# Patient Record
Sex: MALE | Race: WHITE | NOT HISPANIC OR LATINO | URBAN - METROPOLITAN AREA
[De-identification: names, ages, dates, MRNs, and addresses within clinical notes are randomized per-mention and may not be internally consistent; named-entity substitution may affect disease eponyms.]

---

## 2017-07-20 ENCOUNTER — OUTPATIENT (OUTPATIENT)
Dept: OUTPATIENT SERVICES | Facility: HOSPITAL | Age: 77
LOS: 1 days | End: 2017-07-20
Payer: MEDICARE

## 2017-07-20 ENCOUNTER — RESULT REVIEW (OUTPATIENT)
Age: 77
End: 2017-07-20

## 2017-07-20 VITALS
HEIGHT: 66 IN | SYSTOLIC BLOOD PRESSURE: 124 MMHG | RESPIRATION RATE: 14 BRPM | DIASTOLIC BLOOD PRESSURE: 77 MMHG | WEIGHT: 165.79 LBS | HEART RATE: 65 BPM | OXYGEN SATURATION: 96 % | TEMPERATURE: 99 F

## 2017-07-20 VITALS
RESPIRATION RATE: 18 BRPM | HEART RATE: 76 BPM | DIASTOLIC BLOOD PRESSURE: 76 MMHG | OXYGEN SATURATION: 97 % | SYSTOLIC BLOOD PRESSURE: 129 MMHG

## 2017-07-20 DIAGNOSIS — H27.01 APHAKIA, RIGHT EYE: ICD-10-CM

## 2017-07-20 DIAGNOSIS — T85.398D OTHER MECHANICAL COMPLICATION OF OTHER OCULAR PROSTHETIC DEVICES, IMPLANTS AND GRAFTS, SUBSEQUENT ENCOUNTER: ICD-10-CM

## 2017-07-20 DIAGNOSIS — Z90.49 ACQUIRED ABSENCE OF OTHER SPECIFIED PARTS OF DIGESTIVE TRACT: Chronic | ICD-10-CM

## 2017-07-20 DIAGNOSIS — Z98.49 CATARACT EXTRACTION STATUS, UNSPECIFIED EYE: Chronic | ICD-10-CM

## 2017-07-20 DIAGNOSIS — Z98.890 OTHER SPECIFIED POSTPROCEDURAL STATES: Chronic | ICD-10-CM

## 2017-07-20 PROCEDURE — 66986 EXCHANGE LENS PROSTHESIS: CPT | Mod: RT

## 2017-07-20 PROCEDURE — 88300 SURGICAL PATH GROSS: CPT

## 2017-07-20 PROCEDURE — 67036 REMOVAL OF INNER EYE FLUID: CPT | Mod: RT

## 2017-07-20 PROCEDURE — 88300 SURGICAL PATH GROSS: CPT | Mod: 26

## 2017-07-20 NOTE — ASU PATIENT PROFILE, ADULT - HEALTHCARE QUESTIONS, PROFILE
spoke with Dr. Fang and Dr. Rivas spoke with Dr. Fang and Dr. Chaidez from anesthesia prior to procedure

## 2017-07-20 NOTE — ASU PATIENT PROFILE, ADULT - PSH
Bunion    Carpal Tunnel Syndrome  right hand  Cataract extraction status  right  History of eye surgery  PPV right  Inguinal Hernia Bilateral    S/P eye surgery  "removed fluid" from rigth eye  Shoulder Problem  right rotator cuff  Sinus Disorder  surgery x 2  Status post lung surgery  ? wedge resection Bunion    Carpal Tunnel Syndrome  right hand  Cataract extraction status  right  History of colon resection    History of eye surgery  PPV right  Inguinal Hernia Bilateral    S/P eye surgery  "removed fluid" from rigth eye  Shoulder Problem  right rotator cuff  Sinus Disorder  surgery x 2  Status post lung surgery  ? wedge resection

## 2017-07-20 NOTE — ASU PATIENT PROFILE, ADULT - PMH
Anxiety    Asthma  Controlled  Depression    Diverticulitis    Nocturia    Panic attacks    Sleep Apnea  (+) CPAP use

## 2017-07-20 NOTE — ASU DISCHARGE PLAN (ADULT/PEDIATRIC). - SPECIAL INSTRUCTIONS
LEAVE PATCH AND SHIELD ON  FOLLOW UP TOMORROW IN OFFICE  CALL OFFICE [phone] IF NEEDED FOR EMERGENCY

## 2017-07-23 ENCOUNTER — TRANSCRIPTION ENCOUNTER (OUTPATIENT)
Age: 77
End: 2017-07-23

## 2018-05-07 ENCOUNTER — APPOINTMENT (OUTPATIENT)
Dept: FAMILY MEDICINE | Facility: CLINIC | Age: 78
End: 2018-05-07

## 2018-05-07 ENCOUNTER — APPOINTMENT (OUTPATIENT)
Dept: FAMILY MEDICINE | Facility: CLINIC | Age: 78
End: 2018-05-07
Payer: MEDICARE

## 2018-05-07 VITALS
OXYGEN SATURATION: 98 % | BODY MASS INDEX: 25.77 KG/M2 | SYSTOLIC BLOOD PRESSURE: 100 MMHG | HEIGHT: 68.5 IN | HEART RATE: 88 BPM | WEIGHT: 172 LBS | DIASTOLIC BLOOD PRESSURE: 60 MMHG

## 2018-05-07 DIAGNOSIS — K21.9 GASTRO-ESOPHAGEAL REFLUX DISEASE W/OUT ESOPHAGITIS: ICD-10-CM

## 2018-05-07 DIAGNOSIS — N32.81 OVERACTIVE BLADDER: ICD-10-CM

## 2018-05-07 DIAGNOSIS — E78.00 PURE HYPERCHOLESTEROLEMIA, UNSPECIFIED: ICD-10-CM

## 2018-05-07 PROCEDURE — 99204 OFFICE O/P NEW MOD 45 MIN: CPT

## 2018-05-07 PROCEDURE — 99214 OFFICE O/P EST MOD 30 MIN: CPT

## 2018-05-07 RX ORDER — ATORVASTATIN CALCIUM 40 MG/1
40 TABLET, FILM COATED ORAL
Refills: 0 | Status: ACTIVE | COMMUNITY

## 2018-05-07 RX ORDER — ESOMEPRAZOLE MAGNESIUM 40 MG/1
40 GRANULE, DELAYED RELEASE ORAL
Refills: 0 | Status: ACTIVE | COMMUNITY

## 2018-05-07 RX ORDER — OXYBUTYNIN CHLORIDE 5 MG/1
5 TABLET, EXTENDED RELEASE ORAL
Refills: 0 | Status: ACTIVE | COMMUNITY

## 2018-08-06 ENCOUNTER — APPOINTMENT (OUTPATIENT)
Dept: FAMILY MEDICINE | Facility: CLINIC | Age: 78
End: 2018-08-06
Payer: MEDICARE

## 2018-08-06 VITALS
SYSTOLIC BLOOD PRESSURE: 120 MMHG | DIASTOLIC BLOOD PRESSURE: 68 MMHG | HEART RATE: 70 BPM | WEIGHT: 171 LBS | BODY MASS INDEX: 25.62 KG/M2 | RESPIRATION RATE: 16 BRPM | OXYGEN SATURATION: 97 % | HEIGHT: 68.5 IN

## 2018-08-06 DIAGNOSIS — R53.83 OTHER MALAISE: ICD-10-CM

## 2018-08-06 DIAGNOSIS — Z00.00 ENCOUNTER FOR GENERAL ADULT MEDICAL EXAMINATION W/OUT ABNORMAL FINDINGS: ICD-10-CM

## 2018-08-06 DIAGNOSIS — F32.9 ANXIETY DISORDER, UNSPECIFIED: ICD-10-CM

## 2018-08-06 DIAGNOSIS — G89.29 DORSALGIA, UNSPECIFIED: ICD-10-CM

## 2018-08-06 DIAGNOSIS — R53.81 OTHER MALAISE: ICD-10-CM

## 2018-08-06 DIAGNOSIS — M54.9 DORSALGIA, UNSPECIFIED: ICD-10-CM

## 2018-08-06 DIAGNOSIS — F41.9 ANXIETY DISORDER, UNSPECIFIED: ICD-10-CM

## 2018-08-06 PROCEDURE — 93000 ELECTROCARDIOGRAM COMPLETE: CPT

## 2018-08-06 PROCEDURE — 36415 COLL VENOUS BLD VENIPUNCTURE: CPT

## 2018-08-06 PROCEDURE — G0438: CPT

## 2018-08-06 RX ORDER — ASPIRIN 81 MG
81 TABLET, DELAYED RELEASE (ENTERIC COATED) ORAL
Refills: 0 | Status: ACTIVE | COMMUNITY

## 2018-08-06 RX ORDER — SERTRALINE HYDROCHLORIDE 100 MG/1
100 TABLET, FILM COATED ORAL DAILY
Qty: 180 | Refills: 0 | Status: ACTIVE | COMMUNITY

## 2018-08-06 RX ORDER — ARIPIPRAZOLE 5 MG/1
5 TABLET ORAL DAILY
Qty: 90 | Refills: 0 | Status: ACTIVE | COMMUNITY

## 2018-08-06 NOTE — HEALTH RISK ASSESSMENT
[Good] : ~his/her~  mood as  good [] : No [No falls in past year] : Patient reported no falls in the past year [3] : 2) Feeling down, depressed, or hopeless for nearly every day (3) [[id number]] : 6 [Patient reported colonoscopy was normal] : Patient reported colonoscopy was normal [HIV Test offered] : HIV Test offered [Hepatitis C test offered] : Hepatitis C test offered [None] : None [With Significant Other] : lives with significant other [Retired] : retired [] :  [# Of Children ___] : has [unfilled] children [Fully functional (bathing, dressing, toileting, transferring, walking, feeding)] : Fully functional (bathing, dressing, toileting, transferring, walking, feeding) [Fully functional (using the telephone, shopping, preparing meals, housekeeping, doing laundry, using] : Fully functional and needs no help or supervision to perform IADLs (using the telephone, shopping, preparing meals, housekeeping, doing laundry, using transportation, managing medications and managing finances) [Reports changes in hearing] : Reports no changes in hearing [Reports changes in vision] : Reports no changes in vision [Reports changes in dental health] : Reports no changes in dental health [Smoke Detector] : smoke detector [Seat Belt] :  uses seat belt [Sunscreen] : does not use sunscreen [TB Exposure] : is not being exposed to tuberculosis [ColonoscopyDate] : 2015 [Discussed at today's visit] : Advance Directives Discussed at today's visit

## 2018-08-06 NOTE — COUNSELING
[Behavioral health counseling provided] : behavioral health  [Engage in a relaxing activity] : Engage in a relaxing activity [None] : None [Good understanding] : Patient has a good understanding of lifestyle changes and the steps needed to achieve self management goals

## 2018-08-06 NOTE — ASSESSMENT
[FreeTextEntry1] : Discussed diagnosis of anxiety and depression with the patient and potential outcomes/side affects of treatment versus non treatment. Medications were assessed and described at length. Side affects and black box warning were discussed.  Patient was advised to continue will all medications prescribed and the need for compliance was discussed and emphasized. Patient was advised to not stop medications without discussing with a health care provider first. Patient was advised to continue psychotherapy or seek therapy if not currently attending.  Patient verbalized understanding of all the above.\par will restart zoloft, 1 pill daily for two weeks and then two pills\par was stable and happy on previous regimine of zoloft 200 and abilify daily\par \par reflux - only uses meds as needed\par \par overactive bladder - controlled with oxybutynin\par \par chronic pain - will give percocet for only as needed\par \par

## 2018-08-06 NOTE — HISTORY OF PRESENT ILLNESS
[de-identified] : 78 year old male  here for annual well visit. Patient's blood work was drawn and medications reviewed. Patient's past medical history was reviewed, allergies verified and problems were identified and assessed. Patients medications were reviewed. \par \par Patient is not happy - very depressed and tired\par patient has a lot of family issues going on\par

## 2018-08-06 NOTE — PHYSICAL EXAM
[Well Nourished] : well nourished [Normal Oropharynx] : the oropharynx was normal [Clear to Auscultation] : lungs were clear to auscultation bilaterally [Regular Rhythm] : with a regular rhythm [Normal S1, S2] : normal S1 and S2 [Non Tender] : non-tender [No CVA Tenderness] : no CVA  tenderness [No Joint Swelling] : no joint swelling [No Rash] : no rash [Normal Gait] : normal gait [Normal Affect] : the affect was normal [Normal Insight/Judgement] : insight and judgment were intact

## 2018-08-07 LAB
ALBUMIN SERPL ELPH-MCNC: 4 G/DL
ALP BLD-CCNC: 78 U/L
ALT SERPL-CCNC: 15 U/L
ANION GAP SERPL CALC-SCNC: 15 MMOL/L
AST SERPL-CCNC: 21 U/L
BASOPHILS # BLD AUTO: 0.05 K/UL
BASOPHILS NFR BLD AUTO: 0.7 %
BILIRUB SERPL-MCNC: 0.6 MG/DL
BUN SERPL-MCNC: 19 MG/DL
CALCIUM SERPL-MCNC: 9.5 MG/DL
CHLORIDE SERPL-SCNC: 98 MMOL/L
CHOLEST SERPL-MCNC: 173 MG/DL
CHOLEST/HDLC SERPL: 3.7 RATIO
CO2 SERPL-SCNC: 28 MMOL/L
CREAT SERPL-MCNC: 1.17 MG/DL
EOSINOPHIL # BLD AUTO: 0.26 K/UL
EOSINOPHIL NFR BLD AUTO: 3.4 %
GLUCOSE SERPL-MCNC: 56 MG/DL
HBA1C MFR BLD HPLC: 5.6 %
HCT VFR BLD CALC: 44.4 %
HDLC SERPL-MCNC: 47 MG/DL
HGB BLD-MCNC: 13.6 G/DL
IMM GRANULOCYTES NFR BLD AUTO: 0.9 %
LDLC SERPL CALC-MCNC: 96 MG/DL
LYMPHOCYTES # BLD AUTO: 1.33 K/UL
LYMPHOCYTES NFR BLD AUTO: 17.6 %
MAN DIFF?: NORMAL
MCHC RBC-ENTMCNC: 27.1 PG
MCHC RBC-ENTMCNC: 30.6 GM/DL
MCV RBC AUTO: 88.6 FL
MONOCYTES # BLD AUTO: 0.83 K/UL
MONOCYTES NFR BLD AUTO: 11 %
NEUTROPHILS # BLD AUTO: 5.01 K/UL
NEUTROPHILS NFR BLD AUTO: 66.4 %
PLATELET # BLD AUTO: 182 K/UL
POTASSIUM SERPL-SCNC: 4.5 MMOL/L
PROT SERPL-MCNC: 7.5 G/DL
PSA SERPL-MCNC: 0.47 NG/ML
RBC # BLD: 5.01 M/UL
RBC # FLD: 14.7 %
SODIUM SERPL-SCNC: 141 MMOL/L
TRIGL SERPL-MCNC: 149 MG/DL
TSH SERPL-ACNC: 1.66 UIU/ML
WBC # FLD AUTO: 7.55 K/UL

## 2018-09-04 ENCOUNTER — MEDICATION RENEWAL (OUTPATIENT)
Age: 78
End: 2018-09-04

## 2018-09-04 RX ORDER — OXYCODONE AND ACETAMINOPHEN 5; 325 MG/1; MG/1
5-325 TABLET ORAL
Qty: 90 | Refills: 0 | Status: ACTIVE | COMMUNITY
Start: 2018-08-06 | End: 1900-01-01

## 2019-04-25 ENCOUNTER — INPATIENT (INPATIENT)
Facility: HOSPITAL | Age: 79
LOS: 30 days | DRG: 3 | End: 2019-05-26
Attending: STUDENT IN AN ORGANIZED HEALTH CARE EDUCATION/TRAINING PROGRAM | Admitting: INTERNAL MEDICINE
Payer: MEDICARE

## 2019-04-25 VITALS
RESPIRATION RATE: 16 BRPM | DIASTOLIC BLOOD PRESSURE: 78 MMHG | TEMPERATURE: 98 F | WEIGHT: 175.27 LBS | OXYGEN SATURATION: 99 % | SYSTOLIC BLOOD PRESSURE: 155 MMHG | HEIGHT: 68 IN | HEART RATE: 71 BPM

## 2019-04-25 DIAGNOSIS — Z90.49 ACQUIRED ABSENCE OF OTHER SPECIFIED PARTS OF DIGESTIVE TRACT: Chronic | ICD-10-CM

## 2019-04-25 DIAGNOSIS — Z98.49 CATARACT EXTRACTION STATUS, UNSPECIFIED EYE: Chronic | ICD-10-CM

## 2019-04-25 DIAGNOSIS — I25.10 ATHEROSCLEROTIC HEART DISEASE OF NATIVE CORONARY ARTERY WITHOUT ANGINA PECTORIS: ICD-10-CM

## 2019-04-25 DIAGNOSIS — Z98.890 OTHER SPECIFIED POSTPROCEDURAL STATES: Chronic | ICD-10-CM

## 2019-04-25 DIAGNOSIS — I20.9 ANGINA PECTORIS, UNSPECIFIED: ICD-10-CM

## 2019-04-25 LAB
ALBUMIN SERPL ELPH-MCNC: 4.1 G/DL — SIGNIFICANT CHANGE UP (ref 3.3–5)
ALP SERPL-CCNC: 61 U/L — SIGNIFICANT CHANGE UP (ref 40–120)
ALT FLD-CCNC: 18 U/L — SIGNIFICANT CHANGE UP (ref 10–45)
ANION GAP SERPL CALC-SCNC: 13 MMOL/L — SIGNIFICANT CHANGE UP (ref 5–17)
APPEARANCE UR: CLEAR — SIGNIFICANT CHANGE UP
APTT BLD: 29 SEC — SIGNIFICANT CHANGE UP (ref 27.5–36.3)
AST SERPL-CCNC: 24 U/L — SIGNIFICANT CHANGE UP (ref 10–40)
BACTERIA # UR AUTO: NEGATIVE — SIGNIFICANT CHANGE UP
BILIRUB SERPL-MCNC: 0.8 MG/DL — SIGNIFICANT CHANGE UP (ref 0.2–1.2)
BILIRUB UR-MCNC: NEGATIVE — SIGNIFICANT CHANGE UP
BLD GP AB SCN SERPL QL: NEGATIVE — SIGNIFICANT CHANGE UP
BUN SERPL-MCNC: 18 MG/DL — SIGNIFICANT CHANGE UP (ref 7–23)
CALCIUM SERPL-MCNC: 10.1 MG/DL — SIGNIFICANT CHANGE UP (ref 8.4–10.5)
CHLORIDE SERPL-SCNC: 103 MMOL/L — SIGNIFICANT CHANGE UP (ref 96–108)
CHOLEST SERPL-MCNC: 213 MG/DL — HIGH (ref 10–199)
CO2 SERPL-SCNC: 28 MMOL/L — SIGNIFICANT CHANGE UP (ref 22–31)
COLOR SPEC: YELLOW — SIGNIFICANT CHANGE UP
CREAT SERPL-MCNC: 1.06 MG/DL — SIGNIFICANT CHANGE UP (ref 0.5–1.3)
DIFF PNL FLD: NEGATIVE — SIGNIFICANT CHANGE UP
EPI CELLS # UR: 1 /HPF — SIGNIFICANT CHANGE UP
GLUCOSE SERPL-MCNC: 88 MG/DL — SIGNIFICANT CHANGE UP (ref 70–99)
GLUCOSE UR QL: NEGATIVE — SIGNIFICANT CHANGE UP
HBA1C BLD-MCNC: 5.4 % — SIGNIFICANT CHANGE UP (ref 4–5.6)
HCT VFR BLD CALC: 46.4 % — SIGNIFICANT CHANGE UP (ref 39–50)
HDLC SERPL-MCNC: 38 MG/DL — LOW
HGB BLD-MCNC: 15.3 G/DL — SIGNIFICANT CHANGE UP (ref 13–17)
HYALINE CASTS # UR AUTO: 0 /LPF — SIGNIFICANT CHANGE UP (ref 0–2)
INR BLD: 1.03 RATIO — SIGNIFICANT CHANGE UP (ref 0.88–1.16)
KETONES UR-MCNC: NEGATIVE — SIGNIFICANT CHANGE UP
LEUKOCYTE ESTERASE UR-ACNC: NEGATIVE — SIGNIFICANT CHANGE UP
LIPID PNL WITH DIRECT LDL SERPL: 146 MG/DL — HIGH
MCHC RBC-ENTMCNC: 28.9 PG — SIGNIFICANT CHANGE UP (ref 27–34)
MCHC RBC-ENTMCNC: 32.9 GM/DL — SIGNIFICANT CHANGE UP (ref 32–36)
MCV RBC AUTO: 87.7 FL — SIGNIFICANT CHANGE UP (ref 80–100)
NITRITE UR-MCNC: NEGATIVE — SIGNIFICANT CHANGE UP
NT-PROBNP SERPL-SCNC: 1061 PG/ML — HIGH (ref 0–300)
PH UR: 7 — SIGNIFICANT CHANGE UP (ref 5–8)
PLATELET # BLD AUTO: 166 K/UL — SIGNIFICANT CHANGE UP (ref 150–400)
POTASSIUM SERPL-MCNC: 4.7 MMOL/L — SIGNIFICANT CHANGE UP (ref 3.5–5.3)
POTASSIUM SERPL-SCNC: 4.7 MMOL/L — SIGNIFICANT CHANGE UP (ref 3.5–5.3)
PROT SERPL-MCNC: 7.6 G/DL — SIGNIFICANT CHANGE UP (ref 6–8.3)
PROT UR-MCNC: ABNORMAL
PROTHROM AB SERPL-ACNC: 11.7 SEC — SIGNIFICANT CHANGE UP (ref 10–12.9)
RBC # BLD: 5.29 M/UL — SIGNIFICANT CHANGE UP (ref 4.2–5.8)
RBC # FLD: 12.3 % — SIGNIFICANT CHANGE UP (ref 10.3–14.5)
RBC CASTS # UR COMP ASSIST: 1 /HPF — SIGNIFICANT CHANGE UP (ref 0–4)
RH IG SCN BLD-IMP: POSITIVE — SIGNIFICANT CHANGE UP
SODIUM SERPL-SCNC: 144 MMOL/L — SIGNIFICANT CHANGE UP (ref 135–145)
SP GR SPEC: >1.05 (ref 1.01–1.02)
T3 SERPL-MCNC: 91 NG/DL — SIGNIFICANT CHANGE UP (ref 80–200)
T4 AB SER-ACNC: 7.2 UG/DL — SIGNIFICANT CHANGE UP (ref 4.6–12)
TOTAL CHOLESTEROL/HDL RATIO MEASUREMENT: 5.6 RATIO — SIGNIFICANT CHANGE UP (ref 3.4–9.6)
TRIGL SERPL-MCNC: 147 MG/DL — SIGNIFICANT CHANGE UP (ref 10–149)
TSH SERPL-MCNC: 1.48 UIU/ML — SIGNIFICANT CHANGE UP (ref 0.27–4.2)
UROBILINOGEN FLD QL: NEGATIVE — SIGNIFICANT CHANGE UP
WBC # BLD: 6.9 K/UL — SIGNIFICANT CHANGE UP (ref 3.8–10.5)
WBC # FLD AUTO: 6.9 K/UL — SIGNIFICANT CHANGE UP (ref 3.8–10.5)
WBC UR QL: 3 /HPF — SIGNIFICANT CHANGE UP (ref 0–5)

## 2019-04-25 PROCEDURE — 93306 TTE W/DOPPLER COMPLETE: CPT | Mod: 26

## 2019-04-25 PROCEDURE — 93010 ELECTROCARDIOGRAM REPORT: CPT

## 2019-04-25 PROCEDURE — 99152 MOD SED SAME PHYS/QHP 5/>YRS: CPT | Mod: GC

## 2019-04-25 PROCEDURE — 93456 R HRT CORONARY ARTERY ANGIO: CPT | Mod: 26,GC

## 2019-04-25 PROCEDURE — 76937 US GUIDE VASCULAR ACCESS: CPT | Mod: 26,GC

## 2019-04-25 PROCEDURE — 33518 CABG ARTERY-VEIN TWO: CPT | Mod: AS

## 2019-04-25 PROCEDURE — 71045 X-RAY EXAM CHEST 1 VIEW: CPT | Mod: 26

## 2019-04-25 PROCEDURE — 33533 CABG ARTERIAL SINGLE: CPT | Mod: AS

## 2019-04-25 PROCEDURE — 93880 EXTRACRANIAL BILAT STUDY: CPT | Mod: 26

## 2019-04-25 PROCEDURE — 99221 1ST HOSP IP/OBS SF/LOW 40: CPT | Mod: 24

## 2019-04-25 RX ORDER — CEFUROXIME AXETIL 250 MG
1500 TABLET ORAL ONCE
Qty: 0 | Refills: 0 | Status: DISCONTINUED | OUTPATIENT
Start: 2019-04-25 | End: 2019-04-26

## 2019-04-25 RX ORDER — CHLORHEXIDINE GLUCONATE 213 G/1000ML
1 SOLUTION TOPICAL ONCE
Qty: 0 | Refills: 0 | Status: COMPLETED | OUTPATIENT
Start: 2019-04-26 | End: 2019-04-26

## 2019-04-25 RX ORDER — METOPROLOL TARTRATE 50 MG
1 TABLET ORAL
Qty: 0 | Refills: 0 | COMMUNITY

## 2019-04-25 RX ORDER — METOPROLOL TARTRATE 50 MG
25 TABLET ORAL DAILY
Qty: 0 | Refills: 0 | Status: DISCONTINUED | OUTPATIENT
Start: 2019-04-25 | End: 2019-04-26

## 2019-04-25 RX ORDER — ASPIRIN/CALCIUM CARB/MAGNESIUM 324 MG
1 TABLET ORAL
Qty: 0 | Refills: 0 | COMMUNITY

## 2019-04-25 RX ORDER — ESCITALOPRAM OXALATE 10 MG/1
1 TABLET, FILM COATED ORAL
Qty: 0 | Refills: 0 | COMMUNITY

## 2019-04-25 RX ORDER — ACETAMINOPHEN 500 MG
650 TABLET ORAL ONCE
Qty: 0 | Refills: 0 | Status: COMPLETED | OUTPATIENT
Start: 2019-04-25 | End: 2019-04-25

## 2019-04-25 RX ORDER — CHLORHEXIDINE GLUCONATE 213 G/1000ML
1 SOLUTION TOPICAL ONCE
Qty: 0 | Refills: 0 | Status: COMPLETED | OUTPATIENT
Start: 2019-04-25 | End: 2019-04-25

## 2019-04-25 RX ORDER — CHLORHEXIDINE GLUCONATE 213 G/1000ML
30 SOLUTION TOPICAL ONCE
Qty: 0 | Refills: 0 | Status: COMPLETED | OUTPATIENT
Start: 2019-04-25 | End: 2019-04-26

## 2019-04-25 RX ORDER — ESCITALOPRAM OXALATE 10 MG/1
10 TABLET, FILM COATED ORAL DAILY
Qty: 0 | Refills: 0 | Status: DISCONTINUED | OUTPATIENT
Start: 2019-04-25 | End: 2019-04-26

## 2019-04-25 RX ORDER — ASPIRIN/CALCIUM CARB/MAGNESIUM 324 MG
81 TABLET ORAL DAILY
Qty: 0 | Refills: 0 | Status: DISCONTINUED | OUTPATIENT
Start: 2019-04-25 | End: 2019-04-26

## 2019-04-25 RX ORDER — ATORVASTATIN CALCIUM 80 MG/1
40 TABLET, FILM COATED ORAL AT BEDTIME
Qty: 0 | Refills: 0 | Status: DISCONTINUED | OUTPATIENT
Start: 2019-04-25 | End: 2019-04-26

## 2019-04-25 RX ADMIN — Medication 650 MILLIGRAM(S): at 11:30

## 2019-04-25 RX ADMIN — Medication 81 MILLIGRAM(S): at 17:55

## 2019-04-25 RX ADMIN — Medication 650 MILLIGRAM(S): at 10:42

## 2019-04-25 RX ADMIN — Medication 25 MILLIGRAM(S): at 17:55

## 2019-04-25 RX ADMIN — ATORVASTATIN CALCIUM 40 MILLIGRAM(S): 80 TABLET, FILM COATED ORAL at 21:10

## 2019-04-25 RX ADMIN — ESCITALOPRAM OXALATE 10 MILLIGRAM(S): 10 TABLET, FILM COATED ORAL at 18:02

## 2019-04-25 RX ADMIN — CHLORHEXIDINE GLUCONATE 1 APPLICATION(S): 213 SOLUTION TOPICAL at 22:00

## 2019-04-25 NOTE — H&P CARDIOLOGY - PMH
Anxiety    Asthma  Controlled  Depression    Diverticulitis    Nocturia    Panic attacks    Sleep Apnea

## 2019-04-25 NOTE — H&P CARDIOLOGY - HISTORY OF PRESENT ILLNESS
This is a 79 yo   male with PMH of HLD, Sleep apnea ( non compliant with CPAP), GERD, diverticulitis, depression, anxiety, and panic attack. Denies significant PMH of heart disease but reports early CAD in family; mother  of MI at age 54 and uncle ( mothers brother) at age 37.  Presents to Dr Feng with c/c of chest discomfort associated with dyspnea ( on exertion after walking 100 feet)  and palpitations for the past 1 year.  CP is described as 'pinch like", intermittent, sporadic, lasting 1-2 min, localized in the left anterior chest non radiating; CP triggered by exercise and stress. Abnormal NST ( last week) : mild to moderate abnormal study with medium sized inferior and post- lateral ischemia without infarct, EF 50%.  Holter monitor revealed: frequent PVCs, ECHO revealed AR, concentric LVH, diastolic dysfunction, and MR. Referred here today for R and LHC. Presently asymptomatic, denies chest pain, dyspnea, dizziness, palpitations, N&V, HA, LE edema.       PCP: Marleny Ceja This is a 77 yo   male with PMH of HLD, Sleep apnea ( non compliant with CPAP), GERD, diverticulitis, depression, anxiety, and panic attack. Denies significant PMH of heart disease but reports early CAD in family; mother  of MI at age 54 and uncle ( mothers brother) at age 37.  Presents to Dr Feng with c/c of chest discomfort associated with dyspnea ( on exertion after walking 100 feet)  and palpitations for the past 1 year.  CP is described as 'pinch like", intermittent, sporadic, lasting 1-2 min, localized in the left anterior chest non radiating; CP triggered by exercise and stress. Abnormal NST ( last week) : mild to moderate abnormal study with medium sized inferior and post- lateral ischemia without infarct, EF 50%.  Holter monitor revealed: frequent PVCs, ECHO revealed AR, concentric LVH, diastolic dysfunction, and MRGuille Referred here today for R and LHC. Presently asymptomatic, denies chest pain, dyspnea, dizziness, palpitations, N&V, HA, LE edema.    OF NOTE: patient was started on Toprol 25mg PO daily however he never picked it up from pharmacy because he states" he did not know about it"    PCP: Marleny Ceja

## 2019-04-25 NOTE — CONSULT NOTE ADULT - ASSESSMENT
78 yr old male with complaints of 78 yr old male with complaints of intermittent chest pain and SOB for CABG in am

## 2019-04-25 NOTE — CONSULT NOTE ADULT - SUBJECTIVE AND OBJECTIVE BOX
History of Present Illness:  78y Male    Past Medical History  Diverticulitis  Nocturia  Panic attacks  Anxiety  Depression  Asthma: Controlled  Sleep Apnea      Past Surgical History  History of partial colectomy  History of colon resection  History of eye surgery: PPV right  Cataract extraction status: right  Status post lung surgery: ? wedge resection  S/P eye surgery: &quot;removed fluid&quot; from rigth eye  Bunion  Sinus Disorder: surgery x 2  Inguinal Hernia Bilateral  Shoulder Problem: right rotator cuff  Carpal Tunnel Syndrome: right hand      MEDICATIONS  (STANDING):  aspirin enteric coated 81 milliGRAM(s) Oral daily  atorvastatin 40 milliGRAM(s) Oral at bedtime  cefuroxime  IVPB 1500 milliGRAM(s) IV Intermittent once  chlorhexidine 0.12% Liquid 30 milliLiter(s) Swish and Spit once  chlorhexidine 4% Liquid 1 Application(s) Topical once  escitalopram 10 milliGRAM(s) Oral daily  metoprolol succinate ER 25 milliGRAM(s) Oral daily    MEDICATIONS  (PRN):    Antiplatelet therapy:                           Last dose/amt:    Allergies: No Known Allergies      SOCIAL HISTORY:  Smoker: [ ] Yes  [ ] No        PACK YEARS:                         WHEN QUIT?  ETOH use: [ ] Yes  [ ] No              FREQUENCY / QUANTITY:  Ilicit Drug use:  [ ] Yes  [ ] No  Occupation:  Live with:  Assist device use:    Relevant Family History  FAMILY HISTORY:  Family history of acute myocardial infarction (Sibling): mom at 54   mothers brother at 37      Review of Systems  GENERAL:  Fevers[] chills[] sweats[] fatigue[] weight loss[] weight gain []                                        NEURO:  parathesias[] seizures []  syncope []  confusion []                                                                                  EYES: glasses[]  blurry vision[]  discharge[] pain[] glaucoma []                                                                            ENMT:  difficulty hearing []  vertigo[]  dysphagia[] epistaxis[] recent dental work []                                      CV:  chest pain[] palpitations[] COLON [] diaphoresis [] edema[]                                                                                             RESPIRATORY:  wheezing[] SOB[] cough [] sputum[] hemoptysis[]                                                                    GI:  nausea[]  vomiting []  diarrhea[] constipation [] melena []                                                                        : hematuria[ ]  dysuria[ ] urgency[] incontinence[]                                                                                              MUSKULOSKELETAL:  arthritis[ ]  joint swelling [ ] muscle weakness [ ]                                                                  SKIN/BREAST:  rash[ ] itching [ ]  hair loss[ ] masses[ ]                                                                                                PSYCH:  dementia [ ] depresion [ ] anxiety[ ]                                                                                                                  HEME/LYMPH:  bruises easily[ ] enlarged lymph nodes[ ] tender lymph nodes[ ]                                                 ENDOCRINE:  cold intolerance[ ] heat intolerance[ ] polydipsia[ ]                                                                              PHYSICAL EXAM  Vital Signs Last 24 Hrs  T(C): 36.8 (25 Apr 2019 08:32), Max: 36.8 (25 Apr 2019 08:32)  T(F): 98.3 (25 Apr 2019 08:32), Max: 98.3 (25 Apr 2019 08:32)  HR: 71 (25 Apr 2019 08:32) (71 - 71)  BP: 155/78 (25 Apr 2019 08:32) (155/78 - 155/78)  BP(mean): 103 (25 Apr 2019 08:32) (103 - 103)  RR: 16 (25 Apr 2019 08:32) (16 - 16)  SpO2: 99% (25 Apr 2019 08:32) (99% - 99%)    General: Well nourished, well developed, no acute distress.                                                         Neuro: Normal exam oriented to person/place & time with no focal motor or sensory  deficits.                    Eyes: Normal exam of conjunctiva & lids, pupils equally reactive.   ENT: Normal exam of nasal/oral mucosa with absence of cyanosis.   Neck: Normal exam of jugular veins, trachea & thyroid.   Chest: Normal lung exam with good air movement absence of wheezes, rales, or rhonchi:                                                                          CV:  Auscultation: normal [ ] S3[ ] S4[ ] Irregular [ ] Rub[ ] Clicks[ ]  Murmurs none:[ ]systolic [ ]  diastolic [ ] holosystolic [ ]  Carotids: No Bruits[ ] Other____________ Abdominal Aorta: normal [ ] nonpalpable[ ]                                                                         GI: Normal exam of abdomen, liver & spleen with no noted masses or tenderness.                                                                                              Extremities: Normal no evidence of cyanosis or deformity Edema: none[ ]trace[ ]1+[ ]2+[ ]3+[ ]4+[ ]  Lower Extremity Pulses: Right[ ] Left[ ]Varicosities[ ]  SKIN : Normal exam to inspection & palation.                                                           LABS:                        15.3   6.9   )-----------( 166      ( 25 Apr 2019 09:18 )             46.4     04-25    144  |  103  |  18  ----------------------------<  88  4.7   |  28  |  1.06    Ca    10.1      25 Apr 2019 09:18    TPro  7.6  /  Alb  4.1  /  TBili  0.8  /  DBili  x   /  AST  24  /  ALT  18  /  AlkPhos  61  04-25    PT/INR - ( 25 Apr 2019 13:10 )   PT: 11.7 sec;   INR: 1.03 ratio         PTT - ( 25 Apr 2019 13:10 )  PTT:29.0 sec            Cardiac Cath:    TTE / DANIELA:  results to follow    Assessment:  78y Male presents with History of Present Illness:  78y Male   with complaints of intermittent chest pain and shortness of breath over a year now recent positive stress test now  s/p Cardiac cath revealing TVD.  H:  includes Rt vats procedure according to patient fot hemoptysis 10 yrs ago   colon rx for diverticulitis, rt rotator cuff repair  admit for CABG in am    Past Medical History  Diverticulitis  Nocturia  Panic attacks  Anxiety  Depression  Asthma: Controlled  Sleep Apnea      Past Surgical History  History of partial colectomy  History of colon resection  History of eye surgery: PPV right  Cataract extraction status: right  Status post lung surgery: ? wedge resection  S/P eye surgery: &quot;removed fluid&quot; from rigth eye  Bunion  Sinus Disorder: surgery x 2  Inguinal Hernia Bilateral  Shoulder Problem: right rotator cuff  Carpal Tunnel Syndrome: right hand      MEDICATIONS  (STANDING):  aspirin enteric coated 81 milliGRAM(s) Oral daily  atorvastatin 40 milliGRAM(s) Oral at bedtime  cefuroxime  IVPB 1500 milliGRAM(s) IV Intermittent once  chlorhexidine 0.12% Liquid 30 milliLiter(s) Swish and Spit once  chlorhexidine 4% Liquid 1 Application(s) Topical once  escitalopram 10 milliGRAM(s) Oral daily  metoprolol succinate ER 25 milliGRAM(s) Oral daily    MEDICATIONS  (PRN):    Antiplatelet therapy:                           Last dose/amt:    Allergies: No Known Allergies      SOCIAL HISTORY:  Smoker: No     ETOH use:  No        Ilicit Drug use: No    Live with:  wife      Relevant Family History  FAMILY HISTORY:  Family history of acute myocardial infarction (Sibling): mom at 54   mothers brother at 37      Review of Systems  GENERAL:    deniesFever sweats+  fatigue    weight loss                                     NEURO:    denies  parathesias,seizures ,  syncope ,                                                                                     EYES:   denies  glasses   denies   blurry vision discharge                                                                           ENMT:     denies difficulty hearing    denies  vertigo   denies  dysphagia                                     CV:   intermittent chest pain    denies  palpitations+ COLON                                                                                           RESPIRATORY:  + SOB[   denies cough                                                                    GI:    H colon rx  for diverticulitis   denies nausea/   vomiting                                                                      :   denies hematuria/   dysuria   denies urgency[] incontinence[]                                                                                              MUSKULOSKELETAL:    rt shoulder pain   denies/ arthriti  joint swelling                                                                   SKIN/BREAST:  rash[ ] itching [ ]  hair loss[ ] masses[ ]                                                                                                PSYCH:     denies dementia                                                                                                                 HEME/LYMPH:   denies  bruises easil    denies  enlarged lymph node                                                 ENDOCRINE:  cold intolerance[ ] heat intolerance[ ] polydipsia[ ]                                                                              PHYSICAL EXAM  Vital Signs Last 24 Hrs  T(C): 36.8 (25 Apr 2019 08:32), Max: 36.8 (25 Apr 2019 08:32)  T(F): 98.3 (25 Apr 2019 08:32), Max: 98.3 (25 Apr 2019 08:32)  HR: 71 (25 Apr 2019 08:32) (71 - 71)  BP: 155/78 (25 Apr 2019 08:32) (155/78 - 155/78)  BP(mean): 103 (25 Apr 2019 08:32) (103 - 103)  RR: 16 (25 Apr 2019 08:32) (16 - 16)  SpO2: 99% (25 Apr 2019 08:32) (99% - 99%)    General: Well nourished, well developed, no acute distress.                                                         Neuro: Normal exam oriented to person/place & time with no focal motor or sensory  deficits.                    Eyes: Normal exam of conjunctiva & lids, pupils equally reactive.   ENT: Normal exam of nasal/oral mucosa with absence of cyanosis.   Neck: Normal exam of jugular veins, trachea & thyroid.   Chest: Normal lung exam with good air movement absence of wheezes, rales, or rhonchi:            rt vats incisions   CDI                                                           CV:  Auscultation: normal [x ] S3[ ] S4[ ] Irregular [ ] Rub[ ] Clicks[ ]  Murmurs none:[x  Carotids: No Bruits[x ] Other____________ Abdominal Aorta: normanonpalpable[x ]                                                                         GI: Normal exam of abdomen, liver & spleen with no noted masses or tenderness.                                                                                              Extremities: Normal no evidence of cyanosis or deformity Edema: none[x ]trace  Lower Extremity Pulses: Right[ ] Left[ ]Varicosities[ ]  SKIN : Normal exam to inspection & palation.                                                           LABS:                        15.3   6.9   )-----------( 166      ( 25 Apr 2019 09:18 )             46.4     04-25    144  |  103  |  18  ----------------------------<  88  4.7   |  28  |  1.06    Ca    10.1      25 Apr 2019 09:18    TPro  7.6  /  Alb  4.1  /  TBili  0.8  /  DBili  x   /  AST  24  /  ALT  18  /  AlkPhos  61  04-25    PT/INR - ( 25 Apr 2019 13:10 )   PT: 11.7 sec;   INR: 1.03 ratio         PTT - ( 25 Apr 2019 13:10 )  PTT:29.0 sec            Cardiac Cath:    TTE / DANIELA:  results to follow    Assessment:  78y Male presents with History of Present Illness:  78y Male   with complaints of intermittent chest pain and shortness of breath over a year now recent positive stress test now  s/p Cardiac cath revealing TVD.  H:  includes Rt vats procedure according to patient fot hemoptysis 10 yrs ago   colon rx for diverticulitis, rt rotator cuff repair  admit for CABG in am    Past Medical History  Diverticulitis  Nocturia  Panic attacks  Anxiety  Depression  Asthma: Controlled  Sleep Apnea      Past Surgical History  History of partial colectomy  History of colon resection  History of eye surgery: PPV right  Cataract extraction status: right  Status post lung surgery: ? wedge resection  S/P eye surgery: &quot;removed fluid&quot; from rigth eye  Bunion  Sinus Disorder: surgery x 2  Inguinal Hernia Bilateral  Shoulder Problem: right rotator cuff  Carpal Tunnel Syndrome: right hand      MEDICATIONS  (STANDING):  aspirin enteric coated 81 milliGRAM(s) Oral daily  atorvastatin 40 milliGRAM(s) Oral at bedtime  cefuroxime  IVPB 1500 milliGRAM(s) IV Intermittent once  chlorhexidine 0.12% Liquid 30 milliLiter(s) Swish and Spit once  chlorhexidine 4% Liquid 1 Application(s) Topical once  escitalopram 10 milliGRAM(s) Oral daily  metoprolol succinate ER 25 milliGRAM(s) Oral daily    MEDICATIONS  (PRN):      Allergies: No Known Allergies      SOCIAL HISTORY:  Smoker: No     ETOH use:  No        Ilicit Drug use: No    Live with:  wife      Relevant Family History  FAMILY HISTORY:  Family history of acute myocardial infarction (Sibling): mom at 54   mothers brother at 37      Review of Systems  GENERAL:    deniesFever sweats+  fatigue    weight loss                                     NEURO:    denies  parathesias,seizures ,  syncope ,                                                                                     EYES:   denies  glasses   denies   blurry vision discharge                                                                           ENMT:     denies difficulty hearing    denies  vertigo   denies  dysphagia                                     CV:   intermittent chest pain    denies  palpitations+ COLON                                                                                           RESPIRATORY:  + SOB[   denies cough                                                                    GI:    H colon rx  for diverticulitis   denies nausea/   vomiting                                                                      :   denies hematuria/   dysuria   denies urgency[] incontinence[]                                                                                              MUSKULOSKELETAL:    rt shoulder pain   denies/ arthriti  joint swelling                                                                   SKIN/BREAST:  rash[ ] itching [ ]  hair loss[ ] masses[ ]                                                                                                PSYCH:     denies dementia                                                                                                                 HEME/LYMPH:   denies  bruises easil    denies  enlarged lymph node                                                 ENDOCRINE:  cold intolerance[ ] heat intolerance[ ] polydipsia[ ]                                                                              PHYSICAL EXAM  Vital Signs Last 24 Hrs  T(C): 36.8 (25 Apr 2019 08:32), Max: 36.8 (25 Apr 2019 08:32)  T(F): 98.3 (25 Apr 2019 08:32), Max: 98.3 (25 Apr 2019 08:32)  HR: 71 (25 Apr 2019 08:32) (71 - 71)  BP: 155/78 (25 Apr 2019 08:32) (155/78 - 155/78)  BP(mean): 103 (25 Apr 2019 08:32) (103 - 103)  RR: 16 (25 Apr 2019 08:32) (16 - 16)  SpO2: 99% (25 Apr 2019 08:32) (99% - 99%)    General: Well nourished, well developed, no acute distress.                                                         Neuro: Normal exam oriented to person/place & time with no focal motor or sensory  deficits.                    Eyes: Normal exam of conjunctiva & lids, pupils equally reactive.   ENT: Normal exam of nasal/oral mucosa with absence of cyanosis.   Neck: Normal exam of jugular veins, trachea & thyroid.   Chest: Normal lung exam with good air movement absence of wheezes, rales, or rhonchi:            rt vats incisions   CDI                                                           CV:  Auscultation: normal [x ] S3[ ] S4[ ] Irregular [ ] Rub[ ] Clicks[ ]  Murmurs none:[x  Carotids: No Bruits[x ] Other____________ Abdominal Aorta: normanonpalpable[x ]                                                                         GI: Normal exam of abdomen, liver & spleen with no noted masses or tenderness.                                                                                              Extremities: Normal no evidence of cyanosis or deformity Edema: none[x ]trace  Lower Extremity Pulses: Right[ ] Left[ ]Varicosities[ ]  SKIN : Normal exam to inspection & palation.                                                           LABS:                        15.3   6.9   )-----------( 166      ( 25 Apr 2019 09:18 )             46.4     04-25    144  |  103  |  18  ----------------------------<  88  4.7   |  28  |  1.06    Ca    10.1      25 Apr 2019 09:18    TPro  7.6  /  Alb  4.1  /  TBili  0.8  /  DBili  x   /  AST  24  /  ALT  18  /  AlkPhos  61  04-25    PT/INR - ( 25 Apr 2019 13:10 )   PT: 11.7 sec;   INR: 1.03 ratio         PTT - ( 25 Apr 2019 13:10 )  PTT:29.0 sec            Cardiac Cath:MEDICATIONS GIVEN: Fentanyl, 25 mcg, IV. Midazolam, 1 mg, IV.  CORONARY VESSELS: The coronary circulation is right dominant.  LM:   --  Proximal left main: There was a 80 % stenosis.  LAD:   --  Mid LAD: There was a 85 % stenosis.  CX:   --  Proximal circumflex: There was a 100 % stenosis.  RCA:   --  Mid RCA: There was a 90 % stenosis.    TTE / DANIELA:  results to follow

## 2019-04-25 NOTE — H&P CARDIOLOGY - PSH
Bunion    Carpal Tunnel Syndrome  right hand  Cataract extraction status  right  History of colon resection    History of eye surgery  PPV right  History of partial colectomy    Inguinal Hernia Bilateral    S/P eye surgery  "removed fluid" from rigth eye  Shoulder Problem  right rotator cuff  Sinus Disorder  surgery x 2  Status post lung surgery  ? wedge resection

## 2019-04-25 NOTE — H&P CARDIOLOGY - FAMILY HISTORY
Mother  Still living? Unknown  Family history of acute myocardial infarction, Age at diagnosis: Age Unknown     Sibling  Still living? Unknown  Family history of acute myocardial infarction, Age at diagnosis: Age Unknown

## 2019-04-26 ENCOUNTER — APPOINTMENT (OUTPATIENT)
Dept: CARDIOTHORACIC SURGERY | Facility: HOSPITAL | Age: 79
End: 2019-04-26

## 2019-04-26 LAB
ALBUMIN SERPL ELPH-MCNC: 3 G/DL — LOW (ref 3.3–5)
ALBUMIN SERPL ELPH-MCNC: 3.8 G/DL — SIGNIFICANT CHANGE UP (ref 3.3–5)
ALP SERPL-CCNC: 27 U/L — LOW (ref 40–120)
ALP SERPL-CCNC: 58 U/L — SIGNIFICANT CHANGE UP (ref 40–120)
ALT FLD-CCNC: 13 U/L — SIGNIFICANT CHANGE UP (ref 10–45)
ALT FLD-CCNC: 17 U/L — SIGNIFICANT CHANGE UP (ref 10–45)
ANION GAP SERPL CALC-SCNC: 14 MMOL/L — SIGNIFICANT CHANGE UP (ref 5–17)
APTT BLD: 32.2 SEC — SIGNIFICANT CHANGE UP (ref 27.5–36.3)
APTT BLD: 34.6 SEC — SIGNIFICANT CHANGE UP (ref 27.5–36.3)
AST SERPL-CCNC: 22 U/L — SIGNIFICANT CHANGE UP (ref 10–40)
AST SERPL-CCNC: 32 U/L — SIGNIFICANT CHANGE UP (ref 10–40)
BASE EXCESS BLDV CALC-SCNC: -0.2 MMOL/L — SIGNIFICANT CHANGE UP (ref -2–2)
BASE EXCESS BLDV CALC-SCNC: -0.4 MMOL/L — SIGNIFICANT CHANGE UP (ref -2–2)
BASE EXCESS BLDV CALC-SCNC: -0.5 MMOL/L — SIGNIFICANT CHANGE UP (ref -2–2)
BASE EXCESS BLDV CALC-SCNC: -1.6 MMOL/L — SIGNIFICANT CHANGE UP (ref -2–2)
BASE EXCESS BLDV CALC-SCNC: -2.8 MMOL/L — LOW (ref -2–2)
BASE EXCESS BLDV CALC-SCNC: 0 MMOL/L — SIGNIFICANT CHANGE UP (ref -2–2)
BASE EXCESS BLDV CALC-SCNC: 0.2 MMOL/L — SIGNIFICANT CHANGE UP (ref -2–2)
BASE EXCESS BLDV CALC-SCNC: 2.2 MMOL/L — HIGH (ref -2–2)
BASOPHILS # BLD AUTO: 0 K/UL — SIGNIFICANT CHANGE UP (ref 0–0.2)
BILIRUB DIRECT SERPL-MCNC: 0.1 MG/DL — SIGNIFICANT CHANGE UP (ref 0–0.2)
BILIRUB INDIRECT FLD-MCNC: 0.4 MG/DL — SIGNIFICANT CHANGE UP (ref 0.2–1)
BILIRUB SERPL-MCNC: 0.5 MG/DL — SIGNIFICANT CHANGE UP (ref 0.2–1.2)
BILIRUB SERPL-MCNC: 1.2 MG/DL — SIGNIFICANT CHANGE UP (ref 0.2–1.2)
BLD GP AB SCN SERPL QL: NEGATIVE — SIGNIFICANT CHANGE UP
BLOOD GAS VENOUS - CREATININE: SIGNIFICANT CHANGE UP MG/DL (ref 0.5–1.3)
BUN SERPL-MCNC: 16 MG/DL — SIGNIFICANT CHANGE UP (ref 7–23)
CA-I SERPL-SCNC: 0.96 MMOL/L — LOW (ref 1.12–1.3)
CA-I SERPL-SCNC: 1.03 MMOL/L — LOW (ref 1.12–1.3)
CA-I SERPL-SCNC: 1.04 MMOL/L — LOW (ref 1.12–1.3)
CA-I SERPL-SCNC: 1.08 MMOL/L — LOW (ref 1.12–1.3)
CALCIUM SERPL-MCNC: 8 MG/DL — LOW (ref 8.4–10.5)
CHLORIDE BLDV-SCNC: SIGNIFICANT CHANGE UP MMOL/L (ref 96–108)
CHLORIDE SERPL-SCNC: 104 MMOL/L — SIGNIFICANT CHANGE UP (ref 96–108)
CK MB BLD-MCNC: 16 % — HIGH (ref 0–3.5)
CK MB CFR SERPL CALC: 32.5 NG/ML — HIGH (ref 0–6.7)
CK SERPL-CCNC: 203 U/L — HIGH (ref 30–200)
CO2 BLDV-SCNC: 26 MMOL/L — SIGNIFICANT CHANGE UP (ref 22–30)
CO2 BLDV-SCNC: 26 MMOL/L — SIGNIFICANT CHANGE UP (ref 22–30)
CO2 BLDV-SCNC: 28 MMOL/L — SIGNIFICANT CHANGE UP (ref 22–30)
CO2 BLDV-SCNC: 29 MMOL/L — SIGNIFICANT CHANGE UP (ref 22–30)
CO2 SERPL-SCNC: 22 MMOL/L — SIGNIFICANT CHANGE UP (ref 22–31)
CREAT SERPL-MCNC: 0.78 MG/DL — SIGNIFICANT CHANGE UP (ref 0.5–1.3)
EOSINOPHIL # BLD AUTO: 0.2 K/UL — SIGNIFICANT CHANGE UP (ref 0–0.5)
EOSINOPHIL NFR BLD AUTO: 2 % — SIGNIFICANT CHANGE UP (ref 0–6)
FIBRINOGEN PPP-MCNC: 224 MG/DL — LOW (ref 350–510)
FIBRINOGEN PPP-MCNC: 417 MG/DL — SIGNIFICANT CHANGE UP (ref 350–510)
FIBRINOGEN PPP-MCNC: 437 MG/DL — SIGNIFICANT CHANGE UP (ref 350–510)
GAS PNL BLDA: SIGNIFICANT CHANGE UP
GAS PNL BLDV: 137 MMOL/L — SIGNIFICANT CHANGE UP (ref 136–145)
GAS PNL BLDV: 137 MMOL/L — SIGNIFICANT CHANGE UP (ref 136–145)
GAS PNL BLDV: 138 MMOL/L — SIGNIFICANT CHANGE UP (ref 136–145)
GAS PNL BLDV: 138 MMOL/L — SIGNIFICANT CHANGE UP (ref 136–145)
GAS PNL BLDV: SIGNIFICANT CHANGE UP
GLUCOSE BLDC GLUCOMTR-MCNC: 114 MG/DL — HIGH (ref 70–99)
GLUCOSE BLDV-MCNC: 109 MG/DL — HIGH (ref 70–99)
GLUCOSE BLDV-MCNC: 113 MG/DL — HIGH (ref 70–99)
GLUCOSE BLDV-MCNC: 125 MG/DL — HIGH (ref 70–99)
GLUCOSE BLDV-MCNC: 127 MG/DL — HIGH (ref 70–99)
GLUCOSE SERPL-MCNC: 116 MG/DL — HIGH (ref 70–99)
HCO3 BLDV-SCNC: 24 MMOL/L — SIGNIFICANT CHANGE UP (ref 21–29)
HCO3 BLDV-SCNC: 25 MMOL/L — SIGNIFICANT CHANGE UP (ref 21–29)
HCO3 BLDV-SCNC: 26 MMOL/L — SIGNIFICANT CHANGE UP (ref 21–29)
HCO3 BLDV-SCNC: 28 MMOL/L — SIGNIFICANT CHANGE UP (ref 21–29)
HCT VFR BLD CALC: 23.5 % — LOW (ref 39–50)
HCT VFR BLD CALC: 29 % — LOW (ref 39–50)
HCT VFR BLDA CALC: 26 % — LOW (ref 39–50)
HCT VFR BLDA CALC: 30 % — LOW (ref 39–50)
HCT VFR BLDA CALC: 31 % — LOW (ref 39–50)
HCT VFR BLDA CALC: 32 % — LOW (ref 39–50)
HGB BLD CALC-MCNC: 10.2 G/DL — LOW (ref 13–17)
HGB BLD CALC-MCNC: 8.4 G/DL — LOW (ref 13–17)
HGB BLD CALC-MCNC: 9.6 G/DL — LOW (ref 13–17)
HGB BLD CALC-MCNC: 9.9 G/DL — LOW (ref 13–17)
HGB BLD-MCNC: 10 G/DL — LOW (ref 13–17)
HGB BLD-MCNC: 8.4 G/DL — LOW (ref 13–17)
HOROWITZ INDEX BLDV+IHG-RTO: 0 — SIGNIFICANT CHANGE UP
HOROWITZ INDEX BLDV+IHG-RTO: 100 — SIGNIFICANT CHANGE UP
HOROWITZ INDEX BLDV+IHG-RTO: 60 — SIGNIFICANT CHANGE UP
HOROWITZ INDEX BLDV+IHG-RTO: 60 — SIGNIFICANT CHANGE UP
HOROWITZ INDEX BLDV+IHG-RTO: 70 — SIGNIFICANT CHANGE UP
INR BLD: 1.28 RATIO — HIGH (ref 0.88–1.16)
INR BLD: 1.56 RATIO — HIGH (ref 0.88–1.16)
LACTATE BLDV-MCNC: 1 MMOL/L — SIGNIFICANT CHANGE UP (ref 0.7–2)
LACTATE BLDV-MCNC: 1.1 MMOL/L — SIGNIFICANT CHANGE UP (ref 0.7–2)
LACTATE BLDV-MCNC: 1.1 MMOL/L — SIGNIFICANT CHANGE UP (ref 0.7–2)
LACTATE BLDV-MCNC: 1.3 MMOL/L — SIGNIFICANT CHANGE UP (ref 0.7–2)
LYMPHOCYTES # BLD AUTO: 1.3 K/UL — SIGNIFICANT CHANGE UP (ref 1–3.3)
LYMPHOCYTES # BLD AUTO: 5 % — LOW (ref 13–44)
MAGNESIUM SERPL-MCNC: 2.5 MG/DL — SIGNIFICANT CHANGE UP (ref 1.6–2.6)
MCHC RBC-ENTMCNC: 30 PG — SIGNIFICANT CHANGE UP (ref 27–34)
MCHC RBC-ENTMCNC: 31.2 PG — SIGNIFICANT CHANGE UP (ref 27–34)
MCHC RBC-ENTMCNC: 34.5 GM/DL — SIGNIFICANT CHANGE UP (ref 32–36)
MCHC RBC-ENTMCNC: 35.5 GM/DL — SIGNIFICANT CHANGE UP (ref 32–36)
MCV RBC AUTO: 87 FL — SIGNIFICANT CHANGE UP (ref 80–100)
MCV RBC AUTO: 87.9 FL — SIGNIFICANT CHANGE UP (ref 80–100)
MONOCYTES # BLD AUTO: 1.4 K/UL — HIGH (ref 0–0.9)
MONOCYTES NFR BLD AUTO: 7 % — SIGNIFICANT CHANGE UP (ref 2–14)
MRSA PCR RESULT.: SIGNIFICANT CHANGE UP
NEUTROPHILS # BLD AUTO: 15.7 K/UL — HIGH (ref 1.8–7.4)
NEUTROPHILS NFR BLD AUTO: 82 % — HIGH (ref 43–77)
PA ADP PRP-ACNC: 236 PRU — SIGNIFICANT CHANGE UP (ref 194–417)
PCO2 BLDV: 40 MMHG — SIGNIFICANT CHANGE UP (ref 35–50)
PCO2 BLDV: 41 MMHG — SIGNIFICANT CHANGE UP (ref 35–50)
PCO2 BLDV: 44 MMHG — SIGNIFICANT CHANGE UP (ref 35–50)
PCO2 BLDV: 48 MMHG — SIGNIFICANT CHANGE UP (ref 35–50)
PCO2 BLDV: 50 MMHG — SIGNIFICANT CHANGE UP (ref 35–50)
PCO2 BLDV: 51 MMHG — HIGH (ref 35–50)
PCO2 BLDV: 54 MMHG — HIGH (ref 35–50)
PCO2 BLDV: 57 MMHG — HIGH (ref 35–50)
PH BLDV: 7.25 — LOW (ref 7.35–7.45)
PH BLDV: 7.3 — LOW (ref 7.35–7.45)
PH BLDV: 7.31 — LOW (ref 7.35–7.45)
PH BLDV: 7.34 — LOW (ref 7.35–7.45)
PH BLDV: 7.36 — SIGNIFICANT CHANGE UP (ref 7.35–7.45)
PH BLDV: 7.36 — SIGNIFICANT CHANGE UP (ref 7.35–7.45)
PH BLDV: 7.39 — SIGNIFICANT CHANGE UP (ref 7.35–7.45)
PH BLDV: 7.4 — SIGNIFICANT CHANGE UP (ref 7.35–7.45)
PHOSPHATE SERPL-MCNC: 2.5 MG/DL — SIGNIFICANT CHANGE UP (ref 2.5–4.5)
PLATELET # BLD AUTO: 102 K/UL — LOW (ref 150–400)
PLATELET # BLD AUTO: 74 K/UL — LOW (ref 150–400)
PO2 BLDV: 29 MMHG — SIGNIFICANT CHANGE UP (ref 25–45)
PO2 BLDV: 31 MMHG — SIGNIFICANT CHANGE UP (ref 25–45)
PO2 BLDV: 33 MMHG — SIGNIFICANT CHANGE UP (ref 25–45)
PO2 BLDV: 38 MMHG — SIGNIFICANT CHANGE UP (ref 25–45)
PO2 BLDV: 52 MMHG — HIGH (ref 25–45)
PO2 BLDV: 74 MMHG — HIGH (ref 25–45)
PO2 BLDV: 87 MMHG — HIGH (ref 25–45)
PO2 BLDV: 87 MMHG — HIGH (ref 25–45)
POTASSIUM BLDV-SCNC: 4.5 MMOL/L — SIGNIFICANT CHANGE UP (ref 3.5–5)
POTASSIUM BLDV-SCNC: 4.5 MMOL/L — SIGNIFICANT CHANGE UP (ref 3.5–5)
POTASSIUM BLDV-SCNC: 4.6 MMOL/L — SIGNIFICANT CHANGE UP (ref 3.5–5)
POTASSIUM BLDV-SCNC: 4.9 MMOL/L — SIGNIFICANT CHANGE UP (ref 3.5–5)
POTASSIUM SERPL-MCNC: 4.4 MMOL/L — SIGNIFICANT CHANGE UP (ref 3.5–5.3)
POTASSIUM SERPL-SCNC: 4.4 MMOL/L — SIGNIFICANT CHANGE UP (ref 3.5–5.3)
PROT SERPL-MCNC: 4.5 G/DL — LOW (ref 6–8.3)
PROT SERPL-MCNC: 7.1 G/DL — SIGNIFICANT CHANGE UP (ref 6–8.3)
PROTHROM AB SERPL-ACNC: 14.8 SEC — HIGH (ref 10–12.9)
PROTHROM AB SERPL-ACNC: 18.2 SEC — HIGH (ref 10–12.9)
RBC # BLD: 2.68 M/UL — LOW (ref 4.2–5.8)
RBC # BLD: 3.33 M/UL — LOW (ref 4.2–5.8)
RBC # FLD: 11.9 % — SIGNIFICANT CHANGE UP (ref 10.3–14.5)
RBC # FLD: 12.4 % — SIGNIFICANT CHANGE UP (ref 10.3–14.5)
RH IG SCN BLD-IMP: POSITIVE — SIGNIFICANT CHANGE UP
S AUREUS DNA NOSE QL NAA+PROBE: DETECTED
SAO2 % BLDV: 48 % — LOW (ref 67–88)
SAO2 % BLDV: 52 % — LOW (ref 67–88)
SAO2 % BLDV: 54 % — LOW (ref 67–88)
SAO2 % BLDV: 63 % — LOW (ref 67–88)
SAO2 % BLDV: 85 % — SIGNIFICANT CHANGE UP (ref 67–88)
SAO2 % BLDV: 94 % — HIGH (ref 67–88)
SAO2 % BLDV: 96 % — HIGH (ref 67–88)
SAO2 % BLDV: 96 % — HIGH (ref 67–88)
SODIUM SERPL-SCNC: 140 MMOL/L — SIGNIFICANT CHANGE UP (ref 135–145)
TROPONIN T, HIGH SENSITIVITY RESULT: 570 NG/L — HIGH (ref 0–51)
WBC # BLD: 13.1 K/UL — HIGH (ref 3.8–10.5)
WBC # BLD: 18.6 K/UL — HIGH (ref 3.8–10.5)
WBC # FLD AUTO: 13.1 K/UL — HIGH (ref 3.8–10.5)
WBC # FLD AUTO: 18.6 K/UL — HIGH (ref 3.8–10.5)

## 2019-04-26 PROCEDURE — 93010 ELECTROCARDIOGRAM REPORT: CPT

## 2019-04-26 PROCEDURE — 99291 CRITICAL CARE FIRST HOUR: CPT

## 2019-04-26 PROCEDURE — 71045 X-RAY EXAM CHEST 1 VIEW: CPT | Mod: 26

## 2019-04-26 PROCEDURE — 33518 CABG ARTERY-VEIN TWO: CPT

## 2019-04-26 PROCEDURE — 33533 CABG ARTERIAL SINGLE: CPT

## 2019-04-26 RX ORDER — POTASSIUM CHLORIDE 20 MEQ
10 PACKET (EA) ORAL
Qty: 0 | Refills: 0 | Status: COMPLETED | OUTPATIENT
Start: 2019-04-26 | End: 2019-04-26

## 2019-04-26 RX ORDER — ALBUMIN HUMAN 25 %
250 VIAL (ML) INTRAVENOUS ONCE
Qty: 0 | Refills: 0 | Status: COMPLETED | OUTPATIENT
Start: 2019-04-26 | End: 2019-04-26

## 2019-04-26 RX ORDER — NOREPINEPHRINE BITARTRATE/D5W 8 MG/250ML
0.02 PLASTIC BAG, INJECTION (ML) INTRAVENOUS
Qty: 8 | Refills: 0 | Status: DISCONTINUED | OUTPATIENT
Start: 2019-04-26 | End: 2019-05-10

## 2019-04-26 RX ORDER — MEPERIDINE HYDROCHLORIDE 50 MG/ML
25 INJECTION INTRAMUSCULAR; INTRAVENOUS; SUBCUTANEOUS ONCE
Qty: 0 | Refills: 0 | Status: DISCONTINUED | OUTPATIENT
Start: 2019-04-26 | End: 2019-04-27

## 2019-04-26 RX ORDER — VASOPRESSIN 20 [USP'U]/ML
0.08 INJECTION INTRAVENOUS
Qty: 50 | Refills: 0 | Status: DISCONTINUED | OUTPATIENT
Start: 2019-04-26 | End: 2019-05-26

## 2019-04-26 RX ORDER — ASPIRIN/CALCIUM CARB/MAGNESIUM 324 MG
325 TABLET ORAL DAILY
Qty: 0 | Refills: 0 | Status: DISCONTINUED | OUTPATIENT
Start: 2019-04-26 | End: 2019-04-28

## 2019-04-26 RX ORDER — SODIUM CHLORIDE 9 MG/ML
500 INJECTION, SOLUTION INTRAVENOUS ONCE
Qty: 0 | Refills: 0 | Status: COMPLETED | OUTPATIENT
Start: 2019-04-26 | End: 2019-04-26

## 2019-04-26 RX ORDER — DEXTROSE 50 % IN WATER 50 %
25 SYRINGE (ML) INTRAVENOUS
Qty: 0 | Refills: 0 | Status: DISCONTINUED | OUTPATIENT
Start: 2019-04-26 | End: 2019-04-27

## 2019-04-26 RX ORDER — DEXTROSE 50 % IN WATER 50 %
50 SYRINGE (ML) INTRAVENOUS
Qty: 0 | Refills: 0 | Status: DISCONTINUED | OUTPATIENT
Start: 2019-04-26 | End: 2019-04-27

## 2019-04-26 RX ORDER — POTASSIUM CHLORIDE 20 MEQ
10 PACKET (EA) ORAL
Qty: 0 | Refills: 0 | Status: DISCONTINUED | OUTPATIENT
Start: 2019-04-26 | End: 2019-04-27

## 2019-04-26 RX ORDER — INSULIN HUMAN 100 [IU]/ML
2 INJECTION, SOLUTION SUBCUTANEOUS
Qty: 100 | Refills: 0 | Status: DISCONTINUED | OUTPATIENT
Start: 2019-04-26 | End: 2019-04-27

## 2019-04-26 RX ORDER — DOBUTAMINE HCL 250MG/20ML
3 VIAL (ML) INTRAVENOUS
Qty: 500 | Refills: 0 | Status: DISCONTINUED | OUTPATIENT
Start: 2019-04-26 | End: 2019-04-27

## 2019-04-26 RX ORDER — SODIUM CHLORIDE 9 MG/ML
1000 INJECTION INTRAMUSCULAR; INTRAVENOUS; SUBCUTANEOUS
Qty: 0 | Refills: 0 | Status: DISCONTINUED | OUTPATIENT
Start: 2019-04-26 | End: 2019-05-16

## 2019-04-26 RX ORDER — DOCUSATE SODIUM 100 MG
100 CAPSULE ORAL THREE TIMES A DAY
Qty: 0 | Refills: 0 | Status: DISCONTINUED | OUTPATIENT
Start: 2019-04-26 | End: 2019-05-02

## 2019-04-26 RX ORDER — HYDROMORPHONE HYDROCHLORIDE 2 MG/ML
0.5 INJECTION INTRAMUSCULAR; INTRAVENOUS; SUBCUTANEOUS ONCE
Qty: 0 | Refills: 0 | Status: DISCONTINUED | OUTPATIENT
Start: 2019-04-26 | End: 2019-04-26

## 2019-04-26 RX ORDER — CEFUROXIME AXETIL 250 MG
1500 TABLET ORAL EVERY 8 HOURS
Qty: 0 | Refills: 0 | Status: COMPLETED | OUTPATIENT
Start: 2019-04-26 | End: 2019-04-27

## 2019-04-26 RX ORDER — PANTOPRAZOLE SODIUM 20 MG/1
40 TABLET, DELAYED RELEASE ORAL DAILY
Qty: 0 | Refills: 0 | Status: DISCONTINUED | OUTPATIENT
Start: 2019-04-26 | End: 2019-05-04

## 2019-04-26 RX ORDER — MAGNESIUM SULFATE 500 MG/ML
1 VIAL (ML) INJECTION ONCE
Qty: 0 | Refills: 0 | Status: COMPLETED | OUTPATIENT
Start: 2019-04-26 | End: 2019-04-26

## 2019-04-26 RX ORDER — CHLORHEXIDINE GLUCONATE 213 G/1000ML
5 SOLUTION TOPICAL EVERY 4 HOURS
Qty: 0 | Refills: 0 | Status: DISCONTINUED | OUTPATIENT
Start: 2019-04-26 | End: 2019-04-27

## 2019-04-26 RX ADMIN — Medication 100 MILLIGRAM(S): at 16:04

## 2019-04-26 RX ADMIN — Medication 25 MILLIGRAM(S): at 05:05

## 2019-04-26 RX ADMIN — SODIUM CHLORIDE 1500 MILLILITER(S): 9 INJECTION, SOLUTION INTRAVENOUS at 17:20

## 2019-04-26 RX ADMIN — Medication 7.16 MICROGRAM(S)/KG/MIN: at 23:11

## 2019-04-26 RX ADMIN — Medication 100 MILLIEQUIVALENT(S): at 17:44

## 2019-04-26 RX ADMIN — Medication 100 GRAM(S): at 16:41

## 2019-04-26 RX ADMIN — CHLORHEXIDINE GLUCONATE 5 MILLILITER(S): 213 SOLUTION TOPICAL at 23:11

## 2019-04-26 RX ADMIN — Medication 100 MILLIEQUIVALENT(S): at 15:04

## 2019-04-26 RX ADMIN — Medication 6 MICROGRAM(S)/KG/MIN: at 23:11

## 2019-04-26 RX ADMIN — CHLORHEXIDINE GLUCONATE 1 APPLICATION(S): 213 SOLUTION TOPICAL at 06:00

## 2019-04-26 RX ADMIN — Medication 125 MILLILITER(S): at 15:13

## 2019-04-26 RX ADMIN — Medication 125 MILLILITER(S): at 18:30

## 2019-04-26 RX ADMIN — HYDROMORPHONE HYDROCHLORIDE 0.5 MILLIGRAM(S): 2 INJECTION INTRAMUSCULAR; INTRAVENOUS; SUBCUTANEOUS at 20:55

## 2019-04-26 RX ADMIN — CHLORHEXIDINE GLUCONATE 5 MILLILITER(S): 213 SOLUTION TOPICAL at 17:38

## 2019-04-26 RX ADMIN — INSULIN HUMAN 2 UNIT(S)/HR: 100 INJECTION, SOLUTION SUBCUTANEOUS at 23:12

## 2019-04-26 RX ADMIN — SODIUM CHLORIDE 3000 MILLILITER(S): 9 INJECTION, SOLUTION INTRAVENOUS at 14:45

## 2019-04-26 RX ADMIN — Medication 650 MILLIGRAM(S): at 23:30

## 2019-04-26 RX ADMIN — HYDROMORPHONE HYDROCHLORIDE 0.5 MILLIGRAM(S): 2 INJECTION INTRAMUSCULAR; INTRAVENOUS; SUBCUTANEOUS at 20:39

## 2019-04-26 RX ADMIN — Medication 125 MILLILITER(S): at 16:15

## 2019-04-26 RX ADMIN — Medication 100 MILLIEQUIVALENT(S): at 15:26

## 2019-04-26 RX ADMIN — Medication 100 MILLIEQUIVALENT(S): at 18:33

## 2019-04-26 RX ADMIN — Medication 100 MILLIGRAM(S): at 23:49

## 2019-04-26 RX ADMIN — Medication 125 MILLILITER(S): at 19:39

## 2019-04-26 RX ADMIN — CHLORHEXIDINE GLUCONATE 30 MILLILITER(S): 213 SOLUTION TOPICAL at 05:06

## 2019-04-26 RX ADMIN — Medication 650 MILLIGRAM(S): at 23:15

## 2019-04-26 RX ADMIN — Medication 125 MILLILITER(S): at 17:45

## 2019-04-26 RX ADMIN — VASOPRESSIN 3 UNIT(S)/MIN: 20 INJECTION INTRAVENOUS at 23:12

## 2019-04-26 NOTE — PROGRESS NOTE ADULT - SUBJECTIVE AND OBJECTIVE BOX
CRITICAL CARE ATTENDING - CTICU    MEDICATIONS  (STANDING):  albumin human  5% IVPB 250 milliLiter(s) IV Intermittent once  aspirin enteric coated 325 milliGRAM(s) Oral daily  cefuroxime  IVPB 1500 milliGRAM(s) IV Intermittent every 8 hours  chlorhexidine 0.12% Liquid 5 milliLiter(s) Oral Mucosa every 4 hours  dextrose 50% Injectable 50 milliLiter(s) IV Push every 15 minutes  dextrose 50% Injectable 25 milliLiter(s) IV Push every 15 minutes  DOBUTamine Infusion 3 MICROgram(s)/kG/Min (7.155 mL/Hr) IV Continuous <Continuous>  docusate sodium 100 milliGRAM(s) Oral three times a day  insulin Infusion 2 Unit(s)/Hr (2 mL/Hr) IV Continuous <Continuous>  lactated ringers Bolus 500 milliLiter(s) IV Bolus once  magnesium sulfate  IVPB 1 Gram(s) IV Intermittent once  meperidine     Injectable 25 milliGRAM(s) IV Push once  norepinephrine Infusion 0.04 MICROgram(s)/kG/Min (6 mL/Hr) IV Continuous <Continuous>  pantoprazole  Injectable 40 milliGRAM(s) IV Push daily  potassium chloride  10 mEq/50 mL IVPB 10 milliEquivalent(s) IV Intermittent every 1 hour  potassium chloride  10 mEq/50 mL IVPB 10 milliEquivalent(s) IV Intermittent every 1 hour  potassium chloride  10 mEq/50 mL IVPB 10 milliEquivalent(s) IV Intermittent every 1 hour  potassium chloride  10 mEq/50 mL IVPB 10 milliEquivalent(s) IV Intermittent every 1 hour  sodium chloride 0.9%. 1000 milliLiter(s) (10 mL/Hr) IV Continuous <Continuous>  vasopressin Infusion 0.05 Unit(s)/Min (3 mL/Hr) IV Continuous <Continuous>                                    10.0   18.6  )-----------( x        ( 26 Apr 2019 14:35 )             29.0       04-26    140  |  104  |  16  ----------------------------<  116<H>  4.4   |  22  |  0.78    Ca    8.0<L>      26 Apr 2019 14:35  Phos  2.5     04-26  Mg     2.5     04-26    TPro  4.5<L>  /  Alb  3.0<L>  /  TBili  1.2  /  DBili  x   /  AST  32  /  ALT  13  /  AlkPhos  27<L>  04-26      PT/INR - ( 25 Apr 2019 13:10 )   PT: 11.7 sec;   INR: 1.03 ratio         PTT - ( 25 Apr 2019 13:10 )  PTT:29.0 sec    Mode: AC/ CMV (Assist Control/ Continuous Mandatory Ventilation)  RR (machine): 10  TV (machine): 600  FiO2: 100  PEEP: 5  ITime: 1  MAP: 9  PIP: 22      Daily Height in cm: 172.72 (26 Apr 2019 06:35)    Daily       04-25 @ 07:01  -  04-26 @ 07:00  --------------------------------------------------------  IN: 240 mL / OUT: 100 mL / NET: 140 mL    04-26 @ 07:01 - 04-26 @ 15:02  --------------------------------------------------------  IN: 549.4 mL / OUT: 855 mL / NET: -305.6 mL        Critically Ill patient  : [ ] preoperative ,   [ x] post operative    Requires :  [ x] Arterial Line   [x ] Central Line  [ ] PA catheter  [ ] IABP  [ ] ECMO  [ ] LVAD  [ x] Ventilator  [x ] pacemaker [ ] Impella.    Bedside evaluation , monitoring , treatment of hemodynamics , fluids , IVP/ IVCD meds.        Diagnosis:     Op Day CABG X 3 L    Hypotension    Hypovolemia    Hemodynamic lability,instability. Requires IVCD [ x] vasopressors [x ] inotropes  [ ] vasodilator  [x ]IVSS fluid  to maintain MAP, perfusion, C.I.     CHF- acute [x ]   chronic [ ]    systolic [ ]   diatolic [x ]          - Echo- EF - LVH            [ x] RV dysfunction          - Cxr-cardiomegally, edema          - Clinical-  [x ]inotropes   [x ]pressors   [ ]diuresis   [ ]IABP   [ ]ECMO   [ ]LVAD   [ ]Respiratory Failure    Ventilator Management:  [ x]AC-rest    [x ]CPAP-PS Wean  this PM  [ ]Trach Collar     [ ]Extubate    [ ] T-Piece  [ ]peep>5     Requires chest PT, pulmonary toilet, ambu bagging, suctioning to maintain SaO2,  patent airway and treat atelectasis.     CT Drainage    ECG     Temporary pacemaker (TPM) interrogation and setting.                     -                     Discussed with CT surgeon, Physician's Assistant - Nurse Practitioner- Critical care medicine team.   Dicussed at  AM / PM rounds.   Chart, labs , films reviewed.    Total Time: 30 min

## 2019-04-27 LAB
ALBUMIN SERPL ELPH-MCNC: 3.7 G/DL — SIGNIFICANT CHANGE UP (ref 3.3–5)
ALP SERPL-CCNC: 23 U/L — LOW (ref 40–120)
ALT FLD-CCNC: 13 U/L — SIGNIFICANT CHANGE UP (ref 10–45)
ANION GAP SERPL CALC-SCNC: 11 MMOL/L — SIGNIFICANT CHANGE UP (ref 5–17)
APTT BLD: 30.3 SEC — SIGNIFICANT CHANGE UP (ref 27.5–36.3)
AST SERPL-CCNC: 32 U/L — SIGNIFICANT CHANGE UP (ref 10–40)
BASE EXCESS BLDV CALC-SCNC: -0.8 MMOL/L — SIGNIFICANT CHANGE UP (ref -2–2)
BASE EXCESS BLDV CALC-SCNC: -0.9 MMOL/L — SIGNIFICANT CHANGE UP (ref -2–2)
BASE EXCESS BLDV CALC-SCNC: -1.5 MMOL/L — SIGNIFICANT CHANGE UP (ref -2–2)
BASE EXCESS BLDV CALC-SCNC: 1.1 MMOL/L — SIGNIFICANT CHANGE UP (ref -2–2)
BASE EXCESS BLDV CALC-SCNC: 1.4 MMOL/L — SIGNIFICANT CHANGE UP (ref -2–2)
BASE EXCESS BLDV CALC-SCNC: 1.9 MMOL/L — SIGNIFICANT CHANGE UP (ref -2–2)
BASE EXCESS BLDV CALC-SCNC: 2.1 MMOL/L — HIGH (ref -2–2)
BILIRUB SERPL-MCNC: 2.1 MG/DL — HIGH (ref 0.2–1.2)
BUN SERPL-MCNC: 14 MG/DL — SIGNIFICANT CHANGE UP (ref 7–23)
CALCIUM SERPL-MCNC: 8.3 MG/DL — LOW (ref 8.4–10.5)
CHLORIDE SERPL-SCNC: 104 MMOL/L — SIGNIFICANT CHANGE UP (ref 96–108)
CO2 BLDV-SCNC: 26 MMOL/L — SIGNIFICANT CHANGE UP (ref 22–30)
CO2 BLDV-SCNC: 26 MMOL/L — SIGNIFICANT CHANGE UP (ref 22–30)
CO2 BLDV-SCNC: 27 MMOL/L — SIGNIFICANT CHANGE UP (ref 22–30)
CO2 BLDV-SCNC: 28 MMOL/L — SIGNIFICANT CHANGE UP (ref 22–30)
CO2 BLDV-SCNC: 29 MMOL/L — SIGNIFICANT CHANGE UP (ref 22–30)
CO2 BLDV-SCNC: 29 MMOL/L — SIGNIFICANT CHANGE UP (ref 22–30)
CO2 BLDV-SCNC: 30 MMOL/L — SIGNIFICANT CHANGE UP (ref 22–30)
CO2 SERPL-SCNC: 22 MMOL/L — SIGNIFICANT CHANGE UP (ref 22–31)
CREAT SERPL-MCNC: 0.91 MG/DL — SIGNIFICANT CHANGE UP (ref 0.5–1.3)
GAS PNL BLDA: SIGNIFICANT CHANGE UP
GAS PNL BLDV: SIGNIFICANT CHANGE UP
GLUCOSE BLDC GLUCOMTR-MCNC: 117 MG/DL — HIGH (ref 70–99)
GLUCOSE BLDC GLUCOMTR-MCNC: 121 MG/DL — HIGH (ref 70–99)
GLUCOSE BLDC GLUCOMTR-MCNC: 126 MG/DL — HIGH (ref 70–99)
GLUCOSE BLDC GLUCOMTR-MCNC: 128 MG/DL — HIGH (ref 70–99)
GLUCOSE BLDC GLUCOMTR-MCNC: 131 MG/DL — HIGH (ref 70–99)
GLUCOSE BLDC GLUCOMTR-MCNC: 94 MG/DL — SIGNIFICANT CHANGE UP (ref 70–99)
GLUCOSE SERPL-MCNC: 93 MG/DL — SIGNIFICANT CHANGE UP (ref 70–99)
HCO3 BLDV-SCNC: 24 MMOL/L — SIGNIFICANT CHANGE UP (ref 21–29)
HCO3 BLDV-SCNC: 25 MMOL/L — SIGNIFICANT CHANGE UP (ref 21–29)
HCO3 BLDV-SCNC: 26 MMOL/L — SIGNIFICANT CHANGE UP (ref 21–29)
HCO3 BLDV-SCNC: 26 MMOL/L — SIGNIFICANT CHANGE UP (ref 21–29)
HCO3 BLDV-SCNC: 27 MMOL/L — SIGNIFICANT CHANGE UP (ref 21–29)
HCO3 BLDV-SCNC: 27 MMOL/L — SIGNIFICANT CHANGE UP (ref 21–29)
HCO3 BLDV-SCNC: 28 MMOL/L — SIGNIFICANT CHANGE UP (ref 21–29)
HCT VFR BLD CALC: 23.7 % — LOW (ref 39–50)
HGB BLD-MCNC: 8.4 G/DL — LOW (ref 13–17)
HOROWITZ INDEX BLDV+IHG-RTO: 100 — SIGNIFICANT CHANGE UP
HOROWITZ INDEX BLDV+IHG-RTO: 50 — SIGNIFICANT CHANGE UP
HOROWITZ INDEX BLDV+IHG-RTO: 70 — SIGNIFICANT CHANGE UP
INR BLD: 1.29 RATIO — HIGH (ref 0.88–1.16)
MAGNESIUM SERPL-MCNC: 2 MG/DL — SIGNIFICANT CHANGE UP (ref 1.6–2.6)
MCHC RBC-ENTMCNC: 31.1 PG — SIGNIFICANT CHANGE UP (ref 27–34)
MCHC RBC-ENTMCNC: 35.3 GM/DL — SIGNIFICANT CHANGE UP (ref 32–36)
MCV RBC AUTO: 88 FL — SIGNIFICANT CHANGE UP (ref 80–100)
PCO2 BLDV: 44 MMHG — SIGNIFICANT CHANGE UP (ref 35–50)
PCO2 BLDV: 48 MMHG — SIGNIFICANT CHANGE UP (ref 35–50)
PCO2 BLDV: 53 MMHG — HIGH (ref 35–50)
PCO2 BLDV: 54 MMHG — HIGH (ref 35–50)
PCO2 BLDV: 54 MMHG — HIGH (ref 35–50)
PCO2 BLDV: 56 MMHG — HIGH (ref 35–50)
PCO2 BLDV: 56 MMHG — HIGH (ref 35–50)
PH BLDV: 7.28 — LOW (ref 7.35–7.45)
PH BLDV: 7.29 — LOW (ref 7.35–7.45)
PH BLDV: 7.31 — LOW (ref 7.35–7.45)
PH BLDV: 7.32 — LOW (ref 7.35–7.45)
PH BLDV: 7.32 — LOW (ref 7.35–7.45)
PH BLDV: 7.33 — LOW (ref 7.35–7.45)
PH BLDV: 7.4 — SIGNIFICANT CHANGE UP (ref 7.35–7.45)
PHOSPHATE SERPL-MCNC: 1.5 MG/DL — LOW (ref 2.5–4.5)
PLATELET # BLD AUTO: 107 K/UL — LOW (ref 150–400)
PO2 BLDV: 28 MMHG — SIGNIFICANT CHANGE UP (ref 25–45)
PO2 BLDV: 28 MMHG — SIGNIFICANT CHANGE UP (ref 25–45)
PO2 BLDV: 30 MMHG — SIGNIFICANT CHANGE UP (ref 25–45)
PO2 BLDV: 31 MMHG — SIGNIFICANT CHANGE UP (ref 25–45)
PO2 BLDV: 31 MMHG — SIGNIFICANT CHANGE UP (ref 25–45)
PO2 BLDV: 33 MMHG — SIGNIFICANT CHANGE UP (ref 25–45)
PO2 BLDV: 36 MMHG — SIGNIFICANT CHANGE UP (ref 25–45)
POTASSIUM SERPL-MCNC: 4.1 MMOL/L — SIGNIFICANT CHANGE UP (ref 3.5–5.3)
POTASSIUM SERPL-SCNC: 4.1 MMOL/L — SIGNIFICANT CHANGE UP (ref 3.5–5.3)
PROT SERPL-MCNC: 5.3 G/DL — LOW (ref 6–8.3)
PROTHROM AB SERPL-ACNC: 15 SEC — HIGH (ref 10–12.9)
RBC # BLD: 2.69 M/UL — LOW (ref 4.2–5.8)
RBC # FLD: 12.3 % — SIGNIFICANT CHANGE UP (ref 10.3–14.5)
SAO2 % BLDV: 45 % — LOW (ref 67–88)
SAO2 % BLDV: 50 % — LOW (ref 67–88)
SAO2 % BLDV: 52 % — LOW (ref 67–88)
SAO2 % BLDV: 53 % — LOW (ref 67–88)
SAO2 % BLDV: 54 % — LOW (ref 67–88)
SAO2 % BLDV: 54 % — LOW (ref 67–88)
SAO2 % BLDV: 60 % — LOW (ref 67–88)
SODIUM SERPL-SCNC: 137 MMOL/L — SIGNIFICANT CHANGE UP (ref 135–145)
WBC # BLD: 14.7 K/UL — HIGH (ref 3.8–10.5)
WBC # FLD AUTO: 14.7 K/UL — HIGH (ref 3.8–10.5)

## 2019-04-27 PROCEDURE — 99291 CRITICAL CARE FIRST HOUR: CPT | Mod: 24,25

## 2019-04-27 PROCEDURE — 71045 X-RAY EXAM CHEST 1 VIEW: CPT | Mod: 26,77

## 2019-04-27 PROCEDURE — 71045 X-RAY EXAM CHEST 1 VIEW: CPT | Mod: 26

## 2019-04-27 PROCEDURE — 93010 ELECTROCARDIOGRAM REPORT: CPT

## 2019-04-27 PROCEDURE — 31645 BRNCHSC W/THER ASPIR 1ST: CPT

## 2019-04-27 RX ORDER — ACETAMINOPHEN 500 MG
1000 TABLET ORAL ONCE
Qty: 0 | Refills: 0 | Status: COMPLETED | OUTPATIENT
Start: 2019-04-27 | End: 2019-04-27

## 2019-04-27 RX ORDER — ONDANSETRON 8 MG/1
2 TABLET, FILM COATED ORAL ONCE
Qty: 0 | Refills: 0 | Status: COMPLETED | OUTPATIENT
Start: 2019-04-27 | End: 2019-04-27

## 2019-04-27 RX ORDER — FENTANYL CITRATE 50 UG/ML
50 INJECTION INTRAVENOUS ONCE
Qty: 0 | Refills: 0 | Status: DISCONTINUED | OUTPATIENT
Start: 2019-04-27 | End: 2019-04-27

## 2019-04-27 RX ORDER — AMIODARONE HYDROCHLORIDE 400 MG/1
150 TABLET ORAL ONCE
Qty: 0 | Refills: 0 | Status: COMPLETED | OUTPATIENT
Start: 2019-04-27 | End: 2019-04-27

## 2019-04-27 RX ORDER — ATORVASTATIN CALCIUM 80 MG/1
40 TABLET, FILM COATED ORAL AT BEDTIME
Qty: 0 | Refills: 0 | Status: DISCONTINUED | OUTPATIENT
Start: 2019-04-27 | End: 2019-05-17

## 2019-04-27 RX ORDER — HYDROMORPHONE HYDROCHLORIDE 2 MG/ML
0.5 INJECTION INTRAMUSCULAR; INTRAVENOUS; SUBCUTANEOUS ONCE
Qty: 0 | Refills: 0 | Status: DISCONTINUED | OUTPATIENT
Start: 2019-04-27 | End: 2019-04-27

## 2019-04-27 RX ORDER — PROPOFOL 10 MG/ML
20.96 INJECTION, EMULSION INTRAVENOUS
Qty: 500 | Refills: 0 | Status: DISCONTINUED | OUTPATIENT
Start: 2019-04-27 | End: 2019-04-27

## 2019-04-27 RX ORDER — BENZOCAINE AND MENTHOL 5; 1 G/100ML; G/100ML
1 LIQUID ORAL EVERY 6 HOURS
Qty: 0 | Refills: 0 | Status: DISCONTINUED | OUTPATIENT
Start: 2019-04-27 | End: 2019-05-05

## 2019-04-27 RX ORDER — ALBUMIN HUMAN 25 %
250 VIAL (ML) INTRAVENOUS ONCE
Qty: 0 | Refills: 0 | Status: COMPLETED | OUTPATIENT
Start: 2019-04-27 | End: 2019-04-27

## 2019-04-27 RX ORDER — FUROSEMIDE 40 MG
20 TABLET ORAL ONCE
Qty: 0 | Refills: 0 | Status: COMPLETED | OUTPATIENT
Start: 2019-04-27 | End: 2019-04-27

## 2019-04-27 RX ORDER — CHLORHEXIDINE GLUCONATE 213 G/1000ML
1 SOLUTION TOPICAL
Qty: 0 | Refills: 0 | Status: DISCONTINUED | OUTPATIENT
Start: 2019-04-27 | End: 2019-05-16

## 2019-04-27 RX ORDER — VECURONIUM BROMIDE 20 MG/1
5 INJECTION, POWDER, FOR SOLUTION INTRAVENOUS ONCE
Qty: 0 | Refills: 0 | Status: COMPLETED | OUTPATIENT
Start: 2019-04-27 | End: 2019-04-27

## 2019-04-27 RX ORDER — ENOXAPARIN SODIUM 100 MG/ML
40 INJECTION SUBCUTANEOUS DAILY
Qty: 0 | Refills: 0 | Status: DISCONTINUED | OUTPATIENT
Start: 2019-04-27 | End: 2019-05-04

## 2019-04-27 RX ORDER — CALCIUM GLUCONATE 100 MG/ML
1 VIAL (ML) INTRAVENOUS ONCE
Qty: 0 | Refills: 0 | Status: COMPLETED | OUTPATIENT
Start: 2019-04-27 | End: 2019-04-27

## 2019-04-27 RX ORDER — ACETAMINOPHEN 500 MG
650 TABLET ORAL ONCE
Qty: 0 | Refills: 0 | Status: COMPLETED | OUTPATIENT
Start: 2019-04-27 | End: 2019-04-27

## 2019-04-27 RX ADMIN — HYDROMORPHONE HYDROCHLORIDE 0.5 MILLIGRAM(S): 2 INJECTION INTRAMUSCULAR; INTRAVENOUS; SUBCUTANEOUS at 20:30

## 2019-04-27 RX ADMIN — Medication 100 MILLIGRAM(S): at 23:04

## 2019-04-27 RX ADMIN — FENTANYL CITRATE 50 MICROGRAM(S): 50 INJECTION INTRAVENOUS at 05:03

## 2019-04-27 RX ADMIN — HYDROMORPHONE HYDROCHLORIDE 0.5 MILLIGRAM(S): 2 INJECTION INTRAMUSCULAR; INTRAVENOUS; SUBCUTANEOUS at 19:05

## 2019-04-27 RX ADMIN — Medication 325 MILLIGRAM(S): at 13:17

## 2019-04-27 RX ADMIN — FENTANYL CITRATE 50 MICROGRAM(S): 50 INJECTION INTRAVENOUS at 05:15

## 2019-04-27 RX ADMIN — ONDANSETRON 2 MILLIGRAM(S): 8 TABLET, FILM COATED ORAL at 09:20

## 2019-04-27 RX ADMIN — Medication 200 GRAM(S): at 23:05

## 2019-04-27 RX ADMIN — Medication 100 MILLIGRAM(S): at 16:05

## 2019-04-27 RX ADMIN — HYDROMORPHONE HYDROCHLORIDE 0.5 MILLIGRAM(S): 2 INJECTION INTRAMUSCULAR; INTRAVENOUS; SUBCUTANEOUS at 10:37

## 2019-04-27 RX ADMIN — PANTOPRAZOLE SODIUM 40 MILLIGRAM(S): 20 TABLET, DELAYED RELEASE ORAL at 13:17

## 2019-04-27 RX ADMIN — VECURONIUM BROMIDE 5 MILLIGRAM(S): 20 INJECTION, POWDER, FOR SOLUTION INTRAVENOUS at 01:45

## 2019-04-27 RX ADMIN — HYDROMORPHONE HYDROCHLORIDE 0.5 MILLIGRAM(S): 2 INJECTION INTRAMUSCULAR; INTRAVENOUS; SUBCUTANEOUS at 08:36

## 2019-04-27 RX ADMIN — Medication 62.5 MILLIMOLE(S): at 03:37

## 2019-04-27 RX ADMIN — ENOXAPARIN SODIUM 40 MILLIGRAM(S): 100 INJECTION SUBCUTANEOUS at 13:17

## 2019-04-27 RX ADMIN — HYDROMORPHONE HYDROCHLORIDE 0.5 MILLIGRAM(S): 2 INJECTION INTRAMUSCULAR; INTRAVENOUS; SUBCUTANEOUS at 17:59

## 2019-04-27 RX ADMIN — ATORVASTATIN CALCIUM 40 MILLIGRAM(S): 80 TABLET, FILM COATED ORAL at 23:04

## 2019-04-27 RX ADMIN — Medication 100 MILLIGRAM(S): at 13:46

## 2019-04-27 RX ADMIN — CHLORHEXIDINE GLUCONATE 5 MILLILITER(S): 213 SOLUTION TOPICAL at 05:04

## 2019-04-27 RX ADMIN — Medication 20 MILLIGRAM(S): at 10:37

## 2019-04-27 RX ADMIN — Medication 500 MILLILITER(S): at 15:07

## 2019-04-27 RX ADMIN — FENTANYL CITRATE 50 MICROGRAM(S): 50 INJECTION INTRAVENOUS at 01:26

## 2019-04-27 RX ADMIN — FENTANYL CITRATE 50 MICROGRAM(S): 50 INJECTION INTRAVENOUS at 01:40

## 2019-04-27 RX ADMIN — Medication 400 MILLIGRAM(S): at 13:43

## 2019-04-27 RX ADMIN — Medication 100 MILLIGRAM(S): at 08:01

## 2019-04-27 RX ADMIN — HYDROMORPHONE HYDROCHLORIDE 0.5 MILLIGRAM(S): 2 INJECTION INTRAMUSCULAR; INTRAVENOUS; SUBCUTANEOUS at 07:58

## 2019-04-27 RX ADMIN — Medication 125 MILLILITER(S): at 21:10

## 2019-04-27 RX ADMIN — HYDROMORPHONE HYDROCHLORIDE 0.5 MILLIGRAM(S): 2 INJECTION INTRAMUSCULAR; INTRAVENOUS; SUBCUTANEOUS at 11:46

## 2019-04-27 RX ADMIN — CHLORHEXIDINE GLUCONATE 5 MILLILITER(S): 213 SOLUTION TOPICAL at 01:37

## 2019-04-27 RX ADMIN — HYDROMORPHONE HYDROCHLORIDE 0.5 MILLIGRAM(S): 2 INJECTION INTRAMUSCULAR; INTRAVENOUS; SUBCUTANEOUS at 20:45

## 2019-04-27 RX ADMIN — AMIODARONE HYDROCHLORIDE 600 MILLIGRAM(S): 400 TABLET ORAL at 20:23

## 2019-04-27 RX ADMIN — Medication 125 MILLILITER(S): at 23:23

## 2019-04-27 RX ADMIN — BENZOCAINE AND MENTHOL 1 LOZENGE: 5; 1 LIQUID ORAL at 13:44

## 2019-04-27 RX ADMIN — Medication 1000 MILLIGRAM(S): at 15:52

## 2019-04-27 NOTE — PHYSICAL THERAPY INITIAL EVALUATION ADULT - ADDITIONAL COMMENTS
as per pt, and wife at b/s, resides in a PH with spouse, 3 stairs to enter  with railings, pt can stay at 1st floor, PTA, pt amb (I), (I) with ADLs.

## 2019-04-27 NOTE — PHYSICAL THERAPY INITIAL EVALUATION ADULT - PLANNED THERAPY INTERVENTIONS, PT EVAL
bed mobility training/stairs: GOAL: patient will be able to negotiated 5 stairs (I) with one HR in 2 weeks/gait training/transfer training

## 2019-04-27 NOTE — PROGRESS NOTE ADULT - SUBJECTIVE AND OBJECTIVE BOX
Indication: hypoxemia. left upper lobe collapse    Findings:  Bronchoscope inserted through ETT. ETT noted to be in good position. Airway evaluation revealed Sharp Marcia. JAYE and LLL evaluation revealed modarate secretions suctioned using bronchoscope,  RUL, RML, RLL revealed clean airways . Bronchoscope then withdrawn from ETT.  No  bleeding noted

## 2019-04-27 NOTE — PHYSICAL THERAPY INITIAL EVALUATION ADULT - GENERAL OBSERVATIONS, REHAB EVAL
semi-supine in bed with NAD, +Cardiac monitor, +HL o2 via NC, +A-line (L rad +R fem), +IV, +pulse ox, +osborne, +external pacer, +chest tube x2

## 2019-04-27 NOTE — PROGRESS NOTE ADULT - SUBJECTIVE AND OBJECTIVE BOX
HPI:  This is a 79 yo   male  Denies significant PMH of heart disease but reports early CAD in family; mother  of MI at age 54 and uncle ( mothers brother) at age 37.  Presents to Dr Feng with c/c of chest discomfort associated with dyspnea ( on exertion after walking 100 feet)  and palpitations for the past 1 year.  CP is described as 'pinch like", intermittent, sporadic, lasting 1-2 min, localized in the left anterior chest non radiating; CP triggered by exercise and stress. Abnormal NST ( last week) : mild to moderate abnormal study with medium sized inferior and post- lateral ischemia without infarct, EF 50%.  Holter monitor revealed: frequent PVCs, ECHO revealed AR, concentric LVH, diastolic dysfunction, and MR. Referred here today for R and LHC. Presently asymptomatic, denies chest pain, dyspnea, dizziness, palpitations, N&V, HA, LE edema.    OF NOTE: patient was started on Toprol 25mg PO daily however he never picked it up from pharmacy because he states" he did not know about it"    PCP: Marleny Ceja (2019 08:32)  CHRIS THAKKAR  MRN#:  [id number]    The patient is a 78y Male with PMH of HLD, Sleep apnea ( non compliant with CPAP), GERD, diverticulitis, depression, anxiety, and panic attack.who was seen, evaluated, & examined with the CTICU staff on rounds and later in the day with a multidisciplinary care plan formulated & implemented.  All available clinical, laboratory, radiographic, pharmacologic, and electrocardiographic data were reviewed & analyzed.      The patient was in the CTICU in critical condition secondary to persistent cardiopulmonary dysfunction, hemodynamically significant anemia/hypovolemia-shock, hemodynamically significant bradycardia, persistent cardiovascular dysfunction, acidosis, & hyperglycemia.      Respiratory status required full ventilatory support, the following of ABG’s with A-line monitoring, continuous pulse oximetry monitoring, an IV Dexmedetomidine infusion & an IV Propofol infusion for support & to evaluate for & prevent further decompensation secondary to persistent cardiopulmonary dysfunction.     Invasive hemodynamic monitoring with a central venous catheter & an A-line were required for the continuous central venous and MAP/BP monitoring to ensure adequate cardiovascular support and to evaluate for & help prevent decompensation while receiving intermittent volume expansion, external epicardial pacing, blood transfusions, blood product transfusions, an IV Levophed drip, an IV Vasopressin drip, an IV Epinephrine drip, an IV Dobutamine drip, an IV Primacor drip, an IV Amiodarone drip, & an IV Neosynephrine drip secondary to hemodynamically significant anemia/hypovolemia-shock, cardiovascular dysfunction, acute postoperative blood loss anemia,, hemodynamically significant bradycardia.       Metabolic stability, uncontrolled type 2 diabetes-hyperglycemia, & infection prophylaxis required an IV regular Insulin drip & the following of serial glucose levels to help achieve & maintain euglycemia.      Patient required acute postoperative critical care management and I provided 35 minutes of non-continuous care to the patient.  Discussed at length with the CTICU staff and helped coordinate care. CHRIS THAKKAR  MRN#:  [id number]    The patient is a 78y Male with PMH of HLD, Sleep apnea ( non compliant with CPAP), GERD, diverticulitis, depression, anxiety, and panic attacks, as well as strong family history of CAD, admitted with unstable angina, found to have coronary artery disease, LVH and diastolic heart failure, now recovering s/p C3L 4/26, post op course complicated by bleeding who was seen, evaluated, & examined with the CTICU staff on rounds and later in the evening with a multidisciplinary care plan formulated & implemented.  All available clinical, laboratory, radiographic, pharmacologic, and electrocardiographic data were reviewed & analyzed.      The patient was in the CTICU in critical condition secondary to persistent cardiopulmonary dysfunction, hemodynamically significant anemia/hypovolemia-shock, , persistent cardiovascular dysfunction, and acidosis.      Respiratory status required high flow supplemental oxygen and now bipap non invasive ventilation, the following of ABG’s with A-line monitoring, continuous pulse oximetry monitoring, and intermittent IV Dilaudid for support & to evaluate for & prevent further decompensation secondary to hypoxia and pulmonary congestion on chest xray.    Invasive hemodynamic monitoring with a central venous catheter & an A-line were required for the continuous central venous and MAP/BP monitoring to ensure adequate cardiovascular support and to evaluate for & help prevent decompensation while receiving intermittent volume expansion, blood transfusion, an IV Levophed drip, and an IV Vasopressin drip secondary to hemodynamically significant anemia/acute postoperative blood loss anemia, hypovolemic shock, acute on chronic diastolic heart failure and oliguria.    Metabolic stability, uncontrolled type 2 diabetes-hyperglycemia, & infection prophylaxis required a lispro Insulin sliding scale & the following of serial glucose levels to help achieve & maintain euglycemia.      Patient required acute postoperative critical care management and I provided 35 minutes of non-continuous care to the patient.  Discussed at length with the CTICU staff and helped coordinate care. CHRIS THAKKAR  MRN#:  [id number]    The patient is a 78y Male with PMH of HLD, Sleep apnea ( non compliant with CPAP), GERD, diverticulitis, depression, anxiety, and panic attacks, as well as strong family history of CAD, admitted with unstable angina, found to have coronary artery disease, LVH and diastolic heart failure, now recovering s/p C3L 4/26, post op course complicated by bleeding who was seen, evaluated, & examined with the CTICU staff on rounds and later in the evening with a multidisciplinary care plan formulated & implemented.  All available clinical, laboratory, radiographic, pharmacologic, and electrocardiographic data were reviewed & analyzed.      The patient was in the CTICU in critical condition secondary to persistent cardiopulmonary dysfunction, hemodynamically significant anemia/hypovolemia-shock, , persistent cardiovascular dysfunction, and acidosis.      Respiratory status required high flow supplemental oxygen and now bipap non invasive ventilation, the following of ABG’s with A-line monitoring, continuous pulse oximetry monitoring, and intermittent IV Dilaudid for support & to evaluate for & prevent further decompensation secondary to hypoxia and pulmonary congestion on chest xray.    Invasive hemodynamic monitoring with a central venous catheter & an A-line were required for the continuous central venous and MAP/BP monitoring to ensure adequate cardiovascular support and to evaluate for & help prevent decompensation while receiving intermittent volume expansion, blood transfusion, an IV Levophed drip, and an IV Vasopressin drip secondary to hemodynamically significant anemia/acute postoperative blood loss anemia, hypovolemic shock, acute on chronic diastolic heart failure and oliguria.    Metabolic stability, uncontrolled type 2 diabetes-hyperglycemia, & infection prophylaxis required a lispro Insulin sliding scale & the following of serial glucose levels to help achieve & maintain euglycemia.      Patient required acute postoperative critical care management and I provided 45 minutes of non-continuous care to the patient.  Discussed at length with the CTICU staff and helped coordinate care.

## 2019-04-27 NOTE — PHYSICAL THERAPY INITIAL EVALUATION ADULT - PERTINENT HX OF CURRENT PROBLEM, REHAB EVAL
78yoM with sleep apnea, depression, anxiety, panic attack, p/w dyspnea, now s/p above procedure, CXr (-), VA duplex b/l carotid 4/25, mod=-severe calcified and soft atherosclerotic plaque. ECG 4/25, 1st deg AV block, low voltage for QRS.

## 2019-04-27 NOTE — AIRWAY REMOVAL NOTE  ADULT & PEDS - ARTIFICAL AIRWAY REMOVAL COMMENTS
Written order for extubation verified. The patient was identified by full name and birth date compared to the identification band. Present during the procedure was RN

## 2019-04-28 LAB
ALBUMIN SERPL ELPH-MCNC: 3.7 G/DL — SIGNIFICANT CHANGE UP (ref 3.3–5)
ALP SERPL-CCNC: 28 U/L — LOW (ref 40–120)
ALT FLD-CCNC: 17 U/L — SIGNIFICANT CHANGE UP (ref 10–45)
ANION GAP SERPL CALC-SCNC: 11 MMOL/L — SIGNIFICANT CHANGE UP (ref 5–17)
APPEARANCE UR: ABNORMAL
AST SERPL-CCNC: 37 U/L — SIGNIFICANT CHANGE UP (ref 10–40)
BASE EXCESS BLDV CALC-SCNC: -0.2 MMOL/L — SIGNIFICANT CHANGE UP (ref -2–2)
BASE EXCESS BLDV CALC-SCNC: -4.2 MMOL/L — LOW (ref -2–2)
BASE EXCESS BLDV CALC-SCNC: 0.8 MMOL/L — SIGNIFICANT CHANGE UP (ref -2–2)
BASE EXCESS BLDV CALC-SCNC: 1.1 MMOL/L — SIGNIFICANT CHANGE UP (ref -2–2)
BASE EXCESS BLDV CALC-SCNC: 1.7 MMOL/L — SIGNIFICANT CHANGE UP (ref -2–2)
BASE EXCESS BLDV CALC-SCNC: 2.1 MMOL/L — HIGH (ref -2–2)
BASE EXCESS BLDV CALC-SCNC: 2.4 MMOL/L — HIGH (ref -2–2)
BASE EXCESS BLDV CALC-SCNC: 2.6 MMOL/L — HIGH (ref -2–2)
BASE EXCESS BLDV CALC-SCNC: 3.4 MMOL/L — HIGH (ref -2–2)
BILIRUB SERPL-MCNC: 3.2 MG/DL — HIGH (ref 0.2–1.2)
BILIRUB UR-MCNC: ABNORMAL
BUN SERPL-MCNC: 19 MG/DL — SIGNIFICANT CHANGE UP (ref 7–23)
CA-I SERPL-SCNC: 1.23 MMOL/L — SIGNIFICANT CHANGE UP (ref 1.12–1.3)
CALCIUM SERPL-MCNC: 8.7 MG/DL — SIGNIFICANT CHANGE UP (ref 8.4–10.5)
CHLORIDE BLDV-SCNC: 102 MMOL/L — SIGNIFICANT CHANGE UP (ref 96–108)
CHLORIDE SERPL-SCNC: 104 MMOL/L — SIGNIFICANT CHANGE UP (ref 96–108)
CO2 BLDV-SCNC: 22 MMOL/L — SIGNIFICANT CHANGE UP (ref 22–30)
CO2 BLDV-SCNC: 26 MMOL/L — SIGNIFICANT CHANGE UP (ref 22–30)
CO2 BLDV-SCNC: 28 MMOL/L — SIGNIFICANT CHANGE UP (ref 22–30)
CO2 BLDV-SCNC: 28 MMOL/L — SIGNIFICANT CHANGE UP (ref 22–30)
CO2 BLDV-SCNC: 29 MMOL/L — SIGNIFICANT CHANGE UP (ref 22–30)
CO2 BLDV-SCNC: 30 MMOL/L — SIGNIFICANT CHANGE UP (ref 22–30)
CO2 BLDV-SCNC: 30 MMOL/L — SIGNIFICANT CHANGE UP (ref 22–30)
CO2 SERPL-SCNC: 24 MMOL/L — SIGNIFICANT CHANGE UP (ref 22–31)
COLOR SPEC: YELLOW — SIGNIFICANT CHANGE UP
CREAT SERPL-MCNC: 0.96 MG/DL — SIGNIFICANT CHANGE UP (ref 0.5–1.3)
DIFF PNL FLD: ABNORMAL
GAS PNL BLDA: SIGNIFICANT CHANGE UP
GAS PNL BLDV: 136 MMOL/L — SIGNIFICANT CHANGE UP (ref 136–145)
GAS PNL BLDV: SIGNIFICANT CHANGE UP
GLUCOSE BLDV-MCNC: 148 MG/DL — HIGH (ref 70–99)
GLUCOSE SERPL-MCNC: 158 MG/DL — HIGH (ref 70–99)
GLUCOSE UR QL: NEGATIVE — SIGNIFICANT CHANGE UP
GRAM STN FLD: SIGNIFICANT CHANGE UP
HCO3 BLDV-SCNC: 21 MMOL/L — SIGNIFICANT CHANGE UP (ref 21–29)
HCO3 BLDV-SCNC: 24 MMOL/L — SIGNIFICANT CHANGE UP (ref 21–29)
HCO3 BLDV-SCNC: 26 MMOL/L — SIGNIFICANT CHANGE UP (ref 21–29)
HCO3 BLDV-SCNC: 27 MMOL/L — SIGNIFICANT CHANGE UP (ref 21–29)
HCO3 BLDV-SCNC: 28 MMOL/L — SIGNIFICANT CHANGE UP (ref 21–29)
HCO3 BLDV-SCNC: 28 MMOL/L — SIGNIFICANT CHANGE UP (ref 21–29)
HCT VFR BLD CALC: 26.1 % — LOW (ref 39–50)
HCT VFR BLDA CALC: 29 % — LOW (ref 39–50)
HGB BLD CALC-MCNC: 9.2 G/DL — LOW (ref 13–17)
HGB BLD-MCNC: 9 G/DL — LOW (ref 13–17)
HOROWITZ INDEX BLDV+IHG-RTO: 100 — SIGNIFICANT CHANGE UP
HOROWITZ INDEX BLDV+IHG-RTO: 80 — SIGNIFICANT CHANGE UP
HOROWITZ INDEX BLDV+IHG-RTO: 90 — SIGNIFICANT CHANGE UP
KETONES UR-MCNC: NEGATIVE — SIGNIFICANT CHANGE UP
LACTATE BLDV-MCNC: 1.7 MMOL/L — SIGNIFICANT CHANGE UP (ref 0.7–2)
LEUKOCYTE ESTERASE UR-ACNC: NEGATIVE — SIGNIFICANT CHANGE UP
MAGNESIUM SERPL-MCNC: 2 MG/DL — SIGNIFICANT CHANGE UP (ref 1.6–2.6)
MCHC RBC-ENTMCNC: 30.8 PG — SIGNIFICANT CHANGE UP (ref 27–34)
MCHC RBC-ENTMCNC: 34.6 GM/DL — SIGNIFICANT CHANGE UP (ref 32–36)
MCV RBC AUTO: 89.1 FL — SIGNIFICANT CHANGE UP (ref 80–100)
NITRITE UR-MCNC: NEGATIVE — SIGNIFICANT CHANGE UP
OTHER CELLS CSF MANUAL: 7 ML/DL — LOW (ref 18–22)
PCO2 BLDV: 41 MMHG — SIGNIFICANT CHANGE UP (ref 35–50)
PCO2 BLDV: 42 MMHG — SIGNIFICANT CHANGE UP (ref 35–50)
PCO2 BLDV: 45 MMHG — SIGNIFICANT CHANGE UP (ref 35–50)
PCO2 BLDV: 49 MMHG — SIGNIFICANT CHANGE UP (ref 35–50)
PCO2 BLDV: 50 MMHG — SIGNIFICANT CHANGE UP (ref 35–50)
PCO2 BLDV: 50 MMHG — SIGNIFICANT CHANGE UP (ref 35–50)
PCO2 BLDV: 52 MMHG — HIGH (ref 35–50)
PCO2 BLDV: 53 MMHG — HIGH (ref 35–50)
PCO2 BLDV: 54 MMHG — HIGH (ref 35–50)
PH BLDV: 7.32 — LOW (ref 7.35–7.45)
PH BLDV: 7.32 — LOW (ref 7.35–7.45)
PH BLDV: 7.34 — LOW (ref 7.35–7.45)
PH BLDV: 7.34 — LOW (ref 7.35–7.45)
PH BLDV: 7.35 — SIGNIFICANT CHANGE UP (ref 7.35–7.45)
PH BLDV: 7.36 — SIGNIFICANT CHANGE UP (ref 7.35–7.45)
PH BLDV: 7.38 — SIGNIFICANT CHANGE UP (ref 7.35–7.45)
PH BLDV: 7.39 — SIGNIFICANT CHANGE UP (ref 7.35–7.45)
PH BLDV: 7.39 — SIGNIFICANT CHANGE UP (ref 7.35–7.45)
PH UR: 5.5 — SIGNIFICANT CHANGE UP (ref 5–8)
PHOSPHATE SERPL-MCNC: 2.6 MG/DL — SIGNIFICANT CHANGE UP (ref 2.5–4.5)
PLATELET # BLD AUTO: 94 K/UL — LOW (ref 150–400)
PO2 BLDV: 25 MMHG — SIGNIFICANT CHANGE UP (ref 25–45)
PO2 BLDV: 27 MMHG — SIGNIFICANT CHANGE UP (ref 25–45)
PO2 BLDV: 30 MMHG — SIGNIFICANT CHANGE UP (ref 25–45)
PO2 BLDV: 32 MMHG — SIGNIFICANT CHANGE UP (ref 25–45)
PO2 BLDV: 32 MMHG — SIGNIFICANT CHANGE UP (ref 25–45)
PO2 BLDV: 35 MMHG — SIGNIFICANT CHANGE UP (ref 25–45)
POTASSIUM BLDV-SCNC: 4 MMOL/L — SIGNIFICANT CHANGE UP (ref 3.5–5.3)
POTASSIUM SERPL-MCNC: 4.2 MMOL/L — SIGNIFICANT CHANGE UP (ref 3.5–5.3)
POTASSIUM SERPL-SCNC: 4.2 MMOL/L — SIGNIFICANT CHANGE UP (ref 3.5–5.3)
PROT SERPL-MCNC: 5.8 G/DL — LOW (ref 6–8.3)
PROT UR-MCNC: ABNORMAL
RBC # BLD: 2.93 M/UL — LOW (ref 4.2–5.8)
RBC # FLD: 12.7 % — SIGNIFICANT CHANGE UP (ref 10.3–14.5)
SAO2 % BLDV: 40 % — LOW (ref 67–88)
SAO2 % BLDV: 50 % — LOW (ref 67–88)
SAO2 % BLDV: 50 % — LOW (ref 67–88)
SAO2 % BLDV: 55 % — LOW (ref 67–88)
SAO2 % BLDV: 57 % — LOW (ref 67–88)
SAO2 % BLDV: 57 % — LOW (ref 67–88)
SAO2 % BLDV: 60 % — LOW (ref 67–88)
SAO2 % BLDV: 65 % — LOW (ref 67–88)
SAO2 % BLDV: 65 % — LOW (ref 67–88)
SODIUM SERPL-SCNC: 139 MMOL/L — SIGNIFICANT CHANGE UP (ref 135–145)
SP GR SPEC: 1.02 — SIGNIFICANT CHANGE UP (ref 1.01–1.02)
SPECIMEN SOURCE: SIGNIFICANT CHANGE UP
UROBILINOGEN FLD QL: NEGATIVE — SIGNIFICANT CHANGE UP
WBC # BLD: 14.5 K/UL — HIGH (ref 3.8–10.5)
WBC # FLD AUTO: 14.5 K/UL — HIGH (ref 3.8–10.5)

## 2019-04-28 PROCEDURE — 71045 X-RAY EXAM CHEST 1 VIEW: CPT | Mod: 26

## 2019-04-28 PROCEDURE — 99291 CRITICAL CARE FIRST HOUR: CPT

## 2019-04-28 PROCEDURE — 93010 ELECTROCARDIOGRAM REPORT: CPT

## 2019-04-28 PROCEDURE — 36620 INSERTION CATHETER ARTERY: CPT | Mod: RT

## 2019-04-28 RX ORDER — HYDROMORPHONE HYDROCHLORIDE 2 MG/ML
0.5 INJECTION INTRAMUSCULAR; INTRAVENOUS; SUBCUTANEOUS ONCE
Qty: 0 | Refills: 0 | Status: DISCONTINUED | OUTPATIENT
Start: 2019-04-28 | End: 2019-04-28

## 2019-04-28 RX ORDER — POTASSIUM CHLORIDE 20 MEQ
10 PACKET (EA) ORAL
Qty: 0 | Refills: 0 | Status: COMPLETED | OUTPATIENT
Start: 2019-04-28 | End: 2019-04-28

## 2019-04-28 RX ORDER — CEFEPIME 1 G/1
2000 INJECTION, POWDER, FOR SOLUTION INTRAMUSCULAR; INTRAVENOUS EVERY 8 HOURS
Qty: 0 | Refills: 0 | Status: DISCONTINUED | OUTPATIENT
Start: 2019-04-28 | End: 2019-04-29

## 2019-04-28 RX ORDER — ACETAMINOPHEN 500 MG
1000 TABLET ORAL ONCE
Qty: 0 | Refills: 0 | Status: COMPLETED | OUTPATIENT
Start: 2019-04-28 | End: 2019-04-28

## 2019-04-28 RX ORDER — MAGNESIUM SULFATE 500 MG/ML
2 VIAL (ML) INJECTION ONCE
Qty: 0 | Refills: 0 | Status: COMPLETED | OUTPATIENT
Start: 2019-04-28 | End: 2019-04-28

## 2019-04-28 RX ORDER — PROPOFOL 10 MG/ML
25 INJECTION, EMULSION INTRAVENOUS
Qty: 1000 | Refills: 0 | Status: DISCONTINUED | OUTPATIENT
Start: 2019-04-28 | End: 2019-05-06

## 2019-04-28 RX ORDER — ALBUMIN HUMAN 25 %
250 VIAL (ML) INTRAVENOUS ONCE
Qty: 0 | Refills: 0 | Status: COMPLETED | OUTPATIENT
Start: 2019-04-28 | End: 2019-04-28

## 2019-04-28 RX ORDER — FUROSEMIDE 40 MG
10 TABLET ORAL ONCE
Qty: 0 | Refills: 0 | Status: COMPLETED | OUTPATIENT
Start: 2019-04-28 | End: 2019-04-28

## 2019-04-28 RX ORDER — CEFEPIME 1 G/1
INJECTION, POWDER, FOR SOLUTION INTRAMUSCULAR; INTRAVENOUS
Qty: 0 | Refills: 0 | Status: DISCONTINUED | OUTPATIENT
Start: 2019-04-28 | End: 2019-04-28

## 2019-04-28 RX ORDER — FENTANYL CITRATE 50 UG/ML
50 INJECTION INTRAVENOUS ONCE
Qty: 0 | Refills: 0 | Status: DISCONTINUED | OUTPATIENT
Start: 2019-04-28 | End: 2019-04-28

## 2019-04-28 RX ORDER — CEFEPIME 1 G/1
INJECTION, POWDER, FOR SOLUTION INTRAMUSCULAR; INTRAVENOUS
Qty: 0 | Refills: 0 | Status: DISCONTINUED | OUTPATIENT
Start: 2019-04-28 | End: 2019-04-29

## 2019-04-28 RX ORDER — ESCITALOPRAM OXALATE 10 MG/1
10 TABLET, FILM COATED ORAL DAILY
Qty: 0 | Refills: 0 | Status: DISCONTINUED | OUTPATIENT
Start: 2019-04-28 | End: 2019-05-17

## 2019-04-28 RX ORDER — CEFEPIME 1 G/1
2000 INJECTION, POWDER, FOR SOLUTION INTRAMUSCULAR; INTRAVENOUS ONCE
Qty: 0 | Refills: 0 | Status: COMPLETED | OUTPATIENT
Start: 2019-04-28 | End: 2019-04-28

## 2019-04-28 RX ORDER — DEXMEDETOMIDINE HYDROCHLORIDE IN 0.9% SODIUM CHLORIDE 4 UG/ML
0.3 INJECTION INTRAVENOUS
Qty: 200 | Refills: 0 | Status: DISCONTINUED | OUTPATIENT
Start: 2019-04-28 | End: 2019-05-01

## 2019-04-28 RX ORDER — POTASSIUM CHLORIDE 20 MEQ
10 PACKET (EA) ORAL ONCE
Qty: 0 | Refills: 0 | Status: COMPLETED | OUTPATIENT
Start: 2019-04-28 | End: 2019-04-28

## 2019-04-28 RX ORDER — ASPIRIN/CALCIUM CARB/MAGNESIUM 324 MG
325 TABLET ORAL DAILY
Qty: 0 | Refills: 0 | Status: DISCONTINUED | OUTPATIENT
Start: 2019-04-28 | End: 2019-05-18

## 2019-04-28 RX ORDER — VANCOMYCIN HCL 1 G
1000 VIAL (EA) INTRAVENOUS EVERY 12 HOURS
Qty: 0 | Refills: 0 | Status: DISCONTINUED | OUTPATIENT
Start: 2019-04-28 | End: 2019-04-30

## 2019-04-28 RX ORDER — MAGNESIUM SULFATE 500 MG/ML
1 VIAL (ML) INJECTION ONCE
Qty: 0 | Refills: 0 | Status: COMPLETED | OUTPATIENT
Start: 2019-04-28 | End: 2019-04-28

## 2019-04-28 RX ORDER — HALOPERIDOL DECANOATE 100 MG/ML
5 INJECTION INTRAMUSCULAR ONCE
Qty: 0 | Refills: 0 | Status: COMPLETED | OUTPATIENT
Start: 2019-04-28 | End: 2019-04-28

## 2019-04-28 RX ORDER — VECURONIUM BROMIDE 20 MG/1
2.5 INJECTION, POWDER, FOR SOLUTION INTRAVENOUS ONCE
Qty: 0 | Refills: 0 | Status: COMPLETED | OUTPATIENT
Start: 2019-04-28 | End: 2019-04-28

## 2019-04-28 RX ADMIN — VASOPRESSIN 5 UNIT(S)/MIN: 20 INJECTION INTRAVENOUS at 19:24

## 2019-04-28 RX ADMIN — HALOPERIDOL DECANOATE 5 MILLIGRAM(S): 100 INJECTION INTRAMUSCULAR at 06:16

## 2019-04-28 RX ADMIN — Medication 50 GRAM(S): at 21:23

## 2019-04-28 RX ADMIN — Medication 50 MILLIEQUIVALENT(S): at 02:52

## 2019-04-28 RX ADMIN — Medication 10 MILLIGRAM(S): at 10:37

## 2019-04-28 RX ADMIN — Medication 1000 MILLIGRAM(S): at 18:25

## 2019-04-28 RX ADMIN — Medication 250 MILLIGRAM(S): at 19:23

## 2019-04-28 RX ADMIN — Medication 10 MILLIGRAM(S): at 17:26

## 2019-04-28 RX ADMIN — Medication 400 MILLIGRAM(S): at 17:26

## 2019-04-28 RX ADMIN — Medication 50 MILLIEQUIVALENT(S): at 02:13

## 2019-04-28 RX ADMIN — Medication 10 MILLIGRAM(S): at 00:57

## 2019-04-28 RX ADMIN — FENTANYL CITRATE 50 MICROGRAM(S): 50 INJECTION INTRAVENOUS at 11:27

## 2019-04-28 RX ADMIN — Medication 50 MILLIEQUIVALENT(S): at 13:26

## 2019-04-28 RX ADMIN — ENOXAPARIN SODIUM 40 MILLIGRAM(S): 100 INJECTION SUBCUTANEOUS at 13:24

## 2019-04-28 RX ADMIN — Medication 50 MILLIEQUIVALENT(S): at 13:43

## 2019-04-28 RX ADMIN — Medication 50 MILLIEQUIVALENT(S): at 13:25

## 2019-04-28 RX ADMIN — ATORVASTATIN CALCIUM 40 MILLIGRAM(S): 80 TABLET, FILM COATED ORAL at 23:48

## 2019-04-28 RX ADMIN — FENTANYL CITRATE 50 MICROGRAM(S): 50 INJECTION INTRAVENOUS at 14:55

## 2019-04-28 RX ADMIN — Medication 100 GRAM(S): at 10:37

## 2019-04-28 RX ADMIN — PROPOFOL 11.93 MICROGRAM(S)/KG/MIN: 10 INJECTION, EMULSION INTRAVENOUS at 19:25

## 2019-04-28 RX ADMIN — FENTANYL CITRATE 50 MICROGRAM(S): 50 INJECTION INTRAVENOUS at 14:40

## 2019-04-28 RX ADMIN — Medication 3 MICROGRAM(S)/KG/MIN: at 19:24

## 2019-04-28 RX ADMIN — Medication 325 MILLIGRAM(S): at 17:27

## 2019-04-28 RX ADMIN — CHLORHEXIDINE GLUCONATE 1 APPLICATION(S): 213 SOLUTION TOPICAL at 05:40

## 2019-04-28 RX ADMIN — FENTANYL CITRATE 50 MICROGRAM(S): 50 INJECTION INTRAVENOUS at 11:57

## 2019-04-28 RX ADMIN — PANTOPRAZOLE SODIUM 40 MILLIGRAM(S): 20 TABLET, DELAYED RELEASE ORAL at 13:24

## 2019-04-28 RX ADMIN — HYDROMORPHONE HYDROCHLORIDE 0.5 MILLIGRAM(S): 2 INJECTION INTRAMUSCULAR; INTRAVENOUS; SUBCUTANEOUS at 20:15

## 2019-04-28 RX ADMIN — CEFEPIME 100 MILLIGRAM(S): 1 INJECTION, POWDER, FOR SOLUTION INTRAMUSCULAR; INTRAVENOUS at 19:23

## 2019-04-28 RX ADMIN — HYDROMORPHONE HYDROCHLORIDE 0.5 MILLIGRAM(S): 2 INJECTION INTRAMUSCULAR; INTRAVENOUS; SUBCUTANEOUS at 20:00

## 2019-04-28 RX ADMIN — ESCITALOPRAM OXALATE 10 MILLIGRAM(S): 10 TABLET, FILM COATED ORAL at 13:42

## 2019-04-28 RX ADMIN — Medication 125 MILLILITER(S): at 13:25

## 2019-04-28 NOTE — PROVIDER CONTACT NOTE (OTHER) - SITUATION
Pt laid flat for femoral Liset removal by ALMA ROSA Adam. Pt on BIPAP 100% FiO2, SpO2 83-86%. Oxygen content 40 on venous blood gas and 85 on arterial blood gas. Pt laid flat for femoral Liset removal by ALMA ROSA Adam. Pt on BIPAP 100% FiO2, SpO2 83-86%. Po2 40 on venous blood gas and 55 on arterial blood gas.

## 2019-04-28 NOTE — PROGRESS NOTE ADULT - SUBJECTIVE AND OBJECTIVE BOX
CHRIS THAKKAR  MRN#:  [id number]    The patient is a 78y Male with PMH of HLD, Sleep apnea ( non compliant with CPAP), GERD, diverticulitis, depression, anxiety, and panic attacks, as well as strong family history of CAD, admitted with unstable angina, found to have coronary artery disease, LVH and diastolic heart failure, now recovering s/p C3L 4/26, post op course complicated by bleeding who was seen, evaluated, & examined with the CTICU staff on rounds, overnight and during the early morning hours with a multidisciplinary care plan formulated & implemented.  All available clinical, laboratory, radiographic, pharmacologic, and electrocardiographic data were reviewed & analyzed.      The patient was in the CTICU in critical condition secondary to persistent cardiopulmonary dysfunction, hemodynamically significant anemia/hypovolemia-shock, , persistent cardiovascular dysfunction, and acidosis.      Respiratory status required high flow supplemental oxygen and now bipap non invasive ventilation, the following of ABG’s with A-line monitoring, continuous pulse oximetry monitoring, an IV Dexmedetomidine infusion and intermittent IV Dilaudid for support & to evaluate for & prevent further decompensation secondary to hypoxia and pulmonary congestion on chest xray as well as agitated delirium.    Invasive hemodynamic monitoring with a central venous catheter & an A-line were required for the continuous central venous and MAP/BP monitoring to ensure adequate cardiovascular support and to evaluate for & help prevent decompensation while receiving intermittent volume expansion and later IV Lasix, an IV Levophed drip, and an IV Vasopressin drip secondary to hemodynamically significant anemia/acute postoperative blood loss anemia, hypovolemic shock, acute on chronic diastolic heart failure and oliguria.    Metabolic stability, uncontrolled type 2 diabetes-hyperglycemia, & infection prophylaxis required a lispro Insulin sliding scale & the following of serial glucose levels to help achieve & maintain euglycemia.      Patient required acute postoperative critical care management and I provided 50 minutes of non-continuous care to the patient.  Discussed at length with the CTICU staff and helped coordinate care.

## 2019-04-29 LAB
ALBUMIN SERPL ELPH-MCNC: 3.5 G/DL — SIGNIFICANT CHANGE UP (ref 3.3–5)
ALP SERPL-CCNC: 67 U/L — SIGNIFICANT CHANGE UP (ref 40–120)
ALT FLD-CCNC: 25 U/L — SIGNIFICANT CHANGE UP (ref 10–45)
ANION GAP SERPL CALC-SCNC: 10 MMOL/L — SIGNIFICANT CHANGE UP (ref 5–17)
AST SERPL-CCNC: 38 U/L — SIGNIFICANT CHANGE UP (ref 10–40)
BASE EXCESS BLDV CALC-SCNC: 2.3 MMOL/L — HIGH (ref -2–2)
BASE EXCESS BLDV CALC-SCNC: 3.1 MMOL/L — HIGH (ref -2–2)
BASE EXCESS BLDV CALC-SCNC: 3.2 MMOL/L — HIGH (ref -2–2)
BASE EXCESS BLDV CALC-SCNC: 3.2 MMOL/L — HIGH (ref -2–2)
BILIRUB SERPL-MCNC: 3.6 MG/DL — HIGH (ref 0.2–1.2)
BUN SERPL-MCNC: 25 MG/DL — HIGH (ref 7–23)
CALCIUM SERPL-MCNC: 8.7 MG/DL — SIGNIFICANT CHANGE UP (ref 8.4–10.5)
CHLORIDE SERPL-SCNC: 101 MMOL/L — SIGNIFICANT CHANGE UP (ref 96–108)
CO2 BLDV-SCNC: 29 MMOL/L — SIGNIFICANT CHANGE UP (ref 22–30)
CO2 BLDV-SCNC: 29 MMOL/L — SIGNIFICANT CHANGE UP (ref 22–30)
CO2 BLDV-SCNC: 30 MMOL/L — SIGNIFICANT CHANGE UP (ref 22–30)
CO2 BLDV-SCNC: 30 MMOL/L — SIGNIFICANT CHANGE UP (ref 22–30)
CO2 SERPL-SCNC: 24 MMOL/L — SIGNIFICANT CHANGE UP (ref 22–31)
CREAT SERPL-MCNC: 1.03 MG/DL — SIGNIFICANT CHANGE UP (ref 0.5–1.3)
GAS PNL BLDA: SIGNIFICANT CHANGE UP
GAS PNL BLDV: SIGNIFICANT CHANGE UP
GLUCOSE SERPL-MCNC: 129 MG/DL — HIGH (ref 70–99)
HCO3 BLDV-SCNC: 27 MMOL/L — SIGNIFICANT CHANGE UP (ref 21–29)
HCO3 BLDV-SCNC: 28 MMOL/L — SIGNIFICANT CHANGE UP (ref 21–29)
HCO3 BLDV-SCNC: 28 MMOL/L — SIGNIFICANT CHANGE UP (ref 21–29)
HCO3 BLDV-SCNC: 29 MMOL/L — SIGNIFICANT CHANGE UP (ref 21–29)
HCT VFR BLD CALC: 25.1 % — LOW (ref 39–50)
HGB BLD-MCNC: 8.6 G/DL — LOW (ref 13–17)
HOROWITZ INDEX BLDV+IHG-RTO: 50 — SIGNIFICANT CHANGE UP
HOROWITZ INDEX BLDV+IHG-RTO: 50 — SIGNIFICANT CHANGE UP
HOROWITZ INDEX BLDV+IHG-RTO: 60 — SIGNIFICANT CHANGE UP
HOROWITZ INDEX BLDV+IHG-RTO: 80 — SIGNIFICANT CHANGE UP
MAGNESIUM SERPL-MCNC: 2.7 MG/DL — HIGH (ref 1.6–2.6)
MCHC RBC-ENTMCNC: 30.8 PG — SIGNIFICANT CHANGE UP (ref 27–34)
MCHC RBC-ENTMCNC: 34.4 GM/DL — SIGNIFICANT CHANGE UP (ref 32–36)
MCV RBC AUTO: 89.7 FL — SIGNIFICANT CHANGE UP (ref 80–100)
PCO2 BLDV: 45 MMHG — SIGNIFICANT CHANGE UP (ref 35–50)
PCO2 BLDV: 49 MMHG — SIGNIFICANT CHANGE UP (ref 35–50)
PCO2 BLDV: 49 MMHG — SIGNIFICANT CHANGE UP (ref 35–50)
PCO2 BLDV: 52 MMHG — HIGH (ref 35–50)
PH BLDV: 7.36 — SIGNIFICANT CHANGE UP (ref 7.35–7.45)
PH BLDV: 7.36 — SIGNIFICANT CHANGE UP (ref 7.35–7.45)
PH BLDV: 7.38 — SIGNIFICANT CHANGE UP (ref 7.35–7.45)
PH BLDV: 7.4 — SIGNIFICANT CHANGE UP (ref 7.35–7.45)
PHOSPHATE SERPL-MCNC: 2 MG/DL — LOW (ref 2.5–4.5)
PLATELET # BLD AUTO: 125 K/UL — LOW (ref 150–400)
PO2 BLDV: 30 MMHG — SIGNIFICANT CHANGE UP (ref 25–45)
PO2 BLDV: 32 MMHG — SIGNIFICANT CHANGE UP (ref 25–45)
PO2 BLDV: 32 MMHG — SIGNIFICANT CHANGE UP (ref 25–45)
PO2 BLDV: 39 MMHG — SIGNIFICANT CHANGE UP (ref 25–45)
POTASSIUM SERPL-MCNC: 4 MMOL/L — SIGNIFICANT CHANGE UP (ref 3.5–5.3)
POTASSIUM SERPL-SCNC: 4 MMOL/L — SIGNIFICANT CHANGE UP (ref 3.5–5.3)
PROT SERPL-MCNC: 5.6 G/DL — LOW (ref 6–8.3)
RBC # BLD: 2.8 M/UL — LOW (ref 4.2–5.8)
RBC # FLD: 12.9 % — SIGNIFICANT CHANGE UP (ref 10.3–14.5)
SAO2 % BLDV: 52 % — LOW (ref 67–88)
SAO2 % BLDV: 61 % — LOW (ref 67–88)
SAO2 % BLDV: 62 % — LOW (ref 67–88)
SAO2 % BLDV: 69 % — SIGNIFICANT CHANGE UP (ref 67–88)
SODIUM SERPL-SCNC: 135 MMOL/L — SIGNIFICANT CHANGE UP (ref 135–145)
WBC # BLD: 20.9 K/UL — HIGH (ref 3.8–10.5)
WBC # FLD AUTO: 20.9 K/UL — HIGH (ref 3.8–10.5)

## 2019-04-29 PROCEDURE — 71045 X-RAY EXAM CHEST 1 VIEW: CPT | Mod: 26

## 2019-04-29 PROCEDURE — 99291 CRITICAL CARE FIRST HOUR: CPT | Mod: 25

## 2019-04-29 PROCEDURE — 36556 INSERT NON-TUNNEL CV CATH: CPT

## 2019-04-29 PROCEDURE — 93306 TTE W/DOPPLER COMPLETE: CPT | Mod: 26

## 2019-04-29 RX ORDER — POTASSIUM CHLORIDE 20 MEQ
10 PACKET (EA) ORAL ONCE
Qty: 0 | Refills: 0 | Status: COMPLETED | OUTPATIENT
Start: 2019-04-29 | End: 2019-04-29

## 2019-04-29 RX ORDER — AMIODARONE HYDROCHLORIDE 400 MG/1
200 TABLET ORAL DAILY
Qty: 0 | Refills: 0 | Status: DISCONTINUED | OUTPATIENT
Start: 2019-05-03 | End: 2019-05-04

## 2019-04-29 RX ORDER — AMIODARONE HYDROCHLORIDE 400 MG/1
150 TABLET ORAL ONCE
Qty: 0 | Refills: 0 | Status: COMPLETED | OUTPATIENT
Start: 2019-04-29 | End: 2019-04-29

## 2019-04-29 RX ORDER — POTASSIUM CHLORIDE 20 MEQ
20 PACKET (EA) ORAL ONCE
Qty: 0 | Refills: 0 | Status: COMPLETED | OUTPATIENT
Start: 2019-04-29 | End: 2019-04-29

## 2019-04-29 RX ORDER — ACETAMINOPHEN 500 MG
1000 TABLET ORAL ONCE
Qty: 0 | Refills: 0 | Status: COMPLETED | OUTPATIENT
Start: 2019-04-29 | End: 2019-04-29

## 2019-04-29 RX ORDER — AMIODARONE HYDROCHLORIDE 400 MG/1
400 TABLET ORAL EVERY 8 HOURS
Qty: 0 | Refills: 0 | Status: COMPLETED | OUTPATIENT
Start: 2019-04-29 | End: 2019-05-03

## 2019-04-29 RX ORDER — AMIODARONE HYDROCHLORIDE 400 MG/1
TABLET ORAL
Qty: 0 | Refills: 0 | Status: DISCONTINUED | OUTPATIENT
Start: 2019-04-29 | End: 2019-05-04

## 2019-04-29 RX ORDER — MEROPENEM 1 G/30ML
500 INJECTION INTRAVENOUS EVERY 8 HOURS
Qty: 0 | Refills: 0 | Status: DISCONTINUED | OUTPATIENT
Start: 2019-04-29 | End: 2019-04-30

## 2019-04-29 RX ORDER — FUROSEMIDE 40 MG
20 TABLET ORAL ONCE
Qty: 0 | Refills: 0 | Status: COMPLETED | OUTPATIENT
Start: 2019-04-29 | End: 2019-04-29

## 2019-04-29 RX ORDER — DOCUSATE SODIUM 100 MG
100 CAPSULE ORAL
Qty: 0 | Refills: 0 | Status: DISCONTINUED | OUTPATIENT
Start: 2019-04-29 | End: 2019-05-02

## 2019-04-29 RX ORDER — MAGNESIUM SULFATE 500 MG/ML
2 VIAL (ML) INJECTION ONCE
Qty: 0 | Refills: 0 | Status: COMPLETED | OUTPATIENT
Start: 2019-04-29 | End: 2019-04-29

## 2019-04-29 RX ORDER — HYDROMORPHONE HYDROCHLORIDE 2 MG/ML
0.5 INJECTION INTRAMUSCULAR; INTRAVENOUS; SUBCUTANEOUS ONCE
Qty: 0 | Refills: 0 | Status: DISCONTINUED | OUTPATIENT
Start: 2019-04-29 | End: 2019-04-29

## 2019-04-29 RX ORDER — POTASSIUM CHLORIDE 20 MEQ
40 PACKET (EA) ORAL ONCE
Qty: 0 | Refills: 0 | Status: COMPLETED | OUTPATIENT
Start: 2019-04-29 | End: 2019-04-29

## 2019-04-29 RX ADMIN — HYDROMORPHONE HYDROCHLORIDE 0.5 MILLIGRAM(S): 2 INJECTION INTRAMUSCULAR; INTRAVENOUS; SUBCUTANEOUS at 15:58

## 2019-04-29 RX ADMIN — MEROPENEM 100 MILLIGRAM(S): 1 INJECTION INTRAVENOUS at 13:30

## 2019-04-29 RX ADMIN — Medication 62.5 MILLIMOLE(S): at 03:00

## 2019-04-29 RX ADMIN — ENOXAPARIN SODIUM 40 MILLIGRAM(S): 100 INJECTION SUBCUTANEOUS at 13:26

## 2019-04-29 RX ADMIN — Medication 20 MILLIGRAM(S): at 00:33

## 2019-04-29 RX ADMIN — Medication 20 MILLIEQUIVALENT(S): at 21:40

## 2019-04-29 RX ADMIN — Medication 250 MILLIGRAM(S): at 17:55

## 2019-04-29 RX ADMIN — Medication 325 MILLIGRAM(S): at 13:26

## 2019-04-29 RX ADMIN — Medication 20 MILLIEQUIVALENT(S): at 22:06

## 2019-04-29 RX ADMIN — Medication 40 MILLIEQUIVALENT(S): at 18:23

## 2019-04-29 RX ADMIN — CEFEPIME 100 MILLIGRAM(S): 1 INJECTION, POWDER, FOR SOLUTION INTRAMUSCULAR; INTRAVENOUS at 05:12

## 2019-04-29 RX ADMIN — VASOPRESSIN 5 UNIT(S)/MIN: 20 INJECTION INTRAVENOUS at 21:40

## 2019-04-29 RX ADMIN — Medication 20 MILLIGRAM(S): at 22:06

## 2019-04-29 RX ADMIN — Medication 1000 MILLIGRAM(S): at 15:57

## 2019-04-29 RX ADMIN — CHLORHEXIDINE GLUCONATE 1 APPLICATION(S): 213 SOLUTION TOPICAL at 05:12

## 2019-04-29 RX ADMIN — PROPOFOL 11.93 MICROGRAM(S)/KG/MIN: 10 INJECTION, EMULSION INTRAVENOUS at 21:40

## 2019-04-29 RX ADMIN — Medication 20 MILLIEQUIVALENT(S): at 02:08

## 2019-04-29 RX ADMIN — Medication 400 MILLIGRAM(S): at 15:55

## 2019-04-29 RX ADMIN — HYDROMORPHONE HYDROCHLORIDE 0.5 MILLIGRAM(S): 2 INJECTION INTRAMUSCULAR; INTRAVENOUS; SUBCUTANEOUS at 00:45

## 2019-04-29 RX ADMIN — Medication 50 MILLIEQUIVALENT(S): at 02:00

## 2019-04-29 RX ADMIN — Medication 20 MILLIEQUIVALENT(S): at 15:57

## 2019-04-29 RX ADMIN — VECURONIUM BROMIDE 2.5 MILLIGRAM(S): 20 INJECTION, POWDER, FOR SOLUTION INTRAVENOUS at 00:30

## 2019-04-29 RX ADMIN — AMIODARONE HYDROCHLORIDE 600 MILLIGRAM(S): 400 TABLET ORAL at 17:55

## 2019-04-29 RX ADMIN — Medication 3 MICROGRAM(S)/KG/MIN: at 21:40

## 2019-04-29 RX ADMIN — AMIODARONE HYDROCHLORIDE 600 MILLIGRAM(S): 400 TABLET ORAL at 03:30

## 2019-04-29 RX ADMIN — ATORVASTATIN CALCIUM 40 MILLIGRAM(S): 80 TABLET, FILM COATED ORAL at 21:40

## 2019-04-29 RX ADMIN — AMIODARONE HYDROCHLORIDE 400 MILLIGRAM(S): 400 TABLET ORAL at 21:40

## 2019-04-29 RX ADMIN — MEROPENEM 100 MILLIGRAM(S): 1 INJECTION INTRAVENOUS at 21:40

## 2019-04-29 RX ADMIN — HYDROMORPHONE HYDROCHLORIDE 0.5 MILLIGRAM(S): 2 INJECTION INTRAMUSCULAR; INTRAVENOUS; SUBCUTANEOUS at 00:30

## 2019-04-29 RX ADMIN — Medication 100 MILLIGRAM(S): at 02:08

## 2019-04-29 RX ADMIN — PANTOPRAZOLE SODIUM 40 MILLIGRAM(S): 20 TABLET, DELAYED RELEASE ORAL at 13:30

## 2019-04-29 RX ADMIN — Medication 250 MILLIGRAM(S): at 05:47

## 2019-04-29 RX ADMIN — ESCITALOPRAM OXALATE 10 MILLIGRAM(S): 10 TABLET, FILM COATED ORAL at 13:29

## 2019-04-29 NOTE — DIETITIAN INITIAL EVALUATION ADULT. - ORAL INTAKE PTA
Unable to obtain information Unable to obtain information at this time Pt with no noted chewing or swallowing difficulties and NKFA per chart. Wife reports pt with good appetite at baseline, good PO intake of 3 meals/day. No chewing/swallowing difficulties or vitamin supplementation reported. Confirmed NKFA./good

## 2019-04-29 NOTE — DIETITIAN INITIAL EVALUATION ADULT. - DIET TYPE
Glucerna 1.2 kcal starting @ 10 ml q4 hours until goal rate @ 20 ml x 24 hrs to provide 480 mL total volume, 576 kcal, 28.8 gm protein/NPO with tube feedings

## 2019-04-29 NOTE — DIETITIAN INITIAL EVALUATION ADULT. - NS AS NUTRI INTERV ENTERAL NUTRITION
Recommend change to Vital 1.5 at 55 ml/hr x 24 hrs to provide 1320 mL fluid, 1980 kcal (25 kcal/kg), + 1 No Carb prosource to provide 104 gm protein  (1.3 gm protein/kg) Recommend change to Vital 1.5 and increase as tolerated to goal rate 55 ml/hr x 24 hrs + 1 no carb prosource to provide 1320 mL fluid, 2040 kcal (25.6 kcal/kg), 104 gm protein  (1.3 gm protein/kg) based on dosing wt 79.2 kg

## 2019-04-29 NOTE — DIETITIAN INITIAL EVALUATION ADULT. - OTHER INFO
Pt seen for LOS on CTU. Pt ordered for Vital 1.2, however Vital 1.5 hanging and running at goal rate. Discussed discrepancy with RN. RN reports pt tolerating feeds, last BM 4/25 per nursing documentation. RD to f/u to obtain diet history when feasible. Pt seen for LOS on CTU. Pt ordered for Glucerna 1.2, however Glucerna 1.5 hanging and running at goal rate. Discussed discrepancy with RN. RN reports pt tolerating feeds, last BM 4/25 per nursing documentation. RD to f/u to obtain diet history when feasible. Pt seen for LOS on CTU. Pt ordered for Glucerna 1.2, however Glucerna 1.5 hanging and running at goal rate. Discussed discrepancy with RN. RN reports pt tolerating feeds, last BM 4/25 per nursing documentation, noted bowel regimen. RD to f/u to obtain diet history when feasible. Pt seen for LOS on CTU. Pt ordered for Glucerna 1.2, however Glucerna 1.5 hanging and running at goal rate. Discussed discrepancy with RN. RN reports pt tolerating feeds, last BM 4/25 per nursing documentation, noted bowel regimen. Discussed heart healthy diet education with family at bedside. RD to f/u per protocol.

## 2019-04-29 NOTE — DIETITIAN INITIAL EVALUATION ADULT. - NUTRITION INTERVENTION
Medical Food Supplements/Enteral Nutrition Medical Food Supplements/Enteral Nutrition/Nutrition Education

## 2019-04-29 NOTE — DIETITIAN INITIAL EVALUATION ADULT. - SOURCE
Comprehensive chart review, RN/other (specify) Wife and daughter at bedside, EMR, RN/family/significant other/other (specify)

## 2019-04-29 NOTE — DIETITIAN INITIAL EVALUATION ADULT. - ENERGY NEEDS
Ht: 5'8", Dosing Wt 175 lb,  lb (+/-10%), BMI 26.7 kg   Noted ~11 lb wt gain (186 lb 4/29) since admission likely 2/2 fluid shifts  Other pertinent information: 78 yo M with PMHx HLD, diverticulitis, sleep apnea, GERD, diverticulitis, depression, anxiety, panic attacks with chest pain and SOB x 1 year. Admitted for CABG. S/p cardiac cath 4/25, found to have CAD, LVH and diastolic HF, S/p CABGx3 with LIMA to LAD; RSVG to OM, RCA 4/26. Hospital course c/b anemia/hypovolemia-shock, cardiovascular dysfunction, acidosis, and hyperglycemia. Pt intubated 4/28 on pressure support, sedated on Propofol running at @ 9.5 mL/hr on 4/29.   Per nursing documentation: 1+, generalized edema, no noted pressure injuries  Fluids per team Ht: 5'8", Dosing Wt 175 lb,  lb (+/-10%), BMI 26.7 kg   Noted ~11 lb wt gain (186 lb 4/29) since admission likely 2/2 fluid shifts  Other pertinent information: 80 yo M with PMHx HLD, sleep apnea, GERD, diverticulitis, depression, anxiety, panic attacks with chest pain and SOB x 1 year. Admitted for CABG. S/p cardiac cath 4/25, found to have CAD, LVH and diastolic HF, S/p CABGx3 with LIMA to LAD; RSVG to OM, RCA 4/26. Hospital course c/b anemia/hypovolemia-shock, cardiovascular dysfunction, acidosis, and hyperglycemia. Pt re-intubated 4/28 on pressure support, sedated on Propofol running at @ 9.5 mL/hr on 4/29. Noted pt being treated for PNA.   Per nursing documentation: 1+, generalized edema, no noted pressure injuries  Fluids per team

## 2019-04-29 NOTE — DIETITIAN INITIAL EVALUATION ADULT. - PT NOT SOURCE
sedated/Pt sedated on Propofol and no family at bedside, unable to obtain diet history sedated/Pt sedated on Propofol

## 2019-04-29 NOTE — DIETITIAN INITIAL EVALUATION ADULT. - ADHERENCE
Wife reports cooking for pt with no diet restrictions, frequently consumes chinese food/pizza and frozen convenience foods. Diet hx indicative of high sodium, high fat diet with minimal intake of vegetables/fruits./fair

## 2019-04-29 NOTE — DIETITIAN INITIAL EVALUATION ADULT. - PERTINENT MEDS FT
Vancomycin IVBP, meropenem IVBP, colace, precedex, propofol, lipitor, colace, norepinephrine, protonix, vasopressin

## 2019-04-29 NOTE — DIETITIAN INITIAL EVALUATION ADULT. - NS AS NUTRI INTERV ED CONTENT
Discussed heart healthy, post-CABG diet education with family at bedside. Discussed food sources high in sodium, low sodium options, food shopping/cooking tips, increasing vegetable intake, healthy sources of fats, and increased nutrient needs s/p surgery. Family verbalized comprehension. RD to f/u to reinforce diet education with patient when medically feasible. Provided written and verbal diet education on heart healthy, post-CABG diet education with family at bedside. Discussed food sources high in sodium, low sodium options, food shopping/cooking tips, increasing vegetable intake, healthy sources of fats, and increased nutrient needs s/p surgery. Family verbalized comprehension. RD to f/u to reinforce diet education with patient when medically feasible.

## 2019-04-29 NOTE — PROGRESS NOTE ADULT - SUBJECTIVE AND OBJECTIVE BOX
CHRIS THAKKAR  MRN#:  [id number]    The patient is a 78y Male with PMH of HLD, Sleep apnea ( non compliant with CPAP), GERD, diverticulitis, depression, anxiety, and panic attacks, as well as strong family history of CAD, admitted with unstable angina, found to have coronary artery disease, LVH and diastolic heart failure, now recovering s/p C3L 4/26, post op course complicated by bleeding who was seen, evaluated, & examined with the CTICU staff on rounds and later in the evening with a multidisciplinary care plan formulated & implemented.  All available clinical, laboratory, radiographic, pharmacologic, and electrocardiographic data were reviewed & analyzed.      The patient was in the CTICU in critical condition secondary to persistent cardiopulmonary dysfunction, hemodynamically significant anemia/hypovolemia-shock, , persistent cardiovascular dysfunction.      Respiratory status required full ventilator support, the following of ABG’s with A-line monitoring, continuous pulse oximetry monitoring, and an IV Propofol infusion for support & to evaluate for & prevent further decompensation secondary to hypoxic respiratory failure and pneumonia.    Invasive hemodynamic monitoring with a central venous catheter & an A-line were required for the continuous central venous and MAP/BP monitoring to ensure adequate cardiovascular support and to evaluate for & help prevent decompensation while receiving intermittent volume expansion, an IV Levophed drip, an IV Vasopressin drip, and enteral Amiodarone load secondary to hemodynamically significant anemia/acute postoperative blood loss anemia, hypovolemic and vasogenic shock, acute on chronic diastolic heart failure, and rapid atrial fibrillation.    Patient has had fever, leukocytosis, thick pulmonary secretions with a right lung infiltrate on chest x ray consistent with pneumonia. He is empirically on treatment with IV Vancomycin and Meropenem.    Enteral feedings ordered and advancing to goal rate.    Patient required acute postoperative critical care management and I provided 40 minutes of non-continuous care to the patient.  Discussed at length with the CTICU staff and helped coordinate care.

## 2019-04-30 LAB
ALBUMIN SERPL ELPH-MCNC: 3.3 G/DL — SIGNIFICANT CHANGE UP (ref 3.3–5)
ALP SERPL-CCNC: 115 U/L — SIGNIFICANT CHANGE UP (ref 40–120)
ALT FLD-CCNC: 26 U/L — SIGNIFICANT CHANGE UP (ref 10–45)
ANION GAP SERPL CALC-SCNC: 11 MMOL/L — SIGNIFICANT CHANGE UP (ref 5–17)
AST SERPL-CCNC: 34 U/L — SIGNIFICANT CHANGE UP (ref 10–40)
BASE EXCESS BLDV CALC-SCNC: 3.1 MMOL/L — HIGH (ref -2–2)
BASE EXCESS BLDV CALC-SCNC: 3.7 MMOL/L — HIGH (ref -2–2)
BASE EXCESS BLDV CALC-SCNC: 4.1 MMOL/L — HIGH (ref -2–2)
BASE EXCESS BLDV CALC-SCNC: 4.5 MMOL/L — HIGH (ref -2–2)
BILIRUB SERPL-MCNC: 2 MG/DL — HIGH (ref 0.2–1.2)
BUN SERPL-MCNC: 23 MG/DL — SIGNIFICANT CHANGE UP (ref 7–23)
CALCIUM SERPL-MCNC: 8.5 MG/DL — SIGNIFICANT CHANGE UP (ref 8.4–10.5)
CHLORIDE SERPL-SCNC: 100 MMOL/L — SIGNIFICANT CHANGE UP (ref 96–108)
CO2 BLDV-SCNC: 29 MMOL/L — SIGNIFICANT CHANGE UP (ref 22–30)
CO2 BLDV-SCNC: 30 MMOL/L — SIGNIFICANT CHANGE UP (ref 22–30)
CO2 SERPL-SCNC: 25 MMOL/L — SIGNIFICANT CHANGE UP (ref 22–31)
CREAT SERPL-MCNC: 1.01 MG/DL — SIGNIFICANT CHANGE UP (ref 0.5–1.3)
CULTURE RESULTS: SIGNIFICANT CHANGE UP
GAS PNL BLDA: SIGNIFICANT CHANGE UP
GAS PNL BLDV: SIGNIFICANT CHANGE UP
GLUCOSE SERPL-MCNC: 147 MG/DL — HIGH (ref 70–99)
HCO3 BLDV-SCNC: 27 MMOL/L — SIGNIFICANT CHANGE UP (ref 21–29)
HCO3 BLDV-SCNC: 29 MMOL/L — SIGNIFICANT CHANGE UP (ref 21–29)
HCT VFR BLD CALC: 27.6 % — LOW (ref 39–50)
HGB BLD-MCNC: 9.7 G/DL — LOW (ref 13–17)
HOROWITZ INDEX BLDV+IHG-RTO: 40 — SIGNIFICANT CHANGE UP
HOROWITZ INDEX BLDV+IHG-RTO: 50 — SIGNIFICANT CHANGE UP
MAGNESIUM SERPL-MCNC: 2.2 MG/DL — SIGNIFICANT CHANGE UP (ref 1.6–2.6)
MCHC RBC-ENTMCNC: 31.4 PG — SIGNIFICANT CHANGE UP (ref 27–34)
MCHC RBC-ENTMCNC: 35.1 GM/DL — SIGNIFICANT CHANGE UP (ref 32–36)
MCV RBC AUTO: 89.5 FL — SIGNIFICANT CHANGE UP (ref 80–100)
PCO2 BLDV: 43 MMHG — SIGNIFICANT CHANGE UP (ref 35–50)
PCO2 BLDV: 46 MMHG — SIGNIFICANT CHANGE UP (ref 35–50)
PCO2 BLDV: 47 MMHG — SIGNIFICANT CHANGE UP (ref 35–50)
PCO2 BLDV: 48 MMHG — SIGNIFICANT CHANGE UP (ref 35–50)
PH BLDV: 7.4 — SIGNIFICANT CHANGE UP (ref 7.35–7.45)
PH BLDV: 7.41 — SIGNIFICANT CHANGE UP (ref 7.35–7.45)
PH BLDV: 7.42 — SIGNIFICANT CHANGE UP (ref 7.35–7.45)
PH BLDV: 7.42 — SIGNIFICANT CHANGE UP (ref 7.35–7.45)
PHOSPHATE SERPL-MCNC: 1.1 MG/DL — LOW (ref 2.5–4.5)
PLATELET # BLD AUTO: 143 K/UL — LOW (ref 150–400)
PO2 BLDV: 29 MMHG — SIGNIFICANT CHANGE UP (ref 25–45)
PO2 BLDV: 29 MMHG — SIGNIFICANT CHANGE UP (ref 25–45)
PO2 BLDV: 30 MMHG — SIGNIFICANT CHANGE UP (ref 25–45)
PO2 BLDV: 32 MMHG — SIGNIFICANT CHANGE UP (ref 25–45)
POTASSIUM SERPL-MCNC: 4.7 MMOL/L — SIGNIFICANT CHANGE UP (ref 3.5–5.3)
POTASSIUM SERPL-SCNC: 4.7 MMOL/L — SIGNIFICANT CHANGE UP (ref 3.5–5.3)
PROT SERPL-MCNC: 6 G/DL — SIGNIFICANT CHANGE UP (ref 6–8.3)
RBC # BLD: 3.09 M/UL — LOW (ref 4.2–5.8)
RBC # FLD: 13 % — SIGNIFICANT CHANGE UP (ref 10.3–14.5)
SAO2 % BLDV: 52 % — LOW (ref 67–88)
SAO2 % BLDV: 53 % — LOW (ref 67–88)
SAO2 % BLDV: 53 % — LOW (ref 67–88)
SAO2 % BLDV: 59 % — LOW (ref 67–88)
SODIUM SERPL-SCNC: 136 MMOL/L — SIGNIFICANT CHANGE UP (ref 135–145)
SPECIMEN SOURCE: SIGNIFICANT CHANGE UP
WBC # BLD: 18.9 K/UL — HIGH (ref 3.8–10.5)
WBC # FLD AUTO: 18.9 K/UL — HIGH (ref 3.8–10.5)

## 2019-04-30 PROCEDURE — 99291 CRITICAL CARE FIRST HOUR: CPT

## 2019-04-30 PROCEDURE — 71045 X-RAY EXAM CHEST 1 VIEW: CPT | Mod: 26

## 2019-04-30 PROCEDURE — 99222 1ST HOSP IP/OBS MODERATE 55: CPT

## 2019-04-30 RX ORDER — IPRATROPIUM/ALBUTEROL SULFATE 18-103MCG
3 AEROSOL WITH ADAPTER (GRAM) INHALATION EVERY 6 HOURS
Qty: 0 | Refills: 0 | Status: DISCONTINUED | OUTPATIENT
Start: 2019-04-30 | End: 2019-05-12

## 2019-04-30 RX ORDER — POTASSIUM CHLORIDE 20 MEQ
40 PACKET (EA) ORAL ONCE
Qty: 0 | Refills: 0 | Status: COMPLETED | OUTPATIENT
Start: 2019-04-30 | End: 2019-04-30

## 2019-04-30 RX ORDER — ACETYLCYSTEINE 200 MG/ML
20 VIAL (ML) MISCELLANEOUS THREE TIMES A DAY
Qty: 0 | Refills: 0 | Status: DISCONTINUED | OUTPATIENT
Start: 2019-04-30 | End: 2019-05-01

## 2019-04-30 RX ORDER — ACETAMINOPHEN 500 MG
1000 TABLET ORAL ONCE
Qty: 0 | Refills: 0 | Status: COMPLETED | OUTPATIENT
Start: 2019-04-30 | End: 2019-04-30

## 2019-04-30 RX ORDER — VANCOMYCIN HCL 1 G
750 VIAL (EA) INTRAVENOUS EVERY 12 HOURS
Qty: 0 | Refills: 0 | Status: DISCONTINUED | OUTPATIENT
Start: 2019-04-30 | End: 2019-05-02

## 2019-04-30 RX ORDER — ALBUMIN HUMAN 25 %
250 VIAL (ML) INTRAVENOUS ONCE
Qty: 0 | Refills: 0 | Status: COMPLETED | OUTPATIENT
Start: 2019-04-30 | End: 2019-04-30

## 2019-04-30 RX ORDER — CHLORHEXIDINE GLUCONATE 213 G/1000ML
15 SOLUTION TOPICAL
Qty: 0 | Refills: 0 | Status: DISCONTINUED | OUTPATIENT
Start: 2019-04-30 | End: 2019-05-26

## 2019-04-30 RX ORDER — VECURONIUM BROMIDE 20 MG/1
10 INJECTION, POWDER, FOR SOLUTION INTRAVENOUS ONCE
Qty: 0 | Refills: 0 | Status: COMPLETED | OUTPATIENT
Start: 2019-04-30 | End: 2019-04-30

## 2019-04-30 RX ORDER — MEROPENEM 1 G/30ML
1000 INJECTION INTRAVENOUS EVERY 8 HOURS
Qty: 0 | Refills: 0 | Status: DISCONTINUED | OUTPATIENT
Start: 2019-04-30 | End: 2019-05-03

## 2019-04-30 RX ORDER — FUROSEMIDE 40 MG
20 TABLET ORAL ONCE
Qty: 0 | Refills: 0 | Status: COMPLETED | OUTPATIENT
Start: 2019-04-30 | End: 2019-04-30

## 2019-04-30 RX ORDER — ALBUTEROL 90 UG/1
1 AEROSOL, METERED ORAL EVERY 4 HOURS
Qty: 0 | Refills: 0 | Status: DISCONTINUED | OUTPATIENT
Start: 2019-04-30 | End: 2019-05-04

## 2019-04-30 RX ORDER — HYDROMORPHONE HYDROCHLORIDE 2 MG/ML
0.5 INJECTION INTRAMUSCULAR; INTRAVENOUS; SUBCUTANEOUS ONCE
Qty: 0 | Refills: 0 | Status: DISCONTINUED | OUTPATIENT
Start: 2019-04-30 | End: 2019-04-30

## 2019-04-30 RX ORDER — HYDROMORPHONE HYDROCHLORIDE 2 MG/ML
0.25 INJECTION INTRAMUSCULAR; INTRAVENOUS; SUBCUTANEOUS ONCE
Qty: 0 | Refills: 0 | Status: DISCONTINUED | OUTPATIENT
Start: 2019-04-30 | End: 2019-04-30

## 2019-04-30 RX ORDER — INSULIN LISPRO 100/ML
VIAL (ML) SUBCUTANEOUS
Qty: 0 | Refills: 0 | Status: DISCONTINUED | OUTPATIENT
Start: 2019-04-30 | End: 2019-05-05

## 2019-04-30 RX ORDER — TIOTROPIUM BROMIDE 18 UG/1
1 CAPSULE ORAL; RESPIRATORY (INHALATION) DAILY
Qty: 0 | Refills: 0 | Status: DISCONTINUED | OUTPATIENT
Start: 2019-04-30 | End: 2019-05-04

## 2019-04-30 RX ADMIN — Medication 250 MILLIGRAM(S): at 17:58

## 2019-04-30 RX ADMIN — PANTOPRAZOLE SODIUM 40 MILLIGRAM(S): 20 TABLET, DELAYED RELEASE ORAL at 12:59

## 2019-04-30 RX ADMIN — PROPOFOL 11.93 MICROGRAM(S)/KG/MIN: 10 INJECTION, EMULSION INTRAVENOUS at 21:23

## 2019-04-30 RX ADMIN — Medication 400 MILLIGRAM(S): at 02:17

## 2019-04-30 RX ADMIN — ATORVASTATIN CALCIUM 40 MILLIGRAM(S): 80 TABLET, FILM COATED ORAL at 21:09

## 2019-04-30 RX ADMIN — CHLORHEXIDINE GLUCONATE 15 MILLILITER(S): 213 SOLUTION TOPICAL at 05:39

## 2019-04-30 RX ADMIN — Medication 40 MILLIEQUIVALENT(S): at 11:42

## 2019-04-30 RX ADMIN — Medication 62.5 MILLIMOLE(S): at 05:17

## 2019-04-30 RX ADMIN — Medication 1000 MILLIGRAM(S): at 14:17

## 2019-04-30 RX ADMIN — Medication 3 MILLILITER(S): at 17:47

## 2019-04-30 RX ADMIN — Medication 125 MILLILITER(S): at 10:36

## 2019-04-30 RX ADMIN — HYDROMORPHONE HYDROCHLORIDE 0.5 MILLIGRAM(S): 2 INJECTION INTRAMUSCULAR; INTRAVENOUS; SUBCUTANEOUS at 05:38

## 2019-04-30 RX ADMIN — Medication 1000 MILLIGRAM(S): at 03:00

## 2019-04-30 RX ADMIN — CHLORHEXIDINE GLUCONATE 1 APPLICATION(S): 213 SOLUTION TOPICAL at 05:39

## 2019-04-30 RX ADMIN — CHLORHEXIDINE GLUCONATE 15 MILLILITER(S): 213 SOLUTION TOPICAL at 17:26

## 2019-04-30 RX ADMIN — Medication 20 MILLIGRAM(S): at 21:09

## 2019-04-30 RX ADMIN — Medication 400 MILLIGRAM(S): at 13:44

## 2019-04-30 RX ADMIN — HYDROMORPHONE HYDROCHLORIDE 0.25 MILLIGRAM(S): 2 INJECTION INTRAMUSCULAR; INTRAVENOUS; SUBCUTANEOUS at 10:38

## 2019-04-30 RX ADMIN — VECURONIUM BROMIDE 10 MILLIGRAM(S): 20 INJECTION, POWDER, FOR SOLUTION INTRAVENOUS at 19:20

## 2019-04-30 RX ADMIN — HYDROMORPHONE HYDROCHLORIDE 0.5 MILLIGRAM(S): 2 INJECTION INTRAMUSCULAR; INTRAVENOUS; SUBCUTANEOUS at 23:50

## 2019-04-30 RX ADMIN — MEROPENEM 100 MILLIGRAM(S): 1 INJECTION INTRAVENOUS at 21:09

## 2019-04-30 RX ADMIN — AMIODARONE HYDROCHLORIDE 400 MILLIGRAM(S): 400 TABLET ORAL at 13:43

## 2019-04-30 RX ADMIN — MEROPENEM 100 MILLIGRAM(S): 1 INJECTION INTRAVENOUS at 05:16

## 2019-04-30 RX ADMIN — Medication 3 MICROGRAM(S)/KG/MIN: at 21:23

## 2019-04-30 RX ADMIN — HYDROMORPHONE HYDROCHLORIDE 0.5 MILLIGRAM(S): 2 INJECTION INTRAMUSCULAR; INTRAVENOUS; SUBCUTANEOUS at 06:00

## 2019-04-30 RX ADMIN — Medication 5: at 17:25

## 2019-04-30 RX ADMIN — Medication 125 MILLILITER(S): at 15:22

## 2019-04-30 RX ADMIN — HYDROMORPHONE HYDROCHLORIDE 0.25 MILLIGRAM(S): 2 INJECTION INTRAMUSCULAR; INTRAVENOUS; SUBCUTANEOUS at 10:37

## 2019-04-30 RX ADMIN — ESCITALOPRAM OXALATE 10 MILLIGRAM(S): 10 TABLET, FILM COATED ORAL at 12:59

## 2019-04-30 RX ADMIN — Medication 250 MILLIGRAM(S): at 05:16

## 2019-04-30 RX ADMIN — ENOXAPARIN SODIUM 40 MILLIGRAM(S): 100 INJECTION SUBCUTANEOUS at 12:59

## 2019-04-30 RX ADMIN — AMIODARONE HYDROCHLORIDE 400 MILLIGRAM(S): 400 TABLET ORAL at 21:09

## 2019-04-30 RX ADMIN — Medication 325 MILLIGRAM(S): at 12:59

## 2019-04-30 RX ADMIN — AMIODARONE HYDROCHLORIDE 400 MILLIGRAM(S): 400 TABLET ORAL at 05:16

## 2019-04-30 RX ADMIN — VASOPRESSIN 5 UNIT(S)/MIN: 20 INJECTION INTRAVENOUS at 21:23

## 2019-04-30 RX ADMIN — MEROPENEM 100 MILLIGRAM(S): 1 INJECTION INTRAVENOUS at 13:43

## 2019-04-30 NOTE — AIRWAY PLACEMENT NOTE ADULT - AIRWAY COMMENTS:
pt intubated in OR
Under Glidescope visualization, vocal cords identified. Supraglo;ttic placement of ETT  balloon noted. Magdalena ETT area suctioned, ETT pulled out under visualization. New ETT placed uneventfully. ETCO2 (+).

## 2019-04-30 NOTE — CONSULT NOTE ADULT - SUBJECTIVE AND OBJECTIVE BOX
Patient is a 79y old  Male who presents with a chief complaint of CABG (2019 17:46)    HPI:  This is a 79 yo   male with PMH of HLD, Sleep apnea ( non compliant with CPAP), GERD, diverticulitis, depression, anxiety, and panic attack. Denies significant PMH of heart disease but reports early CAD in family; mother  of MI at age 54 and uncle ( mothers brother) at age 37.  Presents to Dr Feng with c/c of chest discomfort associated with dyspnea ( on exertion after walking 100 feet)  and palpitations for the past 1 year.  CP is described as 'pinch like", intermittent, sporadic, lasting 1-2 min, localized in the left anterior chest non radiating; CP triggered by exercise and stress. Abnormal NST ( last week) : mild to moderate abnormal study with medium sized inferior and post- lateral ischemia without infarct, EF 50%.  Holter monitor revealed: frequent PVCs, ECHO revealed AR, concentric LVH, diastolic dysfunction, and MR. Referred here today for R and LHC. Presently asymptomatic, denies chest pain, dyspnea, dizziness, palpitations, N&V, HA, LE edema.    OF NOTE: patient was started on Toprol 25mg PO daily however he never picked it up from pharmacy because he states" he did not know about it"    PCP: Marleny Ceja (2019 08:32)      PAST MEDICAL & SURGICAL HISTORY:  Diverticulitis  Nocturia  Panic attacks  Anxiety  Depression  Asthma: Controlled  Sleep Apnea  History of partial colectomy  History of colon resection  History of eye surgery: PPV right  Cataract extraction status: right  Status post lung surgery: ? wedge resection  S/P eye surgery: &quot;removed fluid&quot; from rigth eye  Bunion  Sinus Disorder: surgery x 2  Inguinal Hernia Bilateral  Shoulder Problem: right rotator cuff  Carpal Tunnel Syndrome: right hand      Social history:    FAMILY HISTORY:  Family history of acute myocardial infarction (Sibling): mom at 54   mothers brother at 37                    Allergic/Immunologic:	No hives or rash   Allergies    No Known Allergies    Intolerances        Antimicrobials:    meropenem  IVPB 500 milliGRAM(s) IV Intermittent every 8 hours  vancomycin  IVPB 1000 milliGRAM(s) IV Intermittent every 12 hours        Vital Signs Last 24 Hrs  T(C): 38.3 (2019 15:00), Max: 38.8 (2019 17:00)  T(F): 100.9 (2019 15:00), Max: 101.8 (2019 17:00)  HR: 83 (2019 16:32) (78 - 115)  BP: --  BP(mean): --  RR: 19 (2019 15:15) (0 - 36)  SpO2: 97% (2019 16:32) (87% - 100%)    PHYSICAL EXAM patient in no acute distress.         No cachexia     Eyes:PERRL EOMI.NO discharge or conjunctival injection    ENMT:No sinus tenderness.No thrush.No pharyngeal exudate or erythema.Fair dental hygiene    Neck:Supple,No LN,no JVD      Respiratory:Good air entry bilaterally,CTA    Cardiovascular:S1 S2 wnl, No murmurs,rub or gallops wd is colean and dry    Gastrointestinal:Soft BS(+) no tenderness no masses ,No rebound or guarding    Genitourinary:No CVA tendereness     Rectal:    Extremities:No cyanosis,clubbing or edema.    Vascular:peripheral pulses felt    Neurological:AAO X 3,No grossly focal deficits    Skin:No rash      s    Musculoskeletal:No joint swelling or LOM                                     9.7    18.9  )-----------( 143      ( 2019 01:03 )             27.6             136  |  100  |  23  ----------------------------<  147<H>  4.7   |  25  |  1.01    Ca    8.5      2019 01:03  Phos  1.1       Mg     2.2         TPro  6.0  /  Alb  3.3  /  TBili  2.0<H>  /  DBili  x   /  AST  34  /  ALT  26  /  AlkPhos  115        RECENT CULTURES:   @ 00:24  .Blood  --  --  --    No growth to date.  --   @ 14:06  .Sputum trap  --  --  --    Normal Respiratory Annamaria present  --      MICROBIOLOGY:  Culture Results:   No growth to date. ( @ 00:24)  Culture Results:   No growth to date. ( @ 00:24)  Culture Results:   Normal Respiratory Annamaria present ( @ 14:06)          Radiology:      Assessment:        Recommendations and Plan:    Pager [phone]  After 5 pm/weekends or if no response :[phone]

## 2019-04-30 NOTE — AIRWAY PLACEMENT NOTE ADULT - POST AIRWAY PLACEMENT ASSESSMENT:
CXR pending/chest excursion noted/breath sounds equal/positive end tidal CO2 noted/breath sounds bilateral

## 2019-04-30 NOTE — CONSULT NOTE ADULT - ASSESSMENT
78 yr old with ARCENIO admitted for chest pain and underwent a CABG last last week   Post op has been stable but is still intubated and became febrile last 24hrs.   No evidence of wd infection, alot of sputum and possible LLL infiltrate started on vanco and meropenem which are reasonable  pending culture results    increase meropenem to 1 q 8 hr  decrease vanco to 750 q 12    will follow

## 2019-04-30 NOTE — PROGRESS NOTE ADULT - SUBJECTIVE AND OBJECTIVE BOX
CHRIS THAKKAR  MRN#:  [id number]    The patient is a 78y Male with PMH of HLD, Sleep apnea ( non compliant with CPAP), GERD, diverticulitis, depression, anxiety, and panic attacks, as well as strong family history of CAD, admitted with unstable angina, found to have coronary artery disease, LVH and diastolic heart failure, now recovering s/p C3L 4/26, post op course complicated by bleeding who was seen, evaluated, & examined with the CTICU staff on rounds, overnight and during the early morning hours with a multidisciplinary care plan formulated & implemented.  All available clinical, laboratory, radiographic, pharmacologic, and electrocardiographic data were reviewed & analyzed.      The patient was in the CTICU in critical condition secondary to persistent cardiopulmonary dysfunction, hemodynamically significant anemia/hypovolemia-shock, and persistent cardiovascular dysfunction.      Respiratory status required full ventilator support with subsequent weaning to pressure support and lowering of Fi02, close monitoring of respiratory rate and breathing pattern, the following of ABG’s with A-line monitoring, continuous pulse oximetry monitoring, and weaning of an IV Propofol infusion for support & to evaluate for & prevent further decompensation secondary to hypoxic respiratory failure and pneumonia.    Invasive hemodynamic monitoring with a central venous catheter & an A-line were required for the continuous central venous and MAP/BP monitoring to ensure adequate cardiovascular support and to evaluate for & help prevent decompensation while receiving intermittent volume expansion, an IV Levophed drip, an IV Vasopressin drip, and enteral Amiodarone load secondary to hemodynamically significant anemia/acute postoperative blood loss anemia, hypovolemic and vasogenic shock, acute on chronic diastolic heart failure, and rapid atrial fibrillation.    Patient has had fever, leukocytosis, thick pulmonary secretions with a right lung infiltrate on chest x ray consistent with pneumonia. He is empirically on treatment with IV Vancomycin and Meropenem.    Enteral feedings ordered and advancing to goal rate.    Patient required acute postoperative critical care management and I provided 35 minutes of non-continuous care to the patient.  Discussed at length with the CTICU staff and helped coordinate care.

## 2019-04-30 NOTE — PROVIDER CONTACT NOTE (CRITICAL VALUE NOTIFICATION) - SITUATION
pt orally intubated FiO2 50%  peep 5    O2 sat decreased to  88%  vital signs stable bilateral breath sounds audible

## 2019-05-01 LAB
ALBUMIN SERPL ELPH-MCNC: 3.3 G/DL — SIGNIFICANT CHANGE UP (ref 3.3–5)
ALP SERPL-CCNC: 113 U/L — SIGNIFICANT CHANGE UP (ref 40–120)
ALT FLD-CCNC: 22 U/L — SIGNIFICANT CHANGE UP (ref 10–45)
ANION GAP SERPL CALC-SCNC: 10 MMOL/L — SIGNIFICANT CHANGE UP (ref 5–17)
AST SERPL-CCNC: 28 U/L — SIGNIFICANT CHANGE UP (ref 10–40)
BILIRUB SERPL-MCNC: 1.5 MG/DL — HIGH (ref 0.2–1.2)
BLD GP AB SCN SERPL QL: NEGATIVE — SIGNIFICANT CHANGE UP
BUN SERPL-MCNC: 22 MG/DL — SIGNIFICANT CHANGE UP (ref 7–23)
CALCIUM SERPL-MCNC: 8.6 MG/DL — SIGNIFICANT CHANGE UP (ref 8.4–10.5)
CHLORIDE SERPL-SCNC: 99 MMOL/L — SIGNIFICANT CHANGE UP (ref 96–108)
CO2 SERPL-SCNC: 25 MMOL/L — SIGNIFICANT CHANGE UP (ref 22–31)
CREAT SERPL-MCNC: 1.12 MG/DL — SIGNIFICANT CHANGE UP (ref 0.5–1.3)
GAS PNL BLDA: SIGNIFICANT CHANGE UP
GLUCOSE SERPL-MCNC: 125 MG/DL — HIGH (ref 70–99)
HCT VFR BLD CALC: 27.5 % — LOW (ref 39–50)
HGB BLD-MCNC: 9.3 G/DL — LOW (ref 13–17)
MAGNESIUM SERPL-MCNC: 2.1 MG/DL — SIGNIFICANT CHANGE UP (ref 1.6–2.6)
MCHC RBC-ENTMCNC: 30.4 PG — SIGNIFICANT CHANGE UP (ref 27–34)
MCHC RBC-ENTMCNC: 33.7 GM/DL — SIGNIFICANT CHANGE UP (ref 32–36)
MCV RBC AUTO: 90.3 FL — SIGNIFICANT CHANGE UP (ref 80–100)
PHOSPHATE SERPL-MCNC: 1.2 MG/DL — LOW (ref 2.5–4.5)
PLATELET # BLD AUTO: 154 K/UL — SIGNIFICANT CHANGE UP (ref 150–400)
POTASSIUM SERPL-MCNC: 4.1 MMOL/L — SIGNIFICANT CHANGE UP (ref 3.5–5.3)
POTASSIUM SERPL-SCNC: 4.1 MMOL/L — SIGNIFICANT CHANGE UP (ref 3.5–5.3)
PROT SERPL-MCNC: 6.2 G/DL — SIGNIFICANT CHANGE UP (ref 6–8.3)
RBC # BLD: 3.05 M/UL — LOW (ref 4.2–5.8)
RBC # FLD: 13.1 % — SIGNIFICANT CHANGE UP (ref 10.3–14.5)
RH IG SCN BLD-IMP: POSITIVE — SIGNIFICANT CHANGE UP
SODIUM SERPL-SCNC: 134 MMOL/L — LOW (ref 135–145)
VANCOMYCIN TROUGH SERPL-MCNC: 17.8 UG/ML — SIGNIFICANT CHANGE UP (ref 10–20)
VANCOMYCIN TROUGH SERPL-MCNC: 17.9 UG/ML — SIGNIFICANT CHANGE UP (ref 10–20)
WBC # BLD: 18.6 K/UL — HIGH (ref 3.8–10.5)
WBC # FLD AUTO: 18.6 K/UL — HIGH (ref 3.8–10.5)

## 2019-05-01 PROCEDURE — 99291 CRITICAL CARE FIRST HOUR: CPT

## 2019-05-01 PROCEDURE — 71045 X-RAY EXAM CHEST 1 VIEW: CPT | Mod: 26

## 2019-05-01 PROCEDURE — 99232 SBSQ HOSP IP/OBS MODERATE 35: CPT

## 2019-05-01 RX ORDER — CALCIUM GLUCONATE 100 MG/ML
1 VIAL (ML) INTRAVENOUS ONCE
Qty: 0 | Refills: 0 | Status: COMPLETED | OUTPATIENT
Start: 2019-05-01 | End: 2019-05-01

## 2019-05-01 RX ORDER — ACETYLCYSTEINE 200 MG/ML
3 VIAL (ML) MISCELLANEOUS EVERY 6 HOURS
Qty: 0 | Refills: 0 | Status: COMPLETED | OUTPATIENT
Start: 2019-05-01 | End: 2019-05-03

## 2019-05-01 RX ORDER — HYDROMORPHONE HYDROCHLORIDE 2 MG/ML
0.5 INJECTION INTRAMUSCULAR; INTRAVENOUS; SUBCUTANEOUS ONCE
Qty: 0 | Refills: 0 | Status: DISCONTINUED | OUTPATIENT
Start: 2019-05-01 | End: 2019-05-01

## 2019-05-01 RX ORDER — POTASSIUM CHLORIDE 20 MEQ
10 PACKET (EA) ORAL
Qty: 0 | Refills: 0 | Status: COMPLETED | OUTPATIENT
Start: 2019-05-01 | End: 2019-05-01

## 2019-05-01 RX ORDER — ACETAMINOPHEN 500 MG
1000 TABLET ORAL ONCE
Qty: 0 | Refills: 0 | Status: COMPLETED | OUTPATIENT
Start: 2019-05-01 | End: 2019-05-01

## 2019-05-01 RX ORDER — POTASSIUM CHLORIDE 20 MEQ
40 PACKET (EA) ORAL ONCE
Qty: 0 | Refills: 0 | Status: COMPLETED | OUTPATIENT
Start: 2019-05-01 | End: 2019-05-01

## 2019-05-01 RX ORDER — FUROSEMIDE 40 MG
20 TABLET ORAL ONCE
Qty: 0 | Refills: 0 | Status: COMPLETED | OUTPATIENT
Start: 2019-05-01 | End: 2019-05-01

## 2019-05-01 RX ADMIN — ESCITALOPRAM OXALATE 10 MILLIGRAM(S): 10 TABLET, FILM COATED ORAL at 11:47

## 2019-05-01 RX ADMIN — AMIODARONE HYDROCHLORIDE 400 MILLIGRAM(S): 400 TABLET ORAL at 05:17

## 2019-05-01 RX ADMIN — Medication 2: at 16:45

## 2019-05-01 RX ADMIN — Medication 3 MILLILITER(S): at 12:35

## 2019-05-01 RX ADMIN — Medication 250 MILLIGRAM(S): at 06:17

## 2019-05-01 RX ADMIN — MEROPENEM 100 MILLIGRAM(S): 1 INJECTION INTRAVENOUS at 14:12

## 2019-05-01 RX ADMIN — Medication 40 MILLIEQUIVALENT(S): at 22:28

## 2019-05-01 RX ADMIN — HYDROMORPHONE HYDROCHLORIDE 0.5 MILLIGRAM(S): 2 INJECTION INTRAMUSCULAR; INTRAVENOUS; SUBCUTANEOUS at 03:33

## 2019-05-01 RX ADMIN — CHLORHEXIDINE GLUCONATE 15 MILLILITER(S): 213 SOLUTION TOPICAL at 17:45

## 2019-05-01 RX ADMIN — Medication 50 MILLIEQUIVALENT(S): at 09:07

## 2019-05-01 RX ADMIN — Medication 3 MILLILITER(S): at 00:35

## 2019-05-01 RX ADMIN — HYDROMORPHONE HYDROCHLORIDE 0.5 MILLIGRAM(S): 2 INJECTION INTRAMUSCULAR; INTRAVENOUS; SUBCUTANEOUS at 23:58

## 2019-05-01 RX ADMIN — Medication 50 MILLIEQUIVALENT(S): at 07:03

## 2019-05-01 RX ADMIN — Medication 50 MILLIEQUIVALENT(S): at 08:06

## 2019-05-01 RX ADMIN — Medication 50 MILLIEQUIVALENT(S): at 04:40

## 2019-05-01 RX ADMIN — Medication 400 MILLIGRAM(S): at 14:12

## 2019-05-01 RX ADMIN — Medication 2: at 21:49

## 2019-05-01 RX ADMIN — Medication 3 MILLILITER(S): at 05:29

## 2019-05-01 RX ADMIN — Medication 325 MILLIGRAM(S): at 11:47

## 2019-05-01 RX ADMIN — HYDROMORPHONE HYDROCHLORIDE 0.5 MILLIGRAM(S): 2 INJECTION INTRAMUSCULAR; INTRAVENOUS; SUBCUTANEOUS at 00:05

## 2019-05-01 RX ADMIN — Medication 200 GRAM(S): at 04:40

## 2019-05-01 RX ADMIN — Medication 3 MILLILITER(S): at 05:23

## 2019-05-01 RX ADMIN — MEROPENEM 100 MILLIGRAM(S): 1 INJECTION INTRAVENOUS at 21:32

## 2019-05-01 RX ADMIN — HYDROMORPHONE HYDROCHLORIDE 0.5 MILLIGRAM(S): 2 INJECTION INTRAMUSCULAR; INTRAVENOUS; SUBCUTANEOUS at 03:48

## 2019-05-01 RX ADMIN — Medication 3 MILLILITER(S): at 17:31

## 2019-05-01 RX ADMIN — AMIODARONE HYDROCHLORIDE 400 MILLIGRAM(S): 400 TABLET ORAL at 14:12

## 2019-05-01 RX ADMIN — AMIODARONE HYDROCHLORIDE 400 MILLIGRAM(S): 400 TABLET ORAL at 21:32

## 2019-05-01 RX ADMIN — CHLORHEXIDINE GLUCONATE 1 APPLICATION(S): 213 SOLUTION TOPICAL at 05:11

## 2019-05-01 RX ADMIN — ATORVASTATIN CALCIUM 40 MILLIGRAM(S): 80 TABLET, FILM COATED ORAL at 21:32

## 2019-05-01 RX ADMIN — Medication 3 MILLILITER(S): at 12:38

## 2019-05-01 RX ADMIN — Medication 20 MILLIGRAM(S): at 11:46

## 2019-05-01 RX ADMIN — CHLORHEXIDINE GLUCONATE 15 MILLILITER(S): 213 SOLUTION TOPICAL at 05:17

## 2019-05-01 RX ADMIN — Medication 2: at 06:11

## 2019-05-01 RX ADMIN — Medication 62.5 MILLIMOLE(S): at 04:15

## 2019-05-01 RX ADMIN — Medication 1000 MILLIGRAM(S): at 14:45

## 2019-05-01 RX ADMIN — ENOXAPARIN SODIUM 40 MILLIGRAM(S): 100 INJECTION SUBCUTANEOUS at 11:46

## 2019-05-01 RX ADMIN — MEROPENEM 100 MILLIGRAM(S): 1 INJECTION INTRAVENOUS at 05:17

## 2019-05-01 RX ADMIN — PANTOPRAZOLE SODIUM 40 MILLIGRAM(S): 20 TABLET, DELAYED RELEASE ORAL at 11:46

## 2019-05-01 RX ADMIN — Medication 50 MILLIEQUIVALENT(S): at 05:17

## 2019-05-01 RX ADMIN — Medication 20 MILLIGRAM(S): at 04:40

## 2019-05-01 RX ADMIN — Medication 250 MILLIGRAM(S): at 17:46

## 2019-05-01 RX ADMIN — Medication 50 MILLIEQUIVALENT(S): at 05:48

## 2019-05-01 NOTE — PROGRESS NOTE ADULT - SUBJECTIVE AND OBJECTIVE BOX
CHRIS THAKKAR  MRN-[id number]  Patient is a 79y old  Male who presents with a chief complaint of fever (01 May 2019 08:24)    HPI:  This is a 79 yo   male with PMH of HLD, Sleep apnea ( non compliant with CPAP), GERD, diverticulitis, depression, anxiety, and panic attack. Denies significant PMH of heart disease but reports early CAD in family; mother  of MI at age 54 and uncle ( mothers brother) at age 37.  Presents to Dr Feng with c/c of chest discomfort associated with dyspnea ( on exertion after walking 100 feet)  and palpitations for the past 1 year.  CP is described as 'pinch like", intermittent, sporadic, lasting 1-2 min, localized in the left anterior chest non radiating; CP triggered by exercise and stress. Abnormal NST ( last week) : mild to moderate abnormal study with medium sized inferior and post- lateral ischemia without infarct, EF 50%.  Holter monitor revealed: frequent PVCs, ECHO revealed AR, concentric LVH, diastolic dysfunction, and MR. Referred here today for R and LHC. Presently asymptomatic, denies chest pain, dyspnea, dizziness, palpitations, N&V, HA, LE edema.    OF NOTE: patient was started on Toprol 25mg PO daily however he never picked it up from pharmacy because he states" he did not know about it"    PCP: Marleny Ceja (2019 08:32)      Surgery/Hospital course:    Today:    REVIEW OF SYSTEMS:  Gen:: No fever  EYES/ENT: No visual changes;  No vertigo or throat pain   NECK: No pain or stiffness  RES:  No shortness of breath or Cough  CARD: No chest pain   GI: No abdominal pain  : No dysuria  NEURO: No weakness  SKIN: No itching, rashes, or lesions     Physical Exam:  Vital Signs Last 24 Hrs  T(C): 37.4 (01 May 2019 19:00), Max: 38.4 (01 May 2019 12:00)  T(F): 99.3 (01 May 2019 19:00), Max: 101.1 (01 May 2019 12:00)  HR: 78 (01 May 2019 21:45) (68 - 101)  BP: --  BP(mean): --  RR: 16 (01 May 2019 21:45) (11 - 28)  SpO2: 100% (01 May 2019 21:45) (92% - 100%)  Gen:  Awake, alert,   CNS: non focal .	  Neck: no JVD  RES : clear , no wheezing      ; chest tubes                     CVS: Regular  rhythm. Normal S1/S2  Abd: Soft, non-distended. Bowel sounds present.  Skin: No rash.  Ext:  no edema, A Line  PSY:    ============================I/O===========================   I&O's Detail    2019 07:  -  01 May 2019 07:00  --------------------------------------------------------  IN:    Albumin 5%  - 250 mL: 500 mL    Enteral Tube Flush: 160 mL    IV PiggyBack: 600 mL    norepinephrine Infusion: 145 mL    ns in tub fed vital15: 690 mL    Oral Fluid: 90 mL    propofol Infusion: 365 mL    sodium chloride 0.9%.: 210 mL    Solution: 400 mL    vasopressin Infusion: 144 mL  Total IN: 3304 mL    OUT:    Indwelling Catheter - Urethral: 2690 mL    Nasoenteral Tube: 200 mL  Total OUT: 2890 mL    Total NET: 414 mL      01 May 2019 07:  -  01 May 2019 22:39  --------------------------------------------------------  IN:    Enteral Tube Flush: 150 mL    IV PiggyBack: 600 mL    norepinephrine Infusion: 99 mL    ns in tub fed vital15: 825 mL    Oral Fluid: 90 mL    propofol Infusion: 302.5 mL    sodium chloride 0.9%.: 140 mL    vasopressin Infusion: 90 mL  Total IN: 2296.5 mL    OUT:    Indwelling Catheter - Urethral: 1560 mL  Total OUT: 1560 mL    Total NET: 736.5 mL        ============================ LABS =========================                        9.3    18.6  )-----------( 154      ( 01 May 2019 01:54 )             27.5     05-    134<L>  |  99  |  22  ----------------------------<  125<H>  4.1   |  25  |  1.12    Ca    8.6      01 May 2019 01:54  Phos  1.2     05-  Mg     2.1         TPro  6.2  /  Alb  3.3  /  TBili  1.5<H>  /  DBili  x   /  AST  28  /  ALT  22  /  AlkPhos  113      LIVER FUNCTIONS - ( 01 May 2019 01:54 )  Alb: 3.3 g/dL / Pro: 6.2 g/dL / ALK PHOS: 113 U/L / ALT: 22 U/L / AST: 28 U/L / GGT: x             ABG - ( 01 May 2019 21:42 )  pH, Arterial: 7.46  pH, Blood: x     /  pCO2: 41    /  pO2: 113   / HCO3: 29    / Base Excess: 4.7   /  SaO2: 98                      ======================Micro/Rad/Cardio=================  Culture: Reviewed   CXR: Reviewed  Echo:Reviewed  ======================================================  PAST MEDICAL & SURGICAL HISTORY:  Diverticulitis  Nocturia  Panic attacks  Anxiety  Depression  Asthma: Controlled  Sleep Apnea  History of partial colectomy  History of colon resection  History of eye surgery: PPV right  Cataract extraction status: right  Status post lung surgery: ? wedge resection  S/P eye surgery: &quot;removed fluid&quot; from rigth eye  Bunion  Sinus Disorder: surgery x 2  Inguinal Hernia Bilateral  Shoulder Problem: right rotator cuff  Carpal Tunnel Syndrome: right hand      ====================ASSESMENT ==============  CNS:  RES:  CVS:  Hem:  Francisco:  GI:  Endo:  ID:  Skin:    Plan:  ====================== NEUROLOGY=====================  aspirin 325 milliGRAM(s) Oral  escitalopram 10 milliGRAM(s) Oral  propofol Infusion 25 MICROgram(s)/kG/Min IV Continuous    ==================== RESPIRATORY======================  acetylcysteine 10%  Inhalation 3 milliLiter(s) Inhalation every 6 hours  ALBUTerol    90 MICROgram(s) HFA Inhaler 1 Puff(s) Inhalation every 4 hours  ALBUTerol/ipratropium for Nebulization 3 milliLiter(s) Nebulizer every 6 hours  tiotropium 18 MICROgram(s) Capsule 1 Capsule(s) Inhalation daily    Mechanical Ventilation:  Mode: AC/ CMV (Assist Control/ Continuous Mandatory Ventilation)  RR (machine): 10  TV (machine): 500  FiO2: 40  PEEP: 5  ITime: 1  MAP: 7  PIP: 20    ====================CARDIOVASCULAR==================  amiodarone    Tablet   Oral   amiodarone    Tablet 400 milliGRAM(s) Oral every 8 hours  norepinephrine Infusion 0.02 MICROgram(s)/kG/Min IV Continuous <Continuous>    ===================HEMATOLOGIC/ONC ===================  enoxaparin Injectable 40 milliGRAM(s) SubCutaneous daily    ===================== RENAL =========================    ==================== GASTROINTESTINAL===================  docusate sodium 100 milliGRAM(s) Oral three times a day  docusate sodium Liquid 100 milliGRAM(s) Oral two times a day  pantoprazole  Injectable 40 milliGRAM(s) IV Push daily  potassium chloride   Solution 40 milliEquivalent(s) Oral once  sodium chloride 0.9%. 1000 milliLiter(s) IV Continuous <Continuous>    =======================    ENDOCRINE  =====================  atorvastatin 40  insulin lispro (HumaLOG) corrective regimen sliding scale   vasopressin Infusion 0.083    ========================INFECTIOUS DISEASE================  meropenem  IVPB 1000 milliGRAM(s) IV Intermittent every 8 hours  vancomycin  IVPB 750 milliGRAM(s) IV Intermittent every 12 hours      Patient requires continuous monitoring with bedside rhythm monitoring,arterial line,pulse oximetry,ventilator monitoring;intermittent blood gas analysis.  Care plan discussed with ICU care team.  patient remain critical; required more than usual post op care; I have spent 35 minutes providing non routine post op care. CHRIS THAKKAR  MRN-[id number]  Patient is a 79y old  Male who presents with a chief complaint of fever (01 May 2019 08:24)    HPI:  This is a 77 yo   male with PMH of HLD, Sleep apnea ( non compliant with CPAP), GERD, diverticulitis, depression, anxiety, and panic attack. Denies significant PMH of heart disease but reports early CAD in family; mother  of MI at age 54 and uncle ( mothers brother) at age 37.  Presents to Dr Feng with c/c of chest discomfort associated with dyspnea ( on exertion after walking 100 feet)  and palpitations for the past 1 year.  CP is described as 'pinch like", intermittent, sporadic, lasting 1-2 min, localized in the left anterior chest non radiating; CP triggered by exercise and stress. Abnormal NST ( last week) : mild to moderate abnormal study with medium sized inferior and post- lateral ischemia without infarct, EF 50%.  Holter monitor revealed: frequent PVCs, ECHO revealed AR, concentric LVH, diastolic dysfunction, and MRGuille Referred here today for R and LHC. Presently asymptomatic, denies chest pain, dyspnea, dizziness, palpitations, N&V, HA, LE edema.    OF NOTE: patient was started on Toprol 25mg PO daily however he never picked it up from pharmacy because he states" he did not know about it"    PCP: Marleny Ceja (2019 08:32)      Surgery/Hospital course:   C3L   Rintubated    remain intubated, on variable concntratons of FIO2  sedated on pressors  had fever, cultured and started on antibiotics.    Physical Exam:  Vital Signs Last 24 Hrs  T(C): 37.4 (01 May 2019 19:00), Max: 38.4 (01 May 2019 12:00)  T(F): 99.3 (01 May 2019 19:00), Max: 101.1 (01 May 2019 12:00)  HR: 78 (01 May 2019 21:45) (68 - 101)  BP: --  BP(mean): --  RR: 16 (01 May 2019 21:45) (11 - 28)  SpO2: 100% (01 May 2019 21:45) (92% - 100%)  Gen:  sedated  CNS: non focal .	  Neck: no JVD  RES : + rhonchi     ; chest tubes                     CVS: Regular  rhythm. Normal S1/S2  Abd: Soft, non-distended. Bowel sounds present.  Skin: No rash.  Ext:  no edema, A Line  ============================I/O===========================   I&O's Detail    2019 07:  -  01 May 2019 07:00  --------------------------------------------------------  IN:    Albumin 5%  - 250 mL: 500 mL    Enteral Tube Flush: 160 mL    IV PiggyBack: 600 mL    norepinephrine Infusion: 145 mL    ns in tub fed vital15: 690 mL    Oral Fluid: 90 mL    propofol Infusion: 365 mL    sodium chloride 0.9%.: 210 mL    Solution: 400 mL    vasopressin Infusion: 144 mL  Total IN: 3304 mL    OUT:    Indwelling Catheter - Urethral: 2690 mL    Nasoenteral Tube: 200 mL  Total OUT: 2890 mL    Total NET: 414 mL      01 May 2019 07:  -  01 May 2019 22:39  --------------------------------------------------------  IN:    Enteral Tube Flush: 150 mL    IV PiggyBack: 600 mL    norepinephrine Infusion: 99 mL    ns in tub fed vital15: 825 mL    Oral Fluid: 90 mL    propofol Infusion: 302.5 mL    sodium chloride 0.9%.: 140 mL    vasopressin Infusion: 90 mL  Total IN: 2296.5 mL    OUT:    Indwelling Catheter - Urethral: 1560 mL  Total OUT: 1560 mL    Total NET: 736.5 mL        ============================ LABS =========================                        9.3    18.6  )-----------( 154      ( 01 May 2019 01:54 )             27.5     05-01    134<L>  |  99  |  22  ----------------------------<  125<H>  4.1   |  25  |  1.12    Ca    8.6      01 May 2019 01:54  Phos  1.2     05-  Mg     2.1     -    TPro  6.2  /  Alb  3.3  /  TBili  1.5<H>  /  DBili  x   /  AST  28  /  ALT  22  /  AlkPhos  113  05-    LIVER FUNCTIONS - ( 01 May 2019 01:54 )  Alb: 3.3 g/dL / Pro: 6.2 g/dL / ALK PHOS: 113 U/L / ALT: 22 U/L / AST: 28 U/L / GGT: x             ABG - ( 01 May 2019 21:42 )  pH, Arterial: 7.46  pH, Blood: x     /  pCO2: 41    /  pO2: 113   / HCO3: 29    / Base Excess: 4.7   /  SaO2: 98        ======================Micro/Rad/Cardio=================  Culture: Reviewed   CXR: Reviewed  Echo:Reviewed  ======================================================  PAST MEDICAL & SURGICAL HISTORY:  Diverticulitis  Nocturia  Panic attacks  Anxiety  Depression  Asthma: Controlled  Sleep Apnea  History of partial colectomy  History of colon resection  History of eye surgery: PPV right  Cataract extraction status: right  Status post lung surgery: ? wedge resection  S/P eye surgery: &quot;removed fluid&quot; from rigth eye  Bunion  Sinus Disorder: surgery x 2  Inguinal Hernia Bilateral  Shoulder Problem: right rotator cuff  Carpal Tunnel Syndrome: right hand      ====================ASSESMENT ==============  2019 CABG  C3L  acute hypoxemic respiratory failure  Hypotension  ASHD/CAD  pneumonia vs atelectasis  atrial fibrillation  anxiety  depression      Plan:  ====================== NEUROLOGY=====================  aspirin 325 milliGRAM(s) Oral  escitalopram 10 milliGRAM(s) Oral  propofol Infusion 25 MICROgram(s)/kG/Min IV Continuous    ==================== RESPIRATORY======================  acetylcysteine 10%  Inhalation 3 milliLiter(s) Inhalation every 6 hours  ALBUTerol    90 MICROgram(s) HFA Inhaler 1 Puff(s) Inhalation every 4 hours  ALBUTerol/ipratropium for Nebulization 3 milliLiter(s) Nebulizer every 6 hours  tiotropium 18 MICROgram(s) Capsule 1 Capsule(s) Inhalation daily    Mechanical Ventilation:  Mode: AC/ CMV (Assist Control/ Continuous Mandatory Ventilation)  RR (machine): 10  TV (machine): 500  FiO2: 40  PEEP: 5  ITime: 1  MAP: 7  PIP: 20    ====================CARDIOVASCULAR==================  amiodarone    Tablet   Oral   amiodarone    Tablet 400 milliGRAM(s) Oral every 8 hours  norepinephrine Infusion 0.02 MICROgram(s)/kG/Min IV Continuous <Continuous>    ===================HEMATOLOGIC/ONC ===================  enoxaparin Injectable 40 milliGRAM(s) SubCutaneous daily    ===================== RENAL =========================  diuresis as tolerated  ==================== GASTROINTESTINAL===================  docusate sodium 100 milliGRAM(s) Oral three times a day  docusate sodium Liquid 100 milliGRAM(s) Oral two times a day  pantoprazole  Injectable 40 milliGRAM(s) IV Push daily  potassium chloride   Solution 40 milliEquivalent(s) Oral once  sodium chloride 0.9%. 1000 milliLiter(s) IV Continuous <Continuous>    =======================    ENDOCRINE  =====================  atorvastatin 40  insulin lispro (HumaLOG) corrective regimen sliding scale   vasopressin Infusion 0.083    ========================INFECTIOUS DISEASE================  meropenem  IVPB 1000 milliGRAM(s) IV Intermittent every 8 hours  vancomycin  IVPB 750 milliGRAM(s) IV Intermittent every 12 hours      Patient requires continuous monitoring with bedside rhythm monitoring,arterial line,pulse oximetry,ventilator monitoring;intermittent blood gas analysis.  Care plan discussed with ICU care team.  patient remain critical; required more than usual post op care; I have spent 35 minutes providing non routine post op care.

## 2019-05-01 NOTE — PROGRESS NOTE ADULT - SUBJECTIVE AND OBJECTIVE BOX
infectious diseases progress note:    Patient is a 79y old  Male who presents with a chief complaint of fever (30 Apr 2019 16:41)        Angina pectoris  Atherosclerosis of native coronary artery without angina pectoris        R     Allergies    No Known Allergies    Intolerances        ANTIBIOTICS/RELEVANT:  antimicrobials  meropenem  IVPB 1000 milliGRAM(s) IV Intermittent every 8 hours  vancomycin  IVPB 750 milliGRAM(s) IV Intermittent every 12 hours    immunologic:    OTHER:  acetylcysteine 10%  Inhalation 3 milliLiter(s) Inhalation every 6 hours  ALBUTerol    90 MICROgram(s) HFA Inhaler 1 Puff(s) Inhalation every 4 hours  ALBUTerol/ipratropium for Nebulization 3 milliLiter(s) Nebulizer every 6 hours  amiodarone    Tablet   Oral   amiodarone    Tablet 400 milliGRAM(s) Oral every 8 hours  aspirin 325 milliGRAM(s) Oral daily  atorvastatin 40 milliGRAM(s) Oral at bedtime  benzocaine 15 mG/menthol 3.6 mG Lozenge 1 Lozenge Oral every 6 hours PRN  chlorhexidine 0.12% Liquid 15 milliLiter(s) Oral Mucosa two times a day  chlorhexidine 4% Liquid 1 Application(s) Topical <User Schedule>  dexmedetomidine Infusion 0.3 MICROgram(s)/kG/Hr IV Continuous <Continuous>  docusate sodium 100 milliGRAM(s) Oral three times a day  docusate sodium Liquid 100 milliGRAM(s) Oral two times a day  enoxaparin Injectable 40 milliGRAM(s) SubCutaneous daily  escitalopram 10 milliGRAM(s) Oral daily  insulin lispro (HumaLOG) corrective regimen sliding scale   SubCutaneous Before meals and at bedtime  norepinephrine Infusion 0.02 MICROgram(s)/kG/Min IV Continuous <Continuous>  pantoprazole  Injectable 40 milliGRAM(s) IV Push daily  potassium chloride  10 mEq/50 mL IVPB 10 milliEquivalent(s) IV Intermittent every 1 hour  propofol Infusion 25 MICROgram(s)/kG/Min IV Continuous <Continuous>  sodium chloride 0.9%. 1000 milliLiter(s) IV Continuous <Continuous>  tiotropium 18 MICROgram(s) Capsule 1 Capsule(s) Inhalation daily  vasopressin Infusion 0.083 Unit(s)/Min IV Continuous <Continuous>      Objective:  Vital Signs Last 24 Hrs  T(C): 37.5 (01 May 2019 08:00), Max: 38.4 (30 Apr 2019 13:00)  T(F): 99.5 (01 May 2019 08:00), Max: 101.1 (30 Apr 2019 13:00)  HR: 77 (01 May 2019 08:11) (68 - 98)  BP: --  BP(mean): --  RR: 17 (01 May 2019 07:45) (11 - 28)  SpO2: 96% (01 May 2019 08:11) (87% - 100%)    PHYSICAL EXAM:     Eyes:RYLEY, EOMI  Ear/Nose/Throat: no oral lesion, no sinus tenderness on percussion	  Neck:no JVD, no lymphadenopathy, supple  Respiratory: CTA shagufta  Cardiovascular: S1S2 RRR, no murmurs  Gastrointestinal:soft, (+) BS, no HSM  Extremities:no e/e/c        LABS:                        9.3    18.6  )-----------( 154      ( 01 May 2019 01:54 )             27.5     05-01    134<L>  |  99  |  22  ----------------------------<  125<H>  4.1   |  25  |  1.12    Ca    8.6      01 May 2019 01:54  Phos  1.2     05-01  Mg     2.1     05-01    TPro  6.2  /  Alb  3.3  /  TBili  1.5<H>  /  DBili  x   /  AST  28  /  ALT  22  /  AlkPhos  113  05-01            MICROBIOLOGY:    RECENT CULTURES:  04-29 @ 00:24 .Blood                No growth to date.    04-28 @ 14:06 .Sputum trap       Moderate polymorphonuclear leukocytes per low power field  Rare Squamous epithelial cells per low power field  Numerous Red blood cells per low power field  Rare Yeast like cells per oil power field           Normal Respiratory Annamaria present          RESPIRATORY CULTURES:              RADIOLOGY & ADDITIONAL STUDIES:        Pager [phone]  After 5 pm/weekends or if no response :[phone]

## 2019-05-01 NOTE — PROGRESS NOTE ADULT - ASSESSMENT
78 yr old with ARCENIO admitted for chest pain and underwent a CABG last last week   Post op has been stable but is still intubated and became febrile last 24hrs.   No evidence of wd infection, alot of sputum and possible LLL infiltrate started on vanco and meropenem which are reasonable  pending culture results      meropenem to 1 q 8 hr day 2    vanco to 750 q 12   day 2   cultures pending  fever down  wbc still elevated   no change yet  too early to tell if he is better  sputum with normal lefty only

## 2019-05-02 LAB
ALBUMIN SERPL ELPH-MCNC: 3.1 G/DL — LOW (ref 3.3–5)
ALP SERPL-CCNC: 111 U/L — SIGNIFICANT CHANGE UP (ref 40–120)
ALT FLD-CCNC: 21 U/L — SIGNIFICANT CHANGE UP (ref 10–45)
ANION GAP SERPL CALC-SCNC: 11 MMOL/L — SIGNIFICANT CHANGE UP (ref 5–17)
APPEARANCE UR: ABNORMAL
AST SERPL-CCNC: 28 U/L — SIGNIFICANT CHANGE UP (ref 10–40)
BILIRUB SERPL-MCNC: 1.3 MG/DL — HIGH (ref 0.2–1.2)
BILIRUB UR-MCNC: NEGATIVE — SIGNIFICANT CHANGE UP
BUN SERPL-MCNC: 27 MG/DL — HIGH (ref 7–23)
C DIFF GDH STL QL: NEGATIVE — SIGNIFICANT CHANGE UP
C DIFF GDH STL QL: SIGNIFICANT CHANGE UP
CALCIUM SERPL-MCNC: 8.6 MG/DL — SIGNIFICANT CHANGE UP (ref 8.4–10.5)
CHLORIDE SERPL-SCNC: 98 MMOL/L — SIGNIFICANT CHANGE UP (ref 96–108)
CO2 SERPL-SCNC: 25 MMOL/L — SIGNIFICANT CHANGE UP (ref 22–31)
COLOR SPEC: YELLOW — SIGNIFICANT CHANGE UP
CREAT SERPL-MCNC: 1.31 MG/DL — HIGH (ref 0.5–1.3)
DIFF PNL FLD: ABNORMAL
GAS PNL BLDA: SIGNIFICANT CHANGE UP
GLUCOSE BLDC GLUCOMTR-MCNC: 143 MG/DL — HIGH (ref 70–99)
GLUCOSE SERPL-MCNC: 126 MG/DL — HIGH (ref 70–99)
GLUCOSE UR QL: NEGATIVE — SIGNIFICANT CHANGE UP
HCT VFR BLD CALC: 26.3 % — LOW (ref 39–50)
HGB BLD-MCNC: 8.9 G/DL — LOW (ref 13–17)
KETONES UR-MCNC: NEGATIVE — SIGNIFICANT CHANGE UP
LEUKOCYTE ESTERASE UR-ACNC: NEGATIVE — SIGNIFICANT CHANGE UP
MAGNESIUM SERPL-MCNC: 2.1 MG/DL — SIGNIFICANT CHANGE UP (ref 1.6–2.6)
MCHC RBC-ENTMCNC: 30.8 PG — SIGNIFICANT CHANGE UP (ref 27–34)
MCHC RBC-ENTMCNC: 33.9 GM/DL — SIGNIFICANT CHANGE UP (ref 32–36)
MCV RBC AUTO: 90.7 FL — SIGNIFICANT CHANGE UP (ref 80–100)
NITRITE UR-MCNC: NEGATIVE — SIGNIFICANT CHANGE UP
PH UR: 6 — SIGNIFICANT CHANGE UP (ref 5–8)
PHOSPHATE SERPL-MCNC: 2.5 MG/DL — SIGNIFICANT CHANGE UP (ref 2.5–4.5)
PLATELET # BLD AUTO: 194 K/UL — SIGNIFICANT CHANGE UP (ref 150–400)
POTASSIUM SERPL-MCNC: 4.2 MMOL/L — SIGNIFICANT CHANGE UP (ref 3.5–5.3)
POTASSIUM SERPL-SCNC: 4.2 MMOL/L — SIGNIFICANT CHANGE UP (ref 3.5–5.3)
PROT SERPL-MCNC: 5.8 G/DL — LOW (ref 6–8.3)
PROT UR-MCNC: ABNORMAL
RBC # BLD: 2.9 M/UL — LOW (ref 4.2–5.8)
RBC # FLD: 13.1 % — SIGNIFICANT CHANGE UP (ref 10.3–14.5)
SODIUM SERPL-SCNC: 134 MMOL/L — LOW (ref 135–145)
SP GR SPEC: 1.02 — SIGNIFICANT CHANGE UP (ref 1.01–1.02)
UROBILINOGEN FLD QL: NEGATIVE — SIGNIFICANT CHANGE UP
VANCOMYCIN TROUGH SERPL-MCNC: 19.8 UG/ML — SIGNIFICANT CHANGE UP (ref 10–20)
WBC # BLD: 19.9 K/UL — HIGH (ref 3.8–10.5)
WBC # FLD AUTO: 19.9 K/UL — HIGH (ref 3.8–10.5)

## 2019-05-02 PROCEDURE — 99232 SBSQ HOSP IP/OBS MODERATE 35: CPT

## 2019-05-02 PROCEDURE — 99291 CRITICAL CARE FIRST HOUR: CPT

## 2019-05-02 PROCEDURE — 93010 ELECTROCARDIOGRAM REPORT: CPT

## 2019-05-02 PROCEDURE — 71045 X-RAY EXAM CHEST 1 VIEW: CPT | Mod: 26,76

## 2019-05-02 RX ORDER — FUROSEMIDE 40 MG
20 TABLET ORAL
Qty: 0 | Refills: 0 | Status: DISCONTINUED | OUTPATIENT
Start: 2019-05-02 | End: 2019-05-02

## 2019-05-02 RX ORDER — HYDROMORPHONE HYDROCHLORIDE 2 MG/ML
0.5 INJECTION INTRAMUSCULAR; INTRAVENOUS; SUBCUTANEOUS ONCE
Qty: 0 | Refills: 0 | Status: DISCONTINUED | OUTPATIENT
Start: 2019-05-02 | End: 2019-05-02

## 2019-05-02 RX ORDER — FUROSEMIDE 40 MG
40 TABLET ORAL ONCE
Qty: 0 | Refills: 0 | Status: COMPLETED | OUTPATIENT
Start: 2019-05-02 | End: 2019-05-02

## 2019-05-02 RX ORDER — POTASSIUM CHLORIDE 20 MEQ
20 PACKET (EA) ORAL ONCE
Qty: 0 | Refills: 0 | Status: COMPLETED | OUTPATIENT
Start: 2019-05-02 | End: 2019-05-02

## 2019-05-02 RX ORDER — ACETAMINOPHEN 500 MG
1000 TABLET ORAL ONCE
Qty: 0 | Refills: 0 | Status: COMPLETED | OUTPATIENT
Start: 2019-05-02 | End: 2019-05-02

## 2019-05-02 RX ORDER — FENTANYL CITRATE 50 UG/ML
25 INJECTION INTRAVENOUS ONCE
Qty: 0 | Refills: 0 | Status: DISCONTINUED | OUTPATIENT
Start: 2019-05-02 | End: 2019-05-02

## 2019-05-02 RX ORDER — VANCOMYCIN HCL 1 G
500 VIAL (EA) INTRAVENOUS EVERY 6 HOURS
Qty: 0 | Refills: 0 | Status: DISCONTINUED | OUTPATIENT
Start: 2019-05-02 | End: 2019-05-02

## 2019-05-02 RX ORDER — VANCOMYCIN HCL 1 G
500 VIAL (EA) INTRAVENOUS EVERY 12 HOURS
Qty: 0 | Refills: 0 | Status: DISCONTINUED | OUTPATIENT
Start: 2019-05-02 | End: 2019-05-02

## 2019-05-02 RX ORDER — FENTANYL CITRATE 50 UG/ML
50 INJECTION INTRAVENOUS ONCE
Qty: 0 | Refills: 0 | Status: DISCONTINUED | OUTPATIENT
Start: 2019-05-02 | End: 2019-05-02

## 2019-05-02 RX ORDER — ALBUMIN HUMAN 25 %
250 VIAL (ML) INTRAVENOUS ONCE
Qty: 0 | Refills: 0 | Status: COMPLETED | OUTPATIENT
Start: 2019-05-02 | End: 2019-05-02

## 2019-05-02 RX ORDER — FUROSEMIDE 40 MG
20 TABLET ORAL ONCE
Qty: 0 | Refills: 0 | Status: COMPLETED | OUTPATIENT
Start: 2019-05-02 | End: 2019-05-02

## 2019-05-02 RX ADMIN — VASOPRESSIN 5 UNIT(S)/MIN: 20 INJECTION INTRAVENOUS at 21:40

## 2019-05-02 RX ADMIN — CHLORHEXIDINE GLUCONATE 15 MILLILITER(S): 213 SOLUTION TOPICAL at 17:30

## 2019-05-02 RX ADMIN — Medication 3 MILLILITER(S): at 00:15

## 2019-05-02 RX ADMIN — VASOPRESSIN 5 UNIT(S)/MIN: 20 INJECTION INTRAVENOUS at 05:40

## 2019-05-02 RX ADMIN — HYDROMORPHONE HYDROCHLORIDE 0.5 MILLIGRAM(S): 2 INJECTION INTRAMUSCULAR; INTRAVENOUS; SUBCUTANEOUS at 00:15

## 2019-05-02 RX ADMIN — PROPOFOL 11.93 MICROGRAM(S)/KG/MIN: 10 INJECTION, EMULSION INTRAVENOUS at 05:40

## 2019-05-02 RX ADMIN — Medication 40 MILLIGRAM(S): at 02:19

## 2019-05-02 RX ADMIN — Medication 400 MILLIGRAM(S): at 14:51

## 2019-05-02 RX ADMIN — ESCITALOPRAM OXALATE 10 MILLIGRAM(S): 10 TABLET, FILM COATED ORAL at 14:50

## 2019-05-02 RX ADMIN — MEROPENEM 100 MILLIGRAM(S): 1 INJECTION INTRAVENOUS at 05:34

## 2019-05-02 RX ADMIN — VASOPRESSIN 5 UNIT(S)/MIN: 20 INJECTION INTRAVENOUS at 07:30

## 2019-05-02 RX ADMIN — ENOXAPARIN SODIUM 40 MILLIGRAM(S): 100 INJECTION SUBCUTANEOUS at 14:10

## 2019-05-02 RX ADMIN — HYDROMORPHONE HYDROCHLORIDE 0.5 MILLIGRAM(S): 2 INJECTION INTRAMUSCULAR; INTRAVENOUS; SUBCUTANEOUS at 17:30

## 2019-05-02 RX ADMIN — PROPOFOL 11.93 MICROGRAM(S)/KG/MIN: 10 INJECTION, EMULSION INTRAVENOUS at 21:40

## 2019-05-02 RX ADMIN — HYDROMORPHONE HYDROCHLORIDE 0.5 MILLIGRAM(S): 2 INJECTION INTRAMUSCULAR; INTRAVENOUS; SUBCUTANEOUS at 18:00

## 2019-05-02 RX ADMIN — Medication 3 MICROGRAM(S)/KG/MIN: at 21:40

## 2019-05-02 RX ADMIN — CHLORHEXIDINE GLUCONATE 15 MILLILITER(S): 213 SOLUTION TOPICAL at 05:34

## 2019-05-02 RX ADMIN — ATORVASTATIN CALCIUM 40 MILLIGRAM(S): 80 TABLET, FILM COATED ORAL at 21:39

## 2019-05-02 RX ADMIN — Medication 400 MILLIGRAM(S): at 08:24

## 2019-05-02 RX ADMIN — HYDROMORPHONE HYDROCHLORIDE 0.5 MILLIGRAM(S): 2 INJECTION INTRAMUSCULAR; INTRAVENOUS; SUBCUTANEOUS at 10:39

## 2019-05-02 RX ADMIN — PROPOFOL 11.93 MICROGRAM(S)/KG/MIN: 10 INJECTION, EMULSION INTRAVENOUS at 17:31

## 2019-05-02 RX ADMIN — Medication 2: at 21:51

## 2019-05-02 RX ADMIN — Medication 2: at 17:32

## 2019-05-02 RX ADMIN — Medication 3 MILLILITER(S): at 11:48

## 2019-05-02 RX ADMIN — AMIODARONE HYDROCHLORIDE 400 MILLIGRAM(S): 400 TABLET ORAL at 21:39

## 2019-05-02 RX ADMIN — Medication 5: at 06:01

## 2019-05-02 RX ADMIN — HYDROMORPHONE HYDROCHLORIDE 0.5 MILLIGRAM(S): 2 INJECTION INTRAMUSCULAR; INTRAVENOUS; SUBCUTANEOUS at 23:29

## 2019-05-02 RX ADMIN — FENTANYL CITRATE 25 MICROGRAM(S): 50 INJECTION INTRAVENOUS at 22:05

## 2019-05-02 RX ADMIN — HYDROMORPHONE HYDROCHLORIDE 0.5 MILLIGRAM(S): 2 INJECTION INTRAMUSCULAR; INTRAVENOUS; SUBCUTANEOUS at 11:09

## 2019-05-02 RX ADMIN — Medication 3 MILLILITER(S): at 05:19

## 2019-05-02 RX ADMIN — Medication 20 MILLIGRAM(S): at 14:50

## 2019-05-02 RX ADMIN — Medication 3 MICROGRAM(S)/KG/MIN: at 05:40

## 2019-05-02 RX ADMIN — PROPOFOL 11.93 MICROGRAM(S)/KG/MIN: 10 INJECTION, EMULSION INTRAVENOUS at 07:30

## 2019-05-02 RX ADMIN — AMIODARONE HYDROCHLORIDE 400 MILLIGRAM(S): 400 TABLET ORAL at 14:09

## 2019-05-02 RX ADMIN — Medication 3 MICROGRAM(S)/KG/MIN: at 07:30

## 2019-05-02 RX ADMIN — Medication 100 MILLIGRAM(S): at 06:39

## 2019-05-02 RX ADMIN — Medication 1000 MILLIGRAM(S): at 15:21

## 2019-05-02 RX ADMIN — MEROPENEM 100 MILLIGRAM(S): 1 INJECTION INTRAVENOUS at 14:07

## 2019-05-02 RX ADMIN — Medication 325 MILLIGRAM(S): at 14:10

## 2019-05-02 RX ADMIN — MEROPENEM 100 MILLIGRAM(S): 1 INJECTION INTRAVENOUS at 21:38

## 2019-05-02 RX ADMIN — CHLORHEXIDINE GLUCONATE 1 APPLICATION(S): 213 SOLUTION TOPICAL at 05:35

## 2019-05-02 RX ADMIN — Medication 3 MILLILITER(S): at 17:13

## 2019-05-02 RX ADMIN — AMIODARONE HYDROCHLORIDE 400 MILLIGRAM(S): 400 TABLET ORAL at 05:34

## 2019-05-02 RX ADMIN — FENTANYL CITRATE 50 MICROGRAM(S): 50 INJECTION INTRAVENOUS at 06:39

## 2019-05-02 RX ADMIN — Medication 125 MILLILITER(S): at 10:57

## 2019-05-02 RX ADMIN — Medication 2: at 12:55

## 2019-05-02 RX ADMIN — Medication 20 MILLIEQUIVALENT(S): at 17:30

## 2019-05-02 RX ADMIN — VASOPRESSIN 5 UNIT(S)/MIN: 20 INJECTION INTRAVENOUS at 18:39

## 2019-05-02 RX ADMIN — FENTANYL CITRATE 25 MICROGRAM(S): 50 INJECTION INTRAVENOUS at 22:20

## 2019-05-02 RX ADMIN — FENTANYL CITRATE 50 MICROGRAM(S): 50 INJECTION INTRAVENOUS at 06:57

## 2019-05-02 RX ADMIN — Medication 1000 MILLIGRAM(S): at 09:00

## 2019-05-02 RX ADMIN — PANTOPRAZOLE SODIUM 40 MILLIGRAM(S): 20 TABLET, DELAYED RELEASE ORAL at 14:10

## 2019-05-02 NOTE — PROGRESS NOTE ADULT - ASSESSMENT
78 yr old male with H/O HTN, HLD, CAD, S/P Lung sx, Anxiety, Asthma, S/P CABG x3 POD 6  Post op course complicated with hypoxic respiratory failure suspected Bibasilar Pneumonia, severe Agitation requiring intubation.  PVCs/ PAF     AC mode ventilation, SBT  titrate Fio2& Peep CXR with RLL opacity, cont Meropenem.  SR off  gtt, remains on pressors for septic hypotension  ASA & Statin for CAD  Glycemic control  Cont Antidepressant  Enteral nutrition	    I have spent 30 minutes providing critical care management to this patient.

## 2019-05-02 NOTE — PROGRESS NOTE ADULT - SUBJECTIVE AND OBJECTIVE BOX
infectious diseases progress note:    Patient is a 79y old  Male who presents with a chief complaint of fever (01 May 2019 08:24)        Angina pectoris  Atherosclerosis of native coronary artery without angina pectoris        ROS:  CONSTITUTIONAL:  Negative fever or chills, feels well, good appetite  EYES:  Negative  blurry vision or double vision  CARDIOVASCULAR:  Negative for chest pain or palpitations  RESPIRATORY:  Negative for cough, wheezing, or SOB   GASTROINTESTINAL:  Negative for nausea, vomiting, diarrhea, constipation, or abdominal pain  GENITOURINARY:  Negative frequency, urgency or dysuria  NEUROLOGIC:  No headache, confusion, dizziness, lightheadedness    Allergies    No Known Allergies    Intolerances        ANTIBIOTICS/RELEVANT:  antimicrobials  meropenem  IVPB 1000 milliGRAM(s) IV Intermittent every 8 hours  vancomycin    Solution 500 milliGRAM(s) Oral every 6 hours    immunologic:    OTHER:  acetylcysteine 10%  Inhalation 3 milliLiter(s) Inhalation every 6 hours  ALBUTerol    90 MICROgram(s) HFA Inhaler 1 Puff(s) Inhalation every 4 hours  ALBUTerol/ipratropium for Nebulization 3 milliLiter(s) Nebulizer every 6 hours  amiodarone    Tablet   Oral   amiodarone    Tablet 400 milliGRAM(s) Oral every 8 hours  aspirin 325 milliGRAM(s) Oral daily  atorvastatin 40 milliGRAM(s) Oral at bedtime  benzocaine 15 mG/menthol 3.6 mG Lozenge 1 Lozenge Oral every 6 hours PRN  chlorhexidine 0.12% Liquid 15 milliLiter(s) Oral Mucosa two times a day  chlorhexidine 4% Liquid 1 Application(s) Topical <User Schedule>  enoxaparin Injectable 40 milliGRAM(s) SubCutaneous daily  escitalopram 10 milliGRAM(s) Oral daily  insulin lispro (HumaLOG) corrective regimen sliding scale   SubCutaneous Before meals and at bedtime  norepinephrine Infusion 0.02 MICROgram(s)/kG/Min IV Continuous <Continuous>  pantoprazole  Injectable 40 milliGRAM(s) IV Push daily  propofol Infusion 25 MICROgram(s)/kG/Min IV Continuous <Continuous>  sodium chloride 0.9%. 1000 milliLiter(s) IV Continuous <Continuous>  tiotropium 18 MICROgram(s) Capsule 1 Capsule(s) Inhalation daily  vasopressin Infusion 0.083 Unit(s)/Min IV Continuous <Continuous>      Objective:  Vital Signs Last 24 Hrs  T(C): 37.4 (02 May 2019 03:00), Max: 38.4 (01 May 2019 12:00)  T(F): 99.3 (02 May 2019 03:00), Max: 101.1 (01 May 2019 12:00)  HR: 87 (02 May 2019 08:00) (68 - 101)  BP: --  BP(mean): --  RR: 20 (02 May 2019 08:00) (0 - 26)  SpO2: 92% (02 May 2019 08:00) (89% - 100%)       Eyes:RYLEY, EOMI  Ear/Nose/Throat: no oral lesion, no sinus tenderness on percussion	  Neck:no JVD, no lymphadenopathy, supple  Respiratory: CTA shagufta  Cardiovascular: S1S2 RRR, no murmurs  Gastrointestinal:soft, (+) BS, no HSM  Extremities:no e/e/c        LABS:                        8.9    19.9  )-----------( 194      ( 02 May 2019 01:16 )             26.3     05-02    134<L>  |  98  |  27<H>  ----------------------------<  126<H>  4.2   |  25  |  1.31<H>    Ca    8.6      02 May 2019 01:16  Phos  2.5     05-02  Mg     2.1     05-02    TPro  5.8<L>  /  Alb  3.1<L>  /  TBili  1.3<H>  /  DBili  x   /  AST  28  /  ALT  21  /  AlkPhos  111  05-02            MICROBIOLOGY:    RECENT CULTURES:  04-29 @ 00:24 .Blood                No growth to date.    04-28 @ 14:06 .Sputum trap       Moderate polymorphonuclear leukocytes per low power field  Rare Squamous epithelial cells per low power field  Numerous Red blood cells per low power field  Rare Yeast like cells per oil power field           Normal Respiratory Annamaria present          RESPIRATORY CULTURES:              RADIOLOGY & ADDITIONAL STUDIES:        Pager [phone]  After 5 pm/weekends or if no response :[phone]

## 2019-05-02 NOTE — PROGRESS NOTE ADULT - ASSESSMENT
78 yr old with ARCENIO admitted for chest pain and underwent a CABG last last week   Post op has been stable but is still intubated and became febrile last 24hrs.   No evidence of wd infection, alot of sputum and possible LLL infiltrate started on vanco and meropenem which are reasonable  pending culture results      meropenem to 1 q 8 hr day 2       cultures pending  fever down  wbc still elevated   no change yet  too early to tell if he is better  sputum with normal lefty only and creat going up    hold vanco  ( doubt mrsa pna)  check cdiff for diarrhea  continue meropenem for pna

## 2019-05-02 NOTE — PROGRESS NOTE ADULT - SUBJECTIVE AND OBJECTIVE BOX
5:30pm-6pm     Remained critically ill on continuos ICU monitoring.      OBJECTIVE:  ICU Vital Signs Last 24 Hrs  T(C): 37.5 (02 May 2019 20:00), Max: 38.6 (02 May 2019 08:00)  T(F): 99.5 (02 May 2019 20:00), Max: 101.5 (02 May 2019 08:00)  HR: 71 (02 May 2019 22:35) (68 - 100)  BP: 115/57 (02 May 2019 22:30) (115/57 - 115/59)  BP(mean): 79 (02 May 2019 22:30) (79 - 83)  ABP: 131/46 (02 May 2019 22:35) (104/42 - 163/60)  ABP(mean): 63 (02 May 2019 22:35) (52 - 87)  RR: 14 (02 May 2019 22:35) (0 - 39)  SpO2: 97% (02 May 2019 22:35) (87% - 100%)    Mode: AC/ CMV (Assist Control/ Continuous Mandatory Ventilation), RR (machine): 10, TV (machine): 500, FiO2: 60, PEEP: 5, ITime: 1, MAP: 9, PIP: 20     @ : @ 07:00  --------------------------------------------------------  IN: 3123.3 mL / OUT: 3115 mL / NET: 8.3 mL     @ : @ 22:56  --------------------------------------------------------  IN: 1878 mL / OUT: 890 mL / NET: 988 mL      CAPILLARY BLOOD GLUCOSE      POCT Blood Glucose.: 143 mg/dL (02 May 2019 21:51)      PHYSICAL EXAM:    Daily Weight in k.7 (02 May 2019 00:00)  General: chronic critically ill on Vent with multiple gtt   Neurology: A&O on lesser level of sedation , nonfocal, JOHNSON x 4  Eyes: PERRLA/ EOMI, Gross vision intact  ENT/Neck: Neck supple, +ET  trachea midline, No JVD, Gross hearing intact  Respiratory:  B/L fine  rales, + sternal dressing   CV: RRR, with PVCs S1S2, no murmurs, rubs or gallops  Abdominal: Soft, NT, ND +BS,   Extremities: No edema, + peripheral pulses  Skin: No Rashes, Hematoma, Ecchymosis           HOSPITAL MEDICATIONS:  MEDICATIONS  (STANDING):  acetylcysteine 10%  Inhalation 3 milliLiter(s) Inhalation every 6 hours  ALBUTerol    90 MICROgram(s) HFA Inhaler 1 Puff(s) Inhalation every 4 hours  ALBUTerol/ipratropium for Nebulization 3 milliLiter(s) Nebulizer every 6 hours  amiodarone    Tablet   Oral   amiodarone    Tablet 400 milliGRAM(s) Oral every 8 hours  aspirin 325 milliGRAM(s) Oral daily  atorvastatin 40 milliGRAM(s) Oral at bedtime  chlorhexidine 0.12% Liquid 15 milliLiter(s) Oral Mucosa two times a day  chlorhexidine 4% Liquid 1 Application(s) Topical <User Schedule>  enoxaparin Injectable 40 milliGRAM(s) SubCutaneous daily  escitalopram 10 milliGRAM(s) Oral daily  insulin lispro (HumaLOG) corrective regimen sliding scale   SubCutaneous Before meals and at bedtime  meropenem  IVPB 1000 milliGRAM(s) IV Intermittent every 8 hours  norepinephrine Infusion 0.02 MICROgram(s)/kG/Min (3 mL/Hr) IV Continuous <Continuous>  pantoprazole  Injectable 40 milliGRAM(s) IV Push daily  propofol Infusion 25 MICROgram(s)/kG/Min (11.925 mL/Hr) IV Continuous <Continuous>  sodium chloride 0.9%. 1000 milliLiter(s) (10 mL/Hr) IV Continuous <Continuous>  tiotropium 18 MICROgram(s) Capsule 1 Capsule(s) Inhalation daily  vasopressin Infusion 0.083 Unit(s)/Min (5 mL/Hr) IV Continuous <Continuous>    MEDICATIONS  (PRN):  benzocaine 15 mG/menthol 3.6 mG Lozenge 1 Lozenge Oral every 6 hours PRN Sore Throat      LABS:                        8.9    19.9  )-----------( 194      ( 02 May 2019 01:16 )             26.3     05-    134<L>  |  98  |  27<H>  ----------------------------<  126<H>  4.2   |  25  |  1.31<H>    Ca    8.6      02 May 2019 01:16  Phos  2.5     05-  Mg     2.1     -    TPro  5.8<L>  /  Alb  3.1<L>  /  TBili  1.3<H>  /  DBili  x   /  AST  28  /  ALT  21  /  AlkPhos  111  -      Urinalysis Basic - ( 02 May 2019 09:38 )    Color: Yellow / Appearance: Slightly Turbid / S.019 / pH: x  Gluc: x / Ketone: Negative  / Bili: Negative / Urobili: Negative   Blood: x / Protein: 30 mg/dL / Nitrite: Negative   Leuk Esterase: Negative / RBC: 5 /hpf / WBC 3 /HPF   Sq Epi: x / Non Sq Epi: 3 / Bacteria: Moderate      Arterial Blood Gas:   @ 19:36  7.45/40/72/28/96/3.9  ABG lactate: --  Arterial Blood Gas:   @ 17:10  7.46/39/76/28/97/3.8  ABG lactate: --  Arterial Blood Gas:   @ 14:28  7.47/39/69/28/95/4.2  ABG lactate: --  Arterial Blood Gas:   @ 12:55  7.38/49/65/28/91/3.2  ABG lactate: --  Arterial Blood Gas:   @ 09:04  7.49/38/67/29/95/5.7  ABG lactate: --  Arterial Blood Gas:   @ 05:45  7.49/37/74/28/96/4.7  ABG lactate: --  Arterial Blood Gas:   @ 05:09  7.52/33/76/27/97/4.1  ABG lactate: --  Arterial Blood Gas:   @ 00:51  7.44/41/95/27/99/3.2  ABG lactate: --  Arterial Blood Gas:   @ 21:42  7.46/41/113/29/98/4.7  ABG lactate: --  Arterial Blood Gas:   @ 16:24  7.43/43/79/28/97/3.8  ABG lactate: --  Arterial Blood Gas:   @ 15:03  7.46/39/86/27/98/3.5  ABG lactate: --  Arterial Blood Gas:   @ 10:34  7.47/38/116/27/99/3.5  ABG lactate: --  Arterial Blood Gas:   @ 06:47  7.46/38/144/27/99/3.4  ABG lactate: --  Arterial Blood Gas:   @ 05:59  7.58/28/102/26/99/4.6  ABG lactate: --  Arterial Blood Gas:   @ 03:59  7.56/30/77/27/98/4.7  ABG lactate: --  Arterial Blood Gas:   @ 01:02  7.53/32/76/26/98/4.0  ABG lactate: --  Arterial Blood Gas:   @ 23:06  7.45/39/117/27/99/3.0  ABG lactate: --        LIVER FUNCTIONS - ( 02 May 2019 01:16 )  Alb: 3.1 g/dL / Pro: 5.8 g/dL / ALK PHOS: 111 U/L / ALT: 21 U/L / AST: 28 U/L / GGT: x           Urinalysis Basic - ( 02 May 2019 09:38 )    Color: Yellow / Appearance: Slightly Turbid / S.019 / pH: x  Gluc: x / Ketone: Negative  / Bili: Negative / Urobili: Negative   Blood: x / Protein: 30 mg/dL / Nitrite: Negative   Leuk Esterase: Negative / RBC: 5 /hpf / WBC 3 /HPF   Sq Epi: x / Non Sq Epi: 3 / Bacteria: Moderate    MICROBIOLOGY:     RADIOLOGY:  X Reviewed and interpreted by me

## 2019-05-03 ENCOUNTER — TRANSCRIPTION ENCOUNTER (OUTPATIENT)
Age: 79
End: 2019-05-03

## 2019-05-03 LAB
ALBUMIN SERPL ELPH-MCNC: 3.1 G/DL — LOW (ref 3.3–5)
ALP SERPL-CCNC: 110 U/L — SIGNIFICANT CHANGE UP (ref 40–120)
ALT FLD-CCNC: 21 U/L — SIGNIFICANT CHANGE UP (ref 10–45)
ANION GAP SERPL CALC-SCNC: 13 MMOL/L — SIGNIFICANT CHANGE UP (ref 5–17)
AST SERPL-CCNC: 30 U/L — SIGNIFICANT CHANGE UP (ref 10–40)
BASE EXCESS BLDA CALC-SCNC: 4.3 MMOL/L — HIGH (ref -2–2)
BILIRUB SERPL-MCNC: 1.2 MG/DL — SIGNIFICANT CHANGE UP (ref 0.2–1.2)
BUN SERPL-MCNC: 40 MG/DL — HIGH (ref 7–23)
CALCIUM SERPL-MCNC: 8.7 MG/DL — SIGNIFICANT CHANGE UP (ref 8.4–10.5)
CHLORIDE SERPL-SCNC: 100 MMOL/L — SIGNIFICANT CHANGE UP (ref 96–108)
CO2 BLDA-SCNC: 29 MMOL/L — SIGNIFICANT CHANGE UP (ref 22–30)
CO2 SERPL-SCNC: 25 MMOL/L — SIGNIFICANT CHANGE UP (ref 22–31)
CREAT SERPL-MCNC: 1.56 MG/DL — HIGH (ref 0.5–1.3)
EOSINOPHIL NFR URNS MANUAL: NEGATIVE — SIGNIFICANT CHANGE UP
GAS PNL BLDA: SIGNIFICANT CHANGE UP
GLUCOSE BLDC GLUCOMTR-MCNC: 129 MG/DL — HIGH (ref 70–99)
GLUCOSE SERPL-MCNC: 109 MG/DL — HIGH (ref 70–99)
HCO3 BLDA-SCNC: 28 MMOL/L — SIGNIFICANT CHANGE UP (ref 21–29)
HCT VFR BLD CALC: 26.1 % — LOW (ref 39–50)
HGB BLD-MCNC: 8.7 G/DL — LOW (ref 13–17)
HOROWITZ INDEX BLDA+IHG-RTO: 60 — SIGNIFICANT CHANGE UP
MAGNESIUM SERPL-MCNC: 2 MG/DL — SIGNIFICANT CHANGE UP (ref 1.6–2.6)
MCHC RBC-ENTMCNC: 30.2 PG — SIGNIFICANT CHANGE UP (ref 27–34)
MCHC RBC-ENTMCNC: 33.5 GM/DL — SIGNIFICANT CHANGE UP (ref 32–36)
MCV RBC AUTO: 90.2 FL — SIGNIFICANT CHANGE UP (ref 80–100)
PCO2 BLDA: 38 MMHG — SIGNIFICANT CHANGE UP (ref 32–46)
PH BLDA: 7.47 — HIGH (ref 7.35–7.45)
PHOSPHATE SERPL-MCNC: 2.8 MG/DL — SIGNIFICANT CHANGE UP (ref 2.5–4.5)
PLATELET # BLD AUTO: 231 K/UL — SIGNIFICANT CHANGE UP (ref 150–400)
PO2 BLDA: 73 MMHG — LOW (ref 74–108)
POTASSIUM SERPL-MCNC: 4.2 MMOL/L — SIGNIFICANT CHANGE UP (ref 3.5–5.3)
POTASSIUM SERPL-SCNC: 4.2 MMOL/L — SIGNIFICANT CHANGE UP (ref 3.5–5.3)
PROT SERPL-MCNC: 6.1 G/DL — SIGNIFICANT CHANGE UP (ref 6–8.3)
RBC # BLD: 2.89 M/UL — LOW (ref 4.2–5.8)
RBC # FLD: 13 % — SIGNIFICANT CHANGE UP (ref 10.3–14.5)
SAO2 % BLDA: 97 % — HIGH (ref 92–96)
SODIUM SERPL-SCNC: 138 MMOL/L — SIGNIFICANT CHANGE UP (ref 135–145)
WBC # BLD: 21.9 K/UL — HIGH (ref 3.8–10.5)
WBC # FLD AUTO: 21.9 K/UL — HIGH (ref 3.8–10.5)

## 2019-05-03 PROCEDURE — 71045 X-RAY EXAM CHEST 1 VIEW: CPT | Mod: 26

## 2019-05-03 PROCEDURE — 99232 SBSQ HOSP IP/OBS MODERATE 35: CPT

## 2019-05-03 PROCEDURE — 99291 CRITICAL CARE FIRST HOUR: CPT

## 2019-05-03 PROCEDURE — 99223 1ST HOSP IP/OBS HIGH 75: CPT | Mod: 57

## 2019-05-03 RX ORDER — MEROPENEM 1 G/30ML
1000 INJECTION INTRAVENOUS EVERY 12 HOURS
Qty: 0 | Refills: 0 | Status: DISCONTINUED | OUTPATIENT
Start: 2019-05-03 | End: 2019-05-09

## 2019-05-03 RX ORDER — POTASSIUM CHLORIDE 20 MEQ
20 PACKET (EA) ORAL ONCE
Qty: 0 | Refills: 0 | Status: COMPLETED | OUTPATIENT
Start: 2019-05-03 | End: 2019-05-03

## 2019-05-03 RX ORDER — MEROPENEM 1 G/30ML
INJECTION INTRAVENOUS
Qty: 0 | Refills: 0 | Status: DISCONTINUED | OUTPATIENT
Start: 2019-05-03 | End: 2019-05-09

## 2019-05-03 RX ORDER — FUROSEMIDE 40 MG
40 TABLET ORAL EVERY 12 HOURS
Qty: 0 | Refills: 0 | Status: DISCONTINUED | OUTPATIENT
Start: 2019-05-03 | End: 2019-05-04

## 2019-05-03 RX ORDER — ACETAMINOPHEN 500 MG
1000 TABLET ORAL ONCE
Qty: 0 | Refills: 0 | Status: COMPLETED | OUTPATIENT
Start: 2019-05-03 | End: 2019-05-03

## 2019-05-03 RX ORDER — MEROPENEM 1 G/30ML
1000 INJECTION INTRAVENOUS ONCE
Qty: 0 | Refills: 0 | Status: COMPLETED | OUTPATIENT
Start: 2019-05-03 | End: 2019-05-03

## 2019-05-03 RX ORDER — FENTANYL CITRATE 50 UG/ML
50 INJECTION INTRAVENOUS ONCE
Qty: 0 | Refills: 0 | Status: DISCONTINUED | OUTPATIENT
Start: 2019-05-03 | End: 2019-05-03

## 2019-05-03 RX ADMIN — ATORVASTATIN CALCIUM 40 MILLIGRAM(S): 80 TABLET, FILM COATED ORAL at 23:01

## 2019-05-03 RX ADMIN — Medication 40 MILLIGRAM(S): at 17:23

## 2019-05-03 RX ADMIN — MEROPENEM 100 MILLIGRAM(S): 1 INJECTION INTRAVENOUS at 08:54

## 2019-05-03 RX ADMIN — Medication 3 MILLILITER(S): at 11:28

## 2019-05-03 RX ADMIN — FENTANYL CITRATE 50 MICROGRAM(S): 50 INJECTION INTRAVENOUS at 15:40

## 2019-05-03 RX ADMIN — Medication 3 MILLILITER(S): at 06:13

## 2019-05-03 RX ADMIN — FENTANYL CITRATE 50 MICROGRAM(S): 50 INJECTION INTRAVENOUS at 15:24

## 2019-05-03 RX ADMIN — Medication 400 MILLIGRAM(S): at 12:02

## 2019-05-03 RX ADMIN — ENOXAPARIN SODIUM 40 MILLIGRAM(S): 100 INJECTION SUBCUTANEOUS at 12:02

## 2019-05-03 RX ADMIN — Medication 325 MILLIGRAM(S): at 12:03

## 2019-05-03 RX ADMIN — Medication 1000 MILLIGRAM(S): at 12:15

## 2019-05-03 RX ADMIN — CHLORHEXIDINE GLUCONATE 15 MILLILITER(S): 213 SOLUTION TOPICAL at 17:23

## 2019-05-03 RX ADMIN — Medication 5: at 17:25

## 2019-05-03 RX ADMIN — Medication 3 MILLILITER(S): at 00:24

## 2019-05-03 RX ADMIN — VASOPRESSIN 5 UNIT(S)/MIN: 20 INJECTION INTRAVENOUS at 17:27

## 2019-05-03 RX ADMIN — CHLORHEXIDINE GLUCONATE 1 APPLICATION(S): 213 SOLUTION TOPICAL at 05:47

## 2019-05-03 RX ADMIN — Medication 2: at 07:13

## 2019-05-03 RX ADMIN — Medication 2: at 10:44

## 2019-05-03 RX ADMIN — PANTOPRAZOLE SODIUM 40 MILLIGRAM(S): 20 TABLET, DELAYED RELEASE ORAL at 12:03

## 2019-05-03 RX ADMIN — PROPOFOL 11.93 MICROGRAM(S)/KG/MIN: 10 INJECTION, EMULSION INTRAVENOUS at 17:26

## 2019-05-03 RX ADMIN — Medication 3 MICROGRAM(S)/KG/MIN: at 17:26

## 2019-05-03 RX ADMIN — AMIODARONE HYDROCHLORIDE 400 MILLIGRAM(S): 400 TABLET ORAL at 05:46

## 2019-05-03 RX ADMIN — ESCITALOPRAM OXALATE 10 MILLIGRAM(S): 10 TABLET, FILM COATED ORAL at 12:03

## 2019-05-03 RX ADMIN — Medication 40 MILLIGRAM(S): at 05:45

## 2019-05-03 RX ADMIN — Medication 3 MILLILITER(S): at 17:31

## 2019-05-03 RX ADMIN — Medication 20 MILLIEQUIVALENT(S): at 10:43

## 2019-05-03 RX ADMIN — Medication 3 MILLILITER(S): at 23:29

## 2019-05-03 RX ADMIN — Medication 3 MILLILITER(S): at 17:30

## 2019-05-03 RX ADMIN — Medication 20 MILLIEQUIVALENT(S): at 23:56

## 2019-05-03 RX ADMIN — CHLORHEXIDINE GLUCONATE 15 MILLILITER(S): 213 SOLUTION TOPICAL at 05:47

## 2019-05-03 RX ADMIN — Medication 2: at 23:00

## 2019-05-03 RX ADMIN — Medication 3 MILLILITER(S): at 00:23

## 2019-05-03 RX ADMIN — AMIODARONE HYDROCHLORIDE 400 MILLIGRAM(S): 400 TABLET ORAL at 13:20

## 2019-05-03 RX ADMIN — Medication 3 MILLILITER(S): at 11:27

## 2019-05-03 RX ADMIN — MEROPENEM 100 MILLIGRAM(S): 1 INJECTION INTRAVENOUS at 18:03

## 2019-05-03 RX ADMIN — HYDROMORPHONE HYDROCHLORIDE 0.5 MILLIGRAM(S): 2 INJECTION INTRAMUSCULAR; INTRAVENOUS; SUBCUTANEOUS at 00:00

## 2019-05-03 NOTE — CONSULT NOTE ADULT - SUBJECTIVE AND OBJECTIVE BOX
Past Medical History:  Anxiety    Asthma  Controlled  Depression    Diverticulitis    Nocturia    Panic attacks    Sleep Apnea.     Past Surgical History:  Bunion    Carpal Tunnel Syndrome  right hand  Cataract extraction status  right  History of colon resection    History of eye surgery  PPV right    History of partial colectomy    Inguinal Hernia Bilateral    S/P eye surgery  "removed fluid" from [facility]  Shoulder Problem  right rotator cuff  Sinus Disorder  surgery x 2  Status post lung surgery  ? wedge resection.       Social History:  · Marital Status		  · Occupation	Retired	  · Lives With	spouse	     Substance Use History:  · Substance Use	never used	     Alcohol Use History:  · Have you ever consumed alcohol	never	     Tobacco Usage:  · Tobacco Usage: Never smoker	     Passive Smoke Exposure:  · Passive Smoke Exposure	No	     Family History:  Mother  Still living? Unknown  Family history of acute myocardial infarction, Age at diagnosis: Age Unknown     Sibling  Still living? Unknown  Family history of acute myocardial infarction, Age at diagnosis: Age Unknown.      Home Medications:   On admission     ·	Aspir 81 oral delayed release tablet: Last Dose Taken:  , 1 tab(s) orally once a day  · 	Percocet 5/325 oral tablet: Last Dose Taken:  , 1 tab(s) orally every 6 hours, As Needed  · 	Toprol-XL 25 mg oral tablet, extended release: Last Dose Taken:  , 1 tab(s) orally once a day  	patient never picked it up from pharamcy   	was never started   · 	Lexapro 10 mg oral tablet: Last Dose Taken:  , 1 tab(s) orally once a day    Review of Systems: intubated cannot assess       Vital Signs/Physical Exam:    Height/Weight:    172 cm  80 kg     PE:   Mode: AC/ CMV (Assist Control/ Continuous Mandatory Ventilation), RR (machine): 10, TV (machine): 500, FiO2: 60, PEEP: 8, ITime: 1, MAP: 10, PIP: 18  General: chronically debilitated   Neurology: Sedated , nonfocal, JOHNSON x 4  Eyes: PERRLA/ EOMI,   ENT/Neck: Neck supple, no incisions,  trachea midline,  Respiratory:  B/L fine  rales + sternal dressing  CV: RRR, S1S2, no murmurs, rubs or gallops  Abdominal: Soft, NT, ND +BS,   Extremities: 2+ edema, + peripheral pulses  Skin: No Rashes, Hematoma, Ecchymosis    Tubes: duo tube     HOSPITAL MEDICATIONS:  MEDICATIONS  (STANDING):  ALBUTerol/ipratropium for Nebulization 3 milliLiter(s) Nebulizer every 6 hours  aspirin 325 milliGRAM(s) Oral daily  atorvastatin 40 milliGRAM(s) Oral at bedtime  chlorhexidine 0.12% Liquid 15 milliLiter(s) Oral Mucosa two times a day  chlorhexidine 4% Liquid 1 Application(s) Topical <User Schedule>  escitalopram 10 milliGRAM(s) Oral daily  heparin  Injectable 5000 Unit(s) SubCutaneous every 8 hours  insulin lispro (HumaLOG) corrective regimen sliding scale   SubCutaneous Before meals and at bedtime  meropenem  IVPB      meropenem  IVPB 1000 milliGRAM(s) IV Intermittent every 12 hours  methylPREDNISolone sodium succinate IVPB 1000 milliGRAM(s) IV Intermittent daily  norepinephrine Infusion 0.02 MICROgram(s)/kG/Min (3 mL/Hr) IV Continuous <Continuous>  pantoprazole  Injectable 40 milliGRAM(s) IV Push two times a day  propofol Infusion 25 MICROgram(s)/kG/Min (11.925 mL/Hr) IV Continuous <Continuous>  sodium chloride 0.9%. 1000 milliLiter(s) (10 mL/Hr) IV Continuous <Continuous>  vasopressin Infusion 0.083 Unit(s)/Min (5 mL/Hr) IV Continuous <Continuous>    MEDICATIONS  (PRN):  benzocaine 15 mG/menthol 3.6 mG Lozenge 1 Lozenge Oral every 6 hours PRN Sore Throat      LABS:              8.7    21.9  )-----------( 231      ( 03 May 2019 01:08 )      138  |  100  |  40<H>  ----------------------------<  109<H>  4.2   |  25  |  1.56<H>    Ca    8.7      03 May 2019 01:08  Phos  2.8     05-03  Mg     2.0     05-03    TPro  6.1  /  Alb  3.1<L>  /  TBili  1.2  /  DBili  x   /  AST  30  /  ALT  21  /  AlkPhos  110  05-03                 26.1

## 2019-05-03 NOTE — PROGRESS NOTE ADULT - SUBJECTIVE AND OBJECTIVE BOX
infectious diseases progress note:    Patient is a 79y old  Male who presents with a chief complaint of fever (02 May 2019 08:31)        Angina pectoris  Atherosclerosis of native coronary artery without angina pectoris             Allergies    No Known Allergies    Intolerances        ANTIBIOTICS/RELEVANT:  antimicrobials  meropenem  IVPB      meropenem  IVPB 1000 milliGRAM(s) IV Intermittent every 12 hours    immunologic:    OTHER:  acetylcysteine 10%  Inhalation 3 milliLiter(s) Inhalation every 6 hours  ALBUTerol    90 MICROgram(s) HFA Inhaler 1 Puff(s) Inhalation every 4 hours  ALBUTerol/ipratropium for Nebulization 3 milliLiter(s) Nebulizer every 6 hours  amiodarone    Tablet   Oral   amiodarone    Tablet 400 milliGRAM(s) Oral every 8 hours  amiodarone    Tablet 200 milliGRAM(s) Oral daily  aspirin 325 milliGRAM(s) Oral daily  atorvastatin 40 milliGRAM(s) Oral at bedtime  benzocaine 15 mG/menthol 3.6 mG Lozenge 1 Lozenge Oral every 6 hours PRN  chlorhexidine 0.12% Liquid 15 milliLiter(s) Oral Mucosa two times a day  chlorhexidine 4% Liquid 1 Application(s) Topical <User Schedule>  enoxaparin Injectable 40 milliGRAM(s) SubCutaneous daily  escitalopram 10 milliGRAM(s) Oral daily  furosemide   Injectable 40 milliGRAM(s) IV Push every 12 hours  insulin lispro (HumaLOG) corrective regimen sliding scale   SubCutaneous Before meals and at bedtime  norepinephrine Infusion 0.02 MICROgram(s)/kG/Min IV Continuous <Continuous>  pantoprazole  Injectable 40 milliGRAM(s) IV Push daily  propofol Infusion 25 MICROgram(s)/kG/Min IV Continuous <Continuous>  sodium chloride 0.9%. 1000 milliLiter(s) IV Continuous <Continuous>  tiotropium 18 MICROgram(s) Capsule 1 Capsule(s) Inhalation daily  vasopressin Infusion 0.083 Unit(s)/Min IV Continuous <Continuous>      Objective:  Vital Signs Last 24 Hrs  T(C): 37.8 (03 May 2019 08:00), Max: 38.4 (03 May 2019 06:00)  T(F): 100 (03 May 2019 08:00), Max: 101.1 (03 May 2019 06:00)  HR: 69 (03 May 2019 10:00) (68 - 100)  BP: 102/57 (03 May 2019 10:00) (102/57 - 136/63)  BP(mean): 76 (03 May 2019 10:00) (76 - 91)  RR: 16 (03 May 2019 10:00) (0 - 39)  SpO2: 99% (03 May 2019 10:00) (87% - 100%)    PHYSICAL EXAM:     Eyes:RYLEY, EOMI  Ear/Nose/Throat: no oral lesion, no sinus tenderness on percussion	  Neck:no JVD, no lymphadenopathy, supple  Respiratory: CTA shagufta  Cardiovascular: S1S2 RRR, no murmurs  Gastrointestinal:soft, (+) BS, no HSM  Extremities:no e/e/c        LABS:                        8.7    21.9  )-----------( 231      ( 03 May 2019 01:08 )             26.1     05-03    138  |  100  |  40<H>  ----------------------------<  109<H>  4.2   |  25  |  1.56<H>    Ca    8.7      03 May 2019 01:08  Phos  2.8     05-03  Mg     2.0     05-03    TPro  6.1  /  Alb  3.1<L>  /  TBili  1.2  /  DBili  x   /  AST  30  /  ALT  21  /  AlkPhos  110  05-03      Urinalysis Basic - ( 02 May 2019 09:38 )    Color: Yellow / Appearance: Slightly Turbid / S.019 / pH: x  Gluc: x / Ketone: Negative  / Bili: Negative / Urobili: Negative   Blood: x / Protein: 30 mg/dL / Nitrite: Negative   Leuk Esterase: Negative / RBC: 5 /hpf / WBC 3 /HPF   Sq Epi: x / Non Sq Epi: 3 / Bacteria: Moderate          MICROBIOLOGY:    RECENT CULTURES:   @ 00:24 .Blood                No growth to date.     @ 14:06 .Sputum trap       Moderate polymorphonuclear leukocytes per low power field  Rare Squamous epithelial cells per low power field  Numerous Red blood cells per low power field  Rare Yeast like cells per oil power field           Normal Respiratory Annamaria present          RESPIRATORY CULTURES:      Clostridium difficile GDH Toxins A&amp;B, EIA:   Negative ( @ 12:40)          RADIOLOGY & ADDITIONAL STUDIES:        Pager [phone]  After 5 pm/weekends or if no response :[phone]

## 2019-05-03 NOTE — CHART NOTE - NSCHARTNOTEFT_GEN_A_CORE
Nutrition Follow Up Note  Patient seen for: nutrition follow up    Source: RN, EMR, Previous RD notes    78 yo M with PMHx HLD, sleep apnea, GERD, diverticulitis, depression, anxiety, panic attacks with chest pain and SOB x 1 year. Admitted for CABG. S/p cardiac cath , found to have CAD, LVH and diastolic HF, S/p CABGx3 with LIMA to LAD; RSVG to OM, RCA . Hospital course c/b anemia/hypovolemia-shock, cardiovascular dysfunction, acidosis, and hyperglycemia. Pt remains intubated and sedated on Propofol (running at 11.5 mL/hr). Pt febrile x 24 hours with increase in sputum and possible LLL infiltrate. Pt with multiple loose stools yesterday, Cdiff for diarrhea negative, pt with suspected bibasilar pneumonia. Per team, possible trach and PEG placement tomorrow. Remains on pressors for septic hypotension.     Diet : NPO   Previously on Vital AF @ 55mL/hr x 24 hours  No carb Prosource 1x/day, Danactive 2x/day    EN provisions   5/3: 55 mL Vital AF  2: 825 mL Vital AF  : 1210 mL Vital AF    Stool count:   5/3: x2  5/2: x3 (loose)  : x3 (loose)      Daily Weight in k (-03), Weight in k.7 (-02), Weight in k.1 (-), Weight in k.1 (-30), Weight in k.5 (-29), Weight in k.5 (-29), Weight in k (-28)  Noted wt fluctuations likely due to fluid shifts     Pertinent Medications: MEDICATIONS  (STANDING):  acetylcysteine 10%  Inhalation 3 milliLiter(s) Inhalation every 6 hours  ALBUTerol    90 MICROgram(s) HFA Inhaler 1 Puff(s) Inhalation every 4 hours  ALBUTerol/ipratropium for Nebulization 3 milliLiter(s) Nebulizer every 6 hours  amiodarone    Tablet   Oral   amiodarone    Tablet 200 milliGRAM(s) Oral daily  aspirin 325 milliGRAM(s) Oral daily  atorvastatin 40 milliGRAM(s) Oral at bedtime  chlorhexidine 0.12% Liquid 15 milliLiter(s) Oral Mucosa two times a day  chlorhexidine 4% Liquid 1 Application(s) Topical <User Schedule>  enoxaparin Injectable 40 milliGRAM(s) SubCutaneous daily  escitalopram 10 milliGRAM(s) Oral daily  furosemide   Injectable 40 milliGRAM(s) IV Push every 12 hours  insulin lispro (HumaLOG) corrective regimen sliding scale   SubCutaneous Before meals and at bedtime  meropenem  IVPB      meropenem  IVPB 1000 milliGRAM(s) IV Intermittent every 12 hours  norepinephrine Infusion 0.02 MICROgram(s)/kG/Min (3 mL/Hr) IV Continuous <Continuous>  pantoprazole  Injectable 40 milliGRAM(s) IV Push daily  propofol Infusion 25 MICROgram(s)/kG/Min (11.925 mL/Hr) IV Continuous <Continuous>  sodium chloride 0.9%. 1000 milliLiter(s) (10 mL/Hr) IV Continuous <Continuous>  tiotropium 18 MICROgram(s) Capsule 1 Capsule(s) Inhalation daily  vasopressin Infusion 0.083 Unit(s)/Min (5 mL/Hr) IV Continuous <Continuous>    MEDICATIONS  (PRN):  benzocaine 15 mG/menthol 3.6 mG Lozenge 1 Lozenge Oral every 6 hours PRN Sore Throat    Pertinent Labs:  @ 01:08: Na 138, BUN 40<H>, Cr 1.56<H>, <H>, K+ 4.2, Phos 2.8, Mg 2.0, Alk Phos 110, ALT/SGPT 21, AST/SGOT 30, HbA1c --    Finger Sticks:  POCT Blood Glucose.: 129 mg/dL ( @ 07:12)  POCT Blood Glucose.: 143 mg/dL ( @ 21:51)      Skin per nursing documentation: No noted pressure injuries  Edema per nursing documentation: generalized 1+     Estimated Needs:   [ X ] no change since previous assessment  [ ] recalculated:     Previous Nutrition Diagnosis: Increased nutrient needs; Nutrition-related knowledge deficit  Nutrition Diagnosis is: Ongoing     Interventions:     Recommend  1)    Monitoring and Evaluation:     Continue to monitor Nutritional intake, Tolerance to diet prescription, weights, labs, skin integrity    RD remains available upon request and will follow up per protocol Nutrition Follow Up Note  Patient seen for: nutrition follow up    Source: RN, EMR, Previous RD notes    80 yo M with PMHx HLD, sleep apnea, GERD, diverticulitis, depression, anxiety, panic attacks with chest pain and SOB x 1 year. Admitted for CABG. S/p cardiac cath , found to have CAD, LVH and diastolic HF, S/p CABGx3 with LIMA to LAD; RSVG to OM, RCA . Hospital course c/b anemia/hypovolemia-shock, cardiovascular dysfunction, acidosis, and hyperglycemia. Pt febrile x 24 hours with increase in sputum and possible LLL infiltrate. Pt remains on pressors for septic hypotension.     Pt remains intubated and sedated on Propofol (running at 11.5 mL/hr). Pt with multiple loose stools yesterday, Cdiff for diarrhea negative, pt with  regular BMs today. Pt with suspected bibasilar pneumonia. Per RN, pt planned for lung biopsy, bronchoscopy, trach placement, and PEG placement tomorrow.     Diet : Vital AF 1.2 @ 55mL/hr x 24 hours + 1 no carb Prosource to provide 1320 fl oz, 1584 kcal/day (20 kcal/kg), 99 gm protein (1.2 gm protein)  No carb Prosource 1x/day, Danactive 2x/day    Propofol provisions:  5/3: 169 ml thus far  : 430.3 mL  : 365 mL  : 230.6 mL     EN provisions:  5/3: 770  mL Vital AF  2: 825 mL Vital AF  : 1210 mL Vital AF    Stool count:   5/3: x2  5: x3 (loose)  : x3 (loose)      Daily Weight in k (-), Weight in k.7 (-), Weight in k.1 (), Weight in k.1 (), Weight in k.5 (-), Weight in k.5 (), Weight in k ()  Noted wt fluctuations likely due to fluid shifts     Pertinent Medications: MEDICATIONS  (STANDING):  acetylcysteine 10%  Inhalation 3 milliLiter(s) Inhalation every 6 hours  ALBUTerol    90 MICROgram(s) HFA Inhaler 1 Puff(s) Inhalation every 4 hours  ALBUTerol/ipratropium for Nebulization 3 milliLiter(s) Nebulizer every 6 hours  amiodarone    Tablet   Oral   amiodarone    Tablet 200 milliGRAM(s) Oral daily  aspirin 325 milliGRAM(s) Oral daily  atorvastatin 40 milliGRAM(s) Oral at bedtime  chlorhexidine 0.12% Liquid 15 milliLiter(s) Oral Mucosa two times a day  chlorhexidine 4% Liquid 1 Application(s) Topical <User Schedule>  enoxaparin Injectable 40 milliGRAM(s) SubCutaneous daily  escitalopram 10 milliGRAM(s) Oral daily  furosemide   Injectable 40 milliGRAM(s) IV Push every 12 hours  insulin lispro (HumaLOG) corrective regimen sliding scale   SubCutaneous Before meals and at bedtime  meropenem  IVPB      meropenem  IVPB 1000 milliGRAM(s) IV Intermittent every 12 hours  norepinephrine Infusion 0.02 MICROgram(s)/kG/Min (3 mL/Hr) IV Continuous <Continuous>  pantoprazole  Injectable 40 milliGRAM(s) IV Push daily  propofol Infusion 25 MICROgram(s)/kG/Min (11.925 mL/Hr) IV Continuous <Continuous>  sodium chloride 0.9%. 1000 milliLiter(s) (10 mL/Hr) IV Continuous <Continuous>  tiotropium 18 MICROgram(s) Capsule 1 Capsule(s) Inhalation daily  vasopressin Infusion 0.083 Unit(s)/Min (5 mL/Hr) IV Continuous <Continuous>    MEDICATIONS  (PRN):  benzocaine 15 mG/menthol 3.6 mG Lozenge 1 Lozenge Oral every 6 hours PRN Sore Throat    Pertinent Labs:  @ 01:08: Na 138, BUN 40<H>, Cr 1.56<H>, <H>, K+ 4.2, Phos 2.8, Mg 2.0, Alk Phos 110, ALT/SGPT 21, AST/SGOT 30, HbA1c --    Finger Sticks:  POCT Blood Glucose.: 129 mg/dL ( @ 07:12)  POCT Blood Glucose.: 143 mg/dL ( @ 21:51)    Skin per nursing documentation: No noted pressure injuries  Edema per nursing documentation: generalized 1+     Estimated Needs:   [ X ] no change since previous assessment  [ ] recalculated:     Previous Nutrition Diagnosis: Increased nutrient needs; Nutrition-related knowledge deficit  Nutrition Diagnosis is: Ongoing, being addressed with EN feeding    Interventions:     Recommend  1) C/w Vital AF 1.2 @ 55mL/hr x 24 hours + 1 no carb Prosource to provide 1320 fl oz, 1644 kcal/day (21 kcal/kg), 114 gm protein (1.4 gm/kg) based on dosing wt 97.5 kg  2) C/w Danactive 2x/day, Banatrol as needed  3) RD to f/u per protocol to reinforce diet education at appropriate time    Monitoring and Evaluation:     Continue to monitor Nutritional intake, Tolerance to diet prescription, weights, labs, skin integrity    RD remains available upon request and will follow up per protocol Nutrition Follow Up Note  Patient seen for: nutrition follow up    Source: RN, EMR, Previous RD notes    80 yo M with PMHx HLD, sleep apnea, GERD, diverticulitis, depression, anxiety, panic attacks with chest pain and SOB x 1 year. Admitted for CABG. S/p cardiac cath , found to have CAD, LVH and diastolic HF, S/p CABGx3 with LIMA to LAD; RSVG to OM, RCA . Hospital course c/b anemia/hypovolemia-shock, cardiovascular dysfunction, acidosis, and hyperglycemia. Pt febrile x 24 hours with increase in sputum and possible LLL infiltrate. Pt remains on pressors for septic hypotension.     Pt remains intubated and sedated on Propofol (running at 11.5 mL/hr). Pt with multiple loose stools yesterday, Cdiff for diarrhea negative, pt with  regular BMs today. Pt with suspected bibasilar pneumonia. Per RN, pt planned for lung biopsy, bronchoscopy, trach placement, and PEG placement tomorrow.     Diet : Vital AF 1.2 @ 55mL/hr x 24 hours + 1 no carb Prosource to provide 1320 fl oz, 1644 kcal/day (21 kcal/kg), 114 gm protein (1.4 gm/kg) based on dosing wt 97.5 kg  No carb Prosource 1x/day, Danactive 2x/day    Propofol provisions:  5/3: 169 ml thus far  : 430.3 mL  : 365 mL  : 230.6 mL     EN provisions:  5/3: 770  mL Vital AF  : 825 mL Vital AF  : 1210 mL Vital AF    Stool count:   5/3: x2  : x3 (loose)  : x3 (loose)      Daily Weight in k (-), Weight in k.7 (-), Weight in k.1 (), Weight in k.1 (), Weight in k.5 (-), Weight in k.5 (), Weight in k ()  Noted wt fluctuations likely due to fluid shifts     Pertinent Medications: MEDICATIONS  (STANDING):  acetylcysteine 10%  Inhalation 3 milliLiter(s) Inhalation every 6 hours  ALBUTerol    90 MICROgram(s) HFA Inhaler 1 Puff(s) Inhalation every 4 hours  ALBUTerol/ipratropium for Nebulization 3 milliLiter(s) Nebulizer every 6 hours  amiodarone    Tablet   Oral   amiodarone    Tablet 200 milliGRAM(s) Oral daily  aspirin 325 milliGRAM(s) Oral daily  atorvastatin 40 milliGRAM(s) Oral at bedtime  chlorhexidine 0.12% Liquid 15 milliLiter(s) Oral Mucosa two times a day  chlorhexidine 4% Liquid 1 Application(s) Topical <User Schedule>  enoxaparin Injectable 40 milliGRAM(s) SubCutaneous daily  escitalopram 10 milliGRAM(s) Oral daily  furosemide   Injectable 40 milliGRAM(s) IV Push every 12 hours  insulin lispro (HumaLOG) corrective regimen sliding scale   SubCutaneous Before meals and at bedtime  meropenem  IVPB      meropenem  IVPB 1000 milliGRAM(s) IV Intermittent every 12 hours  norepinephrine Infusion 0.02 MICROgram(s)/kG/Min (3 mL/Hr) IV Continuous <Continuous>  pantoprazole  Injectable 40 milliGRAM(s) IV Push daily  propofol Infusion 25 MICROgram(s)/kG/Min (11.925 mL/Hr) IV Continuous <Continuous>  sodium chloride 0.9%. 1000 milliLiter(s) (10 mL/Hr) IV Continuous <Continuous>  tiotropium 18 MICROgram(s) Capsule 1 Capsule(s) Inhalation daily  vasopressin Infusion 0.083 Unit(s)/Min (5 mL/Hr) IV Continuous <Continuous>    MEDICATIONS  (PRN):  benzocaine 15 mG/menthol 3.6 mG Lozenge 1 Lozenge Oral every 6 hours PRN Sore Throat    Pertinent Labs:  @ 01:08: Na 138, BUN 40<H>, Cr 1.56<H>, <H>, K+ 4.2, Phos 2.8, Mg 2.0, Alk Phos 110, ALT/SGPT 21, AST/SGOT 30, HbA1c --    Finger Sticks:  POCT Blood Glucose.: 129 mg/dL ( @ 07:12)  POCT Blood Glucose.: 143 mg/dL ( @ 21:51)    Skin per nursing documentation: No noted pressure injuries  Edema per nursing documentation: generalized 1+     Estimated Needs:   [ X ] no change since previous assessment  [ ] recalculated:     Previous Nutrition Diagnosis: Increased nutrient needs; Nutrition-related knowledge deficit  Nutrition Diagnosis is: Ongoing, being addressed with EN feeding    Interventions:     Recommend  1) C/w Vital AF 1.2 @ 55mL/hr x 24 hours + 1 no carb Prosource to provide 1320 fl oz, 1644 kcal/day (21 kcal/kg), 114 gm protein (1.4 gm/kg) based on dosing wt 97.5 kg  2) C/w Danactive 2x/day, Banatrol as needed  3) RD to f/u per protocol to reinforce diet education at appropriate time    Monitoring and Evaluation:     Continue to monitor Nutritional intake, Tolerance to diet prescription, weights, labs, skin integrity    RD remains available upon request and will follow up per protocol

## 2019-05-03 NOTE — PROGRESS NOTE ADULT - SUBJECTIVE AND OBJECTIVE BOX
CHRIS THAKKAR  MRN#:  [id number]    The patient is a 78y Male with PMH of HLD, Sleep apnea ( non compliant with CPAP), GERD, diverticulitis, depression, anxiety, and panic attacks, as well as strong family history of CAD, admitted with unstable angina, found to have coronary artery disease, LVH and diastolic heart failure, now recovering s/p C3L 4/26, post op course complicated by bleeding and later hypoxic respiratory failure who was seen, evaluated, & examined with the CTICU staff on rounds and later in the evening with a multidisciplinary care plan formulated & implemented.  All available clinical, laboratory, radiographic, pharmacologic, and electrocardiographic data were reviewed & analyzed.      The patient was in the CTICU in critical condition secondary to persistent cardiopulmonary dysfunction, hemodynamically significant anemia/hypovolemia-shock, and persistent cardiovascular dysfunction.      Respiratory status required full ventilator support, close monitoring of respiratory rate and breathing pattern, the following of ABG’s with A-line monitoring, continuous pulse oximetry monitoring, and an IV Propofol infusion for support & to evaluate for & prevent further decompensation secondary to hypoxic respiratory failure and pneumonia. 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 Plan is for tracheostomy and lung biopsy 5/04.    Invasive hemodynamic monitoring with a central venous catheter & an A-line were required for the continuous central venous and MAP/BP monitoring to ensure adequate cardiovascular support and to evaluate for & help prevent decompensation while receiving an IV Levophed drip, an IV Vasopressin drip, intermittent IV Lasix, and enteral Amiodarone secondary to hemodynamically significant anemia/acute postoperative blood loss anemia, hypovolemic and vasogenic shock, acute on chronic diastolic heart failure, and rapid atrial fibrillation.    Patient has had fever, leukocytosis, thick pulmonary secretions with a right lung infiltrate on chest x ray consistent with pneumonia. He is empirically on treatment with IV Meropenem, although cultures are negative to date. Biopsy planned as above.    Tolerating enteral feedings at goal rate.    Patient required acute postoperative critical care management and I provided 35 minutes of non-continuous care to the patient.  Discussed at length with the CTICU staff and helped coordinate care.

## 2019-05-03 NOTE — CONSULT NOTE ADULT - ASSESSMENT
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 now presents with persistent requirement for mechanical ventilation.   thoracic consulted for lung biopsy and tracheostomy.   Added for Saturday May 4 for procedure.   not on AC/AP except baby aspirin.     hold off on PEG for now as per primary surgeon.

## 2019-05-03 NOTE — PROGRESS NOTE ADULT - ASSESSMENT
Routing refill request to provider for review/approval because:  Medication not addressed during last progress note.    Nikki Tobin RN   [facility]           78 yr old with ARCENIO admitted for chest pain and underwent a CABG last last week   Post op has been stable but is still intubated and became febrile last 24hrs.   No evidence of wd infection, alot of sputum and possible LLL infiltrate started on vanco and meropenem which are reasonable  pending culture results      meropenem to 1 q 8 hr day 2         fever down mostly but still with low grade  wbc still elevated     sputum with normal lefty only and creat going up     off vanco  ( doubt mrsa pna)   cdiff for diarrhea negative  continue meropenem for pna   Discussed with Dr. Milner in detail  Hs of prior BOOP; discussed biopsy v. short trial of empiric steroids as antibiotics  have not really helped

## 2019-05-04 ENCOUNTER — RESULT REVIEW (OUTPATIENT)
Age: 79
End: 2019-05-04

## 2019-05-04 ENCOUNTER — APPOINTMENT (OUTPATIENT)
Dept: THORACIC SURGERY | Facility: HOSPITAL | Age: 79
End: 2019-05-04

## 2019-05-04 LAB
ALBUMIN SERPL ELPH-MCNC: 3.2 G/DL — LOW (ref 3.3–5)
ALP SERPL-CCNC: 129 U/L — HIGH (ref 40–120)
ALT FLD-CCNC: 29 U/L — SIGNIFICANT CHANGE UP (ref 10–45)
ANION GAP SERPL CALC-SCNC: 15 MMOL/L — SIGNIFICANT CHANGE UP (ref 5–17)
AST SERPL-CCNC: 47 U/L — HIGH (ref 10–40)
BILIRUB SERPL-MCNC: 1.1 MG/DL — SIGNIFICANT CHANGE UP (ref 0.2–1.2)
BLD GP AB SCN SERPL QL: NEGATIVE — SIGNIFICANT CHANGE UP
BUN SERPL-MCNC: 53 MG/DL — HIGH (ref 7–23)
CALCIUM SERPL-MCNC: 8.7 MG/DL — SIGNIFICANT CHANGE UP (ref 8.4–10.5)
CHLORIDE SERPL-SCNC: 100 MMOL/L — SIGNIFICANT CHANGE UP (ref 96–108)
CO2 SERPL-SCNC: 25 MMOL/L — SIGNIFICANT CHANGE UP (ref 22–31)
CREAT SERPL-MCNC: 1.68 MG/DL — HIGH (ref 0.5–1.3)
CULTURE RESULTS: SIGNIFICANT CHANGE UP
CULTURE RESULTS: SIGNIFICANT CHANGE UP
GAS PNL BLDA: SIGNIFICANT CHANGE UP
GLUCOSE BLDC GLUCOMTR-MCNC: 158 MG/DL — HIGH (ref 70–99)
GLUCOSE SERPL-MCNC: 136 MG/DL — HIGH (ref 70–99)
HCT VFR BLD CALC: 26.7 % — LOW (ref 39–50)
HGB BLD-MCNC: 9.1 G/DL — LOW (ref 13–17)
MAGNESIUM SERPL-MCNC: 2.2 MG/DL — SIGNIFICANT CHANGE UP (ref 1.6–2.6)
MCHC RBC-ENTMCNC: 30.2 PG — SIGNIFICANT CHANGE UP (ref 27–34)
MCHC RBC-ENTMCNC: 33.9 GM/DL — SIGNIFICANT CHANGE UP (ref 32–36)
MCV RBC AUTO: 89 FL — SIGNIFICANT CHANGE UP (ref 80–100)
PHOSPHATE SERPL-MCNC: 2.7 MG/DL — SIGNIFICANT CHANGE UP (ref 2.5–4.5)
PLATELET # BLD AUTO: 290 K/UL — SIGNIFICANT CHANGE UP (ref 150–400)
POTASSIUM SERPL-MCNC: 4.2 MMOL/L — SIGNIFICANT CHANGE UP (ref 3.5–5.3)
POTASSIUM SERPL-SCNC: 4.2 MMOL/L — SIGNIFICANT CHANGE UP (ref 3.5–5.3)
PROT SERPL-MCNC: 6.3 G/DL — SIGNIFICANT CHANGE UP (ref 6–8.3)
RBC # BLD: 3 M/UL — LOW (ref 4.2–5.8)
RBC # FLD: 13.2 % — SIGNIFICANT CHANGE UP (ref 10.3–14.5)
RH IG SCN BLD-IMP: POSITIVE — SIGNIFICANT CHANGE UP
SODIUM SERPL-SCNC: 140 MMOL/L — SIGNIFICANT CHANGE UP (ref 135–145)
SPECIMEN SOURCE: SIGNIFICANT CHANGE UP
SPECIMEN SOURCE: SIGNIFICANT CHANGE UP
WBC # BLD: 22.4 K/UL — HIGH (ref 3.8–10.5)
WBC # FLD AUTO: 22.4 K/UL — HIGH (ref 3.8–10.5)

## 2019-05-04 PROCEDURE — 71045 X-RAY EXAM CHEST 1 VIEW: CPT | Mod: 26

## 2019-05-04 PROCEDURE — 31624 DX BRONCHOSCOPE/LAVAGE: CPT | Mod: 78

## 2019-05-04 PROCEDURE — 88342 IMHCHEM/IMCYTCHM 1ST ANTB: CPT | Mod: 26

## 2019-05-04 PROCEDURE — 99291 CRITICAL CARE FIRST HOUR: CPT

## 2019-05-04 PROCEDURE — 88341 IMHCHEM/IMCYTCHM EA ADD ANTB: CPT | Mod: 26

## 2019-05-04 PROCEDURE — 32666 THORACOSCOPY W/WEDGE RESECT: CPT | Mod: LT,78

## 2019-05-04 PROCEDURE — 31600 PLANNED TRACHEOSTOMY: CPT | Mod: 78

## 2019-05-04 PROCEDURE — 88309 TISSUE EXAM BY PATHOLOGIST: CPT | Mod: 26

## 2019-05-04 PROCEDURE — 31645 BRNCHSC W/THER ASPIR 1ST: CPT | Mod: 78

## 2019-05-04 PROCEDURE — 88313 SPECIAL STAINS GROUP 2: CPT | Mod: 26

## 2019-05-04 RX ORDER — HEPARIN SODIUM 5000 [USP'U]/ML
5000 INJECTION INTRAVENOUS; SUBCUTANEOUS EVERY 8 HOURS
Qty: 0 | Refills: 0 | Status: DISCONTINUED | OUTPATIENT
Start: 2019-05-04 | End: 2019-05-05

## 2019-05-04 RX ORDER — HYDROMORPHONE HYDROCHLORIDE 2 MG/ML
1 INJECTION INTRAMUSCULAR; INTRAVENOUS; SUBCUTANEOUS ONCE
Qty: 0 | Refills: 0 | Status: DISCONTINUED | OUTPATIENT
Start: 2019-05-04 | End: 2019-05-04

## 2019-05-04 RX ORDER — PANTOPRAZOLE SODIUM 20 MG/1
40 TABLET, DELAYED RELEASE ORAL
Qty: 0 | Refills: 0 | Status: DISCONTINUED | OUTPATIENT
Start: 2019-05-04 | End: 2019-05-26

## 2019-05-04 RX ORDER — VECURONIUM BROMIDE 20 MG/1
2 INJECTION, POWDER, FOR SOLUTION INTRAVENOUS ONCE
Qty: 0 | Refills: 0 | Status: COMPLETED | OUTPATIENT
Start: 2019-05-04 | End: 2019-05-04

## 2019-05-04 RX ORDER — ACETAMINOPHEN 500 MG
1000 TABLET ORAL ONCE
Qty: 0 | Refills: 0 | Status: COMPLETED | OUTPATIENT
Start: 2019-05-04 | End: 2019-05-04

## 2019-05-04 RX ADMIN — Medication 325 MILLIGRAM(S): at 14:32

## 2019-05-04 RX ADMIN — Medication 58 MILLIGRAM(S): at 17:09

## 2019-05-04 RX ADMIN — Medication 5: at 22:47

## 2019-05-04 RX ADMIN — PANTOPRAZOLE SODIUM 40 MILLIGRAM(S): 20 TABLET, DELAYED RELEASE ORAL at 14:32

## 2019-05-04 RX ADMIN — HEPARIN SODIUM 5000 UNIT(S): 5000 INJECTION INTRAVENOUS; SUBCUTANEOUS at 22:47

## 2019-05-04 RX ADMIN — Medication 2: at 17:10

## 2019-05-04 RX ADMIN — ATORVASTATIN CALCIUM 40 MILLIGRAM(S): 80 TABLET, FILM COATED ORAL at 22:48

## 2019-05-04 RX ADMIN — Medication 3 MILLILITER(S): at 23:09

## 2019-05-04 RX ADMIN — MEROPENEM 100 MILLIGRAM(S): 1 INJECTION INTRAVENOUS at 17:11

## 2019-05-04 RX ADMIN — Medication 40 MILLIGRAM(S): at 05:57

## 2019-05-04 RX ADMIN — ESCITALOPRAM OXALATE 10 MILLIGRAM(S): 10 TABLET, FILM COATED ORAL at 14:32

## 2019-05-04 RX ADMIN — Medication 400 MILLIGRAM(S): at 01:34

## 2019-05-04 RX ADMIN — CHLORHEXIDINE GLUCONATE 15 MILLILITER(S): 213 SOLUTION TOPICAL at 17:13

## 2019-05-04 RX ADMIN — VASOPRESSIN 5 UNIT(S)/MIN: 20 INJECTION INTRAVENOUS at 22:45

## 2019-05-04 RX ADMIN — CHLORHEXIDINE GLUCONATE 1 APPLICATION(S): 213 SOLUTION TOPICAL at 05:35

## 2019-05-04 RX ADMIN — Medication 3 MILLILITER(S): at 05:04

## 2019-05-04 RX ADMIN — AMIODARONE HYDROCHLORIDE 200 MILLIGRAM(S): 400 TABLET ORAL at 05:57

## 2019-05-04 RX ADMIN — Medication 3 MILLILITER(S): at 17:30

## 2019-05-04 RX ADMIN — CHLORHEXIDINE GLUCONATE 15 MILLILITER(S): 213 SOLUTION TOPICAL at 05:57

## 2019-05-04 RX ADMIN — MEROPENEM 100 MILLIGRAM(S): 1 INJECTION INTRAVENOUS at 05:58

## 2019-05-04 RX ADMIN — ENOXAPARIN SODIUM 40 MILLIGRAM(S): 100 INJECTION SUBCUTANEOUS at 14:33

## 2019-05-04 NOTE — PRE-ANESTHESIA EVALUATION ADULT - NSANTHOSAYNRD_GEN_A_CORE
No. ARCENIO screening performed.  STOP BANG Legend: 0-2 = LOW Risk; 3-4 = INTERMEDIATE Risk; 5-8 = HIGH Risk
No. ARCENIO screening performed.  STOP BANG Legend: 0-2 = LOW Risk; 3-4 = INTERMEDIATE Risk; 5-8 = HIGH Risk

## 2019-05-04 NOTE — PROGRESS NOTE ADULT - ASSESSMENT
78 yr old male with H/O HTN, HLD, CAD, S/P Lung sx, Anxiety, Asthma, S/P CABG x3 POD 8  Post op course complicated with hypoxic respiratory failure suspected Bibasilar Pneumonia, severe Agitation requiring intubation. 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 culture negative  Started on high dose solumedrol    on pressors for septic hypotension  ASA & Statin for CAD  Glycemic control  Cont Antidepressant  Enteral nutrition	    I have spent 30 minutes providing critical care management to this patient.

## 2019-05-04 NOTE — BRIEF OPERATIVE NOTE - NSICDXBRIEFPROCEDURE_GEN_ALL_CORE_FT
PROCEDURES:  CABG using saphenous vein graft 26-Apr-2019 13:24:24  Willy Blevins
PROCEDURES:  Biopsy, lung, using VATS 04-May-2019 14:05:02 LEFT VATS, LLL wedge resection Philly Elder  Creation, tracheostomy, open 04-May-2019 14:04:46  Philly Elder

## 2019-05-04 NOTE — BRIEF OPERATIVE NOTE - NSICDXBRIEFPOSTOP_GEN_ALL_CORE_FT
POST-OP DIAGNOSIS:  3-vessel CAD 26-Apr-2019 13:25:02  Willy Blevins
POST-OP DIAGNOSIS:  Respiratory failure 04-May-2019 14:05:38  Philly Elder

## 2019-05-04 NOTE — PROGRESS NOTE ADULT - SUBJECTIVE AND OBJECTIVE BOX
4:30 pm-5pm     Remained critically ill on continuos ICU monitoring.      OBJECTIVE:  ICU Vital Signs Last 24 Hrs  T(C): 37.2 (04 May 2019 19:00), Max: 38.7 (04 May 2019 02:00)  T(F): 99 (04 May 2019 19:00), Max: 101.7 (04 May 2019 02:00)  HR: 71 (04 May 2019 19:34) (66 - 99)  BP: 115/57 (03 May 2019 20:30) (115/57 - 115/57)  BP(mean): 81 (03 May 2019 20:30) (81 - 81)  ABP: 113/47 (04 May 2019 19:00) (109/45 - 172/51)  ABP(mean): 65 (04 May 2019 19:00) (59 - 77)  RR: 18 (04 May 2019 19:00) (0 - 33)  SpO2: 93% (04 May 2019 19:34) (90% - 100%)    Mode: AC/ CMV (Assist Control/ Continuous Mandatory Ventilation), RR (machine): 10, TV (machine): 500, FiO2: 60, PEEP: 8, ITime: 1, MAP: 10, PIP: 18    05-03 @ 07:01  -  05-04 @ 07:00  --------------------------------------------------------  IN: 2107.6 mL / OUT: 2075 mL / NET: 32.6 mL    05-04 @ 07:01 - 05-04 @ 20:05  --------------------------------------------------------  IN: 435 mL / OUT: 985 mL / NET: -550 mL      CAPILLARY BLOOD GLUCOSE      POCT Blood Glucose.: 129 mg/dL (03 May 2019 07:12)      PHYSICAL EXAM:Daily Height in cm: 172.72 (04 May 2019 12:21)      General: chronically debilitated   Neurology: Sedated , nonfocal, JOHNSON x 4  Eyes: PERRLA/ EOMI, Gross vision intact  ENT/Neck: Neck supple, +trac,  trachea midline, No JVD, Gross hearing intact  Respiratory:  B/L fine  rales + sternal dressing L chest tube   CV: RRR, S1S2, no murmurs, rubs or gallops  Abdominal: Soft, NT, ND +BS,   Extremities: 2+ edema, + peripheral pulses  Skin: No Rashes, Hematoma, Ecchymosis    Tubes: [facility]       HOSPITAL MEDICATIONS:  MEDICATIONS  (STANDING):  ALBUTerol/ipratropium for Nebulization 3 milliLiter(s) Nebulizer every 6 hours  aspirin 325 milliGRAM(s) Oral daily  atorvastatin 40 milliGRAM(s) Oral at bedtime  chlorhexidine 0.12% Liquid 15 milliLiter(s) Oral Mucosa two times a day  chlorhexidine 4% Liquid 1 Application(s) Topical <User Schedule>  escitalopram 10 milliGRAM(s) Oral daily  heparin  Injectable 5000 Unit(s) SubCutaneous every 8 hours  insulin lispro (HumaLOG) corrective regimen sliding scale   SubCutaneous Before meals and at bedtime  meropenem  IVPB      meropenem  IVPB 1000 milliGRAM(s) IV Intermittent every 12 hours  methylPREDNISolone sodium succinate IVPB 1000 milliGRAM(s) IV Intermittent daily  norepinephrine Infusion 0.02 MICROgram(s)/kG/Min (3 mL/Hr) IV Continuous <Continuous>  pantoprazole  Injectable 40 milliGRAM(s) IV Push two times a day  propofol Infusion 25 MICROgram(s)/kG/Min (11.925 mL/Hr) IV Continuous <Continuous>  sodium chloride 0.9%. 1000 milliLiter(s) (10 mL/Hr) IV Continuous <Continuous>  vasopressin Infusion 0.083 Unit(s)/Min (5 mL/Hr) IV Continuous <Continuous>    MEDICATIONS  (PRN):  benzocaine 15 mG/menthol 3.6 mG Lozenge 1 Lozenge Oral every 6 hours PRN Sore Throat      LABS:                        9.1    22.4  )-----------( 290      ( 04 May 2019 01:02 )             26.7     05-04    140  |  100  |  53<H>  ----------------------------<  136<H>  4.2   |  25  |  1.68<H>    Ca    8.7      04 May 2019 00:56  Phos  2.7     05-04  Mg     2.2     05-04    TPro  6.3  /  Alb  3.2<L>  /  TBili  1.1  /  DBili  x   /  AST  47<H>  /  ALT  29  /  AlkPhos  129<H>  05-04        Arterial Blood Gas:  05-04 @ 19:49  7.48/36/71/26/96/3.3  ABG lactate: --  Arterial Blood Gas:  05-04 @ 18:07  7.48/40/68/29/94/5.6  ABG lactate: --  Arterial Blood Gas:  05-04 @ 16:49  7.45/40/91/27/97/3.7  ABG lactate: --  Arterial Blood Gas:  05-04 @ 15:38  7.41/44/90/27/96/2.5  ABG lactate: --  Arterial Blood Gas:  05-04 @ 14:26  7.37/48/61/27/89/1.9  ABG lactate: --  Arterial Blood Gas:  05-04 @ 06:06  7.47/39/103/28/98/4.0  ABG lactate: --  Arterial Blood Gas:  05-04 @ 00:41  7.49/36/91/27/98/3.9  ABG lactate: --  Arterial Blood Gas:  05-03 @ 22:18  7.49/38/90/29/98/5.8  ABG lactate: --  Arterial Blood Gas:  05-03 @ 17:16  7.46/41/93/28/97/4.5  ABG lactate: --  Arterial Blood Gas:  05-03 @ 10:20  7.48/36/86/27/97/3.6  ABG lactate: --  Arterial Blood Gas:  05-03 @ 05:58  7.47/39/89/29/97/5.0  ABG lactate: --  Arterial Blood Gas:  05-03 @ 04:45  7.49/38/68/28/95/4.9  ABG lactate: --  Arterial Blood Gas:  05-03 @ 02:02  7.47/38/73/28/97/4.3  ABG lactate: --  Arterial Blood Gas:  05-03 @ 00:55  7.48/39/60/28/93/4.9  ABG lactate: --        LIVER FUNCTIONS - ( 04 May 2019 00:56 )  Alb: 3.2 g/dL / Pro: 6.3 g/dL / ALK PHOS: 129 U/L / ALT: 29 U/L / AST: 47 U/L / GGT: x           MICROBIOLOGY:     RADIOLOGY:  X Reviewed and interpreted by me        I have spent 30 minutes providing critical care management to this patient. 4:30 pm-5pm     Remained critically ill on continuos ICU monitoring.      OBJECTIVE:  ICU Vital Signs Last 24 Hrs  T(C): 37.2 (04 May 2019 19:00), Max: 38.7 (04 May 2019 02:00)  T(F): 99 (04 May 2019 19:00), Max: 101.7 (04 May 2019 02:00)  HR: 71 (04 May 2019 19:34) (66 - 99)  BP: 115/57 (03 May 2019 20:30) (115/57 - 115/57)  BP(mean): 81 (03 May 2019 20:30) (81 - 81)  ABP: 113/47 (04 May 2019 19:00) (109/45 - 172/51)  ABP(mean): 65 (04 May 2019 19:00) (59 - 77)  RR: 18 (04 May 2019 19:00) (0 - 33)  SpO2: 93% (04 May 2019 19:34) (90% - 100%)    Mode: AC/ CMV (Assist Control/ Continuous Mandatory Ventilation), RR (machine): 10, TV (machine): 500, FiO2: 60, PEEP: 8, ITime: 1, MAP: 10, PIP: 18    05-03 @ 07:01  -  05-04 @ 07:00  --------------------------------------------------------  IN: 2107.6 mL / OUT: 2075 mL / NET: 32.6 mL    05-04 @ 07:01 - 05-04 @ 20:05  --------------------------------------------------------  IN: 435 mL / OUT: 985 mL / NET: -550 mL      CAPILLARY BLOOD GLUCOSE      POCT Blood Glucose.: 129 mg/dL (03 May 2019 07:12)      PHYSICAL EXAM:Daily Height in cm: 172.72 (04 May 2019 12:21)      General: chronically debilitated   Neurology: Sedated , nonfocal, JOHNSON x 4  Eyes: PERRLA/ EOMI, Gross vision intact  ENT/Neck: Neck supple, +trac,  trachea midline, No JVD, Gross hearing intact  Respiratory:  B/L fine  rales + sternal dressing L chest tube   CV: RRR, S1S2, no murmurs, rubs or gallops  Abdominal: Soft, NT, ND +BS,   Extremities: 2+ edema, + peripheral pulses  Skin: No Rashes, Hematoma, Ecchymosis    Tubes: [facility]       HOSPITAL MEDICATIONS:  MEDICATIONS  (STANDING):  ALBUTerol/ipratropium for Nebulization 3 milliLiter(s) Nebulizer every 6 hours  aspirin 325 milliGRAM(s) Oral daily  atorvastatin 40 milliGRAM(s) Oral at bedtime  chlorhexidine 0.12% Liquid 15 milliLiter(s) Oral Mucosa two times a day  chlorhexidine 4% Liquid 1 Application(s) Topical <User Schedule>  escitalopram 10 milliGRAM(s) Oral daily  heparin  Injectable 5000 Unit(s) SubCutaneous every 8 hours  insulin lispro (HumaLOG) corrective regimen sliding scale   SubCutaneous Before meals and at bedtime  meropenem  IVPB      meropenem  IVPB 1000 milliGRAM(s) IV Intermittent every 12 hours  methylPREDNISolone sodium succinate IVPB 1000 milliGRAM(s) IV Intermittent daily  norepinephrine Infusion 0.02 MICROgram(s)/kG/Min (3 mL/Hr) IV Continuous <Continuous>  pantoprazole  Injectable 40 milliGRAM(s) IV Push two times a day  propofol Infusion 25 MICROgram(s)/kG/Min (11.925 mL/Hr) IV Continuous <Continuous>  sodium chloride 0.9%. 1000 milliLiter(s) (10 mL/Hr) IV Continuous <Continuous>  vasopressin Infusion 0.083 Unit(s)/Min (5 mL/Hr) IV Continuous <Continuous>    MEDICATIONS  (PRN):  benzocaine 15 mG/menthol 3.6 mG Lozenge 1 Lozenge Oral every 6 hours PRN Sore Throat      LABS:                        9.1    22.4  )-----------( 290      ( 04 May 2019 01:02 )             26.7     05-04    140  |  100  |  53<H>  ----------------------------<  136<H>  4.2   |  25  |  1.68<H>    Ca    8.7      04 May 2019 00:56  Phos  2.7     05-04  Mg     2.2     05-04    TPro  6.3  /  Alb  3.2<L>  /  TBili  1.1  /  DBili  x   /  AST  47<H>  /  ALT  29  /  AlkPhos  129<H>  05-04        Arterial Blood Gas:  05-04 @ 19:49  7.48/36/71/26/96/3.3  ABG lactate: --  Arterial Blood Gas:  05-04 @ 18:07  7.48/40/68/29/94/5.6  ABG lactate: --  Arterial Blood Gas:  05-04 @ 16:49  7.45/40/91/27/97/3.7  ABG lactate: --  Arterial Blood Gas:  05-04 @ 15:38  7.41/44/90/27/96/2.5  ABG lactate: --  Arterial Blood Gas:  05-04 @ 14:26  7.37/48/61/27/89/1.9  ABG lactate: --  Arterial Blood Gas:  05-04 @ 06:06  7.47/39/103/28/98/4.0  ABG lactate: --  Arterial Blood Gas:  05-04 @ 00:41  7.49/36/91/27/98/3.9  ABG lactate: --  Arterial Blood Gas:  05-03 @ 22:18  7.49/38/90/29/98/5.8  ABG lactate: --  Arterial Blood Gas:  05-03 @ 17:16  7.46/41/93/28/97/4.5  ABG lactate: --  Arterial Blood Gas:  05-03 @ 10:20  7.48/36/86/27/97/3.6  ABG lactate: --  Arterial Blood Gas:  05-03 @ 05:58  7.47/39/89/29/97/5.0  ABG lactate: --  Arterial Blood Gas:  05-03 @ 04:45  7.49/38/68/28/95/4.9  ABG lactate: --  Arterial Blood Gas:  05-03 @ 02:02  7.47/38/73/28/97/4.3  ABG lactate: --  Arterial Blood Gas:  05-03 @ 00:55  7.48/39/60/28/93/4.9  ABG lactate: --        LIVER FUNCTIONS - ( 04 May 2019 00:56 )  Alb: 3.2 g/dL / Pro: 6.3 g/dL / ALK PHOS: 129 U/L / ALT: 29 U/L / AST: 47 U/L / GGT: x           MICROBIOLOGY:     RADIOLOGY:  X Reviewed and interpreted by me

## 2019-05-04 NOTE — BRIEF OPERATIVE NOTE - OPERATION/FINDINGS
CAD
LLL wedge resection, open tracheostomy - size 8, minimal blood loss - 500 cc of old blood evacuated from the left pleural space

## 2019-05-04 NOTE — BRIEF OPERATIVE NOTE - NSICDXBRIEFPREOP_GEN_ALL_CORE_FT
PRE-OP DIAGNOSIS:  3-vessel CAD 26-Apr-2019 13:24:41  Willy Blevins
PRE-OP DIAGNOSIS:  Respiratory failure 04-May-2019 14:05:27  Philly Elder

## 2019-05-05 LAB
ALBUMIN SERPL ELPH-MCNC: 3.1 G/DL — LOW (ref 3.3–5)
ALP SERPL-CCNC: 133 U/L — HIGH (ref 40–120)
ALT FLD-CCNC: 62 U/L — HIGH (ref 10–45)
ANION GAP SERPL CALC-SCNC: 15 MMOL/L — SIGNIFICANT CHANGE UP (ref 5–17)
APTT BLD: 25.3 SEC — LOW (ref 27.5–36.3)
APTT BLD: 30.5 SEC — SIGNIFICANT CHANGE UP (ref 27.5–36.3)
AST SERPL-CCNC: 97 U/L — HIGH (ref 10–40)
BILIRUB SERPL-MCNC: 1.4 MG/DL — HIGH (ref 0.2–1.2)
BUN SERPL-MCNC: 60 MG/DL — HIGH (ref 7–23)
CALCIUM SERPL-MCNC: 8.5 MG/DL — SIGNIFICANT CHANGE UP (ref 8.4–10.5)
CHLORIDE SERPL-SCNC: 103 MMOL/L — SIGNIFICANT CHANGE UP (ref 96–108)
CO2 SERPL-SCNC: 26 MMOL/L — SIGNIFICANT CHANGE UP (ref 22–31)
CREAT SERPL-MCNC: 1.62 MG/DL — HIGH (ref 0.5–1.3)
GAS PNL BLDA: SIGNIFICANT CHANGE UP
GLUCOSE BLDC GLUCOMTR-MCNC: 158 MG/DL — HIGH (ref 70–99)
GLUCOSE BLDC GLUCOMTR-MCNC: 191 MG/DL — HIGH (ref 70–99)
GLUCOSE BLDC GLUCOMTR-MCNC: 192 MG/DL — HIGH (ref 70–99)
GLUCOSE BLDC GLUCOMTR-MCNC: 197 MG/DL — HIGH (ref 70–99)
GLUCOSE SERPL-MCNC: 160 MG/DL — HIGH (ref 70–99)
GRAM STN FLD: SIGNIFICANT CHANGE UP
GRAM STN FLD: SIGNIFICANT CHANGE UP
HCT VFR BLD CALC: 29.2 % — LOW (ref 39–50)
HGB BLD-MCNC: 9.5 G/DL — LOW (ref 13–17)
INR BLD: 1.34 RATIO — HIGH (ref 0.88–1.16)
MAGNESIUM SERPL-MCNC: 2.4 MG/DL — SIGNIFICANT CHANGE UP (ref 1.6–2.6)
MCHC RBC-ENTMCNC: 29.4 PG — SIGNIFICANT CHANGE UP (ref 27–34)
MCHC RBC-ENTMCNC: 32.4 GM/DL — SIGNIFICANT CHANGE UP (ref 32–36)
MCV RBC AUTO: 90.7 FL — SIGNIFICANT CHANGE UP (ref 80–100)
NIGHT BLUE STAIN TISS: SIGNIFICANT CHANGE UP
PHOSPHATE SERPL-MCNC: 5.3 MG/DL — HIGH (ref 2.5–4.5)
PLATELET # BLD AUTO: 400 K/UL — SIGNIFICANT CHANGE UP (ref 150–400)
POTASSIUM SERPL-MCNC: 4.7 MMOL/L — SIGNIFICANT CHANGE UP (ref 3.5–5.3)
POTASSIUM SERPL-SCNC: 4.7 MMOL/L — SIGNIFICANT CHANGE UP (ref 3.5–5.3)
PROT SERPL-MCNC: 6.6 G/DL — SIGNIFICANT CHANGE UP (ref 6–8.3)
PROTHROM AB SERPL-ACNC: 15.4 SEC — HIGH (ref 10–12.9)
RBC # BLD: 3.22 M/UL — LOW (ref 4.2–5.8)
RBC # FLD: 13.6 % — SIGNIFICANT CHANGE UP (ref 10.3–14.5)
SODIUM SERPL-SCNC: 144 MMOL/L — SIGNIFICANT CHANGE UP (ref 135–145)
SPECIMEN SOURCE: SIGNIFICANT CHANGE UP
WBC # BLD: 27.5 K/UL — HIGH (ref 3.8–10.5)
WBC # FLD AUTO: 27.5 K/UL — HIGH (ref 3.8–10.5)

## 2019-05-05 PROCEDURE — 99232 SBSQ HOSP IP/OBS MODERATE 35: CPT

## 2019-05-05 PROCEDURE — 36556 INSERT NON-TUNNEL CV CATH: CPT

## 2019-05-05 PROCEDURE — 71045 X-RAY EXAM CHEST 1 VIEW: CPT | Mod: 26,77

## 2019-05-05 PROCEDURE — 71045 X-RAY EXAM CHEST 1 VIEW: CPT | Mod: 26

## 2019-05-05 PROCEDURE — 36620 INSERTION CATHETER ARTERY: CPT

## 2019-05-05 PROCEDURE — 99291 CRITICAL CARE FIRST HOUR: CPT | Mod: 25

## 2019-05-05 RX ORDER — INSULIN HUMAN 100 [IU]/ML
3 INJECTION, SOLUTION SUBCUTANEOUS
Qty: 100 | Refills: 0 | Status: DISCONTINUED | OUTPATIENT
Start: 2019-05-05 | End: 2019-05-13

## 2019-05-05 RX ORDER — HEPARIN SODIUM 5000 [USP'U]/ML
600 INJECTION INTRAVENOUS; SUBCUTANEOUS
Qty: 25000 | Refills: 0 | Status: DISCONTINUED | OUTPATIENT
Start: 2019-05-05 | End: 2019-05-06

## 2019-05-05 RX ORDER — ACETAMINOPHEN 500 MG
1000 TABLET ORAL ONCE
Qty: 0 | Refills: 0 | Status: COMPLETED | OUTPATIENT
Start: 2019-05-05 | End: 2019-05-05

## 2019-05-05 RX ORDER — DEXMEDETOMIDINE HYDROCHLORIDE IN 0.9% SODIUM CHLORIDE 4 UG/ML
0.7 INJECTION INTRAVENOUS
Qty: 200 | Refills: 0 | Status: DISCONTINUED | OUTPATIENT
Start: 2019-05-05 | End: 2019-05-10

## 2019-05-05 RX ORDER — MICAFUNGIN SODIUM 100 MG/1
100 INJECTION, POWDER, LYOPHILIZED, FOR SOLUTION INTRAVENOUS EVERY 24 HOURS
Qty: 0 | Refills: 0 | Status: COMPLETED | OUTPATIENT
Start: 2019-05-05 | End: 2019-05-05

## 2019-05-05 RX ORDER — INSULIN LISPRO 100/ML
VIAL (ML) SUBCUTANEOUS EVERY 6 HOURS
Qty: 0 | Refills: 0 | Status: DISCONTINUED | OUTPATIENT
Start: 2019-05-05 | End: 2019-05-06

## 2019-05-05 RX ORDER — POTASSIUM CHLORIDE 20 MEQ
10 PACKET (EA) ORAL ONCE
Qty: 0 | Refills: 0 | Status: COMPLETED | OUTPATIENT
Start: 2019-05-05 | End: 2019-05-05

## 2019-05-05 RX ORDER — CALCIUM GLUCONATE 100 MG/ML
1 VIAL (ML) INTRAVENOUS ONCE
Qty: 0 | Refills: 0 | Status: COMPLETED | OUTPATIENT
Start: 2019-05-05 | End: 2019-05-05

## 2019-05-05 RX ADMIN — MEROPENEM 100 MILLIGRAM(S): 1 INJECTION INTRAVENOUS at 06:36

## 2019-05-05 RX ADMIN — Medication 3 MILLILITER(S): at 05:13

## 2019-05-05 RX ADMIN — VASOPRESSIN 5 UNIT(S)/MIN: 20 INJECTION INTRAVENOUS at 07:55

## 2019-05-05 RX ADMIN — CHLORHEXIDINE GLUCONATE 15 MILLILITER(S): 213 SOLUTION TOPICAL at 06:37

## 2019-05-05 RX ADMIN — CHLORHEXIDINE GLUCONATE 15 MILLILITER(S): 213 SOLUTION TOPICAL at 17:28

## 2019-05-05 RX ADMIN — Medication 58 MILLIGRAM(S): at 07:54

## 2019-05-05 RX ADMIN — Medication 3 MILLILITER(S): at 23:09

## 2019-05-05 RX ADMIN — SODIUM CHLORIDE 10 MILLILITER(S): 9 INJECTION INTRAMUSCULAR; INTRAVENOUS; SUBCUTANEOUS at 02:00

## 2019-05-05 RX ADMIN — VASOPRESSIN 5 UNIT(S)/MIN: 20 INJECTION INTRAVENOUS at 02:02

## 2019-05-05 RX ADMIN — Medication 3 MICROGRAM(S)/KG/MIN: at 07:54

## 2019-05-05 RX ADMIN — HEPARIN SODIUM 5000 UNIT(S): 5000 INJECTION INTRAVENOUS; SUBCUTANEOUS at 06:37

## 2019-05-05 RX ADMIN — DEXMEDETOMIDINE HYDROCHLORIDE IN 0.9% SODIUM CHLORIDE 13.91 MICROGRAM(S)/KG/HR: 4 INJECTION INTRAVENOUS at 09:44

## 2019-05-05 RX ADMIN — Medication 50 MILLIEQUIVALENT(S): at 18:53

## 2019-05-05 RX ADMIN — INSULIN HUMAN 3 UNIT(S)/HR: 100 INJECTION, SOLUTION SUBCUTANEOUS at 18:55

## 2019-05-05 RX ADMIN — Medication 325 MILLIGRAM(S): at 12:07

## 2019-05-05 RX ADMIN — ESCITALOPRAM OXALATE 10 MILLIGRAM(S): 10 TABLET, FILM COATED ORAL at 12:07

## 2019-05-05 RX ADMIN — DEXMEDETOMIDINE HYDROCHLORIDE IN 0.9% SODIUM CHLORIDE 13.91 MICROGRAM(S)/KG/HR: 4 INJECTION INTRAVENOUS at 09:52

## 2019-05-05 RX ADMIN — CHLORHEXIDINE GLUCONATE 1 APPLICATION(S): 213 SOLUTION TOPICAL at 06:37

## 2019-05-05 RX ADMIN — MICAFUNGIN SODIUM 105 MILLIGRAM(S): 100 INJECTION, POWDER, LYOPHILIZED, FOR SOLUTION INTRAVENOUS at 17:28

## 2019-05-05 RX ADMIN — DEXMEDETOMIDINE HYDROCHLORIDE IN 0.9% SODIUM CHLORIDE 13.91 MICROGRAM(S)/KG/HR: 4 INJECTION INTRAVENOUS at 22:32

## 2019-05-05 RX ADMIN — Medication 2: at 09:44

## 2019-05-05 RX ADMIN — Medication 400 MILLIGRAM(S): at 13:37

## 2019-05-05 RX ADMIN — Medication 200 GRAM(S): at 18:53

## 2019-05-05 RX ADMIN — PANTOPRAZOLE SODIUM 40 MILLIGRAM(S): 20 TABLET, DELAYED RELEASE ORAL at 17:50

## 2019-05-05 RX ADMIN — PROPOFOL 11.93 MICROGRAM(S)/KG/MIN: 10 INJECTION, EMULSION INTRAVENOUS at 01:55

## 2019-05-05 RX ADMIN — Medication 3 MILLILITER(S): at 12:24

## 2019-05-05 RX ADMIN — Medication 200 GRAM(S): at 15:03

## 2019-05-05 RX ADMIN — HYDROMORPHONE HYDROCHLORIDE 1 MILLIGRAM(S): 2 INJECTION INTRAMUSCULAR; INTRAVENOUS; SUBCUTANEOUS at 01:57

## 2019-05-05 RX ADMIN — Medication 1000 MILLIGRAM(S): at 14:00

## 2019-05-05 RX ADMIN — VASOPRESSIN 5 UNIT(S)/MIN: 20 INJECTION INTRAVENOUS at 22:31

## 2019-05-05 RX ADMIN — Medication 4: at 15:03

## 2019-05-05 RX ADMIN — MEROPENEM 100 MILLIGRAM(S): 1 INJECTION INTRAVENOUS at 17:50

## 2019-05-05 RX ADMIN — Medication 3 MICROGRAM(S)/KG/MIN: at 02:00

## 2019-05-05 RX ADMIN — PANTOPRAZOLE SODIUM 40 MILLIGRAM(S): 20 TABLET, DELAYED RELEASE ORAL at 06:37

## 2019-05-05 RX ADMIN — HEPARIN SODIUM 6 UNIT(S)/HR: 5000 INJECTION INTRAVENOUS; SUBCUTANEOUS at 14:00

## 2019-05-05 RX ADMIN — VECURONIUM BROMIDE 2 MILLIGRAM(S): 20 INJECTION, POWDER, FOR SOLUTION INTRAVENOUS at 01:55

## 2019-05-05 RX ADMIN — HYDROMORPHONE HYDROCHLORIDE 1 MILLIGRAM(S): 2 INJECTION INTRAMUSCULAR; INTRAVENOUS; SUBCUTANEOUS at 02:30

## 2019-05-05 RX ADMIN — ATORVASTATIN CALCIUM 40 MILLIGRAM(S): 80 TABLET, FILM COATED ORAL at 22:31

## 2019-05-05 RX ADMIN — Medication 3 MILLILITER(S): at 17:17

## 2019-05-05 RX ADMIN — Medication 5: at 06:56

## 2019-05-05 RX ADMIN — PROPOFOL 11.93 MICROGRAM(S)/KG/MIN: 10 INJECTION, EMULSION INTRAVENOUS at 07:55

## 2019-05-05 NOTE — PROGRESS NOTE ADULT - ASSESSMENT
78 yr old with ARCENIO admitted for chest pain and underwent a CABG last last week   Post op has been stable but is still intubated and became febrile last 24hrs.   No evidence of wd infection, alot of sputum and possible LLL infiltrate underwent Bronch/BAL with biopsy to r/o BOOP, started on steroids      meropenem  1 q 8 hr day 4         fever down mostly but still with low grade  wbc still elevated               continue meropenem for pna / VAP  started on micafungin based upon yeast seen in bronch specimen- but usually that is representative of oral lefty. Discussed with CTU team and to be continued for now.  continue steroids  Abdomen distended - would image with X-ray    Rodney Olivas MD  [phone]  After 5pm/weekends [phone]

## 2019-05-05 NOTE — PROGRESS NOTE ADULT - SUBJECTIVE AND OBJECTIVE BOX
CRITICAL CARE ATTENDING - CTICU    MEDICATIONS  (STANDING):  ALBUTerol/ipratropium for Nebulization 3 milliLiter(s) Nebulizer every 6 hours  aspirin 325 milliGRAM(s) Oral daily  atorvastatin 40 milliGRAM(s) Oral at bedtime  chlorhexidine 0.12% Liquid 15 milliLiter(s) Oral Mucosa two times a day  chlorhexidine 4% Liquid 1 Application(s) Topical <User Schedule>  dexmedetomidine Infusion 0.7 MICROgram(s)/kG/Hr (13.912 mL/Hr) IV Continuous <Continuous>  escitalopram 10 milliGRAM(s) Oral daily  heparin  Injectable 5000 Unit(s) SubCutaneous every 8 hours  insulin lispro (HumaLOG) corrective regimen sliding scale   SubCutaneous every 6 hours  meropenem  IVPB      meropenem  IVPB 1000 milliGRAM(s) IV Intermittent every 12 hours  methylPREDNISolone sodium succinate IVPB 1000 milliGRAM(s) IV Intermittent daily  norepinephrine Infusion 0.02 MICROgram(s)/kG/Min (3 mL/Hr) IV Continuous <Continuous>  pantoprazole  Injectable 40 milliGRAM(s) IV Push two times a day  propofol Infusion 25 MICROgram(s)/kG/Min (11.925 mL/Hr) IV Continuous <Continuous>  sodium chloride 0.9%. 1000 milliLiter(s) (10 mL/Hr) IV Continuous <Continuous>  vasopressin Infusion 0.083 Unit(s)/Min (5 mL/Hr) IV Continuous <Continuous>                                    9.5    27.5  )-----------( 400      ( 05 May 2019 02:58 )             29.2       -    144  |  103  |  60<H>  ----------------------------<  160<H>  4.7   |  26  |  1.62<H>    Ca    8.5      05 May 2019 02:58  Phos  5.3     05-05  Mg     2.4     -    TPro  6.6  /  Alb  3.1<L>  /  TBili  1.4<H>  /  DBili  x   /  AST  97<H>  /  ALT  62<H>  /  AlkPhos  133<H>  05-05          Mode: CPAP with PS  FiO2: 50  PEEP: 8  PS: 15      Daily Height in cm: 172.72 (04 May 2019 12:21)    Daily Weight in k.3 (05 May 2019 00:00)       @ 07:01  -   @ 07:00  --------------------------------------------------------  IN: 1403.6 mL / OUT: 1796 mL / NET: -392.4 mL     @ 07:01  -   @ 09:48  --------------------------------------------------------  IN: 325.5 mL / OUT: 110 mL / NET: 215.5 mL        Critically Ill patient  : [ ] preoperative ,   [x ] post operative    Requires :  [x ] Arterial Line   [x ] Central Line  [ ] PA catheter  [ ] IABP  [ ] ECMO  [ ] LVAD  [x ] Ventilator  [x ] pacemaker [ ] Impella.    Bedside evaluation , monitoring , treatment of hemodynamics , fluids , IVP/ IVCD meds.        Diagnosis:     POD / - CABG X 3 L    respiratory failure - Tracheostomy - Lung Biopsy    Requires chest PT, pulmonary toilet, ambu bagging, suctioning to maintain SaO2,  patent airway and treat atelectasis.     Ventilator Management:  [x ]AC-rest    [x ]CPAP-PS Wean    [ ]Trach Collar     [ ]Extubate    [ ] T-Piece  [ ]peep>5     Difficult weaning process - multiple organ system involvement in critically ill patient      Renal Failure - Acute Kidney Injury     Hemodynamic lability,instability. Requires IVCD [x ] vasopressors [ ] inotropes  [ ] vasodilator  [ ]IVSS fluid  to maintain MAP, perfusion, C.I.     ? Aspiration ?     ? BOOP / ? ARDS    Obesity OHS / ARCENIO     CT to water seal    High Dose steroids    Hypoxia    Increased A - a gradient    CHF- acute [x ]   chronic [x ]    systolic [ ]   diatolic [x ]          - Echo- EF -   hyperdynamic          [ ] RV dysfunction          - Cxr-cardiomegally, edema          - Clinical-  [ ]inotropes   [x ]pressors   [ x]diuresis   [ ]IABP   [ ]ECMO   [ ]LVAD   [x ]Respiratory Failure                        -                       Discussed with CT surgeon, Physician's Assistant - Nurse Practitioner- Critical care medicine team.   Dicussed at  AM / PM rounds.   Chart, labs , films reviewed.    Total Time: 30 min

## 2019-05-05 NOTE — PROGRESS NOTE ADULT - SUBJECTIVE AND OBJECTIVE BOX
INFECTIOUS DISEASES FOLLOW UP-- Vandana Olivas  [phone]    This is a follow up note for this  79yMale with  Angina pectoris  Atherosclerosis of native coronary artery without angina pectoris      ROS:  CONSTITUTIONAL:  No fever, good appetite  CARDIOVASCULAR:  No chest pain or palpitations  RESPIRATORY:  No dyspnea  GASTROINTESTINAL:  No nausea, vomiting, diarrhea, or abdominal pain  GENITOURINARY:  No dysuria  NEUROLOGIC:  No headache,     Allergies    No Known Allergies    Intolerances        ANTIBIOTICS/RELEVANT:  antimicrobials  meropenem  IVPB      meropenem  IVPB 1000 milliGRAM(s) IV Intermittent every 12 hours  micafungin IVPB 100 milliGRAM(s) IV Intermittent every 24 hours    immunologic:    OTHER:  ALBUTerol/ipratropium for Nebulization 3 milliLiter(s) Nebulizer every 6 hours  aspirin 325 milliGRAM(s) Oral daily  atorvastatin 40 milliGRAM(s) Oral at bedtime  chlorhexidine 0.12% Liquid 15 milliLiter(s) Oral Mucosa two times a day  chlorhexidine 4% Liquid 1 Application(s) Topical <User Schedule>  dexmedetomidine Infusion 0.7 MICROgram(s)/kG/Hr IV Continuous <Continuous>  escitalopram 10 milliGRAM(s) Oral daily  heparin  Infusion 600 Unit(s)/Hr IV Continuous <Continuous>  insulin lispro (HumaLOG) corrective regimen sliding scale   SubCutaneous every 6 hours  methylPREDNISolone sodium succinate IVPB 1000 milliGRAM(s) IV Intermittent daily  norepinephrine Infusion 0.02 MICROgram(s)/kG/Min IV Continuous <Continuous>  pantoprazole  Injectable 40 milliGRAM(s) IV Push two times a day  propofol Infusion 25 MICROgram(s)/kG/Min IV Continuous <Continuous>  sodium chloride 0.9%. 1000 milliLiter(s) IV Continuous <Continuous>  vasopressin Infusion 0.083 Unit(s)/Min IV Continuous <Continuous>      Objective:  Vital Signs Last 24 Hrs  T(C): 38 (05 May 2019 12:00), Max: 38 (05 May 2019 12:00)  T(F): 100.4 (05 May 2019 12:00), Max: 100.4 (05 May 2019 12:00)  HR: 92 (05 May 2019 13:30) (67 - 99)  BP: --  BP(mean): --  RR: 24 (05 May 2019 13:30) (4 - 30)  SpO2: 98% (05 May 2019 13:30) (91% - 100%)    PHYSICAL EXAM:  Constitutional:no acute distress remains on vent but wife reports that he interacts with her  Eyes:RYLEY,  Ear/Nose/Throat: no oral lesions, trach site no erythema or drainage around the trach	  Respiratory: clear BL anteriorly  Cardiovascular: S1S2 sternotomy gauze dressing dry and intact  Gastrointestinal: distended  Extremities:no e/e/c  No Lymphadenopathy  IV sites not inflammed.    LABS:                        9.5    27.5  )-----------( 400      ( 05 May 2019 02:58 )             29.2     05-05    144  |  103  |  60<H>  ----------------------------<  160<H>  4.7   |  26  |  1.62<H>    Ca    8.5      05 May 2019 02:58  Phos  5.3     05-05  Mg     2.4     05-05    TPro  6.6  /  Alb  3.1<L>  /  TBili  1.4<H>  /  DBili  x   /  AST  97<H>  /  ALT  62<H>  /  AlkPhos  133<H>  05-05    PT/INR - ( 05 May 2019 09:28 )   PT: 15.4 sec;   INR: 1.34 ratio         PTT - ( 05 May 2019 09:28 )  PTT:25.3 sec      MICROBIOLOGY:    Bronchial specimen= numerous polys moderate yeast         RECENT CULTURES:  05-05 @ 00:49  .Tissue  --  --  --  --  --  05-05 @ 00:41  Bronch Wash  --  --  --  --  --  05-02 @ 11:30  .Blood  --  --  --    No growth to date.  --  04-29 @ 00:24  .Blood  --  --  --    No growth at 5 days.  --      RADIOLOGY & ADDITIONAL STUDIES:  < from: Xray Chest 1 View- PORTABLE-Routine (05.05.19 @ 02:18) >  IMPRESSION:    Lines and tubes are unchanged, in satisfactory position.    Pulmonary edema and bilateral pleural effusions are unchanged.    < end of copied text >

## 2019-05-05 NOTE — CHART NOTE - NSCHARTNOTEFT_GEN_A_CORE
POD # 1 - left vats, wedge, trach   minimal output from chest tube -water sealed in AM. if no air leak tomorrow and low output < 150/24 can d/c tube.

## 2019-05-06 LAB
ALBUMIN SERPL ELPH-MCNC: 2.7 G/DL — LOW (ref 3.3–5)
ALP SERPL-CCNC: 113 U/L — SIGNIFICANT CHANGE UP (ref 40–120)
ALT FLD-CCNC: 72 U/L — HIGH (ref 10–45)
ANION GAP SERPL CALC-SCNC: 16 MMOL/L — SIGNIFICANT CHANGE UP (ref 5–17)
APTT BLD: 37.1 SEC — HIGH (ref 27.5–36.3)
APTT BLD: 46.3 SEC — HIGH (ref 27.5–36.3)
APTT BLD: 56.7 SEC — HIGH (ref 27.5–36.3)
APTT BLD: 63.9 SEC — HIGH (ref 27.5–36.3)
AST SERPL-CCNC: 103 U/L — HIGH (ref 10–40)
BILIRUB SERPL-MCNC: 1 MG/DL — SIGNIFICANT CHANGE UP (ref 0.2–1.2)
BUN SERPL-MCNC: 78 MG/DL — HIGH (ref 7–23)
C DIFF GDH STL QL: NEGATIVE — SIGNIFICANT CHANGE UP
C DIFF GDH STL QL: SIGNIFICANT CHANGE UP
CALCIUM SERPL-MCNC: 8.4 MG/DL — SIGNIFICANT CHANGE UP (ref 8.4–10.5)
CHLORIDE SERPL-SCNC: 109 MMOL/L — HIGH (ref 96–108)
CO2 SERPL-SCNC: 21 MMOL/L — LOW (ref 22–31)
CREAT SERPL-MCNC: 1.67 MG/DL — HIGH (ref 0.5–1.3)
CULTURE RESULTS: SIGNIFICANT CHANGE UP
GAS PNL BLDA: SIGNIFICANT CHANGE UP
GLUCOSE BLDC GLUCOMTR-MCNC: 109 MG/DL — HIGH (ref 70–99)
GLUCOSE BLDC GLUCOMTR-MCNC: 113 MG/DL — HIGH (ref 70–99)
GLUCOSE BLDC GLUCOMTR-MCNC: 114 MG/DL — HIGH (ref 70–99)
GLUCOSE BLDC GLUCOMTR-MCNC: 117 MG/DL — HIGH (ref 70–99)
GLUCOSE BLDC GLUCOMTR-MCNC: 120 MG/DL — HIGH (ref 70–99)
GLUCOSE BLDC GLUCOMTR-MCNC: 122 MG/DL — HIGH (ref 70–99)
GLUCOSE BLDC GLUCOMTR-MCNC: 124 MG/DL — HIGH (ref 70–99)
GLUCOSE BLDC GLUCOMTR-MCNC: 125 MG/DL — HIGH (ref 70–99)
GLUCOSE BLDC GLUCOMTR-MCNC: 128 MG/DL — HIGH (ref 70–99)
GLUCOSE BLDC GLUCOMTR-MCNC: 134 MG/DL — HIGH (ref 70–99)
GLUCOSE BLDC GLUCOMTR-MCNC: 136 MG/DL — HIGH (ref 70–99)
GLUCOSE BLDC GLUCOMTR-MCNC: 136 MG/DL — HIGH (ref 70–99)
GLUCOSE BLDC GLUCOMTR-MCNC: 139 MG/DL — HIGH (ref 70–99)
GLUCOSE BLDC GLUCOMTR-MCNC: 140 MG/DL — HIGH (ref 70–99)
GLUCOSE BLDC GLUCOMTR-MCNC: 142 MG/DL — HIGH (ref 70–99)
GLUCOSE BLDC GLUCOMTR-MCNC: 151 MG/DL — HIGH (ref 70–99)
GLUCOSE BLDC GLUCOMTR-MCNC: 167 MG/DL — HIGH (ref 70–99)
GLUCOSE BLDC GLUCOMTR-MCNC: 170 MG/DL — HIGH (ref 70–99)
GLUCOSE BLDC GLUCOMTR-MCNC: 171 MG/DL — HIGH (ref 70–99)
GLUCOSE BLDC GLUCOMTR-MCNC: 193 MG/DL — HIGH (ref 70–99)
GLUCOSE SERPL-MCNC: 176 MG/DL — HIGH (ref 70–99)
HCT VFR BLD CALC: 25.5 % — LOW (ref 39–50)
HGB BLD-MCNC: 8.7 G/DL — LOW (ref 13–17)
MAGNESIUM SERPL-MCNC: 2.6 MG/DL — SIGNIFICANT CHANGE UP (ref 1.6–2.6)
MCHC RBC-ENTMCNC: 30.4 PG — SIGNIFICANT CHANGE UP (ref 27–34)
MCHC RBC-ENTMCNC: 33.9 GM/DL — SIGNIFICANT CHANGE UP (ref 32–36)
MCV RBC AUTO: 89.7 FL — SIGNIFICANT CHANGE UP (ref 80–100)
PHOSPHATE SERPL-MCNC: 3.1 MG/DL — SIGNIFICANT CHANGE UP (ref 2.5–4.5)
PLATELET # BLD AUTO: 357 K/UL — SIGNIFICANT CHANGE UP (ref 150–400)
POTASSIUM SERPL-MCNC: 3.9 MMOL/L — SIGNIFICANT CHANGE UP (ref 3.5–5.3)
POTASSIUM SERPL-SCNC: 3.9 MMOL/L — SIGNIFICANT CHANGE UP (ref 3.5–5.3)
PROT SERPL-MCNC: 6.3 G/DL — SIGNIFICANT CHANGE UP (ref 6–8.3)
RBC # BLD: 2.84 M/UL — LOW (ref 4.2–5.8)
RBC # FLD: 13.7 % — SIGNIFICANT CHANGE UP (ref 10.3–14.5)
SODIUM SERPL-SCNC: 146 MMOL/L — HIGH (ref 135–145)
SPECIMEN SOURCE: SIGNIFICANT CHANGE UP
WBC # BLD: 19.2 K/UL — HIGH (ref 3.8–10.5)
WBC # FLD AUTO: 19.2 K/UL — HIGH (ref 3.8–10.5)

## 2019-05-06 PROCEDURE — 71045 X-RAY EXAM CHEST 1 VIEW: CPT | Mod: 26

## 2019-05-06 PROCEDURE — 99291 CRITICAL CARE FIRST HOUR: CPT

## 2019-05-06 PROCEDURE — 99232 SBSQ HOSP IP/OBS MODERATE 35: CPT

## 2019-05-06 RX ORDER — HEPARIN SODIUM 5000 [USP'U]/ML
1300 INJECTION INTRAVENOUS; SUBCUTANEOUS
Qty: 25000 | Refills: 0 | Status: DISCONTINUED | OUTPATIENT
Start: 2019-05-06 | End: 2019-05-18

## 2019-05-06 RX ORDER — MICAFUNGIN SODIUM 100 MG/1
INJECTION, POWDER, LYOPHILIZED, FOR SOLUTION INTRAVENOUS
Qty: 0 | Refills: 0 | Status: DISCONTINUED | OUTPATIENT
Start: 2019-05-06 | End: 2019-05-10

## 2019-05-06 RX ORDER — MICAFUNGIN SODIUM 100 MG/1
100 INJECTION, POWDER, LYOPHILIZED, FOR SOLUTION INTRAVENOUS ONCE
Qty: 0 | Refills: 0 | Status: COMPLETED | OUTPATIENT
Start: 2019-05-06 | End: 2019-05-06

## 2019-05-06 RX ORDER — POTASSIUM CHLORIDE 20 MEQ
20 PACKET (EA) ORAL ONCE
Qty: 0 | Refills: 0 | Status: COMPLETED | OUTPATIENT
Start: 2019-05-06 | End: 2019-05-06

## 2019-05-06 RX ORDER — MICAFUNGIN SODIUM 100 MG/1
100 INJECTION, POWDER, LYOPHILIZED, FOR SOLUTION INTRAVENOUS EVERY 24 HOURS
Qty: 0 | Refills: 0 | Status: DISCONTINUED | OUTPATIENT
Start: 2019-05-07 | End: 2019-05-10

## 2019-05-06 RX ORDER — ACETAMINOPHEN 500 MG
650 TABLET ORAL ONCE
Qty: 0 | Refills: 0 | Status: COMPLETED | OUTPATIENT
Start: 2019-05-06 | End: 2019-05-06

## 2019-05-06 RX ADMIN — DEXMEDETOMIDINE HYDROCHLORIDE IN 0.9% SODIUM CHLORIDE 13.91 MICROGRAM(S)/KG/HR: 4 INJECTION INTRAVENOUS at 05:42

## 2019-05-06 RX ADMIN — Medication 3 MICROGRAM(S)/KG/MIN: at 05:12

## 2019-05-06 RX ADMIN — ESCITALOPRAM OXALATE 10 MILLIGRAM(S): 10 TABLET, FILM COATED ORAL at 11:01

## 2019-05-06 RX ADMIN — Medication 20 MILLIEQUIVALENT(S): at 17:03

## 2019-05-06 RX ADMIN — MEROPENEM 100 MILLIGRAM(S): 1 INJECTION INTRAVENOUS at 05:13

## 2019-05-06 RX ADMIN — Medication 3 MILLILITER(S): at 11:56

## 2019-05-06 RX ADMIN — VASOPRESSIN 5 UNIT(S)/MIN: 20 INJECTION INTRAVENOUS at 08:05

## 2019-05-06 RX ADMIN — VASOPRESSIN 5 UNIT(S)/MIN: 20 INJECTION INTRAVENOUS at 21:14

## 2019-05-06 RX ADMIN — Medication 58 MILLIGRAM(S): at 05:42

## 2019-05-06 RX ADMIN — ATORVASTATIN CALCIUM 40 MILLIGRAM(S): 80 TABLET, FILM COATED ORAL at 21:14

## 2019-05-06 RX ADMIN — MICAFUNGIN SODIUM 105 MILLIGRAM(S): 100 INJECTION, POWDER, LYOPHILIZED, FOR SOLUTION INTRAVENOUS at 10:02

## 2019-05-06 RX ADMIN — INSULIN HUMAN 3 UNIT(S)/HR: 100 INJECTION, SOLUTION SUBCUTANEOUS at 21:14

## 2019-05-06 RX ADMIN — Medication 650 MILLIGRAM(S): at 10:53

## 2019-05-06 RX ADMIN — CHLORHEXIDINE GLUCONATE 15 MILLILITER(S): 213 SOLUTION TOPICAL at 17:04

## 2019-05-06 RX ADMIN — CHLORHEXIDINE GLUCONATE 15 MILLILITER(S): 213 SOLUTION TOPICAL at 05:42

## 2019-05-06 RX ADMIN — Medication 3 MICROGRAM(S)/KG/MIN: at 08:06

## 2019-05-06 RX ADMIN — Medication 650 MILLIGRAM(S): at 11:20

## 2019-05-06 RX ADMIN — PANTOPRAZOLE SODIUM 40 MILLIGRAM(S): 20 TABLET, DELAYED RELEASE ORAL at 05:42

## 2019-05-06 RX ADMIN — CHLORHEXIDINE GLUCONATE 1 APPLICATION(S): 213 SOLUTION TOPICAL at 00:00

## 2019-05-06 RX ADMIN — MEROPENEM 100 MILLIGRAM(S): 1 INJECTION INTRAVENOUS at 17:03

## 2019-05-06 RX ADMIN — PANTOPRAZOLE SODIUM 40 MILLIGRAM(S): 20 TABLET, DELAYED RELEASE ORAL at 17:04

## 2019-05-06 RX ADMIN — HEPARIN SODIUM 12 UNIT(S)/HR: 5000 INJECTION INTRAVENOUS; SUBCUTANEOUS at 05:12

## 2019-05-06 RX ADMIN — Medication 3 MILLILITER(S): at 17:01

## 2019-05-06 RX ADMIN — Medication 3 MILLILITER(S): at 05:30

## 2019-05-06 RX ADMIN — INSULIN HUMAN 3 UNIT(S)/HR: 100 INJECTION, SOLUTION SUBCUTANEOUS at 08:07

## 2019-05-06 RX ADMIN — Medication 325 MILLIGRAM(S): at 11:01

## 2019-05-06 RX ADMIN — INSULIN HUMAN 3 UNIT(S)/HR: 100 INJECTION, SOLUTION SUBCUTANEOUS at 05:41

## 2019-05-06 RX ADMIN — DEXMEDETOMIDINE HYDROCHLORIDE IN 0.9% SODIUM CHLORIDE 13.91 MICROGRAM(S)/KG/HR: 4 INJECTION INTRAVENOUS at 08:06

## 2019-05-06 NOTE — PROGRESS NOTE ADULT - ASSESSMENT
78 yr old with ARCENIO admitted for chest pain and underwent a CABG last last week   Post op has been stable but is still intubated and became febrile last 24hrs.   No evidence of wd infection, alot of sputum and possible LLL infiltrate underwent Bronch/BAL with biopsy to r/o BOOP, started on steroids      meropenem  1 q 8 hr day 4         fever down mostly but still with low grade  wbc still elevated               continue meropenem for pna / VAP  started on micafungin based upon yeast seen in bronch specimen- but usually that is representative of oral lefty. Discussed with CTU team and to be continued for now.  continue steroids   I am concerned about the ongoing fever despite the steroids  discussed with Dr. Milner  bronch is negative

## 2019-05-06 NOTE — PROGRESS NOTE ADULT - SUBJECTIVE AND OBJECTIVE BOX
infectious diseases progress note:    Patient is a 79y old  Male who presents with a chief complaint of 77 yo   male with PMH of HLD, Sleep apnea ( non compliant with CPAP), GERD, diverticulitis, depression, anxiety, and panic attack. Early CAD in family; mother  of MI at age 54 and uncle ( mothers brother) at age 37.  Presented to Dr Feng with c/c of chest discomfort associated with dyspnea ( on exertion after walking 100 feet)  and palpitations for the past 1 year.   Abnormal NST ( last week) : mild to moderate abnormal study with medium sized inferior and post- lateral ischemia without infarct, EF 50%.  Holter monitor revealed: frequent PVCs, ECHO revealed AR, concentric LVH, diastolic dysfunction, and MR. Referred  for R and LHC.       s/p 3v CABG with persistent respiratory failure requiring mechanical ventilation & h/o BOOP (03 May 2019 11:22)        Angina pectoris  Atherosclerosis of native coronary artery without angina pectoris             Allergies    No Known Allergies    Intolerances        ANTIBIOTICS/RELEVANT:  antimicrobials  meropenem  IVPB      meropenem  IVPB 1000 milliGRAM(s) IV Intermittent every 12 hours    immunologic:    OTHER:  ALBUTerol/ipratropium for Nebulization 3 milliLiter(s) Nebulizer every 6 hours  aspirin 325 milliGRAM(s) Oral daily  atorvastatin 40 milliGRAM(s) Oral at bedtime  chlorhexidine 0.12% Liquid 15 milliLiter(s) Oral Mucosa two times a day  chlorhexidine 4% Liquid 1 Application(s) Topical <User Schedule>  dexmedetomidine Infusion 0.7 MICROgram(s)/kG/Hr IV Continuous <Continuous>  escitalopram 10 milliGRAM(s) Oral daily  heparin  Infusion 600 Unit(s)/Hr IV Continuous <Continuous>  insulin regular Infusion 3 Unit(s)/Hr IV Continuous <Continuous>  methylPREDNISolone sodium succinate IVPB 1000 milliGRAM(s) IV Intermittent daily  norepinephrine Infusion 0.02 MICROgram(s)/kG/Min IV Continuous <Continuous>  pantoprazole  Injectable 40 milliGRAM(s) IV Push two times a day  propofol Infusion 25 MICROgram(s)/kG/Min IV Continuous <Continuous>  sodium chloride 0.9%. 1000 milliLiter(s) IV Continuous <Continuous>  vasopressin Infusion 0.083 Unit(s)/Min IV Continuous <Continuous>      Objective:  Vital Signs Last 24 Hrs  T(C): 38.7 (06 May 2019 07:00), Max: 38.7 (05 May 2019 21:00)  T(F): 101.7 (06 May 2019 07:00), Max: 101.7 (05 May 2019 21:00)  HR: 85 (06 May 2019 07:15) (69 - 110)  BP: --  BP(mean): --  RR: 13 (06 May 2019 07:15) (10 - 28)  SpO2: 93% (06 May 2019 07:15) (93% - 100%)       Eyes:RYLEY, EOMI  Ear/Nose/Throat: no oral lesion, no sinus tenderness on percussion	  Neck:no JVD, no lymphadenopathy, supple  Respiratory: CTA shagufta  Cardiovascular: S1S2 RRR, no murmurs  Gastrointestinal:soft, (+) BS, no HSM  Extremities:no e/e/c        LABS:                        8.7    19.2  )-----------( 357      ( 06 May 2019 01:30 )             25.5     05-06    146<H>  |  109<H>  |  78<H>  ----------------------------<  176<H>  3.9   |  21<L>  |  1.67<H>    Ca    8.4      06 May 2019 01:30  Phos  3.1     05-06  Mg     2.6     05-06    TPro  6.3  /  Alb  2.7<L>  /  TBili  1.0  /  DBili  x   /  AST  103<H>  /  ALT  72<H>  /  AlkPhos  113  05-06    PT/INR - ( 05 May 2019 09:28 )   PT: 15.4 sec;   INR: 1.34 ratio         PTT - ( 06 May 2019 02:53 )  PTT:37.1 sec        MICROBIOLOGY:    RECENT CULTURES:   @ 06:05 .Blood                No growth to date.     @ 00:49 .Tissue       Few polymorphonuclear leukocytes seen per low power field  No organisms seen per oil power field           No growth     @ 00:41 Bronch Wash       Numerous polymorphonuclear leukocytes seen per low power field  Rare Squamous epithelial cells seen per low power field  Moderate Yeast like cells seen per oil power field           Normal Respiratory Annamaria present     @ 11:30 .Blood                No growth to date.          RESPIRATORY CULTURES:      Clostridium difficile GDH Toxins A&amp;B, EIA:   Negative ( @ 12:40)          RADIOLOGY & ADDITIONAL STUDIES:        Pager [phone]  After 5 pm/weekends or if no response :[phone]

## 2019-05-06 NOTE — PROGRESS NOTE ADULT - SUBJECTIVE AND OBJECTIVE BOX
CRITICAL CARE ATTENDING - CTICU    MEDICATIONS  (STANDING):  acetaminophen    Suspension .. 650 milliGRAM(s) Enteral Tube once  ALBUTerol/ipratropium for Nebulization 3 milliLiter(s) Nebulizer every 6 hours  aspirin 325 milliGRAM(s) Oral daily  atorvastatin 40 milliGRAM(s) Oral at bedtime  chlorhexidine 0.12% Liquid 15 milliLiter(s) Oral Mucosa two times a day  chlorhexidine 4% Liquid 1 Application(s) Topical <User Schedule>  dexmedetomidine Infusion 0.7 MICROgram(s)/kG/Hr (13.912 mL/Hr) IV Continuous <Continuous>  escitalopram 10 milliGRAM(s) Oral daily  heparin  Infusion 600 Unit(s)/Hr (12 mL/Hr) IV Continuous <Continuous>  insulin regular Infusion 3 Unit(s)/Hr (3 mL/Hr) IV Continuous <Continuous>  meropenem  IVPB      meropenem  IVPB 1000 milliGRAM(s) IV Intermittent every 12 hours  methylPREDNISolone sodium succinate IVPB 1000 milliGRAM(s) IV Intermittent daily  micafungin IVPB      norepinephrine Infusion 0.02 MICROgram(s)/kG/Min (3 mL/Hr) IV Continuous <Continuous>  pantoprazole  Injectable 40 milliGRAM(s) IV Push two times a day  sodium chloride 0.9%. 1000 milliLiter(s) (10 mL/Hr) IV Continuous <Continuous>  vasopressin Infusion 0.083 Unit(s)/Min (5 mL/Hr) IV Continuous <Continuous>                                    8.7    19.2  )-----------( 357      ( 06 May 2019 01:30 )             25.5       05-06    146<H>  |  109<H>  |  78<H>  ----------------------------<  176<H>  3.9   |  21<L>  |  1.67<H>    Ca    8.4      06 May 2019 01:30  Phos  3.1     05-06  Mg     2.6     05-06    TPro  6.3  /  Alb  2.7<L>  /  TBili  1.0  /  DBili  x   /  AST  103<H>  /  ALT  72<H>  /  AlkPhos  113  05-06      PT/INR - ( 05 May 2019 09:28 )   PT: 15.4 sec;   INR: 1.34 ratio         PTT - ( 06 May 2019 02:53 )  PTT:37.1 sec    Mode: CPAP with PS  FiO2: 50  PEEP: 5  PS: 10      Daily     Daily Weight in k.9 (06 May 2019 00:00)       @ 07:  -   @ 07:00  --------------------------------------------------------  IN: 3103.2 mL / OUT: 1400 mL / NET: 1703.2 mL     @ 07:  -   @ 10:49  --------------------------------------------------------  IN: 267 mL / OUT: 85 mL / NET: 182 mL        Critically Ill patient  : [ ] preoperative ,   [ x] post operative    Requires :  [ x] Arterial Line   [x ] Central Line  [ ] PA catheter  [ ] IABP  [ ] ECMO  [ ] LVAD  [x ] Ventilator  [x ] pacemaker [ ] Impella.    Bedside evaluation , monitoring , treatment of hemodynamics , fluids , IVP/ IVCD meds.        Diagnosis:     POD  - CABG X 3 L     Aspiration    respiratory failure - Tracheostomy     Requires chest PT, pulmonary toilet, ambu bagging, suctioning to maintain SaO2,  patent airway and treat atelectasis.     Ventilator Management:  [x ]AC-rest    [x ]CPAP-PS Wean    [ ]Trach Collar     [ ]Extubate    [ ] T-Piece  [x ]peep>5     Difficult weaning process - multiple organ system involvement in critically ill patient     r/o BOOP    ARDS - resolving    fluid overload      Renal Failure - Acute Kidney Injury     Hypernatremia     Tolerates NG / NJ feeds at [ x] goal rate    [ ] trophic rate    [ ]       rate     CHF- acute [ ]   chronic [x ]    systolic [ ]   diatolic [x ]          - Echo- EF -    70%          [ ] RV dysfunction          - Cxr-cardiomegally, edema          - Clinical-  [ ]inotropes   [x ]pressors   [x ]diuresis   [ ]IABP   [ ]ECMO   [ ]LVAD   [ x]Respiratory Failure    Hemodynamic lability,instability. Requires IVCD [x ] vasopressors [ ] inotropes  [ ] vasodilator  [ ]IVSS fluid  to maintain MAP, perfusion, C.I.     s/p Lung Biopsy    Tolerates NG / NJ feeds at [x ] goal rate    [ ] trophic rate    [ ]       rate                             -                     Discussed with CT surgeon, Physician's Assistant - Nurse Practitioner- Critical care medicine team.   Dicussed at  AM / PM rounds.   Chart, labs , films reviewed.    Total Time: 30 min

## 2019-05-07 LAB
-  COAGULASE NEGATIVE STAPHYLOCOCCUS: SIGNIFICANT CHANGE UP
ALBUMIN SERPL ELPH-MCNC: 2.8 G/DL — LOW (ref 3.3–5)
ALP SERPL-CCNC: 96 U/L — SIGNIFICANT CHANGE UP (ref 40–120)
ALT FLD-CCNC: 81 U/L — HIGH (ref 10–45)
ANION GAP SERPL CALC-SCNC: 14 MMOL/L — SIGNIFICANT CHANGE UP (ref 5–17)
APTT BLD: 61.6 SEC — HIGH (ref 27.5–36.3)
APTT BLD: 65.5 SEC — HIGH (ref 27.5–36.3)
APTT BLD: 73 SEC — HIGH (ref 27.5–36.3)
AST SERPL-CCNC: 106 U/L — HIGH (ref 10–40)
BILIRUB SERPL-MCNC: 1.1 MG/DL — SIGNIFICANT CHANGE UP (ref 0.2–1.2)
BUN SERPL-MCNC: 102 MG/DL — HIGH (ref 7–23)
CALCIUM SERPL-MCNC: 8.5 MG/DL — SIGNIFICANT CHANGE UP (ref 8.4–10.5)
CHLORIDE SERPL-SCNC: 110 MMOL/L — HIGH (ref 96–108)
CO2 SERPL-SCNC: 21 MMOL/L — LOW (ref 22–31)
CREAT SERPL-MCNC: 2 MG/DL — HIGH (ref 0.5–1.3)
CULTURE RESULTS: SIGNIFICANT CHANGE UP
CULTURE RESULTS: SIGNIFICANT CHANGE UP
GAS PNL BLDA: SIGNIFICANT CHANGE UP
GLUCOSE BLDC GLUCOMTR-MCNC: 111 MG/DL — HIGH (ref 70–99)
GLUCOSE BLDC GLUCOMTR-MCNC: 119 MG/DL — HIGH (ref 70–99)
GLUCOSE BLDC GLUCOMTR-MCNC: 119 MG/DL — HIGH (ref 70–99)
GLUCOSE BLDC GLUCOMTR-MCNC: 121 MG/DL — HIGH (ref 70–99)
GLUCOSE BLDC GLUCOMTR-MCNC: 121 MG/DL — HIGH (ref 70–99)
GLUCOSE BLDC GLUCOMTR-MCNC: 122 MG/DL — HIGH (ref 70–99)
GLUCOSE BLDC GLUCOMTR-MCNC: 122 MG/DL — HIGH (ref 70–99)
GLUCOSE BLDC GLUCOMTR-MCNC: 123 MG/DL — HIGH (ref 70–99)
GLUCOSE BLDC GLUCOMTR-MCNC: 124 MG/DL — HIGH (ref 70–99)
GLUCOSE BLDC GLUCOMTR-MCNC: 125 MG/DL — HIGH (ref 70–99)
GLUCOSE BLDC GLUCOMTR-MCNC: 129 MG/DL — HIGH (ref 70–99)
GLUCOSE BLDC GLUCOMTR-MCNC: 129 MG/DL — HIGH (ref 70–99)
GLUCOSE BLDC GLUCOMTR-MCNC: 133 MG/DL — HIGH (ref 70–99)
GLUCOSE BLDC GLUCOMTR-MCNC: 135 MG/DL — HIGH (ref 70–99)
GLUCOSE BLDC GLUCOMTR-MCNC: 139 MG/DL — HIGH (ref 70–99)
GLUCOSE BLDC GLUCOMTR-MCNC: 142 MG/DL — HIGH (ref 70–99)
GLUCOSE BLDC GLUCOMTR-MCNC: 156 MG/DL — HIGH (ref 70–99)
GLUCOSE BLDC GLUCOMTR-MCNC: 98 MG/DL — SIGNIFICANT CHANGE UP (ref 70–99)
GLUCOSE SERPL-MCNC: 124 MG/DL — HIGH (ref 70–99)
GRAM STN FLD: SIGNIFICANT CHANGE UP
GRAM STN FLD: SIGNIFICANT CHANGE UP
HCT VFR BLD CALC: 26 % — LOW (ref 39–50)
HGB BLD-MCNC: 8.7 G/DL — LOW (ref 13–17)
MAGNESIUM SERPL-MCNC: 2.8 MG/DL — HIGH (ref 1.6–2.6)
MCHC RBC-ENTMCNC: 30.1 PG — SIGNIFICANT CHANGE UP (ref 27–34)
MCHC RBC-ENTMCNC: 33.6 GM/DL — SIGNIFICANT CHANGE UP (ref 32–36)
MCV RBC AUTO: 89.4 FL — SIGNIFICANT CHANGE UP (ref 80–100)
METHOD TYPE: SIGNIFICANT CHANGE UP
PHOSPHATE SERPL-MCNC: 4 MG/DL — SIGNIFICANT CHANGE UP (ref 2.5–4.5)
PLATELET # BLD AUTO: 376 K/UL — SIGNIFICANT CHANGE UP (ref 150–400)
POTASSIUM SERPL-MCNC: 4 MMOL/L — SIGNIFICANT CHANGE UP (ref 3.5–5.3)
POTASSIUM SERPL-SCNC: 4 MMOL/L — SIGNIFICANT CHANGE UP (ref 3.5–5.3)
PROT SERPL-MCNC: 6 G/DL — SIGNIFICANT CHANGE UP (ref 6–8.3)
RBC # BLD: 2.91 M/UL — LOW (ref 4.2–5.8)
RBC # FLD: 14 % — SIGNIFICANT CHANGE UP (ref 10.3–14.5)
SODIUM SERPL-SCNC: 145 MMOL/L — SIGNIFICANT CHANGE UP (ref 135–145)
SPECIMEN SOURCE: SIGNIFICANT CHANGE UP
WBC # BLD: 13.7 K/UL — HIGH (ref 3.8–10.5)
WBC # FLD AUTO: 13.7 K/UL — HIGH (ref 3.8–10.5)

## 2019-05-07 PROCEDURE — 71045 X-RAY EXAM CHEST 1 VIEW: CPT | Mod: 26

## 2019-05-07 PROCEDURE — 99291 CRITICAL CARE FIRST HOUR: CPT

## 2019-05-07 PROCEDURE — 99232 SBSQ HOSP IP/OBS MODERATE 35: CPT

## 2019-05-07 RX ORDER — POTASSIUM CHLORIDE 20 MEQ
20 PACKET (EA) ORAL ONCE
Qty: 0 | Refills: 0 | Status: COMPLETED | OUTPATIENT
Start: 2019-05-07 | End: 2019-05-07

## 2019-05-07 RX ORDER — AMIODARONE HYDROCHLORIDE 400 MG/1
150 TABLET ORAL ONCE
Qty: 0 | Refills: 0 | Status: COMPLETED | OUTPATIENT
Start: 2019-05-07 | End: 2019-05-07

## 2019-05-07 RX ORDER — HYDROMORPHONE HYDROCHLORIDE 2 MG/ML
0.5 INJECTION INTRAMUSCULAR; INTRAVENOUS; SUBCUTANEOUS ONCE
Qty: 0 | Refills: 0 | Status: DISCONTINUED | OUTPATIENT
Start: 2019-05-07 | End: 2019-05-07

## 2019-05-07 RX ORDER — FENTANYL CITRATE 50 UG/ML
25 INJECTION INTRAVENOUS ONCE
Qty: 0 | Refills: 0 | Status: DISCONTINUED | OUTPATIENT
Start: 2019-05-07 | End: 2019-05-07

## 2019-05-07 RX ORDER — FENTANYL CITRATE 50 UG/ML
50 INJECTION INTRAVENOUS ONCE
Qty: 0 | Refills: 0 | Status: DISCONTINUED | OUTPATIENT
Start: 2019-05-07 | End: 2019-05-07

## 2019-05-07 RX ADMIN — AMIODARONE HYDROCHLORIDE 600 MILLIGRAM(S): 400 TABLET ORAL at 08:44

## 2019-05-07 RX ADMIN — Medication 3 MILLILITER(S): at 00:36

## 2019-05-07 RX ADMIN — Medication 3 MILLILITER(S): at 05:11

## 2019-05-07 RX ADMIN — PANTOPRAZOLE SODIUM 40 MILLIGRAM(S): 20 TABLET, DELAYED RELEASE ORAL at 05:01

## 2019-05-07 RX ADMIN — HYDROMORPHONE HYDROCHLORIDE 0.5 MILLIGRAM(S): 2 INJECTION INTRAMUSCULAR; INTRAVENOUS; SUBCUTANEOUS at 16:40

## 2019-05-07 RX ADMIN — FENTANYL CITRATE 25 MICROGRAM(S): 50 INJECTION INTRAVENOUS at 06:45

## 2019-05-07 RX ADMIN — HEPARIN SODIUM 12.5 UNIT(S)/HR: 5000 INJECTION INTRAVENOUS; SUBCUTANEOUS at 10:00

## 2019-05-07 RX ADMIN — FENTANYL CITRATE 50 MICROGRAM(S): 50 INJECTION INTRAVENOUS at 21:47

## 2019-05-07 RX ADMIN — HYDROMORPHONE HYDROCHLORIDE 0.5 MILLIGRAM(S): 2 INJECTION INTRAMUSCULAR; INTRAVENOUS; SUBCUTANEOUS at 16:55

## 2019-05-07 RX ADMIN — Medication 58 MILLIGRAM(S): at 05:01

## 2019-05-07 RX ADMIN — Medication 3 MILLILITER(S): at 12:21

## 2019-05-07 RX ADMIN — Medication 20 MILLIEQUIVALENT(S): at 05:23

## 2019-05-07 RX ADMIN — Medication 20 MILLIEQUIVALENT(S): at 21:31

## 2019-05-07 RX ADMIN — HEPARIN SODIUM 11.5 UNIT(S)/HR: 5000 INJECTION INTRAVENOUS; SUBCUTANEOUS at 21:33

## 2019-05-07 RX ADMIN — Medication 325 MILLIGRAM(S): at 13:42

## 2019-05-07 RX ADMIN — FENTANYL CITRATE 50 MICROGRAM(S): 50 INJECTION INTRAVENOUS at 21:32

## 2019-05-07 RX ADMIN — INSULIN HUMAN 3 UNIT(S)/HR: 100 INJECTION, SOLUTION SUBCUTANEOUS at 21:33

## 2019-05-07 RX ADMIN — MEROPENEM 100 MILLIGRAM(S): 1 INJECTION INTRAVENOUS at 05:01

## 2019-05-07 RX ADMIN — Medication 3 MICROGRAM(S)/KG/MIN: at 21:34

## 2019-05-07 RX ADMIN — Medication 3 MILLILITER(S): at 17:21

## 2019-05-07 RX ADMIN — PANTOPRAZOLE SODIUM 40 MILLIGRAM(S): 20 TABLET, DELAYED RELEASE ORAL at 18:49

## 2019-05-07 RX ADMIN — MICAFUNGIN SODIUM 105 MILLIGRAM(S): 100 INJECTION, POWDER, LYOPHILIZED, FOR SOLUTION INTRAVENOUS at 09:05

## 2019-05-07 RX ADMIN — CHLORHEXIDINE GLUCONATE 15 MILLILITER(S): 213 SOLUTION TOPICAL at 18:49

## 2019-05-07 RX ADMIN — ESCITALOPRAM OXALATE 10 MILLIGRAM(S): 10 TABLET, FILM COATED ORAL at 13:41

## 2019-05-07 RX ADMIN — SODIUM CHLORIDE 10 MILLILITER(S): 9 INJECTION INTRAMUSCULAR; INTRAVENOUS; SUBCUTANEOUS at 09:08

## 2019-05-07 RX ADMIN — HEPARIN SODIUM 11.5 UNIT(S)/HR: 5000 INJECTION INTRAVENOUS; SUBCUTANEOUS at 16:30

## 2019-05-07 RX ADMIN — INSULIN HUMAN 3 UNIT(S)/HR: 100 INJECTION, SOLUTION SUBCUTANEOUS at 09:07

## 2019-05-07 RX ADMIN — Medication 3 MICROGRAM(S)/KG/MIN: at 09:07

## 2019-05-07 RX ADMIN — FENTANYL CITRATE 25 MICROGRAM(S): 50 INJECTION INTRAVENOUS at 06:25

## 2019-05-07 RX ADMIN — ATORVASTATIN CALCIUM 40 MILLIGRAM(S): 80 TABLET, FILM COATED ORAL at 21:05

## 2019-05-07 RX ADMIN — VASOPRESSIN 5 UNIT(S)/MIN: 20 INJECTION INTRAVENOUS at 09:08

## 2019-05-07 RX ADMIN — DEXMEDETOMIDINE HYDROCHLORIDE IN 0.9% SODIUM CHLORIDE 13.91 MICROGRAM(S)/KG/HR: 4 INJECTION INTRAVENOUS at 21:33

## 2019-05-07 RX ADMIN — CHLORHEXIDINE GLUCONATE 15 MILLILITER(S): 213 SOLUTION TOPICAL at 05:03

## 2019-05-07 RX ADMIN — DEXMEDETOMIDINE HYDROCHLORIDE IN 0.9% SODIUM CHLORIDE 13.91 MICROGRAM(S)/KG/HR: 4 INJECTION INTRAVENOUS at 09:07

## 2019-05-07 RX ADMIN — VASOPRESSIN 5 UNIT(S)/MIN: 20 INJECTION INTRAVENOUS at 21:33

## 2019-05-07 RX ADMIN — MEROPENEM 100 MILLIGRAM(S): 1 INJECTION INTRAVENOUS at 18:59

## 2019-05-07 RX ADMIN — CHLORHEXIDINE GLUCONATE 1 APPLICATION(S): 213 SOLUTION TOPICAL at 05:03

## 2019-05-07 NOTE — PROGRESS NOTE ADULT - SUBJECTIVE AND OBJECTIVE BOX
infectious diseases progress note:    Patient is a 79y old  Male who presents with a chief complaint of fever (06 May 2019 08:16)        Angina pectoris  Atherosclerosis of native coronary artery without angina pectoris             Allergies    No Known Allergies    Intolerances        ANTIBIOTICS/RELEVANT:  antimicrobials  meropenem  IVPB      meropenem  IVPB 1000 milliGRAM(s) IV Intermittent every 12 hours  micafungin IVPB      micafungin IVPB 100 milliGRAM(s) IV Intermittent every 24 hours    immunologic:    OTHER:  ALBUTerol/ipratropium for Nebulization 3 milliLiter(s) Nebulizer every 6 hours  amiodarone IVPB 150 milliGRAM(s) IV Intermittent once  aspirin 325 milliGRAM(s) Oral daily  atorvastatin 40 milliGRAM(s) Oral at bedtime  chlorhexidine 0.12% Liquid 15 milliLiter(s) Oral Mucosa two times a day  chlorhexidine 4% Liquid 1 Application(s) Topical <User Schedule>  dexmedetomidine Infusion 0.7 MICROgram(s)/kG/Hr IV Continuous <Continuous>  escitalopram 10 milliGRAM(s) Oral daily  heparin  Infusion 1300 Unit(s)/Hr IV Continuous <Continuous>  insulin regular Infusion 3 Unit(s)/Hr IV Continuous <Continuous>  methylPREDNISolone sodium succinate IVPB 1000 milliGRAM(s) IV Intermittent daily  norepinephrine Infusion 0.02 MICROgram(s)/kG/Min IV Continuous <Continuous>  pantoprazole  Injectable 40 milliGRAM(s) IV Push two times a day  sodium chloride 0.9%. 1000 milliLiter(s) IV Continuous <Continuous>  vasopressin Infusion 0.083 Unit(s)/Min IV Continuous <Continuous>      Objective:  Vital Signs Last 24 Hrs  T(C): 38.2 (07 May 2019 04:00), Max: 38.2 (06 May 2019 20:00)  T(F): 100.8 (07 May 2019 04:00), Max: 100.8 (06 May 2019 20:00)  HR: 112 (07 May 2019 07:30) (72 - 136)  BP: --  BP(mean): --  RR: 23 (07 May 2019 07:30) (0 - 27)  SpO2: 96% (07 May 2019 07:30) (89% - 98%)    PHYSICAL EXAM:     Eyes:RYLEY, EOMI  Ear/Nose/Throat: no oral lesion, no sinus tenderness on percussion	  Neck:no JVD, no lymphadenopathy, supple  Respiratory: CTA shagufta  Cardiovascular: S1S2 RRR, no murmurs  Gastrointestinal:soft, (+) BS, no HSM  Extremities:no e/e/c        LABS:                        8.7    13.7  )-----------( 376      ( 07 May 2019 01:38 )             26.0     05-07    145  |  110<H>  |  102<H>  ----------------------------<  124<H>  4.0   |  21<L>  |  2.00<H>    Ca    8.5      07 May 2019 01:38  Phos  4.0     05-07  Mg     2.8     05-07    TPro  6.0  /  Alb  2.8<L>  /  TBili  1.1  /  DBili  x   /  AST  106<H>  /  ALT  81<H>  /  AlkPhos  96  05-07    PT/INR - ( 05 May 2019 09:28 )   PT: 15.4 sec;   INR: 1.34 ratio         PTT - ( 07 May 2019 02:32 )  PTT:61.6 sec        MICROBIOLOGY:    RECENT CULTURES:  05-05 @ 06:05 .Blood                No growth to date.    05-05 @ 00:49 .Tissue       Few polymorphonuclear leukocytes seen per low power field  No organisms seen per oil power field           Testing in progress    05-05 @ 00:41 Bronch Wash       Numerous polymorphonuclear leukocytes seen per low power field  Rare Squamous epithelial cells seen per low power field  Moderate Yeast like cells seen per oil power field           Normal Respiratory Annamaria present    05-02 @ 11:30 .Blood                No growth to date.          RESPIRATORY CULTURES:      Clostridium difficile G[facility] Toxins A&amp;B, EIA:   Negative (05-06 @ 15:24)  Clostridium difficile [facility] Toxins A&amp;B, EIA:   Negative (05-02 @ 12:40)          RADIOLOGY & ADDITIONAL STUDIES:        Pager [phone]  After 5 pm/weekends or if no response :[phone]

## 2019-05-07 NOTE — PROGRESS NOTE ADULT - ASSESSMENT
78 yr old with ARCENIO admitted for chest pain and underwent a CABG last last week   Post op has been stable but is still intubated and became febrile last 24hrs.   No evidence of wd infection, alot of sputum and possible LLL infiltrate underwent Bronch/BAL with biopsy to r/o BOOP, started on steroids      meropenem  1 q 8 hr day 4         fever down mostly but still with low grade  wbc still elevated               continue meropenem for pna / VAP  started on micafungin based upon yeast seen in bronch specimen- but usually that is representative of oral lefty. Discussed with CTU team and to be continued for now.  continue steroids   I am concerned about the ongoing fever despite the steroids  discussed with Dr. Milner  bronch is negative awaiting lung biopsy results  no improvement with either meropenem or micafungin

## 2019-05-07 NOTE — PROGRESS NOTE ADULT - SUBJECTIVE AND OBJECTIVE BOX
CRITICAL CARE ATTENDING - CTICU    MEDICATIONS  (STANDING):  ALBUTerol/ipratropium for Nebulization 3 milliLiter(s) Nebulizer every 6 hours  aspirin 325 milliGRAM(s) Oral daily  atorvastatin 40 milliGRAM(s) Oral at bedtime  chlorhexidine 0.12% Liquid 15 milliLiter(s) Oral Mucosa two times a day  chlorhexidine 4% Liquid 1 Application(s) Topical <User Schedule>  dexmedetomidine Infusion 0.7 MICROgram(s)/kG/Hr (13.912 mL/Hr) IV Continuous <Continuous>  escitalopram 10 milliGRAM(s) Oral daily  heparin  Infusion 1300 Unit(s)/Hr (12.5 mL/Hr) IV Continuous <Continuous>  insulin regular Infusion 3 Unit(s)/Hr (3 mL/Hr) IV Continuous <Continuous>  meropenem  IVPB      meropenem  IVPB 1000 milliGRAM(s) IV Intermittent every 12 hours  methylPREDNISolone sodium succinate IVPB 1000 milliGRAM(s) IV Intermittent daily  micafungin IVPB      micafungin IVPB 100 milliGRAM(s) IV Intermittent every 24 hours  norepinephrine Infusion 0.02 MICROgram(s)/kG/Min (3 mL/Hr) IV Continuous <Continuous>  pantoprazole  Injectable 40 milliGRAM(s) IV Push two times a day  sodium chloride 0.9%. 1000 milliLiter(s) (10 mL/Hr) IV Continuous <Continuous>  vasopressin Infusion 0.083 Unit(s)/Min (5 mL/Hr) IV Continuous <Continuous>                                    8.7    13.7  )-----------( 376      ( 07 May 2019 01:38 )             26.0       05-07    145  |  110<H>  |  102<H>  ----------------------------<  124<H>  4.0   |  21<L>  |  2.00<H>    Ca    8.5      07 May 2019 01:38  Phos  4.0     05-07  Mg     2.8     -    TPro  6.0  /  Alb  2.8<L>  /  TBili  1.1  /  DBili  x   /  AST  106<H>  /  ALT  81<H>  /  AlkPhos  96  05-07      PTT - ( 07 May 2019 08:11 )  PTT:65.5 sec    Mode: CPAP with PS  FiO2: 50  PEEP: 5  PS: 10  MAP: 8  PIP: 16      Daily     Daily Weight in k.3 (07 May 2019 00:00)       @ 07:  -   @ 07:00  --------------------------------------------------------  IN: 2859.5 mL / OUT: 1810 mL / NET: 1049.5 mL     @ 07:  -   @ 10:00  --------------------------------------------------------  IN: 84 mL / OUT: 40 mL / NET: 44 mL        Critically Ill patient  : [ ] preoperative ,   [x post operative    Requires :  [x ] Arterial Line   [x ] Central Line  [ ] PA catheter  [ ] IABP  [ ] ECMO  [ ] LVAD  [x ] Ventilator  [ ] pacemaker [ ] Impella.    Bedside evaluation , monitoring , treatment of hemodynamics , fluids , IVP/ IVCD meds.        Diagnosis:     POD  - CABG X 3 L    respiratory failure - Tracheostomy     Requires chest PT, pulmonary toilet, ambu bagging, suctioning to maintain SaO2,  patent airway and treat atelectasis.     Ventilator Management:  [ x]AC-rest    [x ]CPAP-PS Wean    [ x]Trach Collar  trial    [ ]Extubate    [ ] T-Piece  [ ]peep>5     Difficult weaning process - multiple organ system involvement in critically ill patient     CHF- acute [ x]   chronic [ ]    systolic [ ]   diatolic [x ]          - Echo- EF -   70%          [ ] RV dysfunction          - Cxr-cardiomegally, edema          - Clinical-  [ ]inotropes   [ x]pressors   [x ]diuresis   [ ]IABP   [ ]ECMO   [ ]LVAD   [x ]Respiratory Failure    Aspiration     RML / RLL pneumonia    ARDS - resolving    h/o BOOP - s/p Lung BX     fluid overload      Renal Failure - Acute Kidney Injury     IVCD anticoagulation with [x ] Heparin  [ ] Argatroban for A Fib    Anxiety    Asthma    Sleep Apnea    Tolerates NG / NJ feeds at [x ] goal rate    [ ] trophic rate    [ ]       rate                         -                     Discussed with CT surgeon, Physician's Assistant - Nurse Practitioner- Critical care medicine team.   Dicussed at  AM / PM rounds.   Chart, labs , films reviewed.    Total Time: 30 min

## 2019-05-08 DIAGNOSIS — R79.89 OTHER SPECIFIED ABNORMAL FINDINGS OF BLOOD CHEMISTRY: ICD-10-CM

## 2019-05-08 DIAGNOSIS — N17.9 ACUTE KIDNEY FAILURE, UNSPECIFIED: ICD-10-CM

## 2019-05-08 LAB
ALBUMIN SERPL ELPH-MCNC: 2.8 G/DL — LOW (ref 3.3–5)
ALP SERPL-CCNC: 83 U/L — SIGNIFICANT CHANGE UP (ref 40–120)
ALT FLD-CCNC: 80 U/L — HIGH (ref 10–45)
ANION GAP SERPL CALC-SCNC: 12 MMOL/L — SIGNIFICANT CHANGE UP (ref 5–17)
APPEARANCE UR: ABNORMAL
APTT BLD: 53.7 SEC — HIGH (ref 27.5–36.3)
APTT BLD: 60.6 SEC — HIGH (ref 27.5–36.3)
APTT BLD: 70.6 SEC — HIGH (ref 27.5–36.3)
APTT BLD: 86.2 SEC — HIGH (ref 27.5–36.3)
AST SERPL-CCNC: 86 U/L — HIGH (ref 10–40)
BACTERIA # UR AUTO: NEGATIVE — SIGNIFICANT CHANGE UP
BILIRUB SERPL-MCNC: 1.1 MG/DL — SIGNIFICANT CHANGE UP (ref 0.2–1.2)
BILIRUB UR-MCNC: NEGATIVE — SIGNIFICANT CHANGE UP
BLD GP AB SCN SERPL QL: NEGATIVE — SIGNIFICANT CHANGE UP
BUN SERPL-MCNC: 111 MG/DL — HIGH (ref 7–23)
CALCIUM SERPL-MCNC: 8.3 MG/DL — LOW (ref 8.4–10.5)
CHLORIDE SERPL-SCNC: 114 MMOL/L — HIGH (ref 96–108)
CO2 SERPL-SCNC: 19 MMOL/L — LOW (ref 22–31)
COLOR SPEC: YELLOW — SIGNIFICANT CHANGE UP
COMMENT - URINE: SIGNIFICANT CHANGE UP
COMMENT - URINE: SIGNIFICANT CHANGE UP
CREAT ?TM UR-MCNC: 70 MG/DL — SIGNIFICANT CHANGE UP
CREAT SERPL-MCNC: 2.16 MG/DL — HIGH (ref 0.5–1.3)
CULTURE RESULTS: SIGNIFICANT CHANGE UP
DIFF PNL FLD: ABNORMAL
EPI CELLS # UR: 6 — HIGH
GAS PNL BLDA: SIGNIFICANT CHANGE UP
GLUCOSE BLDC GLUCOMTR-MCNC: 110 MG/DL — HIGH (ref 70–99)
GLUCOSE BLDC GLUCOMTR-MCNC: 117 MG/DL — HIGH (ref 70–99)
GLUCOSE BLDC GLUCOMTR-MCNC: 128 MG/DL — HIGH (ref 70–99)
GLUCOSE BLDC GLUCOMTR-MCNC: 128 MG/DL — HIGH (ref 70–99)
GLUCOSE BLDC GLUCOMTR-MCNC: 132 MG/DL — HIGH (ref 70–99)
GLUCOSE BLDC GLUCOMTR-MCNC: 133 MG/DL — HIGH (ref 70–99)
GLUCOSE BLDC GLUCOMTR-MCNC: 134 MG/DL — HIGH (ref 70–99)
GLUCOSE BLDC GLUCOMTR-MCNC: 135 MG/DL — HIGH (ref 70–99)
GLUCOSE BLDC GLUCOMTR-MCNC: 150 MG/DL — HIGH (ref 70–99)
GLUCOSE BLDC GLUCOMTR-MCNC: 152 MG/DL — HIGH (ref 70–99)
GLUCOSE BLDC GLUCOMTR-MCNC: 153 MG/DL — HIGH (ref 70–99)
GLUCOSE BLDC GLUCOMTR-MCNC: 155 MG/DL — HIGH (ref 70–99)
GLUCOSE BLDC GLUCOMTR-MCNC: 157 MG/DL — HIGH (ref 70–99)
GLUCOSE BLDC GLUCOMTR-MCNC: 158 MG/DL — HIGH (ref 70–99)
GLUCOSE BLDC GLUCOMTR-MCNC: 159 MG/DL — HIGH (ref 70–99)
GLUCOSE BLDC GLUCOMTR-MCNC: 160 MG/DL — HIGH (ref 70–99)
GLUCOSE BLDC GLUCOMTR-MCNC: 162 MG/DL — HIGH (ref 70–99)
GLUCOSE BLDC GLUCOMTR-MCNC: 164 MG/DL — HIGH (ref 70–99)
GLUCOSE BLDC GLUCOMTR-MCNC: 169 MG/DL — HIGH (ref 70–99)
GLUCOSE BLDC GLUCOMTR-MCNC: 187 MG/DL — HIGH (ref 70–99)
GLUCOSE BLDC GLUCOMTR-MCNC: 93 MG/DL — SIGNIFICANT CHANGE UP (ref 70–99)
GLUCOSE SERPL-MCNC: 151 MG/DL — HIGH (ref 70–99)
GLUCOSE UR QL: NEGATIVE — SIGNIFICANT CHANGE UP
GRAN CASTS # UR COMP ASSIST: ABNORMAL /LPF
HCT VFR BLD CALC: 24.6 % — LOW (ref 39–50)
HGB BLD-MCNC: 8.4 G/DL — LOW (ref 13–17)
HYALINE CASTS # UR AUTO: NEGATIVE /LPF — SIGNIFICANT CHANGE UP
KETONES UR-MCNC: NEGATIVE — SIGNIFICANT CHANGE UP
LEUKOCYTE ESTERASE UR-ACNC: NEGATIVE — SIGNIFICANT CHANGE UP
MAGNESIUM SERPL-MCNC: 3 MG/DL — HIGH (ref 1.6–2.6)
MCHC RBC-ENTMCNC: 30.8 PG — SIGNIFICANT CHANGE UP (ref 27–34)
MCHC RBC-ENTMCNC: 34.1 GM/DL — SIGNIFICANT CHANGE UP (ref 32–36)
MCV RBC AUTO: 90.3 FL — SIGNIFICANT CHANGE UP (ref 80–100)
NITRITE UR-MCNC: NEGATIVE — SIGNIFICANT CHANGE UP
ORGANISM # SPEC MICROSCOPIC CNT: SIGNIFICANT CHANGE UP
ORGANISM # SPEC MICROSCOPIC CNT: SIGNIFICANT CHANGE UP
PH UR: 5.5 — SIGNIFICANT CHANGE UP (ref 5–8)
PHOSPHATE SERPL-MCNC: 5.9 MG/DL — HIGH (ref 2.5–4.5)
PLATELET # BLD AUTO: 285 K/UL — SIGNIFICANT CHANGE UP (ref 150–400)
POTASSIUM SERPL-MCNC: 4.4 MMOL/L — SIGNIFICANT CHANGE UP (ref 3.5–5.3)
POTASSIUM SERPL-SCNC: 4.4 MMOL/L — SIGNIFICANT CHANGE UP (ref 3.5–5.3)
POTASSIUM UR-SCNC: 71 MMOL/L — SIGNIFICANT CHANGE UP
PROT ?TM UR-MCNC: 45 MG/DL — HIGH (ref 0–12)
PROT SERPL-MCNC: 5.7 G/DL — LOW (ref 6–8.3)
PROT UR-MCNC: ABNORMAL
PROT/CREAT UR-RTO: 0.6 RATIO — HIGH (ref 0–0.2)
RBC # BLD: 2.73 M/UL — LOW (ref 4.2–5.8)
RBC # FLD: 13.9 % — SIGNIFICANT CHANGE UP (ref 10.3–14.5)
RBC CASTS # UR COMP ASSIST: 25 /HPF — HIGH (ref 0–4)
RH IG SCN BLD-IMP: POSITIVE — SIGNIFICANT CHANGE UP
SODIUM SERPL-SCNC: 145 MMOL/L — SIGNIFICANT CHANGE UP (ref 135–145)
SODIUM UR-SCNC: 26 MMOL/L — SIGNIFICANT CHANGE UP
SP GR SPEC: 1.02 — SIGNIFICANT CHANGE UP (ref 1.01–1.02)
SPECIMEN SOURCE: SIGNIFICANT CHANGE UP
UROBILINOGEN FLD QL: NEGATIVE — SIGNIFICANT CHANGE UP
UUN UR-MCNC: 1191 MG/DL — SIGNIFICANT CHANGE UP
WBC # BLD: 10.1 K/UL — SIGNIFICANT CHANGE UP (ref 3.8–10.5)
WBC # FLD AUTO: 10.1 K/UL — SIGNIFICANT CHANGE UP (ref 3.8–10.5)
WBC UR QL: SIGNIFICANT CHANGE UP

## 2019-05-08 PROCEDURE — 71045 X-RAY EXAM CHEST 1 VIEW: CPT | Mod: 26

## 2019-05-08 PROCEDURE — 99291 CRITICAL CARE FIRST HOUR: CPT

## 2019-05-08 PROCEDURE — 99232 SBSQ HOSP IP/OBS MODERATE 35: CPT

## 2019-05-08 RX ORDER — FENTANYL CITRATE 50 UG/ML
25 INJECTION INTRAVENOUS ONCE
Qty: 0 | Refills: 0 | Status: DISCONTINUED | OUTPATIENT
Start: 2019-05-08 | End: 2019-05-09

## 2019-05-08 RX ORDER — HYDROMORPHONE HYDROCHLORIDE 2 MG/ML
0.5 INJECTION INTRAMUSCULAR; INTRAVENOUS; SUBCUTANEOUS EVERY 8 HOURS
Qty: 0 | Refills: 0 | Status: DISCONTINUED | OUTPATIENT
Start: 2019-05-08 | End: 2019-05-14

## 2019-05-08 RX ORDER — ALBUMIN HUMAN 25 %
250 VIAL (ML) INTRAVENOUS EVERY 4 HOURS
Qty: 0 | Refills: 0 | Status: DISCONTINUED | OUTPATIENT
Start: 2019-05-08 | End: 2019-05-08

## 2019-05-08 RX ORDER — FENTANYL CITRATE 50 UG/ML
50 INJECTION INTRAVENOUS ONCE
Qty: 0 | Refills: 0 | Status: DISCONTINUED | OUTPATIENT
Start: 2019-05-08 | End: 2019-05-08

## 2019-05-08 RX ORDER — ALBUMIN HUMAN 25 %
50 VIAL (ML) INTRAVENOUS EVERY 6 HOURS
Qty: 0 | Refills: 0 | Status: COMPLETED | OUTPATIENT
Start: 2019-05-08 | End: 2019-05-09

## 2019-05-08 RX ADMIN — Medication 3 MILLILITER(S): at 01:33

## 2019-05-08 RX ADMIN — MEROPENEM 100 MILLIGRAM(S): 1 INJECTION INTRAVENOUS at 05:12

## 2019-05-08 RX ADMIN — Medication 58 MILLIGRAM(S): at 05:16

## 2019-05-08 RX ADMIN — VASOPRESSIN 5 UNIT(S)/MIN: 20 INJECTION INTRAVENOUS at 08:17

## 2019-05-08 RX ADMIN — HEPARIN SODIUM 10 UNIT(S)/HR: 5000 INJECTION INTRAVENOUS; SUBCUTANEOUS at 23:57

## 2019-05-08 RX ADMIN — ESCITALOPRAM OXALATE 10 MILLIGRAM(S): 10 TABLET, FILM COATED ORAL at 12:09

## 2019-05-08 RX ADMIN — Medication 3 MILLILITER(S): at 17:25

## 2019-05-08 RX ADMIN — MICAFUNGIN SODIUM 105 MILLIGRAM(S): 100 INJECTION, POWDER, LYOPHILIZED, FOR SOLUTION INTRAVENOUS at 08:17

## 2019-05-08 RX ADMIN — PANTOPRAZOLE SODIUM 40 MILLIGRAM(S): 20 TABLET, DELAYED RELEASE ORAL at 17:58

## 2019-05-08 RX ADMIN — CHLORHEXIDINE GLUCONATE 1 APPLICATION(S): 213 SOLUTION TOPICAL at 05:09

## 2019-05-08 RX ADMIN — HYDROMORPHONE HYDROCHLORIDE 0.5 MILLIGRAM(S): 2 INJECTION INTRAMUSCULAR; INTRAVENOUS; SUBCUTANEOUS at 08:35

## 2019-05-08 RX ADMIN — Medication 3 MICROGRAM(S)/KG/MIN: at 08:17

## 2019-05-08 RX ADMIN — SODIUM CHLORIDE 10 MILLILITER(S): 9 INJECTION INTRAMUSCULAR; INTRAVENOUS; SUBCUTANEOUS at 08:17

## 2019-05-08 RX ADMIN — Medication 3 MILLILITER(S): at 23:29

## 2019-05-08 RX ADMIN — HYDROMORPHONE HYDROCHLORIDE 0.5 MILLIGRAM(S): 2 INJECTION INTRAMUSCULAR; INTRAVENOUS; SUBCUTANEOUS at 21:20

## 2019-05-08 RX ADMIN — CHLORHEXIDINE GLUCONATE 15 MILLILITER(S): 213 SOLUTION TOPICAL at 05:16

## 2019-05-08 RX ADMIN — DEXMEDETOMIDINE HYDROCHLORIDE IN 0.9% SODIUM CHLORIDE 13.91 MICROGRAM(S)/KG/HR: 4 INJECTION INTRAVENOUS at 08:18

## 2019-05-08 RX ADMIN — FENTANYL CITRATE 50 MICROGRAM(S): 50 INJECTION INTRAVENOUS at 02:43

## 2019-05-08 RX ADMIN — DEXMEDETOMIDINE HYDROCHLORIDE IN 0.9% SODIUM CHLORIDE 13.91 MICROGRAM(S)/KG/HR: 4 INJECTION INTRAVENOUS at 21:03

## 2019-05-08 RX ADMIN — ATORVASTATIN CALCIUM 40 MILLIGRAM(S): 80 TABLET, FILM COATED ORAL at 21:04

## 2019-05-08 RX ADMIN — Medication 3 MILLILITER(S): at 05:43

## 2019-05-08 RX ADMIN — Medication 125 MILLILITER(S): at 15:50

## 2019-05-08 RX ADMIN — HYDROMORPHONE HYDROCHLORIDE 0.5 MILLIGRAM(S): 2 INJECTION INTRAMUSCULAR; INTRAVENOUS; SUBCUTANEOUS at 08:50

## 2019-05-08 RX ADMIN — CHLORHEXIDINE GLUCONATE 15 MILLILITER(S): 213 SOLUTION TOPICAL at 17:58

## 2019-05-08 RX ADMIN — FENTANYL CITRATE 50 MICROGRAM(S): 50 INJECTION INTRAVENOUS at 02:38

## 2019-05-08 RX ADMIN — INSULIN HUMAN 3 UNIT(S)/HR: 100 INJECTION, SOLUTION SUBCUTANEOUS at 23:57

## 2019-05-08 RX ADMIN — PANTOPRAZOLE SODIUM 40 MILLIGRAM(S): 20 TABLET, DELAYED RELEASE ORAL at 05:12

## 2019-05-08 RX ADMIN — Medication 3 MILLILITER(S): at 12:17

## 2019-05-08 RX ADMIN — Medication 1 DROP(S): at 15:54

## 2019-05-08 RX ADMIN — HEPARIN SODIUM 10 UNIT(S)/HR: 5000 INJECTION INTRAVENOUS; SUBCUTANEOUS at 08:30

## 2019-05-08 RX ADMIN — Medication 325 MILLIGRAM(S): at 12:09

## 2019-05-08 RX ADMIN — INSULIN HUMAN 3 UNIT(S)/HR: 100 INJECTION, SOLUTION SUBCUTANEOUS at 08:17

## 2019-05-08 RX ADMIN — HYDROMORPHONE HYDROCHLORIDE 0.5 MILLIGRAM(S): 2 INJECTION INTRAMUSCULAR; INTRAVENOUS; SUBCUTANEOUS at 21:05

## 2019-05-08 RX ADMIN — MEROPENEM 100 MILLIGRAM(S): 1 INJECTION INTRAVENOUS at 17:58

## 2019-05-08 NOTE — CONSULT NOTE ADULT - ASSESSMENT
79 yo   male with PMH of HLD, Sleep apnea ( non compliant with CPAP), GERD, diverticulitis, depression, anxiety, and panic attack, presented to [facility] on 04/25/19 for LHC after having  abnormal NST done as outpatient, LHC done same day revealed TVD. therefore underwent CABG on 04/26/19, hospital course complicated with fever, shock, no on pressors, abx, developed julio cesar hence nephrology consulted.

## 2019-05-08 NOTE — CONSULT NOTE ADULT - PROBLEM SELECTOR RECOMMENDATION 9
Pt with TARAH most likely hemodynamically mediated vs ischemic atn in the setting of anemia from blood loss, shock (on pressors), and sepsis. On review of labs in [facility], back in records pt had scr of 1.2 in 2016, 1.1 08/18 and 1.06 on 04/25/19 admission day. Scr remained stable until 05/02/19 when started to rise 1.3 mg/ld, progressively now 2.1. BUN also noted to be 111 today, Also noted Hb was 15.3 on 04/25/19 and dropped to 8 by 04/27/19. Has multiple UA showing some rbc, on 05/02/19 has granular, hyaline and epithelial cast.   UA repeated today showing 6-10 granula cast, high specific gravity, FENa OF 0.5% compatible with ATN(pre renal tarah), non oliguric, low albumin, suggest to give albumin every 8 hours and start 250cc of water through ng tube every 6 hours. Monitor I&O, daily weight, avoid nephrotoxics, adjust meds based on GFR, avoid hypotension, continue hemodynamic support, no indications for HD

## 2019-05-08 NOTE — CONSULT NOTE ADULT - SUBJECTIVE AND OBJECTIVE BOX
[facility] DIVISION OF KIDNEY DISEASES AND HYPERTENSION -- INITIAL CONSULT NOTE  --------------------------------------------------------------------------------  HPI:    77 yo   male with PMH of HLD, Sleep apnea ( non compliant with CPAP), GERD, diverticulitis, depression, anxiety, and panic attack, presented to [facility] on 04/25/19 for LHC after having  abnormal NST done as outpatient, LHC done same day revealed TVD. therefore underwent CABG on 04/26/19, however post op was complicated with bleeding causing Anemia, respiratory failure, shock requiring pressors and fever needing abx, s/p tracheostomy on 05/04/19 and Left VATS,  LLL wedge resection with 500 cc of blood loss, underwent Bronch/BAL with biopsy to r/o BOOP, started on steroids 1 g daily since 05/14/19. Nephrology consulted for TARAH and azotemia.     On review of labs in [facility], back in records pt had scr of 1.2 in 2016, 1.1 08/18 and 1.06 on 04/25/19 admission day. Scr remained stable until 05/02/19 when started to rise 1.3 mg/ld, progressively now 2.1. BUN also noted to be 111 today, Also noted Hb was 15.3 on 04/25/19 and dropped to 8 by 04/27/19. Has mutiple UA showing some rbc, on 05/02/19 has granular, hyaline and epithelial cast, on abx and antifungal.     During evaluation pt was somnolent, trach to vent, following simple commands, but not able to verbalized meaningful ros, on pressors.       PAST HISTORY  --------------------------------------------------------------------------------  PAST MEDICAL & SURGICAL HISTORY:  Diverticulitis  Nocturia  Panic attacks  Anxiety  Depression  Asthma: Controlled  Sleep Apnea  History of partial colectomy  History of colon resection  History of eye surgery: PPV right  Cataract extraction status: right  Status post lung surgery: ? wedge resection  S/P eye surgery: &quot;removed fluid&quot; from rigth eye  Bunion  Sinus Disorder: surgery x 2  Inguinal Hernia Bilateral  Shoulder Problem: right rotator cuff  Carpal Tunnel Syndrome: right hand    FAMILY HISTORY:  Family history of acute myocardial infarction (Sibling): mom at 54   mothers brother at 37    PAST SOCIAL HISTORY:    ALLERGIES & MEDICATIONS  --------------------------------------------------------------------------------  Allergies    No Known Allergies    Intolerances      Standing Inpatient Medications  ALBUTerol/ipratropium for Nebulization 3 milliLiter(s) Nebulizer every 6 hours  aspirin 325 milliGRAM(s) Oral daily  atorvastatin 40 milliGRAM(s) Oral at bedtime  chlorhexidine 0.12% Liquid 15 milliLiter(s) Oral Mucosa two times a day  chlorhexidine 4% Liquid 1 Application(s) Topical <User Schedule>  dexmedetomidine Infusion 0.7 MICROgram(s)/kG/Hr IV Continuous <Continuous>  escitalopram 10 milliGRAM(s) Oral daily  heparin  Infusion 1300 Unit(s)/Hr IV Continuous <Continuous>  insulin regular Infusion 3 Unit(s)/Hr IV Continuous <Continuous>  meropenem  IVPB      meropenem  IVPB 1000 milliGRAM(s) IV Intermittent every 12 hours  micafungin IVPB      micafungin IVPB 100 milliGRAM(s) IV Intermittent every 24 hours  norepinephrine Infusion 0.02 MICROgram(s)/kG/Min IV Continuous <Continuous>  pantoprazole  Injectable 40 milliGRAM(s) IV Push two times a day  sodium chloride 0.9%. 1000 milliLiter(s) IV Continuous <Continuous>  vasopressin Infusion 0.083 Unit(s)/Min IV Continuous <Continuous>    PRN Inpatient Medications  HYDROmorphone  Injectable 0.5 milliGRAM(s) IV Push every 8 hours PRN      REVIEW OF SYSTEMS  --------------------------------------------------------------------------------  Not able to obtain.     VITALS/PHYSICAL EXAM  --------------------------------------------------------------------------------  T(C): 36.1 (05-08-19 @ 08:00), Max: 38.3 (05-07-19 @ 16:30)  HR: 87 (05-08-19 @ 11:00) (68 - 111)  BP: --  RR: 16 (05-08-19 @ 11:00) (6 - 34)  SpO2: 94% (05-08-19 @ 11:00) (93% - 100%)  Wt(kg): --        05-07-19 @ 07:01  -  05-08-19 @ 07:00  --------------------------------------------------------  IN: 2261.8 mL / OUT: 1585 mL / NET: 676.8 mL    05-08-19 @ 07:01  -  05-08-19 @ 11:59  --------------------------------------------------------  IN: 552.3 mL / OUT: 195 mL / NET: 357.3 mL      Physical Exam:    	Gen: NAD,   	HEENT:  trach to vent, no bleeding.   	CV: RRR, S1S2; no rub              Lungs: Clear limited to anterior chest.   	Abd: +BS, soft, nontender/nondistended  	: De with clear urine.   	UE:no edema  	LE: +2 edema, mottle skin over feet, cold.     LABS/STUDIES  --------------------------------------------------------------------------------              8.4    10.1  >-----------<  285      [05-08-19 @ 01:14]              24.6     145  |  114  |  111  ----------------------------<  151      [05-08-19 @ 01:14]  4.4   |  19  |  2.16        Ca     8.3     [05-08-19 @ 01:14]      Mg     3.0     [05-08-19 @ 01:14]      Phos  5.9     [05-08-19 @ 01:14]    TPro  5.7  /  Alb  2.8  /  TBili  1.1  /  DBili  x   /  AST  86  /  ALT  80  /  AlkPhos  83  [05-08-19 @ 01:14]      PTT: 70.6       [05-08-19 @ 07:30]      Creatinine Trend:  SCr 2.16 [05-08 @ 01:14]  SCr 2.00 [05-07 @ 01:38]  SCr 1.67 [05-06 @ 01:30]  SCr 1.62 [05-05 @ 02:58]  SCr 1.68 [05-04 @ 00:56]    Urinalysis - [05-08-19 @ 11:22]      Color Yellow / Appearance Slightly Turbid / SG 1.024 / pH 5.5      Gluc Negative / Ketone Negative  / Bili Negative / Urobili Negative       Blood Moderate / Protein 30 mg/dL / Leuk Est Negative / Nitrite Negative      RBC  / WBC  / Hyaline  / Gran  / Sq Epi  / Non Sq Epi  / Bacteria       HbA1c 5.4      [04-25-19 @ 17:39]  TSH 1.48      [04-25-19 @ 17:58]  Lipid: chol 213, , HDL 38,       [04-25-19 @ 17:58]

## 2019-05-08 NOTE — PROGRESS NOTE ADULT - SUBJECTIVE AND OBJECTIVE BOX
infectious diseases progress note:    Patient is a 79y old  Male who presents with a chief complaint of cabg (07 May 2019 08:25)        Angina pectoris  Atherosclerosis of native coronary artery without angina pectoris             Allergies    No Known Allergies    Intolerances        ANTIBIOTICS/RELEVANT:  antimicrobials  meropenem  IVPB      meropenem  IVPB 1000 milliGRAM(s) IV Intermittent every 12 hours  micafungin IVPB      micafungin IVPB 100 milliGRAM(s) IV Intermittent every 24 hours    immunologic:    OTHER:  ALBUTerol/ipratropium for Nebulization 3 milliLiter(s) Nebulizer every 6 hours  aspirin 325 milliGRAM(s) Oral daily  atorvastatin 40 milliGRAM(s) Oral at bedtime  chlorhexidine 0.12% Liquid 15 milliLiter(s) Oral Mucosa two times a day  chlorhexidine 4% Liquid 1 Application(s) Topical <User Schedule>  dexmedetomidine Infusion 0.7 MICROgram(s)/kG/Hr IV Continuous <Continuous>  escitalopram 10 milliGRAM(s) Oral daily  heparin  Infusion 1300 Unit(s)/Hr IV Continuous <Continuous>  HYDROmorphone  Injectable 0.5 milliGRAM(s) IV Push every 8 hours PRN  insulin regular Infusion 3 Unit(s)/Hr IV Continuous <Continuous>  norepinephrine Infusion 0.02 MICROgram(s)/kG/Min IV Continuous <Continuous>  pantoprazole  Injectable 40 milliGRAM(s) IV Push two times a day  sodium chloride 0.9%. 1000 milliLiter(s) IV Continuous <Continuous>  vasopressin Infusion 0.083 Unit(s)/Min IV Continuous <Continuous>      Objective:  Vital Signs Last 24 Hrs  T(C): 36.1 (08 May 2019 08:00), Max: 38.3 (07 May 2019 16:30)  T(F): 97 (08 May 2019 08:00), Max: 100.9 (07 May 2019 16:30)  HR: 75 (08 May 2019 08:45) (68 - 111)  BP: --  BP(mean): --  RR: 13 (08 May 2019 08:45) (6 - 31)  SpO2: 100% (08 May 2019 08:45) (94% - 100%)    PHYSICAL EXAM:  Constitutional:Well-developed, well nourished--no acute distress  Eyes:RYLEY, EOMI  Ear/Nose/Throat: no oral lesion, no sinus tenderness on percussion	  Neck:no JVD, no lymphadenopathy, supple  Respiratory: CTA shagufta  Cardiovascular: S1S2 RRR, no murmurs  Gastrointestinal:soft, (+) BS, no HSM  Extremities:no e/e/c        LABS:                        8.4    10.1  )-----------( 285      ( 08 May 2019 01:14 )             24.6     05-08    145  |  114<H>  |  111<H>  ----------------------------<  151<H>  4.4   |  19<L>  |  2.16<H>    Ca    8.3<L>      08 May 2019 01:14  Phos  5.9     05-08  Mg     3.0     05-08    TPro  5.7<L>  /  Alb  2.8<L>  /  TBili  1.1  /  DBili  x   /  AST  86<H>  /  ALT  80<H>  /  AlkPhos  83  05-08    PTT - ( 08 May 2019 07:30 )  PTT:70.6 sec        MICROBIOLOGY:    RECENT CULTURES:  05-07 @ 11:39 .Sputum trap       No polymorphonuclear leukocytes per low power field  Rare Squamous epithelial cells per low power field  No organisms seen per oil power field             05-05 @ 06:05 .Blood   PCR    Growth in anaerobic bottle: Gram Positive Cocci in Clusters    Blood Culture PCR  Blood Culture PCR     Growth in anaerobic bottle: Gram Positive Cocci in Clusters  "Due to technical problems, Proteus sp. will Not be reported as part of  the BCID panel until further notice"  ***Blood Panel PCR results on this specimen are available  approximately 3 hours after the Gram stain result.***  Gram stain, PCR, and/or culture results may not always  correspond due to difference in methodologies.  ************************************************************  This PCR assay was performed using [employer].  The following targets are tested for: Enterococcus,  vancomycin resistant enterococci, Listeria monocytogenes,  coagulase negative staphylococci, S. aureus,  methicillin resistant S. aureus, Streptococcus agalactiae  (Group B), S. pneumoniae, S. pyogenes (Group A),  Acinetobacter baumannii, Enterobacter cloacae, E. coli,  Klebsiella oxytoca, K. pneumoniae, Proteus sp.,  Serratia marcescens, Haemophilus influenzae,  Neisseria meningitidis, Pseudomonas aeruginosa, Candida  albicans, C. glabrata, C krusei, C parapsilosis,  C. tropicalis and the KPC resistance gene.    05-05 @ 00:49 .Tissue       Few polymorphonuclear leukocytes seen per low power field  No organisms seen per oil power field           Testing in progress    05-05 @ 00:41 Bronch Wash       Numerous polymorphonuclear leukocytes seen per low power field  Rare Squamous epithelial cells seen per low power field  Moderate Yeast like cells seen per oil power field           Normal Respiratory Annamaria present    05-02 @ 11:30 .Blood                No growth at 5 days.          RESPIRATORY CULTURES:      Clostridium difficile G[facility] Toxins A&amp;B, EIA:   Negative (05-06 @ 15:24)  Clostridium difficile GDH Toxins A&amp;B, EIA:   Negative (05-02 @ 12:40)          RADIOLOGY & ADDITIONAL STUDIES:        Pager [phone]  After 5 pm/weekends or if no response :[phone]

## 2019-05-08 NOTE — CHART NOTE - NSCHARTNOTEFT_GEN_A_CORE
Nutrition Follow Up Note    Patient seen for: nutrition follow up     Source: RN, EMR, Previous RD notes    78 yo M with PMHx HLD, sleep apnea, GERD, diverticulitis, depression, anxiety, panic attacks with chest pain and SOB x 1 year. Admitted for CABG. S/p cardiac cath , found to have CAD, LVH and diastolic HF, S/p CABGx3 with LIMA to LAD; RSVG to OM, RCA . Hospital course c/b anemia/hypovolemia-shock, cardiovascular dysfunction, acidosis, and hyperglycemia. S/p tracheostomy on 2019 and left VATS, LLL wedge resection, underwent Bronch/BAL with biopsy to r/o BOOP. Pt with TARAH and azotemia, on pressors, no longer on Propofol.     Diet : Vital AF 1.2 @ 55mL/hr x 24 hours + 1 no carb Prosource to provide 1320 fl oz, 1644 kcal/day (21 kcal/kg), 114 gm protein (1.4 gm/kg) based on dosing wt 97.5 kg  Danactive 2x/day    RN reports pt with loose stools through rectal tube (noted Cdiff negative). Pt given Banatrol and Danactive this AM. Noted renal labs.    EN nutrition:  : 825 mL thus far  : 1320 mL (100%)  6: 1320 mL (100%)  : 1320 mL (100%)    Rectal tube output:   : 100 mL  : 600 mL    Daily Weight in k.7 (-), Weight in k.3 (), Weight in k.9 (), Weight in k.3 (-05), Weight in k (-), Weight in k.7 ()  Weight fluctuations likely secondary to fluid, noted pt with edema     Pertinent Medications: MEDICATIONS  (STANDING):  ALBUTerol/ipratropium for Nebulization 3 milliLiter(s) Nebulizer every 6 hours  aspirin 325 milliGRAM(s) Oral daily  atorvastatin 40 milliGRAM(s) Oral at bedtime  chlorhexidine 0.12% Liquid 15 milliLiter(s) Oral Mucosa two times a day  chlorhexidine 4% Liquid 1 Application(s) Topical <User Schedule>  dexmedetomidine Infusion 0.7 MICROgram(s)/kG/Hr (13.912 mL/Hr) IV Continuous <Continuous>  escitalopram 10 milliGRAM(s) Oral daily  heparin  Infusion 1300 Unit(s)/Hr (10 mL/Hr) IV Continuous <Continuous>  insulin regular Infusion 3 Unit(s)/Hr (3 mL/Hr) IV Continuous <Continuous>  meropenem  IVPB      meropenem  IVPB 1000 milliGRAM(s) IV Intermittent every 12 hours  micafungin IVPB      micafungin IVPB 100 milliGRAM(s) IV Intermittent every 24 hours  norepinephrine Infusion 0.02 MICROgram(s)/kG/Min (3 mL/Hr) IV Continuous <Continuous>  pantoprazole  Injectable 40 milliGRAM(s) IV Push two times a day  sodium chloride 0.9%. 1000 milliLiter(s) (10 mL/Hr) IV Continuous <Continuous>  vasopressin Infusion 0.083 Unit(s)/Min (5 mL/Hr) IV Continuous <Continuous>    MEDICATIONS  (PRN):  HYDROmorphone  Injectable 0.5 milliGRAM(s) IV Push every 8 hours PRN Severe Pain (7 - 10)    Pertinent Labs:  @ 01:14: Na 145, <H>, Cr 2.16<H>, <H>, K+ 4.4, Phos 5.9<H>, Mg 3.0<H>, Alk Phos 83, ALT/SGPT 80<H>, AST/SGOT 86<H>, HbA1c --    Finger Sticks:  POCT Blood Glucose.: 135 mg/dL ( @ 13:58)  POCT Blood Glucose.: 134 mg/dL ( @ 13:24)  POCT Blood Glucose.: 150 mg/dL ( @ 10:48)  POCT Blood Glucose.: 158 mg/dL ( @ 10:20)  POCT Blood Glucose.: 132 mg/dL ( @ 09:10)  POCT Blood Glucose.: 159 mg/dL ( @ 08:01)  POCT Blood Glucose.: 187 mg/dL ( @ 07:23)  POCT Blood Glucose.: 153 mg/dL ( @ 06:48)  POCT Blood Glucose.: 169 mg/dL ( @ 05:51)  POCT Blood Glucose.: 164 mg/dL ( @ 04:25)  POCT Blood Glucose.: 155 mg/dL ( @ 02:44)  POCT Blood Glucose.: 162 mg/dL ( @ 02:17)  POCT Blood Glucose.: 157 mg/dL ( @ 01:10)  POCT Blood Glucose.: 142 mg/dL ( @ 22:57)  POCT Blood Glucose.: 139 mg/dL ( @ 22:00)  POCT Blood Glucose.: 111 mg/dL ( @ 19:55)  POCT Blood Glucose.: 122 mg/dL ( @ 19:03)  POCT Blood Glucose.: 119 mg/dL ( @ 17:24)  POCT Blood Glucose.: 135 mg/dL ( @ 15:52)  POCT Blood Glucose.: 129 mg/dL ( @ 14:59)      Skin per nursing documentation: intact of pressure injuries  Edema:  2+, left foot; right foot, skin intact    Estimated Needs:   [ x ] no change since previous assessment  [ ] recalculated:     Previous Nutrition Diagnosis: Increased nutrient needs; nutrient related knowledge deficit  Nutrition Diagnosis is: Ongoing, being addressed with EN      Interventions:     Recommend  1) Continue with Vital AF 1.2 @ 55mL/hr x 24 hours + 1 no carb Prosource to provide 1320 fl oz, 1644 kcal/day (21 kcal/kg), 114 gm protein (1.4 gm/kg) based on dosing wt 97.5 kg  3) Provide Danactive 2x/day  3) Monitor renal indices, BG, electrolytes    Monitoring and Evaluation:     Continue to monitor Nutritional intake, Tolerance to diet prescription, weights, labs, skin integrity    RD remains available upon request and will follow up per protocol

## 2019-05-08 NOTE — PROGRESS NOTE ADULT - ASSESSMENT
78 yr old with ARCENIO admitted for chest pain and underwent a CABG last last week   Post op has been stable but is still intubated and became febrile last 24hrs.   No evidence of wd infection, alot of sputum and possible LLL infiltrate underwent Bronch/BAL with biopsy to r/o BOOP, started on steroids      meropenem  1 q 8 hr day 4         fever down mostly but still with low grade  wbc still elevated               continue meropenem for pna / VAP  started on micafungin based upon yeast seen in bronch specimen- but usually that is representative of oral lefty. Discussed with CTU team and to be continued for now.  continue steroids   I am concerned about the ongoing fever despite the steroids  discussed with Dr. Milner  bronch is negative awaiting lung biopsy results  no improvement with either meropenem or micafungin      Would consider stopping meropenem  has had adequate trial

## 2019-05-08 NOTE — PROGRESS NOTE ADULT - SUBJECTIVE AND OBJECTIVE BOX
CRITICAL CARE ATTENDING - CTICU    MEDICATIONS  (STANDING):  ALBUTerol/ipratropium for Nebulization 3 milliLiter(s) Nebulizer every 6 hours  aspirin 325 milliGRAM(s) Oral daily  atorvastatin 40 milliGRAM(s) Oral at bedtime  chlorhexidine 0.12% Liquid 15 milliLiter(s) Oral Mucosa two times a day  chlorhexidine 4% Liquid 1 Application(s) Topical <User Schedule>  dexmedetomidine Infusion 0.7 MICROgram(s)/kG/Hr (13.912 mL/Hr) IV Continuous <Continuous>  escitalopram 10 milliGRAM(s) Oral daily  heparin  Infusion 1300 Unit(s)/Hr (10 mL/Hr) IV Continuous <Continuous>  insulin regular Infusion 3 Unit(s)/Hr (3 mL/Hr) IV Continuous <Continuous>  meropenem  IVPB      meropenem  IVPB 1000 milliGRAM(s) IV Intermittent every 12 hours  micafungin IVPB      micafungin IVPB 100 milliGRAM(s) IV Intermittent every 24 hours  norepinephrine Infusion 0.02 MICROgram(s)/kG/Min (3 mL/Hr) IV Continuous <Continuous>  pantoprazole  Injectable 40 milliGRAM(s) IV Push two times a day  sodium chloride 0.9%. 1000 milliLiter(s) (10 mL/Hr) IV Continuous <Continuous>  vasopressin Infusion 0.083 Unit(s)/Min (5 mL/Hr) IV Continuous <Continuous>                                    8.4    10.1  )-----------( 285      ( 08 May 2019 01:14 )             24.6       05-08    145  |  114<H>  |  111<H>  ----------------------------<  151<H>  4.4   |  19<L>  |  2.16<H>    Ca    8.3<L>      08 May 2019 01:14  Phos  5.9     05-08  Mg     3.0     05-08    TPro  5.7<L>  /  Alb  2.8<L>  /  TBili  1.1  /  DBili  x   /  AST  86<H>  /  ALT  80<H>  /  AlkPhos  83  05-08      PTT - ( 08 May 2019 07:30 )  PTT:70.6 sec    Mode: trach collar  FiO2: 50      Daily     Daily Weight in k.7 (08 May 2019 00:00)       @ 07:01  -   @ 07:00  --------------------------------------------------------  IN: 2261.8 mL / OUT: 1585 mL / NET: 676.8 mL     @ 07: @ 12:07  --------------------------------------------------------  IN: 552.3 mL / OUT: 195 mL / NET: 357.3 mL        Critically Ill patient  : [ ] preoperative ,   [ x] post operative    Requires :  [x ] Arterial Line   [x] Central Line  [ ] PA catheter  [ ] IABP  [ ] ECMO  [ ] LVAD  [ x Ventilator  [ ] pacemaker [ ] Impella.    Bedside evaluation , monitoring , treatment of hemodynamics , fluids , IVP/ IVCD meds.        Diagnosis:     POD  - CABG X 3 L    Post Op Aspiration    respiratory failure - Tracheostomy     Requires chest PT, pulmonary toilet, ambu bagging, suctioning to maintain SaO2,  patent airway and treat atelectasis.     Ventilator Management:  [x ]AC-rest    [x ]CPAP-PS Wean    [ ]Trach Collar     [ ]Extubate    [ ] T-Piece  [ ]peep>5     Difficult weaning process - multiple organ system involvement in critically ill patient     r/o RML / RLL pneumonia    r/o BOOP    CHF- acute [x ]   chronic [x ]    systolic [ ]   diatolic [x ]          - Echo- EF -    70%         [ ] RV dysfunction          - Cxr-cardiomegally, edema          - Clinical-  [ ]inotropes   [x ]pressors   [x ]diuresis   [ ]IABP   [ ]ECMO   [ ]LVAD   [x ]Respiratory Failure    Hemodynamic lability,instability. Requires IVCD [ x] vasopressors [ ] inotropes  [ ] vasodilator  [ ]IVSS fluid  to maintain MAP, perfusion, C.I.     Await lung Bx results    fluid overload     Tolerates NG / NJ feeds at [x ] goal rate    [ ] trophic rate    [ ]       rate      Renal Failure - Acute Kidney Injury     IVCD anticoagulation with [ x] Heparin  [ ] Argatroban for A Fib    IVCD insulin                            -                     Discussed with CT surgeon, Physician's Assistant - Nurse Practitioner- Critical care medicine team.   Dicussed at  AM / PM rounds.   Chart, labs , films reviewed.    Total Time: 30 min

## 2019-05-09 DIAGNOSIS — E87.0 HYPEROSMOLALITY AND HYPERNATREMIA: ICD-10-CM

## 2019-05-09 LAB
ALBUMIN SERPL ELPH-MCNC: 2.6 G/DL — LOW (ref 3.3–5)
ALP SERPL-CCNC: 73 U/L — SIGNIFICANT CHANGE UP (ref 40–120)
ALT FLD-CCNC: 64 U/L — HIGH (ref 10–45)
ANION GAP SERPL CALC-SCNC: 16 MMOL/L — SIGNIFICANT CHANGE UP (ref 5–17)
APTT BLD: 55.1 SEC — HIGH (ref 27.5–36.3)
AST SERPL-CCNC: 52 U/L — HIGH (ref 10–40)
BILIRUB SERPL-MCNC: 1 MG/DL — SIGNIFICANT CHANGE UP (ref 0.2–1.2)
BUN SERPL-MCNC: 127 MG/DL — HIGH (ref 7–23)
CALCIUM SERPL-MCNC: 7.9 MG/DL — LOW (ref 8.4–10.5)
CHLORIDE SERPL-SCNC: 116 MMOL/L — HIGH (ref 96–108)
CO2 SERPL-SCNC: 18 MMOL/L — LOW (ref 22–31)
CREAT SERPL-MCNC: 2.28 MG/DL — HIGH (ref 0.5–1.3)
CULTURE RESULTS: SIGNIFICANT CHANGE UP
CULTURE RESULTS: SIGNIFICANT CHANGE UP
GAS PNL BLDA: SIGNIFICANT CHANGE UP
GLUCOSE BLDC GLUCOMTR-MCNC: 100 MG/DL — HIGH (ref 70–99)
GLUCOSE BLDC GLUCOMTR-MCNC: 106 MG/DL — HIGH (ref 70–99)
GLUCOSE BLDC GLUCOMTR-MCNC: 116 MG/DL — HIGH (ref 70–99)
GLUCOSE BLDC GLUCOMTR-MCNC: 122 MG/DL — HIGH (ref 70–99)
GLUCOSE BLDC GLUCOMTR-MCNC: 124 MG/DL — HIGH (ref 70–99)
GLUCOSE BLDC GLUCOMTR-MCNC: 129 MG/DL — HIGH (ref 70–99)
GLUCOSE BLDC GLUCOMTR-MCNC: 130 MG/DL — HIGH (ref 70–99)
GLUCOSE BLDC GLUCOMTR-MCNC: 130 MG/DL — HIGH (ref 70–99)
GLUCOSE BLDC GLUCOMTR-MCNC: 133 MG/DL — HIGH (ref 70–99)
GLUCOSE BLDC GLUCOMTR-MCNC: 133 MG/DL — HIGH (ref 70–99)
GLUCOSE BLDC GLUCOMTR-MCNC: 134 MG/DL — HIGH (ref 70–99)
GLUCOSE BLDC GLUCOMTR-MCNC: 136 MG/DL — HIGH (ref 70–99)
GLUCOSE BLDC GLUCOMTR-MCNC: 139 MG/DL — HIGH (ref 70–99)
GLUCOSE BLDC GLUCOMTR-MCNC: 143 MG/DL — HIGH (ref 70–99)
GLUCOSE BLDC GLUCOMTR-MCNC: 148 MG/DL — HIGH (ref 70–99)
GLUCOSE BLDC GLUCOMTR-MCNC: 150 MG/DL — HIGH (ref 70–99)
GLUCOSE BLDC GLUCOMTR-MCNC: 168 MG/DL — HIGH (ref 70–99)
GLUCOSE BLDC GLUCOMTR-MCNC: 176 MG/DL — HIGH (ref 70–99)
GLUCOSE BLDC GLUCOMTR-MCNC: 189 MG/DL — HIGH (ref 70–99)
GLUCOSE SERPL-MCNC: 176 MG/DL — HIGH (ref 70–99)
HCT VFR BLD CALC: 23 % — LOW (ref 39–50)
HGB BLD-MCNC: 7.8 G/DL — LOW (ref 13–17)
MAGNESIUM SERPL-MCNC: 3 MG/DL — HIGH (ref 1.6–2.6)
MCHC RBC-ENTMCNC: 30.4 PG — SIGNIFICANT CHANGE UP (ref 27–34)
MCHC RBC-ENTMCNC: 33.8 GM/DL — SIGNIFICANT CHANGE UP (ref 32–36)
MCV RBC AUTO: 90.1 FL — SIGNIFICANT CHANGE UP (ref 80–100)
PHOSPHATE SERPL-MCNC: 6.3 MG/DL — HIGH (ref 2.5–4.5)
PLATELET # BLD AUTO: 247 K/UL — SIGNIFICANT CHANGE UP (ref 150–400)
POTASSIUM SERPL-MCNC: 4.6 MMOL/L — SIGNIFICANT CHANGE UP (ref 3.5–5.3)
POTASSIUM SERPL-SCNC: 4.6 MMOL/L — SIGNIFICANT CHANGE UP (ref 3.5–5.3)
PROT SERPL-MCNC: 5.5 G/DL — LOW (ref 6–8.3)
RBC # BLD: 2.56 M/UL — LOW (ref 4.2–5.8)
RBC # FLD: 14 % — SIGNIFICANT CHANGE UP (ref 10.3–14.5)
SODIUM SERPL-SCNC: 150 MMOL/L — HIGH (ref 135–145)
SPECIMEN SOURCE: SIGNIFICANT CHANGE UP
SPECIMEN SOURCE: SIGNIFICANT CHANGE UP
WBC # BLD: 9.5 K/UL — SIGNIFICANT CHANGE UP (ref 3.8–10.5)
WBC # FLD AUTO: 9.5 K/UL — SIGNIFICANT CHANGE UP (ref 3.8–10.5)

## 2019-05-09 PROCEDURE — 99291 CRITICAL CARE FIRST HOUR: CPT

## 2019-05-09 PROCEDURE — 99233 SBSQ HOSP IP/OBS HIGH 50: CPT | Mod: GC

## 2019-05-09 PROCEDURE — 99232 SBSQ HOSP IP/OBS MODERATE 35: CPT

## 2019-05-09 PROCEDURE — 71045 X-RAY EXAM CHEST 1 VIEW: CPT | Mod: 26

## 2019-05-09 RX ORDER — FUROSEMIDE 40 MG
40 TABLET ORAL EVERY 12 HOURS
Refills: 0 | Status: DISCONTINUED | OUTPATIENT
Start: 2019-05-09 | End: 2019-05-10

## 2019-05-09 RX ORDER — HYDROMORPHONE HYDROCHLORIDE 2 MG/ML
0.5 INJECTION INTRAMUSCULAR; INTRAVENOUS; SUBCUTANEOUS ONCE
Refills: 0 | Status: DISCONTINUED | OUTPATIENT
Start: 2019-05-09 | End: 2019-05-09

## 2019-05-09 RX ORDER — CALCIUM GLUCONATE 100 MG/ML
1 VIAL (ML) INTRAVENOUS ONCE
Refills: 0 | Status: COMPLETED | OUTPATIENT
Start: 2019-05-09 | End: 2019-05-09

## 2019-05-09 RX ADMIN — HYDROMORPHONE HYDROCHLORIDE 0.5 MILLIGRAM(S): 2 INJECTION INTRAMUSCULAR; INTRAVENOUS; SUBCUTANEOUS at 06:45

## 2019-05-09 RX ADMIN — Medication 3 MILLILITER(S): at 12:10

## 2019-05-09 RX ADMIN — ESCITALOPRAM OXALATE 10 MILLIGRAM(S): 10 TABLET, FILM COATED ORAL at 12:27

## 2019-05-09 RX ADMIN — VASOPRESSIN 5 UNIT(S)/MIN: 20 INJECTION INTRAVENOUS at 03:06

## 2019-05-09 RX ADMIN — Medication 125 MILLIGRAM(S): at 23:13

## 2019-05-09 RX ADMIN — Medication 325 MILLIGRAM(S): at 12:25

## 2019-05-09 RX ADMIN — HYDROMORPHONE HYDROCHLORIDE 0.5 MILLIGRAM(S): 2 INJECTION INTRAMUSCULAR; INTRAVENOUS; SUBCUTANEOUS at 06:30

## 2019-05-09 RX ADMIN — Medication 3 MICROGRAM(S)/KG/MIN: at 19:41

## 2019-05-09 RX ADMIN — Medication 125 MILLIGRAM(S): at 14:51

## 2019-05-09 RX ADMIN — Medication 3 MILLILITER(S): at 17:59

## 2019-05-09 RX ADMIN — Medication 3 MILLILITER(S): at 23:52

## 2019-05-09 RX ADMIN — DEXMEDETOMIDINE HYDROCHLORIDE IN 0.9% SODIUM CHLORIDE 13.91 MICROGRAM(S)/KG/HR: 4 INJECTION INTRAVENOUS at 03:06

## 2019-05-09 RX ADMIN — PANTOPRAZOLE SODIUM 40 MILLIGRAM(S): 20 TABLET, DELAYED RELEASE ORAL at 17:02

## 2019-05-09 RX ADMIN — HYDROMORPHONE HYDROCHLORIDE 0.5 MILLIGRAM(S): 2 INJECTION INTRAMUSCULAR; INTRAVENOUS; SUBCUTANEOUS at 12:00

## 2019-05-09 RX ADMIN — Medication 3 MICROGRAM(S)/KG/MIN: at 03:05

## 2019-05-09 RX ADMIN — MEROPENEM 100 MILLIGRAM(S): 1 INJECTION INTRAVENOUS at 05:57

## 2019-05-09 RX ADMIN — CHLORHEXIDINE GLUCONATE 1 APPLICATION(S): 213 SOLUTION TOPICAL at 00:00

## 2019-05-09 RX ADMIN — VASOPRESSIN 5 UNIT(S)/MIN: 20 INJECTION INTRAVENOUS at 19:41

## 2019-05-09 RX ADMIN — HYDROMORPHONE HYDROCHLORIDE 0.5 MILLIGRAM(S): 2 INJECTION INTRAMUSCULAR; INTRAVENOUS; SUBCUTANEOUS at 20:00

## 2019-05-09 RX ADMIN — INSULIN HUMAN 3 UNIT(S)/HR: 100 INJECTION, SOLUTION SUBCUTANEOUS at 19:41

## 2019-05-09 RX ADMIN — Medication 40 MILLIGRAM(S): at 14:40

## 2019-05-09 RX ADMIN — HYDROMORPHONE HYDROCHLORIDE 0.5 MILLIGRAM(S): 2 INJECTION INTRAMUSCULAR; INTRAVENOUS; SUBCUTANEOUS at 15:50

## 2019-05-09 RX ADMIN — CHLORHEXIDINE GLUCONATE 15 MILLILITER(S): 213 SOLUTION TOPICAL at 17:02

## 2019-05-09 RX ADMIN — MICAFUNGIN SODIUM 105 MILLIGRAM(S): 100 INJECTION, POWDER, LYOPHILIZED, FOR SOLUTION INTRAVENOUS at 09:35

## 2019-05-09 RX ADMIN — FENTANYL CITRATE 25 MICROGRAM(S): 50 INJECTION INTRAVENOUS at 00:00

## 2019-05-09 RX ADMIN — HYDROMORPHONE HYDROCHLORIDE 0.5 MILLIGRAM(S): 2 INJECTION INTRAMUSCULAR; INTRAVENOUS; SUBCUTANEOUS at 16:05

## 2019-05-09 RX ADMIN — HEPARIN SODIUM 10 UNIT(S)/HR: 5000 INJECTION INTRAVENOUS; SUBCUTANEOUS at 19:40

## 2019-05-09 RX ADMIN — HYDROMORPHONE HYDROCHLORIDE 0.5 MILLIGRAM(S): 2 INJECTION INTRAMUSCULAR; INTRAVENOUS; SUBCUTANEOUS at 12:15

## 2019-05-09 RX ADMIN — FENTANYL CITRATE 25 MICROGRAM(S): 50 INJECTION INTRAVENOUS at 00:15

## 2019-05-09 RX ADMIN — Medication 200 GRAM(S): at 23:20

## 2019-05-09 RX ADMIN — HYDROMORPHONE HYDROCHLORIDE 0.5 MILLIGRAM(S): 2 INJECTION INTRAMUSCULAR; INTRAVENOUS; SUBCUTANEOUS at 19:45

## 2019-05-09 RX ADMIN — Medication 3 MILLILITER(S): at 05:54

## 2019-05-09 RX ADMIN — ATORVASTATIN CALCIUM 40 MILLIGRAM(S): 80 TABLET, FILM COATED ORAL at 22:49

## 2019-05-09 RX ADMIN — PANTOPRAZOLE SODIUM 40 MILLIGRAM(S): 20 TABLET, DELAYED RELEASE ORAL at 05:57

## 2019-05-09 RX ADMIN — CHLORHEXIDINE GLUCONATE 15 MILLILITER(S): 213 SOLUTION TOPICAL at 05:56

## 2019-05-09 NOTE — PROGRESS NOTE ADULT - SUBJECTIVE AND OBJECTIVE BOX
[facility] Division of Kidney Diseases & Hypertension  FOLLOW UP NOTE  --------------------------------------------------------------------------------  Chief Complaint: CABG    24 hour events/subjective: Patient was seen and examined this morning at bedside.  He has an NG tube and trach.  He is awake but seems lethargic.  Unable to offer additional review of systems.        PAST HISTORY  --------------------------------------------------------------------------------  No significant changes to PMH, PSH, FHx, SHx, unless otherwise noted    ALLERGIES & MEDICATIONS  --------------------------------------------------------------------------------  Allergies    No Known Allergies    Intolerances      Standing Inpatient Medications  ALBUTerol/ipratropium for Nebulization 3 milliLiter(s) Nebulizer every 6 hours  aspirin 325 milliGRAM(s) Oral daily  atorvastatin 40 milliGRAM(s) Oral at bedtime  chlorhexidine 0.12% Liquid 15 milliLiter(s) Oral Mucosa two times a day  chlorhexidine 4% Liquid 1 Application(s) Topical <User Schedule>  dexmedetomidine Infusion 0.7 MICROgram(s)/kG/Hr IV Continuous <Continuous>  escitalopram 10 milliGRAM(s) Oral daily  heparin  Infusion 1300 Unit(s)/Hr IV Continuous <Continuous>  insulin regular Infusion 3 Unit(s)/Hr IV Continuous <Continuous>  micafungin IVPB      micafungin IVPB 100 milliGRAM(s) IV Intermittent every 24 hours  norepinephrine Infusion 0.02 MICROgram(s)/kG/Min IV Continuous <Continuous>  pantoprazole  Injectable 40 milliGRAM(s) IV Push two times a day  sodium chloride 0.9%. 1000 milliLiter(s) IV Continuous <Continuous>  vasopressin Infusion 0.083 Unit(s)/Min IV Continuous <Continuous>    PRN Inpatient Medications  HYDROmorphone  Injectable 0.5 milliGRAM(s) IV Push every 8 hours PRN      REVIEW OF SYSTEMS  --------------------------------------------------------------------------------  unable to obtain    VITALS/PHYSICAL EXAM  --------------------------------------------------------------------------------  T(C): 37.7 (05-09-19 @ 08:00), Max: 38.1 (05-09-19 @ 06:00)  HR: 79 (05-09-19 @ 08:00) (73 - 102)  ABP: 114/52 (05-09-19 @ 08:00) (85/39 - 127/60)  ABP(mean): 73 (05-09-19 @ 08:00) (52 - 119)  RR: 18 (05-09-19 @ 08:00) (8 - 34)  SpO2: 100% (05-09-19 @ 08:00) (93% - 100%)  CVP(mm Hg): 15 (05-09-19 @ 08:00) (9 - 33)        05-08-19 @ 07:01  -  05-09-19 @ 07:00  --------------------------------------------------------  IN: 3221.8 mL / OUT: 1350 mL / NET: 1871.8 mL      Physical Exam:    	Gen: lethargic with tubes and lines in place  	HEENT: anicteric  	Pulm: course bilateral breath sounds  	CV: RRR, S1S2; no rub  	Abd: distended apparently non tender rectal tube  	: osborne   	LE: slight edema  	Psych: lethargic  	Skin: cool to touch    LABS/STUDIES  --------------------------------------------------------------------------------              7.8    9.5   >-----------<  247      [05-09-19 @ 02:18]              23.0     150  |  116  |  127  ----------------------------<  176      [05-09-19 @ 02:18]  4.6   |  18  |  2.28        Ca     7.9     [05-09-19 @ 02:18]      Mg     3.0     [05-09-19 @ 02:18]      Phos  6.3     [05-09-19 @ 02:18]    TPro  5.5  /  Alb  2.6  /  TBili  1.0  /  DBili  x   /  AST  52  /  ALT  64  /  AlkPhos  73  [05-09-19 @ 02:18]      PTT: 55.1       [05-09-19 @ 02:18]      Creatinine Trend:  SCr 2.28 [05-09 @ 02:18]  SCr 2.16 [05-08 @ 01:14]  SCr 2.00 [05-07 @ 01:38]  SCr 1.67 [05-06 @ 01:30]  SCr 1.62 [05-05 @ 02:58]    Urinalysis - [05-08-19 @ 11:22]      Color Yellow / Appearance Slightly Turbid / SG 1.024 / pH 5.5      Gluc Negative / Ketone Negative  / Bili Negative / Urobili Negative       Blood Moderate / Protein 30 mg/dL / Leuk Est Negative / Nitrite Negative      RBC 25 / WBC 3-5 / Hyaline Negative / Gran 6-10 / Sq Epi  / Non Sq Epi 6 / Bacteria Negative    Urine Creatinine 70      [05-08-19 @ 11:28]  Urine Protein 45      [05-08-19 @ 11:28]  Urine Sodium 26      [05-08-19 @ 11:28]  Urine Urea Nitrogen 1191      [05-08-19 @ 14:02]  Urine Potassium 71      [05-08-19 @ 11:28]

## 2019-05-09 NOTE — PROGRESS NOTE ADULT - SUBJECTIVE AND OBJECTIVE BOX
infectious diseases progress note:    Patient is a 79y old  Male who presents with a chief complaint of fever (08 May 2019 08:57)        Angina pectoris  Atherosclerosis of native coronary artery without angina pectoris             Allergies    No Known Allergies    Intolerances        ANTIBIOTICS/RELEVANT:  antimicrobials  micafungin IVPB      micafungin IVPB 100 milliGRAM(s) IV Intermittent every 24 hours    immunologic:    OTHER:  ALBUTerol/ipratropium for Nebulization 3 milliLiter(s) Nebulizer every 6 hours  aspirin 325 milliGRAM(s) Oral daily  atorvastatin 40 milliGRAM(s) Oral at bedtime  chlorhexidine 0.12% Liquid 15 milliLiter(s) Oral Mucosa two times a day  chlorhexidine 4% Liquid 1 Application(s) Topical <User Schedule>  dexmedetomidine Infusion 0.7 MICROgram(s)/kG/Hr IV Continuous <Continuous>  escitalopram 10 milliGRAM(s) Oral daily  heparin  Infusion 1300 Unit(s)/Hr IV Continuous <Continuous>  HYDROmorphone  Injectable 0.5 milliGRAM(s) IV Push every 8 hours PRN  insulin regular Infusion 3 Unit(s)/Hr IV Continuous <Continuous>  norepinephrine Infusion 0.02 MICROgram(s)/kG/Min IV Continuous <Continuous>  pantoprazole  Injectable 40 milliGRAM(s) IV Push two times a day  sodium chloride 0.9%. 1000 milliLiter(s) IV Continuous <Continuous>  vasopressin Infusion 0.083 Unit(s)/Min IV Continuous <Continuous>      Objective:  Vital Signs Last 24 Hrs  T(C): 37.7 (09 May 2019 08:00), Max: 38.1 (09 May 2019 06:00)  T(F): 99.9 (09 May 2019 08:00), Max: 100.6 (09 May 2019 06:00)  HR: 79 (09 May 2019 08:00) (73 - 102)  BP: --  BP(mean): --  RR: 18 (09 May 2019 08:00) (8 - 34)  SpO2: 100% (09 May 2019 08:00) (93% - 100%)    PHYSICAL EXAM:     Eyes:RYLEY, EOMI  Ear/Nose/Throat: no oral lesion, no sinus tenderness on percussion	  Neck:no JVD, no lymphadenopathy, supple  Respiratory: CTA shagufta  Cardiovascular: S1S2 RRR, no murmurs  Gastrointestinal:soft, (+) BS, no HSM  Extremities:no e/e/c        LABS:                        7.8    9.5   )-----------( 247      ( 09 May 2019 02:18 )             23.0         150<H>  |  116<H>  |  127<H>  ----------------------------<  176<H>  4.6   |  18<L>  |  2.28<H>    Ca    7.9<L>      09 May 2019 02:18  Phos  6.3       Mg     3.0         TPro  5.5<L>  /  Alb  2.6<L>  /  TBili  1.0  /  DBili  x   /  AST  52<H>  /  ALT  64<H>  /  AlkPhos  73      PTT - ( 09 May 2019 02:18 )  PTT:55.1 sec  Urinalysis Basic - ( 08 May 2019 11:22 )    Color: Yellow / Appearance: Slightly Turbid / S.024 / pH: x  Gluc: x / Ketone: Negative  / Bili: Negative / Urobili: Negative   Blood: x / Protein: 30 mg/dL / Nitrite: Negative   Leuk Esterase: Negative / RBC: 25 /hpf / WBC 3-5   Sq Epi: x / Non Sq Epi: 6 / Bacteria: Negative          MICROBIOLOGY:    RECENT CULTURES:   @ 11:39 .Sputum trap       No polymorphonuclear leukocytes per low power field  Rare Squamous epithelial cells per low power field  No organisms seen per oil power field           Normal Respiratory Annamaria present     @ 11:32 .Blood                No growth to date.     @ 06:05 .Blood   PCR    Growth in anaerobic bottle: Gram Positive Cocci in Clusters    Blood Culture PCR  Blood Culture PCR     Growth in anaerobic bottle: Coag Negative Staphylococcus  Single set isolate, possible contaminant. Contact  Microbiology if susceptibility testing clinically  indicated.  "Due to technical problems, Proteus sp. will Not be reported as part of  theBCID panel until further notice"  ***Blood Panel PCR results on this specimen are available  approximately 3 hours after the Gram stain result.***  Gram stain, PCR, and/or culture results may not always  correspond due to difference in methodologies.  ************************************************************  This PCR assay was performed using [employer].  The following targets are tested for: Enterococcus,  vancomycin resistant enterococci, Listeria monocytogenes,  coagulase negative staphylococci, S. aureus,  methicillin resistant S. aureus, Streptococcus agalactiae  (Group B), S. pneumoniae, S. pyogenes (Group A),  Acinetobacter baumannii, Enterobacter cloacae, E. coli,  Klebsiella oxytoca, K. pneumoniae, Proteus sp.,  Serratia marcescens, Haemophilus influenzae,  Neisseria meningitidis, Pseudomonas aeruginosa, Candida  albicans, C. glabrata, C krusei, C parapsilosis,  C. tropicalis and the KPC resistance gene.     @ 00:49 .Tissue       Few polymorphonuclear leukocytes seen per low power field  No organisms seen per oil power field           Testing in progress     @ 00:41 Bronch Wash       Numerous polymorphonuclear leukocytes seen per low power field  Rare Squamous epithelial cells seen per low power field  Moderate Yeast like cells seen per oil power field           Normal Respiratory Annamaria present     @ 11:30 .Blood                No growth at 5 days.          RESPIRATORY CULTURES:      Clostridium difficile [facility] Toxins A&amp;B, EIA:   Negative ( @ 15:24)  Clostridium difficile [facility]DH Toxins A&amp;B, EIA:   Negative ( @ 12:40)          RADIOLOGY & ADDITIONAL STUDIES:        Pager [phone]  After 5 pm/weekends or if no response :[phone]

## 2019-05-09 NOTE — OCCUPATIONAL THERAPY INITIAL EVALUATION ADULT - ADDITIONAL COMMENTS
Xray (5/9): Bilateral lower lobe opacities suspicious for pneumonia. ECG (5/2): ATRIAL FLUTTER WITH VARIABLE A-V BLOCKLEFT AXIS DEVIATION INFERIOR INFARCT , AGE UNDETERMINED ANTEROSEPTAL INFARCT , AGE UNDETERMINED ABNORMAL ECG Xray (5/9): Bilateral lower lobe opacities suspicious for pneumonia. ECG (5/2): ATRIAL FLUTTER WITH VARIABLE A-V BLOCKLEFT AXIS DEVIATION INFERIOR INFARCT , AGE UNDETERMINED ANTEROSEPTAL INFARCT , AGE UNDETERMINED ABNORMAL ECG Respiratory failure 04-May-2019 14:05:38. Biopsy, lung, using VATS 04-May-2019 14:05:02 LEFT VATS, LLL wedge resection. Creation, tracheostomy, open 04-May-2019 14:04:46  Philly Elder.

## 2019-05-09 NOTE — PROGRESS NOTE ADULT - SUBJECTIVE AND OBJECTIVE BOX
CHRIS THAKKAR   MRN#: [id number]     The patient is a 79y Male who was seen, evaluated, & examined with the CTICU staff post-operatively with a multidisciplinary care plan formulated & implemented.  All available clinical, laboratory, radiographic, pharmacologic, and electrocardiographic data were reviewed & analyzed.      The patient was in the CTICU in critical condition secondary to:     persistent cardiopulmonary dysfunction  undifferentiated shock  uncontrolled Type II Diabetes melitus-stress hyperglycemia    For support and evaluation & prevention of further decompensation secondary to persistent cardiopulmonary dysfunction and cardiogenic shock-cardiovascular dysfunction, respiratory status required:     supplemental oxygen with full ventilatory support / mechanical ventilation   continuous pulse oximetry monitoring  following ABGs with A-line monitoring  IV Dexmedetomidine infusion    Invasive hemodynamic monitoring with     an A-line was required for continuous MAP/BP monitoring     to ensure adequate cardiovascular support and to evaluate for & help prevent decompensation while receiving     IV Levophed infusion  IV Vasopressin infusion    secondary to     undifferentiated shock    Metabolic stability, uncontrolled type II Diabetes mellitus-stress hyperglycemia, & infection prophylaxis required an IV regular Insulin drip & the following of serial glucose levels to help achieve & maintain euglycemia.      In addition:  Respiratory failure requiring   Unclear etiology of respiratory failure - ? BOOP (had it prior) vs. aspiration PNA - on broad spectrum abx and 1 gram daily of Solumedrol for the last 5 days and Meropenem and Micafungin  Bacterial cultures negative - Meropenem now stopped by ID  Lung tissue culture pathology pending  Unclear cause of pressor requirement - ? if sedation is contributing - will attempt to wean Precedex, can add Seroquel if needed  H/H also low (but stable) - will transfuse one unit PRBC  Volume up, increasing Cr (normal on 4/30); will diurese, aim for 1L overall negative  Sodium increased to 150 - add free water    The patient required critical care management and I personally provided 30 minutes of non-continuous care to the patient, excluding separate procedures, in addition to  discussing the patient and plan at length with the CTICU staff and helping coordinate care. CHRIS THAKKAR   MRN#: [id number]     The patient is a 79y Male who was seen, evaluated, & examined with the CTICU staff post-operatively with a multidisciplinary care plan formulated & implemented.  All available clinical, laboratory, radiographic, pharmacologic, and electrocardiographic data were reviewed & analyzed.      The patient was in the CTICU in critical condition secondary to:     persistent cardiopulmonary dysfunction  undifferentiated shock  uncontrolled Type II Diabetes melitus-stress hyperglycemia    For support and evaluation & prevention of further decompensation secondary to persistent cardiopulmonary dysfunction and cardiogenic shock-cardiovascular dysfunction, respiratory status required:     supplemental oxygen with full ventilatory support / mechanical ventilation   continuous pulse oximetry monitoring  following ABGs with A-line monitoring  IV Dexmedetomidine infusion    Invasive hemodynamic monitoring with     an A-line was required for continuous MAP/BP monitoring     to ensure adequate cardiovascular support and to evaluate for & help prevent decompensation while receiving     IV Levophed infusion  IV Vasopressin infusion    secondary to     undifferentiated shock    Metabolic stability, uncontrolled type II Diabetes mellitus-stress hyperglycemia, & infection prophylaxis required an IV regular Insulin drip & the following of serial glucose levels to help achieve & maintain euglycemia.      In addition:  Respiratory failure requiring trach  Unclear etiology of respiratory failure - ? BOOP (had it prior) vs. aspiration PNA - on broad spectrum abx and 1 gram daily of Solumedrol for the last 5 days and Meropenem and Micafungin  Bacterial cultures negative - Meropenem now stopped by ID  Lung tissue culture pathology pending  Unclear cause of pressor requirement - ? if sedation is contributing - will attempt to wean Precedex, can add Seroquel if needed  H/H also low (but stable) - will transfuse one unit PRBC  Volume up, increasing Cr (normal on 4/30); will diurese, aim for 1L overall negative  Sodium increased to 150 - add free water    The patient required critical care management and I personally provided 30 minutes of non-continuous care to the patient, excluding separate procedures, in addition to  discussing the patient and plan at length with the CTICU staff and helping coordinate care.

## 2019-05-09 NOTE — PROGRESS NOTE ADULT - ASSESSMENT
77 yo   male with PMH of HLD, Sleep apnea ( non compliant with CPAP), GERD, diverticulitis, depression, anxiety, and panic attack, presented to [facility] on 04/25/19 for LHC after having  abnormal NST done as outpatient, LHC done same day revealed TVD. therefore underwent CABG on 04/26/19, hospital course complicated with fever, shock, no on pressors, abx, developed julio cesar hence nephrology consulted.

## 2019-05-09 NOTE — PROGRESS NOTE ADULT - PROBLEM SELECTOR PLAN 1
Pt with TARAH most likely hemodynamically mediated vs ischemic atn in the setting of anemia from blood loss, shock (on pressors), and sepsis. On review of labs in [facility], back in records pt had scr of 1.2 in 2016, 1.1 08/18 and 1.06 on 04/25/19 admission day. Scr remained stable until 05/02/19 when started to rise 1.3 mg/ld, progressively now 2.1.  Likely compatible with ATN (pre renal tarah), non oliguric, low albumin, suggest to keep net positive fluid balance for now.  Monitor I&O, daily weight, avoid nephrotoxics, adjust meds based on GFR, avoid hypotension, continue hemodynamic support, no indications for HD.  Increased FiO2 requirement does not seem to be related to cardiogenic pulmonary edema but rather primary lung process.

## 2019-05-09 NOTE — PROGRESS NOTE ADULT - PROBLEM SELECTOR PLAN 2
In the setting of pre renal TARAH as described above and high dose of steroids use.   Continue to monitor.  No indication for HD.

## 2019-05-09 NOTE — OCCUPATIONAL THERAPY INITIAL EVALUATION ADULT - PERTINENT HX OF CURRENT PROBLEM, REHAB EVAL
78M with PMH of HLD, Sleep apnea (non compliant with CPAP), GERD, diverticulitis, depression, anxiety, and panic attack. chest discomfort associated with dyspnea and palpitations for the past 1 year.  CP is described. CP triggered by exercise and stress. Abnormal NST (last week): mild to moderate abnormal study with medium sized inferior and post- lateral ischemia without infarct, EF 50%.  Holter monitor revealed: frequent PVCs, ECHO revealed AR, concentric LVH, diastolic dysfunction, and MR.

## 2019-05-09 NOTE — PROGRESS NOTE ADULT - ASSESSMENT
78 yr old with ARCENIO admitted for chest pain and underwent a CABG last last week   Post op has been stable but is still intubated and became febrile last 24hrs.   No evidence of wd infection, alot of sputum and possible LLL infiltrate underwent Bronch/BAL with biopsy to r/o BOOP, started on steroids      meropenem  1 q 8 hr day 4         fever down mostly but still with low grade  wbc still elevated                  started on micafungin based upon yeast seen in bronch specimen- but usually that is representative of oral lefty. Discussed with CTU team and to be continued for now.  continue steroids   I am concerned about the ongoing fever despite the steroids ( ALTHOUGH TEMP NORMAL FOR THE LAST DAY).  discussed with Dr. Milner  bronch is negative awaiting lung biopsy results  no improvement with either meropenem or micafungin    will nasima huitron

## 2019-05-10 DIAGNOSIS — E85.4 ORGAN-LIMITED AMYLOIDOSIS: ICD-10-CM

## 2019-05-10 DIAGNOSIS — Z29.9 ENCOUNTER FOR PROPHYLACTIC MEASURES, UNSPECIFIED: ICD-10-CM

## 2019-05-10 DIAGNOSIS — J45.20 MILD INTERMITTENT ASTHMA, UNCOMPLICATED: ICD-10-CM

## 2019-05-10 DIAGNOSIS — G47.33 OBSTRUCTIVE SLEEP APNEA (ADULT) (PEDIATRIC): ICD-10-CM

## 2019-05-10 DIAGNOSIS — R13.12 DYSPHAGIA, OROPHARYNGEAL PHASE: ICD-10-CM

## 2019-05-10 DIAGNOSIS — E85.9 AMYLOIDOSIS, UNSPECIFIED: ICD-10-CM

## 2019-05-10 DIAGNOSIS — J96.01 ACUTE RESPIRATORY FAILURE WITH HYPOXIA: ICD-10-CM

## 2019-05-10 DIAGNOSIS — I50.31 ACUTE DIASTOLIC (CONGESTIVE) HEART FAILURE: ICD-10-CM

## 2019-05-10 DIAGNOSIS — E87.70 FLUID OVERLOAD, UNSPECIFIED: ICD-10-CM

## 2019-05-10 LAB
% ALBUMIN: 50.9 % — SIGNIFICANT CHANGE UP
% ALPHA 1: 7.8 % — SIGNIFICANT CHANGE UP
% ALPHA 2: 9.9 % — SIGNIFICANT CHANGE UP
% BETA: 13 % — SIGNIFICANT CHANGE UP
% GAMMA: 18.4 % — SIGNIFICANT CHANGE UP
ALBUMIN SERPL ELPH-MCNC: 2.7 G/DL — LOW (ref 3.3–5)
ALBUMIN SERPL ELPH-MCNC: 2.8 G/DL — LOW (ref 3.6–5.5)
ALBUMIN/GLOB SERPL ELPH: 1 RATIO — SIGNIFICANT CHANGE UP
ALP SERPL-CCNC: 67 U/L — SIGNIFICANT CHANGE UP (ref 40–120)
ALPHA1 GLOB SERPL ELPH-MCNC: 0.4 G/DL — SIGNIFICANT CHANGE UP (ref 0.1–0.4)
ALPHA2 GLOB SERPL ELPH-MCNC: 0.5 G/DL — SIGNIFICANT CHANGE UP (ref 0.5–1)
ALT FLD-CCNC: 54 U/L — HIGH (ref 10–45)
ANION GAP SERPL CALC-SCNC: 14 MMOL/L — SIGNIFICANT CHANGE UP (ref 5–17)
ANION GAP SERPL CALC-SCNC: 16 MMOL/L — SIGNIFICANT CHANGE UP (ref 5–17)
APTT BLD: 60.3 SEC — HIGH (ref 27.5–36.3)
APTT BLD: 64.8 SEC — HIGH (ref 27.5–36.3)
APTT BLD: 66.1 SEC — HIGH (ref 27.5–36.3)
AST SERPL-CCNC: 41 U/L — HIGH (ref 10–40)
B-GLOBULIN SERPL ELPH-MCNC: 0.7 G/DL — SIGNIFICANT CHANGE UP (ref 0.5–1)
BILIRUB SERPL-MCNC: 1.1 MG/DL — SIGNIFICANT CHANGE UP (ref 0.2–1.2)
BUN SERPL-MCNC: 144 MG/DL — HIGH (ref 7–23)
BUN SERPL-MCNC: 158 MG/DL — HIGH (ref 7–23)
CALCIUM SERPL-MCNC: 8.1 MG/DL — LOW (ref 8.4–10.5)
CALCIUM SERPL-MCNC: 8.2 MG/DL — LOW (ref 8.4–10.5)
CHLORIDE SERPL-SCNC: 116 MMOL/L — HIGH (ref 96–108)
CHLORIDE SERPL-SCNC: 118 MMOL/L — HIGH (ref 96–108)
CO2 SERPL-SCNC: 17 MMOL/L — LOW (ref 22–31)
CO2 SERPL-SCNC: 17 MMOL/L — LOW (ref 22–31)
CREAT SERPL-MCNC: 2.53 MG/DL — HIGH (ref 0.5–1.3)
CREAT SERPL-MCNC: 2.74 MG/DL — HIGH (ref 0.5–1.3)
CULTURE RESULTS: SIGNIFICANT CHANGE UP
GAMMA GLOBULIN: 1 G/DL — SIGNIFICANT CHANGE UP (ref 0.6–1.6)
GAS PNL BLDA: SIGNIFICANT CHANGE UP
GLUCOSE BLDC GLUCOMTR-MCNC: 102 MG/DL — HIGH (ref 70–99)
GLUCOSE BLDC GLUCOMTR-MCNC: 106 MG/DL — HIGH (ref 70–99)
GLUCOSE BLDC GLUCOMTR-MCNC: 115 MG/DL — HIGH (ref 70–99)
GLUCOSE BLDC GLUCOMTR-MCNC: 123 MG/DL — HIGH (ref 70–99)
GLUCOSE BLDC GLUCOMTR-MCNC: 126 MG/DL — HIGH (ref 70–99)
GLUCOSE BLDC GLUCOMTR-MCNC: 128 MG/DL — HIGH (ref 70–99)
GLUCOSE BLDC GLUCOMTR-MCNC: 136 MG/DL — HIGH (ref 70–99)
GLUCOSE BLDC GLUCOMTR-MCNC: 137 MG/DL — HIGH (ref 70–99)
GLUCOSE BLDC GLUCOMTR-MCNC: 139 MG/DL — HIGH (ref 70–99)
GLUCOSE BLDC GLUCOMTR-MCNC: 142 MG/DL — HIGH (ref 70–99)
GLUCOSE BLDC GLUCOMTR-MCNC: 149 MG/DL — HIGH (ref 70–99)
GLUCOSE BLDC GLUCOMTR-MCNC: 149 MG/DL — HIGH (ref 70–99)
GLUCOSE BLDC GLUCOMTR-MCNC: 160 MG/DL — HIGH (ref 70–99)
GLUCOSE SERPL-MCNC: 121 MG/DL — HIGH (ref 70–99)
GLUCOSE SERPL-MCNC: 128 MG/DL — HIGH (ref 70–99)
HCT VFR BLD CALC: 26.8 % — LOW (ref 39–50)
HGB BLD-MCNC: 9.4 G/DL — LOW (ref 13–17)
INTERPRETATION SERPL IFE-IMP: SIGNIFICANT CHANGE UP
MAGNESIUM SERPL-MCNC: 3.1 MG/DL — HIGH (ref 1.6–2.6)
MCHC RBC-ENTMCNC: 30.2 PG — SIGNIFICANT CHANGE UP (ref 27–34)
MCHC RBC-ENTMCNC: 34.9 GM/DL — SIGNIFICANT CHANGE UP (ref 32–36)
MCV RBC AUTO: 86.6 FL — SIGNIFICANT CHANGE UP (ref 80–100)
PHOSPHATE SERPL-MCNC: 7.2 MG/DL — HIGH (ref 2.5–4.5)
PLATELET # BLD AUTO: 225 K/UL — SIGNIFICANT CHANGE UP (ref 150–400)
POTASSIUM SERPL-MCNC: 4.4 MMOL/L — SIGNIFICANT CHANGE UP (ref 3.5–5.3)
POTASSIUM SERPL-MCNC: 4.5 MMOL/L — SIGNIFICANT CHANGE UP (ref 3.5–5.3)
POTASSIUM SERPL-SCNC: 4.4 MMOL/L — SIGNIFICANT CHANGE UP (ref 3.5–5.3)
POTASSIUM SERPL-SCNC: 4.5 MMOL/L — SIGNIFICANT CHANGE UP (ref 3.5–5.3)
PROT PATTERN SERPL ELPH-IMP: SIGNIFICANT CHANGE UP
PROT SERPL-MCNC: 5.4 G/DL — LOW (ref 6–8.3)
PROT SERPL-MCNC: 5.5 G/DL — LOW (ref 6–8.3)
PROT SERPL-MCNC: 5.5 G/DL — LOW (ref 6–8.3)
RBC # BLD: 3.1 M/UL — LOW (ref 4.2–5.8)
RBC # FLD: 16.1 % — HIGH (ref 10.3–14.5)
SODIUM SERPL-SCNC: 149 MMOL/L — HIGH (ref 135–145)
SODIUM SERPL-SCNC: 149 MMOL/L — HIGH (ref 135–145)
SPECIMEN SOURCE: SIGNIFICANT CHANGE UP
WBC # BLD: 10.9 K/UL — HIGH (ref 3.8–10.5)
WBC # FLD AUTO: 10.9 K/UL — HIGH (ref 3.8–10.5)

## 2019-05-10 PROCEDURE — 99232 SBSQ HOSP IP/OBS MODERATE 35: CPT

## 2019-05-10 PROCEDURE — 99233 SBSQ HOSP IP/OBS HIGH 50: CPT | Mod: GC

## 2019-05-10 PROCEDURE — 99291 CRITICAL CARE FIRST HOUR: CPT

## 2019-05-10 PROCEDURE — 93010 ELECTROCARDIOGRAM REPORT: CPT

## 2019-05-10 PROCEDURE — 99223 1ST HOSP IP/OBS HIGH 75: CPT

## 2019-05-10 PROCEDURE — 71045 X-RAY EXAM CHEST 1 VIEW: CPT | Mod: 26

## 2019-05-10 PROCEDURE — 99223 1ST HOSP IP/OBS HIGH 75: CPT | Mod: GC

## 2019-05-10 RX ORDER — BUMETANIDE 0.25 MG/ML
4 INJECTION INTRAMUSCULAR; INTRAVENOUS ONCE
Refills: 0 | Status: COMPLETED | OUTPATIENT
Start: 2019-05-10 | End: 2019-05-10

## 2019-05-10 RX ORDER — BUMETANIDE 0.25 MG/ML
2 INJECTION INTRAMUSCULAR; INTRAVENOUS
Qty: 20 | Refills: 0 | Status: DISCONTINUED | OUTPATIENT
Start: 2019-05-10 | End: 2019-05-16

## 2019-05-10 RX ORDER — FUROSEMIDE 40 MG
80 TABLET ORAL ONCE
Refills: 0 | Status: COMPLETED | OUTPATIENT
Start: 2019-05-10 | End: 2019-05-10

## 2019-05-10 RX ORDER — PROPOFOL 10 MG/ML
5 INJECTION, EMULSION INTRAVENOUS
Qty: 500 | Refills: 0 | Status: DISCONTINUED | OUTPATIENT
Start: 2019-05-10 | End: 2019-05-11

## 2019-05-10 RX ORDER — VECURONIUM BROMIDE 20 MG/1
5 INJECTION, POWDER, FOR SOLUTION INTRAVENOUS ONCE
Refills: 0 | Status: COMPLETED | OUTPATIENT
Start: 2019-05-10 | End: 2019-05-10

## 2019-05-10 RX ORDER — HYDROMORPHONE HYDROCHLORIDE 2 MG/ML
0.5 INJECTION INTRAMUSCULAR; INTRAVENOUS; SUBCUTANEOUS ONCE
Refills: 0 | Status: DISCONTINUED | OUTPATIENT
Start: 2019-05-10 | End: 2019-05-10

## 2019-05-10 RX ORDER — FUROSEMIDE 40 MG
20 TABLET ORAL
Qty: 500 | Refills: 0 | Status: DISCONTINUED | OUTPATIENT
Start: 2019-05-10 | End: 2019-05-10

## 2019-05-10 RX ORDER — HYDROMORPHONE HYDROCHLORIDE 2 MG/ML
0.5 INJECTION INTRAMUSCULAR; INTRAVENOUS; SUBCUTANEOUS ONCE
Refills: 0 | Status: DISCONTINUED | OUTPATIENT
Start: 2019-05-10 | End: 2019-05-11

## 2019-05-10 RX ADMIN — Medication 10 MG/HR: at 11:11

## 2019-05-10 RX ADMIN — ESCITALOPRAM OXALATE 10 MILLIGRAM(S): 10 TABLET, FILM COATED ORAL at 11:35

## 2019-05-10 RX ADMIN — HYDROMORPHONE HYDROCHLORIDE 0.5 MILLIGRAM(S): 2 INJECTION INTRAMUSCULAR; INTRAVENOUS; SUBCUTANEOUS at 11:48

## 2019-05-10 RX ADMIN — Medication 3 MILLILITER(S): at 05:26

## 2019-05-10 RX ADMIN — PANTOPRAZOLE SODIUM 40 MILLIGRAM(S): 20 TABLET, DELAYED RELEASE ORAL at 05:34

## 2019-05-10 RX ADMIN — HYDROMORPHONE HYDROCHLORIDE 0.5 MILLIGRAM(S): 2 INJECTION INTRAMUSCULAR; INTRAVENOUS; SUBCUTANEOUS at 21:50

## 2019-05-10 RX ADMIN — ATORVASTATIN CALCIUM 40 MILLIGRAM(S): 80 TABLET, FILM COATED ORAL at 21:15

## 2019-05-10 RX ADMIN — HYDROMORPHONE HYDROCHLORIDE 0.5 MILLIGRAM(S): 2 INJECTION INTRAMUSCULAR; INTRAVENOUS; SUBCUTANEOUS at 11:33

## 2019-05-10 RX ADMIN — BUMETANIDE 132 MILLIGRAM(S): 0.25 INJECTION INTRAMUSCULAR; INTRAVENOUS at 15:37

## 2019-05-10 RX ADMIN — Medication 3 MILLILITER(S): at 11:10

## 2019-05-10 RX ADMIN — VECURONIUM BROMIDE 5 MILLIGRAM(S): 20 INJECTION, POWDER, FOR SOLUTION INTRAVENOUS at 22:25

## 2019-05-10 RX ADMIN — HYDROMORPHONE HYDROCHLORIDE 0.5 MILLIGRAM(S): 2 INJECTION INTRAMUSCULAR; INTRAVENOUS; SUBCUTANEOUS at 06:25

## 2019-05-10 RX ADMIN — PANTOPRAZOLE SODIUM 40 MILLIGRAM(S): 20 TABLET, DELAYED RELEASE ORAL at 17:39

## 2019-05-10 RX ADMIN — CHLORHEXIDINE GLUCONATE 15 MILLILITER(S): 213 SOLUTION TOPICAL at 17:39

## 2019-05-10 RX ADMIN — Medication 325 MILLIGRAM(S): at 11:11

## 2019-05-10 RX ADMIN — HYDROMORPHONE HYDROCHLORIDE 0.5 MILLIGRAM(S): 2 INJECTION INTRAMUSCULAR; INTRAVENOUS; SUBCUTANEOUS at 06:10

## 2019-05-10 RX ADMIN — Medication 80 MILLIGRAM(S): at 10:32

## 2019-05-10 RX ADMIN — Medication 125 MILLIGRAM(S): at 05:34

## 2019-05-10 RX ADMIN — Medication 40 MILLIGRAM(S): at 05:35

## 2019-05-10 RX ADMIN — CHLORHEXIDINE GLUCONATE 15 MILLILITER(S): 213 SOLUTION TOPICAL at 05:34

## 2019-05-10 RX ADMIN — CHLORHEXIDINE GLUCONATE 1 APPLICATION(S): 213 SOLUTION TOPICAL at 05:35

## 2019-05-10 RX ADMIN — Medication 3 MILLILITER(S): at 17:51

## 2019-05-10 RX ADMIN — HYDROMORPHONE HYDROCHLORIDE 0.5 MILLIGRAM(S): 2 INJECTION INTRAMUSCULAR; INTRAVENOUS; SUBCUTANEOUS at 22:05

## 2019-05-10 RX ADMIN — HYDROMORPHONE HYDROCHLORIDE 0.5 MILLIGRAM(S): 2 INJECTION INTRAMUSCULAR; INTRAVENOUS; SUBCUTANEOUS at 17:45

## 2019-05-10 RX ADMIN — BUMETANIDE 10 MG/HR: 0.25 INJECTION INTRAMUSCULAR; INTRAVENOUS at 15:36

## 2019-05-10 RX ADMIN — HYDROMORPHONE HYDROCHLORIDE 0.5 MILLIGRAM(S): 2 INJECTION INTRAMUSCULAR; INTRAVENOUS; SUBCUTANEOUS at 18:00

## 2019-05-10 RX ADMIN — Medication 100 MILLIGRAM(S): at 17:36

## 2019-05-10 RX ADMIN — BUMETANIDE 10 MG/HR: 0.25 INJECTION INTRAMUSCULAR; INTRAVENOUS at 15:37

## 2019-05-10 NOTE — PROGRESS NOTE ADULT - ATTENDING COMMENTS
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  work up as noted above.  doubt amyloid is causing this TARAH but patient's with vascular amyloid would be sensitive to hemodynamic changes.

## 2019-05-10 NOTE — CONSULT NOTE ADULT - PROBLEM SELECTOR RECOMMENDATION 8
- DVT proph - on Hep gtt  - GI proph  - Maintain O2 sat > 90%  - Aspiration precautions  - Fall precautions

## 2019-05-10 NOTE — CONSULT NOTE ADULT - PROBLEM SELECTOR RECOMMENDATION 6
- tracheostomy is a treatment for ARCENIO  - if decannulated will need to discuss possibility of NIPPV with patient

## 2019-05-10 NOTE — CONSULT NOTE ADULT - PROBLEM SELECTOR PROBLEM 1
Coronary artery disease
Amyloidosis, unspecified type
TARAH (acute kidney injury)
Acute hypoxemic respiratory failure

## 2019-05-10 NOTE — CONSULT NOTE ADULT - PROBLEM SELECTOR RECOMMENDATION 7
- no wheezing on my exam  - can use bronchodilator therapy as needed - would start with Duoneb Q6 if necessary  - patient should be upright and OOB if possible  - maintain O2 sat > 90%

## 2019-05-10 NOTE — PROGRESS NOTE ADULT - ASSESSMENT
77 yo   male with PMH of HLD, Sleep apnea ( non compliant with CPAP), GERD, diverticulitis, depression, anxiety, and panic attack, presented to [facility] on 04/25/19 for LHC after having  abnormal NST done as outpatient, LHC done same day revealed TVD. therefore underwent CABG on 04/26/19, hospital course complicated with fever, shock, no on pressors, abx, developed julio cesar hence nephrology consulted.

## 2019-05-10 NOTE — CONSULT NOTE ADULT - ATTENDING COMMENTS
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 Had prolonged respiratory failure and required trach. CXR was worsening so underwent bronch and biopsy and was found to have amyloid in his lung. Renal function has been worsening and has been net positive over period of time. Exam c/w elevated neck veins, RRR, no m/r/g, CTA anterior lung fields, b/l pitting edema. Labs reviewed. EKG atrial flutter, low voltage. TTE with mild concentric LVH, nl LV function, no clear amyloid features. Overall stage C HF, NYHA class III with worsening renal function and possible pulmonary edema from cardiac amyloidosis.  - diurese for goal CVP 8 (preload dependent); inadequate response to lasix gtt so increased to bumex gtt  - avoid BB as poorly tolerated in amyloid  - amyloid w/u as above; if serum workup negative, can consider Tc-PyP; can also repeat TTE with strain to assess for pathognomonic findings  - increase free water as needed for hypernatremia  - will request path to send biopsy to [place] for further subtyping

## 2019-05-10 NOTE — PROGRESS NOTE ADULT - PROBLEM SELECTOR PLAN 1
Pt with TARAH most likely hemodynamically mediated vs ischemic ATN in the setting of anemia from blood loss, shock (on pressors), and sepsis. On review of labs in [facility] / [place], back in records pt had scr of 1.2 in 2016, 1.1 08/18 and 1.06 on 04/25/19 admission day. Scr remained stable until 05/02/19 when started to rise 1.3 mg/ld, progressively now 2.5. Lung biopsy consistent with amyloid. Recommend to repeat UA, TP/Cr ratio, FLC and SIFE.  Monitor I&O, daily weight, avoid nephrotoxics, adjust meds based on GFR, avoid hypotension, continue hemodynamic support, no indications for HD.  Increased FiO2 requirement does not seem to be related to cardiogenic pulmonary edema but rather primary lung process. Pt with TARAH most likely hemodynamically mediated vs ischemic ATN in the setting of anemia from blood loss, shock (on pressors), and sepsis vs renal amyloidosis?  [facility] [place] back in records pt had scr of 1.2 in 2016, 1.1 08/18 and 1.06 on 04/25/19 admission day. Scr remained stable until 05/02/19 when started to rise 1.3 mg/ld, progressively now 2.5. Lung biopsy consistent with amyloid. Recommend to repeat UA, TP/Cr ratio, FLC and SIFE.  Monitor I&O, daily weight, avoid nephrotoxics, adjust meds based on GFR, avoid hypotension, continue hemodynamic support, no indications for HD. Continue diuretics to keep net negative. Pt with TARAH most likely hemodynamically mediated vs ischemic ATN in the setting of anemia from blood loss, shock (on pressors), and sepsis. On review of labs in [facility] / [place], back in records pt had scr of 1.2 in 2016, 1.1 08/18 and 1.06 on 04/25/19 admission day. Scr remained stable until 05/02/19 when started to rise 1.3 mg/ld, progressively now 2.5. Lung biopsy consistent with amyloid. Recommend to repeat UA, TP/Cr ratio, FLC and SIFE, given presence of amyloid in lung.  Monitor I&O, daily weight, avoid nephrotoxics, adjust meds based on GFR, avoid hypotension, continue hemodynamic support, no indications for HD. Continue diuretics to keep net negative.

## 2019-05-10 NOTE — CONSULT NOTE ADULT - PROBLEM SELECTOR RECOMMENDATION 3
- would suggest cardiology evaluation. I believe Dr. Herminio Caraballo (Cardiology) has some experience with cardiac amyloidosis and I would suggest having him review the case as well  - echocardiography with some LV hypertrophy but reportedly not typical of the starry-armida pattern seen in amyloid  - ? if cardiac MRI or technetium scan would be of added utility ?  - Further serologic workup for Amyloid.  - Would consider Hematology evaluation - would suggest cardiology evaluation. I believe Dr. Herminio Caraballo (Cardiology) has some experience with cardiac amyloidosis and I would suggest having him review the case as well  - echocardiography with some LV hypertrophy but reportedly not typical of the starry-armida pattern seen in amyloid  - would suggest technetium pyrophosphate scan once patient more stable which is sensitive and specific for transthyretin cardiac amyloid  - could also consider cardiac biopsy  - Further serologic workup for Amyloid - including Immunoglobulins and serum light chains (to evaluate for AL amyloid)  - Would consider Hematology evaluation

## 2019-05-10 NOTE — PROGRESS NOTE ADULT - SUBJECTIVE AND OBJECTIVE BOX
infectious diseases progress note:    Patient is a 79y old  Male who presents with a chief complaint of CABG (10 May 2019 09:43)        Angina pectoris  Atherosclerosis of native coronary artery without angina pectoris             Allergies    No Known Allergies    Intolerances        ANTIBIOTICS/RELEVANT:  antimicrobials    immunologic:    OTHER:  ALBUTerol/ipratropium for Nebulization 3 milliLiter(s) Nebulizer every 6 hours  aspirin 325 milliGRAM(s) Oral daily  atorvastatin 40 milliGRAM(s) Oral at bedtime  chlorhexidine 0.12% Liquid 15 milliLiter(s) Oral Mucosa two times a day  chlorhexidine 4% Liquid 1 Application(s) Topical <User Schedule>  dexmedetomidine Infusion 0.7 MICROgram(s)/kG/Hr IV Continuous <Continuous>  escitalopram 10 milliGRAM(s) Oral daily  furosemide   Injectable 80 milliGRAM(s) IV Push once  furosemide Infusion 20 mG/Hr IV Continuous <Continuous>  heparin  Infusion 1300 Unit(s)/Hr IV Continuous <Continuous>  HYDROmorphone  Injectable 0.5 milliGRAM(s) IV Push every 8 hours PRN  insulin regular Infusion 3 Unit(s)/Hr IV Continuous <Continuous>  methylPREDNISolone sodium succinate Injectable   IV Push   methylPREDNISolone sodium succinate Injectable 100 milliGRAM(s) IV Push every 12 hours  norepinephrine Infusion 0.02 MICROgram(s)/kG/Min IV Continuous <Continuous>  pantoprazole  Injectable 40 milliGRAM(s) IV Push two times a day  sodium chloride 0.9%. 1000 milliLiter(s) IV Continuous <Continuous>  vasopressin Infusion 0.083 Unit(s)/Min IV Continuous <Continuous>      Objective:  Vital Signs Last 24 Hrs  T(C): 37 (10 May 2019 08:00), Max: 37.6 (09 May 2019 12:00)  T(F): 98.6 (10 May 2019 08:00), Max: 99.7 (09 May 2019 12:00)  HR: 74 (10 May 2019 08:13) (66 - 101)  BP: --  BP(mean): --  RR: 18 (10 May 2019 08:00) (12 - 30)  SpO2: 95% (10 May 2019 08:13) (92% - 100%)    PHYSICAL EXAM:   trach   Eyes:RYLEY, EOMI  Ear/Nose/Throat: no oral lesion, no sinus tenderness on percussion	  Neck:no JVD, no lymphadenopathy, supple  Respiratory: CTA shagufta  Cardiovascular: S1S2 RRR, no murmurs  Gastrointestinal:soft, (+) BS, no HSM  Extremities:no e/e/c        LABS:                        9.4    10.9  )-----------( 225      ( 10 May 2019 00:53 )             26.8     05-10    149<H>  |  118<H>  |  144<H>  ----------------------------<  128<H>  4.5   |  17<L>  |  2.53<H>    Ca    8.2<L>      10 May 2019 00:53  Phos  7.2     05-10  Mg     3.1     05-10    TPro  5.4<L>  /  Alb  2.7<L>  /  TBili  1.1  /  DBili  x   /  AST  41<H>  /  ALT  54<H>  /  AlkPhos  67  05-10    PTT - ( 10 May 2019 08:14 )  PTT:64.8 sec  Urinalysis Basic - ( 08 May 2019 11:22 )    Color: Yellow / Appearance: Slightly Turbid / S.024 / pH: x  Gluc: x / Ketone: Negative  / Bili: Negative / Urobili: Negative   Blood: x / Protein: 30 mg/dL / Nitrite: Negative   Leuk Esterase: Negative / RBC: 25 /hpf / WBC 3-5   Sq Epi: x / Non Sq Epi: 6 / Bacteria: Negative          MICROBIOLOGY:    RECENT CULTURES:   @ 09:24 .Blood                No growth to date.     @ 11:39 .Sputum trap       No polymorphonuclear leukocytes per low power field  Rare Squamous epithelial cells per low power field  No organisms seen per oil power field           Normal Respiratory Annamaria present     @ 11:32 .Blood                No growth to date.     @ 06:05 .Blood   PCR    Growth in anaerobic bottle: Gram Positive Cocci in Clusters    Blood Culture PCR  Blood Culture PCR     Growth in anaerobic bottle: Coag Negative Staphylococcus  Single set isolate, possible contaminant. Contact  Microbiology if susceptibility testing clinically  indicated.  "Due to technical problems, Proteus sp. will Not be reported as part of  theBCID panel until further notice"  ***Blood Panel PCR results on this specimen are available  approximately 3 hours after the Gram stain result.***  Gram stain, PCR, and/or culture results may not always  correspond due to difference in methodologies.  ************************************************************  This PCR assay was performed using [employer].  The following targets are tested for: Enterococcus,  vancomycin resistant enterococci, Listeria monocytogenes,  coagulase negative staphylococci, S. aureus,  methicillin resistant S. aureus, Streptococcus agalactiae  (Group B), S. pneumoniae, S. pyogenes (Group A),  Acinetobacter baumannii, Enterobacter cloacae, E. coli,  Klebsiella oxytoca, K. pneumoniae, Proteus sp.,  Serratia marcescens, Haemophilus influenzae,  Neisseria meningitidis, Pseudomonas aeruginosa, Candida  albicans, C. glabrata, C krusei, C parapsilosis,  C. tropicalis and the KPC resistance gene.     @ 00:49 .Tissue       Few polymorphonuclear leukocytes seen per low power field  No organisms seen per oil power field           Testing in progress     @ 00:41 Bronch Wash       Numerous polymorphonuclear leukocytes seen per low power field  Rare Squamous epithelial cells seen per low power field  Moderate Yeast like cells seen per oil power field           Normal Respiratory Annamaria present          RESPIRATORY CULTURES:      Clostridium difficile GDH Toxins A&amp;B, EIA:   Negative ( @ 15:24)          RADIOLOGY & ADDITIONAL STUDIES:        Pager [phone]  After 5 pm/weekends or if no response :[phone]

## 2019-05-10 NOTE — PROGRESS NOTE ADULT - ASSESSMENT
78 yr old with ARCENIO admitted for chest pain and underwent a CABG last last week   Post op has been stable but is still intubated and became febrile last 24hrs.   No evidence of wd infection, alot of sputum and possible LLL infiltrate underwent Bronch/BAL with biopsy to r/o BOOP, started on steroids              fever down mostly but still with low grade  wbc still elevated   sp course of meropenem and micafungin without improvement leading us to do lung biopsy                  started on micafungin based upon yeast seen in bronch specimen- but usually that is representative of oral lefty. Discussed with CTU team and to be continued for now.  lung biopsy is positive for amyloid which may also be in kidney and heart.     Off antibiotics and micafungin  discussed with Dr. DRAPER  reculture for any clinically deterioration  ID will cove this weekend and monday  'taper steroids

## 2019-05-10 NOTE — CONSULT NOTE ADULT - PROBLEM SELECTOR RECOMMENDATION 9
He overall appears volume overloaded with elevated CVP.   - Give bumex 4mg x 1 and then start drip at 2 mg/hr.   - continue to wean pressors as tolerated for goal MAP > 65 He overall appears volume overloaded with elevated CVP.   - Give bumex 4mg x 1 and then start drip at 2 mg/hr.   - continue to wean pressors as tolerated for goal MAP > 65  - please send serum kappa/lambda, serum immunofixation, urine immunofixation, SPEP, UPEP to evaluate for AL amyloid  - may have cardiac involvement as has some hypertrophy with EKG with relatively low voltage

## 2019-05-10 NOTE — CONSULT NOTE ADULT - SUBJECTIVE AND OBJECTIVE BOX
[facility] DIVISION OF PULMONARY, CRITICAL CARE AND SLEEP MEDICINE  PULMONARY CONSULTATION NOTE  OFFICE NUMBER: [phone]    NAME: CHRIS THAKKAR  MEDICAL RECORD NUMBER: [id number]    CHIEF COMPLAINT: Patient is a 79y old  Male who presents with a chief complaint of CABG (10 May 2019 09:43)      HISTORY OF PRESENT ILLNESS: 79M lengthy medical history including "asthma", Bronchiolitis Obliterans (), ARCENIO nonadherent to therapy, CAD, and anxiety/depression who initially presented with unstable angina. He underwent a cardiac cath that showed multivessel disease. He was then referred to cardiothoracic surgery for a CABG. He received a 3 vessel CABG on . Post procedure was complicated by persistent respiratory failure and CXR with bilateral opacities. He was initially extubated  but then reintubated . He was then noted to have thick secretions and was started on antibiotics. He was unable to be liberated from the ventilator and underwent VATS with lung biopsy and tracheostomy placement on 19.     Since that time he has been weaned to trach collar and is presently on trach collar breathing comfortably and saturating well. He continues to require NGT feeds and has diarrhea to the point of needing a rectal tube. He is on multiple vasopressors (Levo and Vaso) as well as Precedex for his delirium and Lasix gtt to assist with diuresis.     He has had an echo with mild LV hypertrophy and a low voltage EKG. I am told he will be evaluated by the HF team.     His lung biopsy taken  came back yesterday evening revealing Pulmonary Amyloidosis. Although he is reported as having asthma, ARCENIO, and prior Bronchiolitis obliterans - he denies any pulmonary history on my questioning (though he is on Precedex). He denies any pulmonary symptoms at present - only irritation around the tracheostomy site.    ====================BACKGROUND INFORMATION============================    FAMILY HISTORY: FAMILY HISTORY:  Family history of acute myocardial infarction (Sibling): mom at 54   mothers brother at 37      PAST MEDICAL AND SURGICAL HISTORY: PAST MEDICAL & SURGICAL HISTORY:  Diverticulitis  Nocturia  Panic attacks  Anxiety  Depression  Asthma: Controlled  Sleep Apnea  History of partial colectomy  History of colon resection  History of eye surgery: PPV right  Cataract extraction status: right  Status post lung surgery: ? wedge resection  S/P eye surgery: &quot;removed fluid&quot; from rigth eye  Bunion  Sinus Disorder: surgery x 2  Inguinal Hernia Bilateral  Shoulder Problem: right rotator cuff  Carpal Tunnel Syndrome: right hand      ALLERGIES:Allergies: No Known Allergies    HOME MEDICATIONS:     OUTPATIENT PULMONARY DOCTOR: None     SOCIAL HISTORY:  TOBACCO USE: Denied   ALCOHOL: Social   DRUGS: Denied   MARITAL STATUS:   EMPLOYMENT: Retired   EXPOSURES: Denied   RECENT TRAVELS: Denied   PETS: Denied   CODE STATUS: Full Code    ====================REVIEW OF SYSTEMS=====================================  CONSTITUTIONAL: Is bothered by tracheostomy  CARDIOVASCULAR: Denies chest pain at present, no palpitations   PULMONARY: Denies cough, mild SOB, no hemoptysis   GASTROINTESTINAL: Denies nausea or vomiting, rectal tube in place  [x] ALL OTHER REVIEW OF SYSTEMS ARE NEGATIVE   [] UNABLE TO OBTAIN REVIEW OF SYSTEMS DUE TO ______________      ====================PHYSICAL EXAM=========================================    VITALS: ICU Vital Signs Last 24 Hrs  T(C): 37 (10 May 2019 08:00), Max: 37.6 (09 May 2019 12:00)  T(F): 98.6 (10 May 2019 08:00), Max: 99.7 (09 May 2019 12:00)  HR: 74 (10 May 2019 08:13) (66 - 101)  ABP: 124/50 (10 May 2019 08:00) (91/48 - 139/62)  ABP(mean): 70 (10 May 2019 08:00) (55 - 88)  RR: 18 (10 May 2019 08:00) (12 - 30)  SpO2: 95% (10 May 2019 08:13) (92% - 100%)      INTAKE and OUTPUT: I&O's Summary    09 May 2019 07:01  -  10 May 2019 07:00  --------------------------------------------------------  IN: 3828.8 mL / OUT: 2275 mL / NET: 1553.8 mL        WEIGHT: Daily     Daily Weight in k.1 (10 May 2019 01:00)    GLUCOSE: CAPILLARY BLOOD GLUCOSE  160 (10 May 2019 08:00)  POCT Blood Glucose.: 149 mg/dL (10 May 2019 09:28)    VENTILATOR SETTINGS: Mode: off      GENERAL: awake, alert, oriented, interactive, mouthing words, NAD   HEENT: NCAT, EOMI, anicteric, MM dry, nares clear, poor dentition  NECK: supple, no JVD, tracheostomy in place  LYMPH NODES: no palpable supraclavicular LAD   CARDIOVASCULAR: RRR, S1S2, systolic murmur   PULMONARY: decreased BS at bases, no wheeze or rhonchi, no accessory muscle use, moderate effort, on trach collar   ABDOMEN: soft, NT, ND, +BS, NGT in place, rectal tube in place   SKIN: warm and dry   EXTREMITIES: no clubbing or cyanosis, bilateral trace LE edema  NEUROLOGIC: nonfocal exam, moves all extremities   PSYCHIATRIC: calm    ====================MEDICATIONS===========================================  MEDICATIONS  (STANDING):  ALBUTerol/ipratropium for Nebulization 3 milliLiter(s) Nebulizer every 6 hours  aspirin 325 milliGRAM(s) Oral daily  atorvastatin 40 milliGRAM(s) Oral at bedtime  chlorhexidine 0.12% Liquid 15 milliLiter(s) Oral Mucosa two times a day  chlorhexidine 4% Liquid 1 Application(s) Topical <User Schedule>  dexmedetomidine Infusion 0.7 MICROgram(s)/kG/Hr (13.912 mL/Hr) IV Continuous <Continuous>  escitalopram 10 milliGRAM(s) Oral daily  furosemide   Injectable 80 milliGRAM(s) IV Push once  furosemide Infusion 20 mG/Hr (10 mL/Hr) IV Continuous <Continuous>  heparin  Infusion 1300 Unit(s)/Hr (9.5 mL/Hr) IV Continuous <Continuous>  insulin regular Infusion 3 Unit(s)/Hr (3 mL/Hr) IV Continuous <Continuous>  methylPREDNISolone sodium succinate Injectable   IV Push   methylPREDNISolone sodium succinate Injectable 100 milliGRAM(s) IV Push every 12 hours  norepinephrine Infusion 0.02 MICROgram(s)/kG/Min (3 mL/Hr) IV Continuous <Continuous>  pantoprazole  Injectable 40 milliGRAM(s) IV Push two times a day  sodium chloride 0.9%. 1000 milliLiter(s) (10 mL/Hr) IV Continuous <Continuous>  vasopressin Infusion 0.083 Unit(s)/Min (5 mL/Hr) IV Continuous <Continuous>      MEDICATIONS  (PRN):  HYDROmorphone  Injectable 0.5 milliGRAM(s) IV Push every 8 hours PRN Severe Pain (7 - 10)      ====================LABORATORY RESULTS====================================  CBC Full  -  ( 10 May 2019 00:53 )  WBC Count : 10.9 K/uL  RBC Count : 3.10 M/uL  Hemoglobin : 9.4 g/dL  Hematocrit : 26.8 %  Platelet Count - Automated : 225 K/uL  Mean Cell Volume : 86.6 fl  Mean Cell Hemoglobin : 30.2 pg  Mean Cell Hemoglobin Concentration : 34.9 gm/dL                                      05-10    149<H>  |  118<H>  |  144<H>  ----------------------------<  128<H>  4.5   |  17<L>  |  2.53<H>    Ca    8.2<L>      10 May 2019 00:53  Phos  7.2     05-10  Mg     3.1     05-10    TPro  5.4<L>  /  Alb  2.7<L>  /  TBili  1.1  /  DBili  x   /  AST  41<H>  /  ALT  54<H>  /  AlkPhos  67  05-10        PTT - ( 10 May 2019 08:14 )  PTT:64.8 sec    Creatinine Trend: 2.53<--, 2.28<--, 2.16<--, 2.00<--, 1.67<--, 1.62<--    ABG - ( 10 May 2019 06:00 )  pH, Arterial: 7.41  pH, Blood: x     /  pCO2: 30    /  pO2: 79    / HCO3: 19    / Base Excess: -4.8  /  SaO2: 96          LUNG BIOPSY: Lung, left lower lobe, wedge biopsy: - Amyloidosis    Comment: Sections show the presence of hyalinized material conisistent with amyloid mostly in small arteries and arterioles,as well as focally in alveolar interstitium. [place] red stain is positive with green birefringence on polarization. This confirms the diagnosis of amyloidosis.    Verified by: ADE SWENSON M.D. (Electronic Signature)  Reported on: 19 15:14 EDT, [address] [place], [place] [id number]        ====================RADIOLOGY and ECHOCARDIOGRAPHY=======================    CXR:< from: Xray Chest 1 View- PORTABLE-Routine (19 @ 02:18) >  INTERPRETATION:  AP chest x-ray at 0217 hours on 2019.    Clinical information: Status post cardiothoracic surgery.    Comparison: Chest x-raydated 2019.    Findings: Patient is status post CABG. Tracheostomy tube, enteric tube,   and right subclavian vein central venous catheter persist unchanged.    Pulmonary vascularity appears normal. Bilateral lower lung opacities   persist. Theremay be a small left pleural effusion.    Impression: Bilateral lower lobe opacities suspicious for pneumonia.    < end of copied text >      CT CHEST:     ECHOCARDIOGRAPHY: < from: Transthoracic Echocardiogram (19 @ 09:26) >  EF (Visual Estimate): 80 %  ------------------------------------------------------------------------  Observations:  Mitral Valve: Mitral annular calcification.  Aortic Valve/Aorta: Calcified aortic valve without  stenosis.  Mild aortic regurgitation.  Normal aortic root size. (Ao: 3.6 cm at the sinuses of  Valsalva).  Left Atrium: Normal left atrium.  LA volume index = 32  cc/m2.  Left Ventricle: Hyperdynamic left ventricular systolic  function. Intracavitary gradient seen.  Normal left  ventricular internal dimensions. Mild concentric left  ventricular hypertrophy. Normal diastolic function  Right Heart: Normal right atrium. Normal right ventricular  size and function. Normal tricuspid valve. Pulmonic valve  not well visualized.  Hemodynamic: No evidence of pulmonary hypertension.  Opacification of the right atrium with agitated saline was  suboptimal. Study does not rule-out the presence of a PFO.  ------------------------------------------------------------------------  Conclusions:  Hyperdynamic left ventricular systolic function.  Intracavitary gradient seen.  Opacification of the right atrium with agitated saline was  suboptimal. Study does not rule-out the presence of a PFO.    < end of copied text >

## 2019-05-10 NOTE — CONSULT NOTE ADULT - ASSESSMENT
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 His postop course was complicated by persistent respiratory failure requiring tracheostomy and he had a wedge biopsy showing Pulmonary Amyloidosis.

## 2019-05-10 NOTE — PROGRESS NOTE ADULT - SUBJECTIVE AND OBJECTIVE BOX
CHRIS THAKKAR  MRN#:  [id number]    The patient is a 78y Male with PMH of HLD, Sleep apnea ( non compliant with CPAP), GERD, diverticulitis, depression, anxiety, and panic attacks, as well as strong family history of CAD, admitted with unstable angina, found to have coronary artery disease, LVH and diastolic heart failure, now recovering s/p C3L 4/26, post op course complicated by bleeding and later hypoxic respiratory failure who was seen, evaluated, & examined with the CTICU staff on rounds, overnight and during the early morning hours with a multidisciplinary care plan formulated & implemented.  All available clinical, laboratory, radiographic, pharmacologic, and electrocardiographic data were reviewed & analyzed.      The patient was in the CTICU in critical condition secondary to persistent cardiopulmonary dysfunction, hemodynamically significant anemia, acute renal insufficiency, vasogenic shock, persistent cardiovascular dysfunction, and hyperglycemia.     Respiratory status required full ventilator support with advancement to trach collar for long periods of time, close monitoring of respiratory rate and breathing pattern, the following of ABG’s with A-line monitoring, continuous pulse oximetry monitoring, and an IV Dexmedetomidine infusion for support & to evaluate for & prevent further decompensation secondary to hypoxic respiratory failure, pneumonia, and delirium. Patient found to have pulmonary amyloidosis on biopsy. Plan for further work up to determine subtype.    Invasive hemodynamic monitoring with a central venous catheter & an A-line were required for the continuous central venous and MAP/BP monitoring to ensure adequate cardiovascular support and to evaluate for & help prevent decompensation while receiving an IV Levophed drip, an IV Vasopressin drip, and intermittent IV Lasix secondary to hemodynamically significant anemia/acute postoperative blood loss anemia, vasogenic shock, acute on chronic diastolic heart failure, and acute renal insufficiency. Patient may ultimately require hemodialysis. Enteral free water started for hypernatremia and uremia.    Patient has had fever, leukocytosis, thick pulmonary secretions with a right lung infiltrate on chest x ray consistent with pneumonia. He completed treatment with IV Meropenem, and now continues on Micafungin due to BAL positive for yeast.    Tolerating enteral feedings at goal rate.    Patient required acute postoperative critical care management and I provided 45 minutes of non-continuous care to the patient.  Discussed at length with the CTICU staff and helped coordinate care.

## 2019-05-10 NOTE — CONSULT NOTE ADULT - PROBLEM SELECTOR RECOMMENDATION 9
- patient currently on trach collar. Would attempt trach collar 24 hours and followup ABG in AM  - if patient remains on trach collar would downsize to cuffless trach which may lessen irritation and help with phonation  - CXR with bilateral opacities and now lung biopsy with evidence of pulmonary amyloid  - would suggest checking a dedicated noncontrast CT chest for further evaluation of lung parenchyma  - He has been bronched by the thoracic surgery team without any evidence of endobronchial lesion by report

## 2019-05-10 NOTE — CONSULT NOTE ADULT - ASSESSMENT
Dawson Baltazar is a 78 year old man with PMH of HLD, Sleep apnea ( non compliant with CPAP), GERD, diverticulitis, depression, anxiety, and panic attack. Denies significant PMH of heart disease but reports early CAD in family; mother  of MI at age 54 and uncle (maternal) at age 37.  Presents to Dr Feng with complaints of chest discomfort associated with dyspnea and palpitations for the past 1 year. CP triggered by exercise and stress. He had an abnormal nuclear stress test with mild to moderate abnormal study with medium sized inferior and post- lateral ischemia without infarct, EF 50%.  Holter monitor revealed: frequent PVCs, ECHO revealed AR, concentric LVH, diastolic dysfunction, and  Referred for RHC/LHC. Presently asymptomatic, denies chest pain, dyspnea, dizziness, palpitations, N&V, HA, LE edema.   Patient was started on Toprol 25mg PO daily however he never picked it up from pharmacy because he states" he did not know about it"    He was found to have triple vessel disease and underwent CABG x3 LIMA LAD and SVG to OM1 and RCA on . He remains on trach collar. He seems frustrated with his situation and not being able to have anything by mouth. 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 We are awaiting confirmation of specimens fro [facility]. He does have a history of carpel tunnel. Dawson Baltazar is a 78 year old man with PMH of HLD, Sleep apnea ( non compliant with CPAP), GERD, diverticulitis, depression, anxiety, and panic attack. Denies significant PMH of heart disease but reports early CAD in family; mother  of MI at age 54 and uncle (maternal) at age 37.  Presents to Dr Feng with complaints of chest discomfort associated with dyspnea and palpitations for the past 1 year. CP triggered by exercise and stress. He had an abnormal nuclear stress test with mild to moderate abnormal study with medium sized inferior and post- lateral ischemia without infarct, EF 50%.  Holter monitor revealed: frequent PVCs, ECHO revealed AR, concentric LVH, diastolic dysfunction, and  Found to have triple vessel disease and underwent CABG x3 LIMA LAD and SVG to OM1 and RCA on . He remains on trach collar. He seems frustrated with his situation and not being able to have anything by mouth. 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 We are awaiting confirmation of specimens fro [facility]. He does have a history of carpel tunnel.

## 2019-05-10 NOTE — CONSULT NOTE ADULT - SUBJECTIVE AND OBJECTIVE BOX
HISTORY OF PRESENT ILLNESS:    Dawson Baltazar is a 78 year old man with PMH of HLD, Sleep apnea ( non compliant with CPAP), GERD, diverticulitis, depression, anxiety, and panic attack. Denies significant PMH of heart disease but reports early CAD in family; mother  of MI at age 54 and uncle (maternal) at age 37.  Presents to Dr Feng with complaints of chest discomfort associated with dyspnea ( on exertion after walking 100 feet)  and palpitations for the past 1 year.  CP is described as 'pinch like", intermittent, sporadic, lasting 1-2 min, localized in the left anterior chest non radiating; CP triggered by exercise and stress. Abnormal NST: mild to moderate abnormal study with medium sized inferior and post- lateral ischemia without infarct, EF 50%.  Holter monitor revealed: frequent PVCs, ECHO revealed AR, concentric LVH, diastolic dysfunction, and  Referred for RHC/LHC. Presently asymptomatic, denies chest pain, dyspnea, dizziness, palpitations, N&V, HA, LE edema.   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 He remains on trach collar. He seems frustrated with his situation and not being able to have anything PO. 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 We are awaiting confirmation of specimens fro [facility]. He does have a history of carpel tunnel.     Allergies  No Known Allergies    MEDICATIONS:  buMETAnide Infusion 2 mG/Hr IV Continuous <Continuous>  heparin  Infusion 1300 Unit(s)/Hr IV Continuous <Continuous>  norepinephrine Infusion 0.02 MICROgram(s)/kG/Min IV Continuous <Continuous>  ALBUTerol/ipratropium for Nebulization 3 milliLiter(s) Nebulizer every 6 hours  aspirin 325 milliGRAM(s) Oral daily  dexmedetomidine Infusion 0.7 MICROgram(s)/kG/Hr IV Continuous <Continuous>  escitalopram 10 milliGRAM(s) Oral daily  HYDROmorphone  Injectable 0.5 milliGRAM(s) IV Push every 8 hours PRN  pantoprazole  Injectable 40 milliGRAM(s) IV Push two times a day  atorvastatin 40 milliGRAM(s) Oral at bedtime  insulin regular Infusion 3 Unit(s)/Hr IV Continuous <Continuous>  methylPREDNISolone sodium succinate Injectable   IV Push   methylPREDNISolone sodium succinate Injectable 100 milliGRAM(s) IV Push every 12 hours  vasopressin Infusion 0.083 Unit(s)/Min IV Continuous <Continuous>  chlorhexidine 0.12% Liquid 15 milliLiter(s) Oral Mucosa two times a day  chlorhexidine 4% Liquid 1 Application(s) Topical <User Schedule>  sodium chloride 0.9%. 1000 milliLiter(s) IV Continuous <Continuous>      PAST MEDICAL & SURGICAL HISTORY:  Diverticulitis  Nocturia  Panic attacks  Anxiety  Depression  Asthma: Controlled  Sleep Apnea  History of partial colectomy  History of colon resection  History of eye surgery: PPV right  Cataract extraction status: right  Status post lung surgery: ? wedge resection  S/P eye surgery: &quot;removed fluid&quot; from rigth eye  Bunion  Sinus Disorder: surgery x 2  Inguinal Hernia Bilateral  Shoulder Problem: right rotator cuff  Carpal Tunnel Syndrome: right hand      FAMILY HISTORY:  Family history of acute myocardial infarction (Sibling): mom at 54   mothers brother at 37      SOCIAL HISTORY:  Lives with wife     REVIEW OF SYSTEMS:  CONSTITUTIONAL: No fever, weight loss, or fatigue  EYES: No eye pain, visual disturbances, or discharge  ENMT:  No difficulty hearing, tinnitus, vertigo; No sinus or throat pain  NECK: No pain or stiffness  RESPIRATORY: No cough, wheezing, chills or hemoptysis; + Shortness of Breath  CARDIOVASCULAR: No chest pain, + palpitations, No passing out, dizziness, + leg swelling  GASTROINTESTINAL: No abdominal or epigastric pain. No nausea, vomiting, or hematemesis; No diarrhea or constipation.   GENITOURINARY: No dysuria, frequency, hematuria, or incontinence  NEUROLOGICAL: No headaches, memory loss, loss of strength, numbness, or tremors  SKIN: No itching, burning, rashes, or lesions   LYMPH Nodes: No enlarged glands  ENDOCRINE: No heat or cold intolerance; No hair loss  MUSCULOSKELETAL: No joint pain or swelling; No muscle, back, or extremity pain  PSYCHIATRIC: No depression, anxiety, mood swings, or difficulty sleeping  HEME/LYMPH: No easy bruising, or bleeding gums  ALLERY AND IMMUNOLOGIC: No hives or eczema	    [ ] All others negative	  [ ] Unable to obtain    PHYSICAL EXAM:  T(C): 36.6 (05-10-19 @ 15:00), Max: 37.1 (19 @ 19:00)  HR: 98 (05-10-19 @ 18:00) (66 - 101)  BP: --  RR: 25 (05-10-19 @ 18:00) (12 - 30)  SpO2: 96% (05-10-19 @ 18:00) (92% - 100%)  Wt(kg): --  I&O's Summary    09 May 2019 07:  -  10 May 2019 07:00  --------------------------------------------------------  IN: 3828.8 mL / OUT: 2275 mL / NET: 1553.8 mL    10 May 2019 07:  -  10 May 2019 18:01  --------------------------------------------------------  IN: 2567 mL / OUT: 1350 mL / NET: 1217 mL    Appearance: Normal	  HEENT:   Normal oral mucosa, PERRL, EOMI	  Lymphatic: No lymphadenopathy  Cardiovascular: Normal S1 S2, No JVD, No murmurs, + LE  edema  Respiratory: Decreased at bases - trach collar in place	  Psychiatry: A & O x 3, Mood & affect appropriate  Gastrointestinal:  Soft, Non-tender, + BS	  Skin: No rashes, No ecchymoses, No cyanosis	  Neurologic: Non-focal  Extremities: Normal range of motion, No clubbing, cyanosis   Vascular: Peripheral pulses palpable bilaterally    LABS:	 	    CBC Full  -  ( 10 May 2019 00:53 )  WBC Count : 10.9 K/uL  Hemoglobin : 9.4 g/dL  Hematocrit : 26.8 %  Platelet Count - Automated : 225 K/uL  Mean Cell Volume : 86.6 fl  Mean Cell Hemoglobin : 30.2 pg  Mean Cell Hemoglobin Concentration : 34.9 gm/dL  Auto Neutrophil # : x  Auto Lymphocyte # : x  Auto Monocyte # : x  Auto Eosinophil # : x  Auto Basophil # : x  Auto Neutrophil % : x  Auto Lymphocyte % : x  Auto Monocyte % : x  Auto Eosinophil % : x  Auto Basophil % : x    05-10    149<H>  |  118<H>  |  144<H>  ----------------------------<  128<H>  4.5   |  17<L>  |  2.53<H>  05-09    150<H>  |  116<H>  |  127<H>  ----------------------------<  176<H>  4.6   |  18<L>  |  2.28<H>    Ca    8.2<L>      10 May 2019 00:53  Ca    7.9<L>      09 May 2019 02:18  Phos  7.2     05-10  Phos  6.3       Mg     3.1     05-10  Mg     3.0     05-09    TPro  5.5<L>  /  Alb  x   /  TBili  x   /  DBili  x   /  AST  x   /  ALT  x   /  AlkPhos  x   05-10  TPro  5.4<L>  /  Alb  2.7<L>  /  TBili  1.1  /  DBili  x   /  AST  41<H>  /  ALT  54<H>  /  AlkPhos  67  05-10    < from: Transthoracic Echocardiogram (19 @ 09:26) >  Patient name: CHRIS THAKKAR  YOB: 1940   Age: 79 (M)   MR#: [id number]  Study Date: 2019  Location: [place] [id number]: Radha Aguiar [facility]  Study quality: Technically difficult  Referring Physician: Lul Milner MD  Blood Pressure: 158/51 mmHg  Height: 173 cm  Weight: 79 kg  BSA: 1.9 m2  ------------------------------------------------------------------------  PROCEDURE: Transthoracic echocardiogram with 2-D, M-Mode  and complete spectral and color flow Doppler. Patient was  injected with 10 cc's of aerosolized saline. Patient was  injected with 10 cc's of aerosolized saline.  INDICATION: Heart failure, unspecified (I50.9)  ------------------------------------------------------------------------  Dimensions:    Normal Values:  LA:     3.6    2.0 - 4.0 cm  Ao:     3.6    2.0 - 3.8 cm  SEPTUM: 1.1    0.6 - 1.2 cm  PWT:    1.2    0.6 - 1.1 cm  LVIDd:  4.6    3.0 - 5.6 cm  LVIDs:         1.8 - 4.0 cm  Derived variables:  LVMI: 100 g/m2  RWT: 0.52  EF (Visual Estimate): 80 %  ------------------------------------------------------------------------  Observations:  Mitral Valve: Mitral annular calcification.  Aortic Valve/Aorta: Calcified aortic valve without  stenosis.  Mild aortic regurgitation.  Normal aortic root size. (Ao: 3.6 cm at the sinuses of  Valsalva).  Left Atrium: Normal left atrium.  LA volume index = 32  cc/m2.  Left Ventricle: Hyperdynamic left ventricular systolic  function. Intracavitary gradient seen.  Normal left  ventricular internal dimensions. Mild concentric left  ventricular hypertrophy. Normal diastolic function  Right Heart: Normal right atrium. Normal right ventricular  size and function. Normal tricuspid valve. Pulmonic valve  not well visualized.  Hemodynamic: No evidence of pulmonary hypertension.  Opacification of the right atrium with agitated saline was  suboptimal. Study does not rule-out the presence of a PFO.  ------------------------------------------------------------------------  Conclusions:  Hyperdynamic left ventricular systolic function.  Intracavitary gradient seen.  Opacification of the right atrium with agitated saline was  suboptimal. Study does not rule-out the presence of a PFO.  ------------------------------------------------------------------------  Confirmed on  2019 - 13:04:33 by Brayden Tom M.D.  ------------------------------------------------------------------------    < end of copied text >      < from: Cardiac Cath Lab - Adult (19 @ 11:31) >  [facility]  Department of Cardiology  [address] [place] [address]  [phone]  Cath Lab Report -- Comprehensive Report  Patient: CHRIS THAKKAR  Study date: 2019  Account number: [id number]  MR number: [id number]  : 1940  Gender: Male  Race: W  Case Physician(s):  Kenneth Delgadillo M.D.  Referring Physician:  Vidal Feng D.O.  INDICATIONS: Stable angina - CCS2. Abnormal stress test.  HISTORY: There was no prior cardiac history. The patient has hypertension  and medication-treated dyslipidemia.  PROCEDURE:  --  Right heart catheterization.  --  Left coronary angiography.  --  Right coronary angiography.  --  Sonosite - Diagnostic.  TECHNIQUE: The risks and alternatives of the procedures and conscious  sedation were explained to the patient and informed consent was obtained.  Cardiac catheterization performed electively.  Local anesthetic given. Right femoral artery access. Right femoral vein  access. Right heart catheterization. The procedure was performed utilizing  a catheter. Left coronary artery angiography. The vessel was injected  utilizing a catheter. Right coronary artery angiography. The vessel was  injected utilizing a catheter. Sonosite - Diagnostic. RADIATION EXPOSURE:  1.9 min.  CONTRAST GIVEN: Omnipaque 30 ml.  MEDICATIONS GIVEN: Fentanyl, 25 mcg, IV. Midazolam, 1 mg, IV.  CORONARY VESSELS: The coronary circulation is right dominant.  LM:   --  Proximal left main: There was a 80 % stenosis.  LAD:   --  Mid LAD: There was a 85 % stenosis.  CX:   --  Proximal circumflex: There was a 100 % stenosis.  RCA:   --  Mid RCA: There was a 90 % stenosis.  COMPLICATIONS: There were no complications.  DIAGNOSTIC RECOMMENDATIONS: Consultation with Annia will be obtained for  coronary artery bypass grafting.  Prepared and signed by  Kenneth Delgadillo M.D.  Signed 2019 12:49:07  HEMODYNAMIC TABLES  Pressures:  Baseline  Pressures:  - HR: 73  Pressures:  - Rhythm:  Pressures:  -- Aortic Pressure (S/D/M): 128/60/86  Pressures:  -- Pulmonary Artery (S/D/M): 30/15/21  Pressures:  -- Pulmonary Capillary Wedge: 14/16/12  Pressures:  -- Right Atrium (a/v/M): 9/9/6  Pressures:  -- Right Ventricle (s/edp): 25/9/--  O2 Sats:  Baseline  O2 Sats:  - HR: 73  O2 Sats:  - Rhythm:  O2 Sats:  -- AO: 14/91.6/17.44  O2 Sats:  -- PA: 13.7/62.7/11.68  Outputs:  Baseline  Outputs:  -- CALCULATIONS[age]  Outputs:  -- CALCULATIONS: Body Surface Area: 1.93  Outputs:  -- CALCULATIONS: Height in cm: 173.00  Outputs:  -- CALCULATIONS: Sex: Male  Outputs:  -- CALCULATIONS: Weight in k.40  Outputs:  -- OUTPUTS: Blood Oxygen Difference: 5.76  Outputs:  -- OUTPUTS: CO by Alycia: 4.49  Outputs:  -- OUTPUTS: Alycia cardiac index: 2.32  Outputs:  -- OUTPUTS: O2 consumption: 258.62  Outputs:  -- OUTPUTS: Vo2 Indexed: 133.75  Outputs:  -- RESISTANCES: Left ventricular stroke work: 63.66  Outputs:  -- RESISTANCES: Left Ventricular Stroke Work index: 32.92  Outputs:  -- RESISTANCES: Pulmonary vascular index (dsc): 309.91  Outputs:  -- RESISTANCES: Pulmonary vascular index (Wood Units): 3.87  Outputs:  -- RESISTANCES: Pulmonary vascular resistance (dsc): 160.27  Outputs:  -- RESISTANCES: Pulmonary vascular resistance (Wood Units): 2.00  Outputs:  -- RESISTANCES: PVR_SVR Ratio: 0.11  Outputs:  -- RESISTANCES: Right ventricular stroke work: 12.55  Outputs:  -- RESISTANCES: Right ventricular stroke work index: 6.49  Outputs:  -- RESISTANCES: Systemic vascular index (dsc): 2754.75  Outputs:  -- RESISTANCES: Systemic vascular index (Wood Units): 34.44  Outputs:  -- RESISTANCES: Systemic vascular resistance (dsc): 1424.65  Outputs:  -- RESISTANCES: Systemic vascular resistance (Wood Units): 17.81  Outputs:  -- RESISTANCES: Total pulmonary index (dsc): 723.12  Outputs:  -- RESISTANCES: Total pulmonary index (Wood Units): 9.04  Outputs:  -- RESISTANCES: Total pulmonary resistance (dsc): 373.97  Outputs:  -- RESISTANCES: Total pulmonary resistance (Wood Units): 4.68  Outputs:  -- RESISTANCES: Total vascular index (WoodUnits): 37.03  Outputs:  -- RESISTANCES: Total vascular resistance (dsc): 1531.50  Outputs:  -- RESISTANCES: Total vascular resistance (Wood Units): 19.15  Outputs:  -- RESISTANCES: Total vascular resistance index (dsc): 2961.36  Outputs:  -- RESISTANCES: TPR_TVR Ratio: 0.24  Outputs:  -- SHUNTS: Qs Indexed: 2.32    < end of copied text >      Surgical Pathology Report:   ACCESSION No:  [id number]    CHRIS THAKKAR JR                 1        Surgical Final Report          Final Diagnosis    Lung, left lower lobe, wedge biopsy  - Amyloidosis    Comment: Sections show the presence of hyalinized material  conisistent with amyloid mostly in small arteries and arterioles,  as well as focally in alveolar interstitium. Congo red stain is  positive with green birefringence on  polarization. This confirms the diagnosis of amyloidosis.    Verified by: ADE SWENSON M.D.  (Electronic Signature)  Reported on: 19 15:14 EDT, 6 [facility], [place]  [address]  _________________________________________________________________    Clinical History  respiratory failure  History of BOOP    Specimen(s) Submitted  1     Left lower lobe wedge    Gross Description  1. The specimen is received in formalin and the specimen  container is labeled: Left lower lobe wedge.  It consists of a  5.0 x 2.0 x 0.7 cm pulmonary wedge. The margin is 6.0 cm long and  is stapled.  The margin inked black.  Blue ink is applied to the  pleura, which is smooth.  The specimen is serially section.  Sections reveal tan gray cut surface.  Entirely submitted.  Four  cassettes.    In addition to other data that may appear on the specimen  container, the label has been inspected to confirm the presence  of the patient's name and date of birth.    Negin Mendes 2019 14:45 (19 @ 12:10) HISTORY OF PRESENT ILLNESS:    Bijan Baltazar is a 78 year old man with PMH of HLD, Sleep apnea (non compliant with CPAP), GERD, diverticulitis, depression, anxiety, and panic attack, prior h/o carpal tunnel. Denies significant PMH of heart disease but reports early CAD in family; mother  of MI at age 54 and uncle (maternal) at age 37.  Presents to Dr Feng with complaints of chest discomfort associated with dyspnea ( on exertion after walking 100 feet)  and palpitations for the past 1 year.  CP is described as 'pinch like", intermittent, sporadic, lasting 1-2 min, localized in the left anterior chest non radiating; CP triggered by exercise and stress. Abnormal NST: mild to moderate abnormal study with medium sized inferior and post- lateral ischemia without infarct, EF 50%.  Holter monitor revealed: frequent PVCs, ECHO revealed AR, concentric LVH, diastolic dysfunction, and  Referred for RHC/LHC.     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 He had prolonged respiratory failure requiring trach. He seems frustrated with his situation and not being able to have anything PO. 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 We are awaiting confirmation of specimens from [facility]. He does have a history of carpel tunnel.     Allergies  No Known Allergies    MEDICATIONS:  buMETAnide Infusion 2 mG/Hr IV Continuous <Continuous>  heparin  Infusion 1300 Unit(s)/Hr IV Continuous <Continuous>  norepinephrine Infusion 0.02 MICROgram(s)/kG/Min IV Continuous <Continuous>  ALBUTerol/ipratropium for Nebulization 3 milliLiter(s) Nebulizer every 6 hours  aspirin 325 milliGRAM(s) Oral daily  dexmedetomidine Infusion 0.7 MICROgram(s)/kG/Hr IV Continuous <Continuous>  escitalopram 10 milliGRAM(s) Oral daily  HYDROmorphone  Injectable 0.5 milliGRAM(s) IV Push every 8 hours PRN  pantoprazole  Injectable 40 milliGRAM(s) IV Push two times a day  atorvastatin 40 milliGRAM(s) Oral at bedtime  insulin regular Infusion 3 Unit(s)/Hr IV Continuous <Continuous>  methylPREDNISolone sodium succinate Injectable   IV Push   methylPREDNISolone sodium succinate Injectable 100 milliGRAM(s) IV Push every 12 hours  vasopressin Infusion 0.083 Unit(s)/Min IV Continuous <Continuous>  chlorhexidine 0.12% Liquid 15 milliLiter(s) Oral Mucosa two times a day  chlorhexidine 4% Liquid 1 Application(s) Topical <User Schedule>  sodium chloride 0.9%. 1000 milliLiter(s) IV Continuous <Continuous>      PAST MEDICAL & SURGICAL HISTORY:  Diverticulitis  Nocturia  Panic attacks  Anxiety  Depression  Asthma: Controlled  Sleep Apnea  History of partial colectomy  History of colon resection  History of eye surgery: PPV right  Cataract extraction status: right  Status post lung surgery: ? wedge resection  S/P eye surgery: &quot;removed fluid&quot; from rigth eye  Bunion  Sinus Disorder: surgery x 2  Inguinal Hernia Bilateral  Shoulder Problem: right rotator cuff  Carpal Tunnel Syndrome: right hand      FAMILY HISTORY:  Family history of acute myocardial infarction (Sibling): mom at 54   mothers brother at 37      SOCIAL HISTORY:  Lives with wife     REVIEW OF SYSTEMS:  CONSTITUTIONAL: No fever, weight loss, or fatigue  EYES: No eye pain, visual disturbances, or discharge  ENMT:  No difficulty hearing, tinnitus, vertigo; No sinus or throat pain  NECK: No pain or stiffness  RESPIRATORY: No cough, wheezing, chills or hemoptysis; + Shortness of Breath  CARDIOVASCULAR: No chest pain, + palpitations, No passing out, dizziness, + leg swelling  GASTROINTESTINAL: No abdominal or epigastric pain. No nausea, vomiting, or hematemesis; No diarrhea or constipation.   GENITOURINARY: No dysuria, frequency, hematuria, or incontinence  NEUROLOGICAL: No headaches, memory loss, loss of strength, numbness, or tremors  SKIN: No itching, burning, rashes, or lesions   LYMPH Nodes: No enlarged glands  ENDOCRINE: No heat or cold intolerance; No hair loss  MUSCULOSKELETAL: No joint pain or swelling; No muscle, back, or extremity pain  PSYCHIATRIC: No depression, anxiety, mood swings, or difficulty sleeping  HEME/LYMPH: No easy bruising, or bleeding gums  ALLERY AND IMMUNOLOGIC: No hives or eczema	    [ ] All others negative	  [ ] Unable to obtain    PHYSICAL EXAM:  T(C): 36.6 (05-10-19 @ 15:00), Max: 37.1 (19 @ 19:00)  HR: 98 (05-10-19 @ 18:00) (66 - 101)  BP: --  RR: 25 (05-10-19 @ 18:00) (12 - 30)  SpO2: 96% (05-10-19 @ 18:00) (92% - 100%)  Wt(kg): --  I&O's Summary    09 May 2019 07:  -  10 May 2019 07:00  --------------------------------------------------------  IN: 3828.8 mL / OUT: 2275 mL / NET: 1553.8 mL    10 May 2019 07:  -  10 May 2019 18:01  --------------------------------------------------------  IN: 2567 mL / OUT: 1350 mL / NET: 1217 mL    Appearance: on vent; frustrated	  HEENT:   Normal oral mucosa, PERRL, EOMI; trach  Lymphatic: No lymphadenopathy  Cardiovascular: Normal S1 S2, JVP approx 14 cm with HJR, No murmurs, + LE  edema  Respiratory: Decreased at bases - trach collar in place	  Psychiatry: A & O x 3, Mood & affect appropriate  Gastrointestinal:  Soft, Non-tender, + BS	  Skin: No rashes, No ecchymoses, No cyanosis	  Neurologic: Non-focal  Extremities: Normal range of motion, No clubbing, cyanosis   Vascular: Peripheral pulses palpable bilaterally    LABS:	 	    CBC Full  -  ( 10 May 2019 00:53 )  WBC Count : 10.9 K/uL  Hemoglobin : 9.4 g/dL  Hematocrit : 26.8 %  Platelet Count - Automated : 225 K/uL  Mean Cell Volume : 86.6 fl  Mean Cell Hemoglobin : 30.2 pg  Mean Cell Hemoglobin Concentration : 34.9 gm/dL  Auto Neutrophil # : x  Auto Lymphocyte # : x  Auto Monocyte # : x  Auto Eosinophil # : x  Auto Basophil # : x  Auto Neutrophil % : x  Auto Lymphocyte % : x  Auto Monocyte % : x  Auto Eosinophil % : x  Auto Basophil % : x    05-10    149<H>  |  118<H>  |  144<H>  ----------------------------<  128<H>  4.5   |  17<L>  |  2.53<H>      150<H>  |  116<H>  |  127<H>  ----------------------------<  176<H>  4.6   |  18<L>  |  2.28<H>    Ca    8.2<L>      10 May 2019 00:53  Ca    7.9<L>      09 May 2019 02:18  Phos  7.2     -10  Phos  6.3       Mg     3.1     05-10  Mg     3.0     05-09    TPro  5.5<L>  /  Alb  x   /  TBili  x   /  DBili  x   /  AST  x   /  ALT  x   /  AlkPhos  x   10  TPro  5.4<L>  /  Alb  2.7<L>  /  TBili  1.1  /  DBili  x   /  AST  41<H>  /  ALT  54<H>  /  AlkPhos  67  10    < from: Transthoracic Echocardiogram (19 @ 09:26) >  Patient name: CHRIS THAKKAR  YOB: 1940   Age: 79 (M)   MR#: [id number]  Study Date: 2019  Location: [place] [id number]: Radha Aguiar RDCS  Study quality: Technically difficult  Referring Physician: Lul Milner MD  Blood Pressure: 158/51 mmHg  Height: 173 cm  Weight: 79 kg  BSA: 1.9 m2  ------------------------------------------------------------------------  PROCEDURE: Transthoracic echocardiogram with 2-D, M-Mode  and complete spectral and color flow Doppler. Patient was  injected with 10 cc's of aerosolized saline. Patient was  injected with 10 cc's of aerosolized saline.  INDICATION: Heart failure, unspecified (I50.9)  ------------------------------------------------------------------------  Dimensions:    Normal Values:  LA:     3.6    2.0 - 4.0 cm  Ao:     3.6    2.0 - 3.8 cm  SEPTUM: 1.1    0.6 - 1.2 cm  PWT:    1.2    0.6 - 1.1 cm  LVIDd:  4.6    3.0 - 5.6 cm  LVIDs:         1.8 - 4.0 cm  Derived variables:  LVMI: 100 g/m2  RWT: 0.52  EF (Visual Estimate): 80 %  ------------------------------------------------------------------------  Observations:  Mitral Valve: Mitral annular calcification.  Aortic Valve/Aorta: Calcified aortic valve without  stenosis.  Mild aortic regurgitation.  Normal aortic root size. (Ao: 3.6 cm at the sinuses of  Valsalva).  Left Atrium: Normal left atrium.  LA volume index = 32  cc/m2.  Left Ventricle: Hyperdynamic left ventricular systolic  function. Intracavitary gradient seen.  Normal left  ventricular internal dimensions. Mild concentric left  ventricular hypertrophy. Normal diastolic function  Right Heart: Normal right atrium. Normal right ventricular  size and function. Normal tricuspid valve. Pulmonic valve  not well visualized.  Hemodynamic: No evidence of pulmonary hypertension.  Opacification of the right atrium with agitated saline was  suboptimal. Study does not rule-out the presence of a PFO.  ------------------------------------------------------------------------  Conclusions:  Hyperdynamic left ventricular systolic function.  Intracavitary gradient seen.  Opacification of the right atrium with agitated saline was  suboptimal. Study does not rule-out the presence of a PFO.  ------------------------------------------------------------------------  Confirmed on  2019 - 13:04:33 by Brayden Tom M.D.  ------------------------------------------------------------------------    < end of copied text >      < from: Cardiac Cath Lab - Adult (19 @ 11:31) >  [facility]  Department of Cardiology  [address] [place] [address]  [phone]  Cath Lab Report -- Comprehensive Report  Patient: CHRIS THAKKAR  Study date: 2019  Account number: [id number]  MR number: [id number]  : 1940  Gender: Male  Race: W  Case Physician(s):  Kenneth Delgadillo M.D.  Referring Physician:  Vidal Feng D.O.  INDICATIONS: Stable angina - CCS2. Abnormal stress test.  HISTORY: There was no prior cardiac history. The patient has hypertension  and medication-treated dyslipidemia.  PROCEDURE:  --  Right heart catheterization.  --  Left coronary angiography.  --  Right coronary angiography.  --  Sonosite - Diagnostic.  TECHNIQUE: The risks and alternatives of the procedures and conscious  sedation were explained to the patient and informed consent was obtained.  Cardiac catheterization performed electively.  Local anesthetic given. Right femoral artery access. Right femoral vein  access. Right heart catheterization. The procedure was performed utilizing  a catheter. Left coronary artery angiography. The vessel was injected  utilizing a catheter. Right coronary artery angiography. The vessel was  injected utilizing a catheter. Sonosite - Diagnostic. RADIATION EXPOSURE:  1.9 min.  CONTRAST GIVEN: Omnipaque 30 ml.  MEDICATIONS GIVEN: Fentanyl, 25 mcg, IV. Midazolam, 1 mg, IV.  CORONARY VESSELS: The coronary circulation is right dominant.  LM:   --  Proximal left main: There was a 80 % stenosis.  LAD:   --  Mid LAD: There was a 85 % stenosis.  CX:   --  Proximal circumflex: There was a 100 % stenosis.  RCA:   --  Mid RCA: There was a 90 % stenosis.  COMPLICATIONS: There were no complications.  DIAGNOSTIC RECOMMENDATIONS: Consultation with Annia will be obtained for  coronary artery bypass grafting.  Prepared and signed by  Kenneth Delgadillo M.D.  Signed 2019 12:49:07  HEMODYNAMIC TABLES  Pressures:  Baseline  Pressures:  - HR: 73  Pressures:  - Rhythm:  Pressures:  -- Aortic Pressure (S/D/M): 128/60/86  Pressures:  -- Pulmonary Artery (S/D/M): 30/15/21  Pressures:  -- Pulmonary Capillary Wedge: 14/16/12  Pressures:  -- Right Atrium (a/v/M): /9/6  Pressures:  -- Right Ventricle (s/edp): /--  O2 Sats:  Baseline  O2 Sats:  - HR: 73  O2 Sats:  - Rhythm:  O2 Sats:  -- AO: 14/91.6/17.44  O2 Sats:  -- PA: 13.7/62.7/11.68  Outputs:  Baseline  Outputs:  -- CALCULATIONS[age]  Outputs:  -- CALCULATIONS: Body Surface Area: 1.93  Outputs:  -- CALCULATIONS: Height in cm: 173.00  Outputs:  -- CALCULATIONS: Sex: Male  Outputs:  -- CALCULATIONS: Weight in k.40  Outputs:  -- OUTPUTS: Blood Oxygen Difference: 5.76  Outputs:  -- OUTPUTS: CO by Alycia: 4.49  Outputs:  -- OUTPUTS: Alycia cardiac index: 2.32  Outputs:  -- OUTPUTS: O2 consumption: 258.62  Outputs:  -- OUTPUTS: Vo2 Indexed: 133.75  Outputs:  -- RESISTANCES: Left ventricular stroke work: 63.66  Outputs:  -- RESISTANCES: Left Ventricular Stroke Work index: 32.92  Outputs:  -- RESISTANCES: Pulmonary vascular index (dsc): 309.91  Outputs:  -- RESISTANCES: Pulmonary vascular index (Wood Units): 3.87  Outputs:  -- RESISTANCES: Pulmonary vascular resistance (dsc): 160.27  Outputs:  -- RESISTANCES: Pulmonary vascular resistance (Wood Units): 2.00  Outputs:  -- RESISTANCES: PVR_SVR Ratio: 0.11  Outputs:  -- RESISTANCES: Right ventricular stroke work: 12.55  Outputs:  -- RESISTANCES: Right ventricular stroke work index: 6.49  Outputs:  -- RESISTANCES: Systemic vascular index (dsc): 2754.75  Outputs:  -- RESISTANCES: Systemic vascular index (Wood Units): 34.44  Outputs:  -- RESISTANCES: Systemic vascular resistance (dsc): 1424.65  Outputs:  -- RESISTANCES: Systemic vascular resistance (Wood Units): 17.81  Outputs:  -- RESISTANCES: Total pulmonary index (dsc): 723.12  Outputs:  -- RESISTANCES: Total pulmonary index (Wood Units): 9.04  Outputs:  -- RESISTANCES: Total pulmonary resistance (dsc): 373.97  Outputs:  -- RESISTANCES: Total pulmonary resistance (Wood Units): 4.68  Outputs:  -- RESISTANCES: Total vascular index (WoodUnits): 37.03  Outputs:  -- RESISTANCES: Total vascular resistance (dsc): 1531.50  Outputs:  -- RESISTANCES: Total vascular resistance (Wood Units): 19.15  Outputs:  -- RESISTANCES: Total vascular resistance index (dsc): 2961.36  Outputs:  -- RESISTANCES: TPR_TVR Ratio: 0.24  Outputs:  -- SHUNTS: Qs Indexed: 2.32    < end of copied text >      Surgical Pathology Report:   ACCESSION No:  [id number]    BIJAN GABRIEL, CHRIS CASTREJON                 1        Surgical Final Report          Final Diagnosis    Lung, left lower lobe, wedge biopsy  - Amyloidosis    Comment: Sections show the presence of hyalinized material  conisistent with amyloid mostly in small arteries and arterioles,  as well as focally in alveolar interstitium. Congo red stain is  positive with green birefringence on  polarization. This confirms the diagnosis of amyloidosis.    Verified by: ADE SWENSON M.D.  (Electronic Signature)  Reported on: 19 15:14 EDT, [address] [place] [address]  [id number]  _________________________________________________________________    Clinical History  respiratory failure  History of BOOP    Specimen(s) Submitted  1     Left lower lobe wedge    Gross Description  1. The specimen is received in formalin and the specimen  container is labeled: Left lower lobe wedge.  It consists of a  5.0 x 2.0 x 0.7 cm pulmonary wedge. The margin is 6.0 cm long and  is stapled.  The margin inked black.  Blue ink is applied to the  pleura, which is smooth.  The specimen is serially section.  Sections reveal tan gray cut surface.  Entirely submitted.  Four  cassettes.    In addition to other data that may appear on the specimen  container, the label has been inspected to confirm the presence  of the patient's name and date of birth.    Negin Mendes 2019 14:45 (19 @ 12:10)

## 2019-05-10 NOTE — PROGRESS NOTE ADULT - PROBLEM SELECTOR PLAN 2
In the setting of pre renal TARAH as described above and high dose of steroids use.   Continue to monitor.  No indication for HD. In the setting of pre renal TARAH,  high dose of steroids use and diuretics use.  Continue to monitor.  No indication for HD. The patient has an elevated bun in the setting of steroid use.  steroids being tapered.  the patient does not appear to have uremic symptoms on examination.  no role for dialysis yet.

## 2019-05-10 NOTE — PROGRESS NOTE ADULT - PROBLEM SELECTOR PLAN 3
The patient has hypernatremia recommend free water through NG tube.  Monitor serum sodium Q6. Continue free water through NG tube.

## 2019-05-10 NOTE — PROGRESS NOTE ADULT - SUBJECTIVE AND OBJECTIVE BOX
[facility] DIVISION OF KIDNEY DISEASES AND HYPERTENSION -- FOLLOW UP NOTE  --------------------------------------------------------------------------------  Chief Complaint: TARAH    24 hour events/subjective:        PAST HISTORY  --------------------------------------------------------------------------------  No significant changes to PMH, PSH, FHx, SHx, unless otherwise noted    ALLERGIES & MEDICATIONS  --------------------------------------------------------------------------------  Allergies    No Known Allergies    Intolerances      Standing Inpatient Medications  ALBUTerol/ipratropium for Nebulization 3 milliLiter(s) Nebulizer every 6 hours  aspirin 325 milliGRAM(s) Oral daily  atorvastatin 40 milliGRAM(s) Oral at bedtime  chlorhexidine 0.12% Liquid 15 milliLiter(s) Oral Mucosa two times a day  chlorhexidine 4% Liquid 1 Application(s) Topical <User Schedule>  dexmedetomidine Infusion 0.7 MICROgram(s)/kG/Hr IV Continuous <Continuous>  escitalopram 10 milliGRAM(s) Oral daily  furosemide Infusion 20 mG/Hr IV Continuous <Continuous>  heparin  Infusion 1300 Unit(s)/Hr IV Continuous <Continuous>  insulin regular Infusion 3 Unit(s)/Hr IV Continuous <Continuous>  methylPREDNISolone sodium succinate Injectable   IV Push   methylPREDNISolone sodium succinate Injectable 100 milliGRAM(s) IV Push every 12 hours  norepinephrine Infusion 0.02 MICROgram(s)/kG/Min IV Continuous <Continuous>  pantoprazole  Injectable 40 milliGRAM(s) IV Push two times a day  sodium chloride 0.9%. 1000 milliLiter(s) IV Continuous <Continuous>  vasopressin Infusion 0.083 Unit(s)/Min IV Continuous <Continuous>    PRN Inpatient Medications  HYDROmorphone  Injectable 0.5 milliGRAM(s) IV Push every 8 hours PRN      REVIEW OF SYSTEMS  --------------------------------------------------------------------------------  unable to obtain      VITALS/PHYSICAL EXAM  --------------------------------------------------------------------------------  T(C): 37 (05-10-19 @ 08:00), Max: 37.6 (05-09-19 @ 12:00)  HR: 74 (05-10-19 @ 08:13) (66 - 101)  BP: --  RR: 18 (05-10-19 @ 08:00) (12 - 30)  SpO2: 95% (05-10-19 @ 08:13) (92% - 100%)  Wt(kg): --        05-09-19 @ 07:01  -  05-10-19 @ 07:00  --------------------------------------------------------  IN: 3828.8 mL / OUT: 2275 mL / NET: 1553.8 mL      Physical Exam:  	Gen: lethargic   	HEENT: anicteric,NGT+  	Pulm: coarse bilateral breath sounds  	CV: RRR, S1S2; no rub  	Abd: distended  	: [place]   	LE: trace edema  	Psych: lethargic      LABS/STUDIES  --------------------------------------------------------------------------------              9.4    10.9  >-----------<  225      [05-10-19 @ 00:53]              26.8     149  |  118  |  144  ----------------------------<  128      [05-10-19 @ 00:53]  4.5   |  17  |  2.53        Ca     8.2     [05-10-19 @ 00:53]      Mg     3.1     [05-10-19 @ 00:53]      Phos  7.2     [05-10-19 @ 00:53]    TPro  5.4  /  Alb  2.7  /  TBili  1.1  /  DBili  x   /  AST  41  /  ALT  54  /  AlkPhos  67  [05-10-19 @ 00:53]      PTT: 64.8       [05-10-19 @ 08:14]      Creatinine Trend:  SCr 2.53 [05-10 @ 00:53]  SCr 2.28 [05-09 @ 02:18]  SCr 2.16 [05-08 @ 01:14]  SCr 2.00 [05-07 @ 01:38]  SCr 1.67 [05-06 @ 01:30]    Urinalysis - [05-08-19 @ 11:22]      Color Yellow / Appearance Slightly Turbid / SG 1.024 / pH 5.5      Gluc Negative / Ketone Negative  / Bili Negative / Urobili Negative       Blood Moderate / Protein 30 mg/dL / Leuk Est Negative / Nitrite Negative      RBC 25 / WBC 3-5 / Hyaline Negative / Gran 6-10 / Sq Epi  / Non Sq Epi 6 / Bacteria Negative    Urine Creatinine 70      [05-08-19 @ 11:28]  Urine Protein 45      [05-08-19 @ 11:28]  Urine Sodium 26      [05-08-19 @ 11:28]  Urine Urea Nitrogen 1191      [05-08-19 @ 14:02]  Urine Potassium 71      [05-08-19 @ 11:28]    HbA1c 5.4      [04-25-19 @ 17:39]  TSH 1.48      [04-25-19 @ 17:58]  Lipid: chol 213, , HDL 38,       [04-25-19 @ 17:58] [facility] DIVISION OF KIDNEY DISEASES AND HYPERTENSION -- FOLLOW UP NOTE  --------------------------------------------------------------------------------  Chief Complaint: TARAH    24 hour events/subjective:  No acute events.        PAST HISTORY  --------------------------------------------------------------------------------  No significant changes to PMH, PSH, FHx, SHx, unless otherwise noted    ALLERGIES & MEDICATIONS  --------------------------------------------------------------------------------  Allergies    No Known Allergies    Intolerances      Standing Inpatient Medications  ALBUTerol/ipratropium for Nebulization 3 milliLiter(s) Nebulizer every 6 hours  aspirin 325 milliGRAM(s) Oral daily  atorvastatin 40 milliGRAM(s) Oral at bedtime  chlorhexidine 0.12% Liquid 15 milliLiter(s) Oral Mucosa two times a day  chlorhexidine 4% Liquid 1 Application(s) Topical <User Schedule>  dexmedetomidine Infusion 0.7 MICROgram(s)/kG/Hr IV Continuous <Continuous>  escitalopram 10 milliGRAM(s) Oral daily  furosemide Infusion 20 mG/Hr IV Continuous <Continuous>  heparin  Infusion 1300 Unit(s)/Hr IV Continuous <Continuous>  insulin regular Infusion 3 Unit(s)/Hr IV Continuous <Continuous>  methylPREDNISolone sodium succinate Injectable   IV Push   methylPREDNISolone sodium succinate Injectable 100 milliGRAM(s) IV Push every 12 hours  norepinephrine Infusion 0.02 MICROgram(s)/kG/Min IV Continuous <Continuous>  pantoprazole  Injectable 40 milliGRAM(s) IV Push two times a day  sodium chloride 0.9%. 1000 milliLiter(s) IV Continuous <Continuous>  vasopressin Infusion 0.083 Unit(s)/Min IV Continuous <Continuous>    PRN Inpatient Medications  HYDROmorphone  Injectable 0.5 milliGRAM(s) IV Push every 8 hours PRN      REVIEW OF SYSTEMS  --------------------------------------------------------------------------------  unable to obtain      VITALS/PHYSICAL EXAM  --------------------------------------------------------------------------------  T(C): 37 (05-10-19 @ 08:00), Max: 37.6 (05-09-19 @ 12:00)  HR: 74 (05-10-19 @ 08:13) (66 - 101)  BP: --  RR: 18 (05-10-19 @ 08:00) (12 - 30)  SpO2: 95% (05-10-19 @ 08:13) (92% - 100%)  Wt(kg): --        05-09-19 @ 07:01  -  05-10-19 @ 07:00  --------------------------------------------------------  IN: 3828.8 mL / OUT: 2275 mL / NET: 1553.8 mL      Physical Exam:  	Gen: lethargic   	HEENT: anicteric, NGT+  	Pulm: coarse bilateral breath sounds  	CV: RRR, S1S2; no rub  	Abd: distended  	: [place]   	LE: trace edema  	Psych: lethargic      LABS/STUDIES  --------------------------------------------------------------------------------              9.4    10.9  >-----------<  225      [05-10-19 @ 00:53]              26.8     149  |  118  |  144  ----------------------------<  128      [05-10-19 @ 00:53]  4.5   |  17  |  2.53        Ca     8.2     [05-10-19 @ 00:53]      Mg     3.1     [05-10-19 @ 00:53]      Phos  7.2     [05-10-19 @ 00:53]    TPro  5.4  /  Alb  2.7  /  TBili  1.1  /  DBili  x   /  AST  41  /  ALT  54  /  AlkPhos  67  [05-10-19 @ 00:53]      PTT: 64.8       [05-10-19 @ 08:14]      Creatinine Trend:  SCr 2.53 [05-10 @ 00:53]  SCr 2.28 [05-09 @ 02:18]  SCr 2.16 [05-08 @ 01:14]  SCr 2.00 [05-07 @ 01:38]  SCr 1.67 [05-06 @ 01:30]    Urinalysis - [05-08-19 @ 11:22]      Color Yellow / Appearance Slightly Turbid / SG 1.024 / pH 5.5      Gluc Negative / Ketone Negative  / Bili Negative / Urobili Negative       Blood Moderate / Protein 30 mg/dL / Leuk Est Negative / Nitrite Negative      RBC 25 / WBC 3-5 / Hyaline Negative / Gran 6-10 / Sq Epi  / Non Sq Epi 6 / Bacteria Negative    Urine Creatinine 70      [05-08-19 @ 11:28]  Urine Protein 45      [05-08-19 @ 11:28]  Urine Sodium 26      [05-08-19 @ 11:28]  Urine Urea Nitrogen 1191      [05-08-19 @ 14:02]  Urine Potassium 71      [05-08-19 @ 11:28]    HbA1c 5.4      [04-25-19 @ 17:39]  TSH 1.48      [04-25-19 @ 17:58]  Lipid: chol 213, , HDL 38,       [04-25-19 @ 17:58] [facility] DIVISION OF KIDNEY DISEASES AND HYPERTENSION -- FOLLOW UP NOTE  --------------------------------------------------------------------------------  Chief Complaint: ATRAH    24 hour events/subjective: unable to obtain review of systems.  as per rn patient without acute events. still urinating.  No acute events.        PAST HISTORY  --------------------------------------------------------------------------------  No significant changes to PMH, PSH, FHx, SHx, unless otherwise noted    ALLERGIES & MEDICATIONS  --------------------------------------------------------------------------------  Allergies    No Known Allergies    Intolerances      Standing Inpatient Medications  ALBUTerol/ipratropium for Nebulization 3 milliLiter(s) Nebulizer every 6 hours  aspirin 325 milliGRAM(s) Oral daily  atorvastatin 40 milliGRAM(s) Oral at bedtime  chlorhexidine 0.12% Liquid 15 milliLiter(s) Oral Mucosa two times a day  chlorhexidine 4% Liquid 1 Application(s) Topical <User Schedule>  dexmedetomidine Infusion 0.7 MICROgram(s)/kG/Hr IV Continuous <Continuous>  escitalopram 10 milliGRAM(s) Oral daily  furosemide Infusion 20 mG/Hr IV Continuous <Continuous>  heparin  Infusion 1300 Unit(s)/Hr IV Continuous <Continuous>  insulin regular Infusion 3 Unit(s)/Hr IV Continuous <Continuous>  methylPREDNISolone sodium succinate Injectable   IV Push   methylPREDNISolone sodium succinate Injectable 100 milliGRAM(s) IV Push every 12 hours  norepinephrine Infusion 0.02 MICROgram(s)/kG/Min IV Continuous <Continuous>  pantoprazole  Injectable 40 milliGRAM(s) IV Push two times a day  sodium chloride 0.9%. 1000 milliLiter(s) IV Continuous <Continuous>  vasopressin Infusion 0.083 Unit(s)/Min IV Continuous <Continuous>    PRN Inpatient Medications  HYDROmorphone  Injectable 0.5 milliGRAM(s) IV Push every 8 hours PRN      REVIEW OF SYSTEMS  --------------------------------------------------------------------------------  unable to obtain      VITALS/PHYSICAL EXAM  --------------------------------------------------------------------------------  T(C): 37 (05-10-19 @ 08:00), Max: 37.6 (05-09-19 @ 12:00)  HR: 74 (05-10-19 @ 08:13) (66 - 101)  BP: --  RR: 18 (05-10-19 @ 08:00) (12 - 30)  SpO2: 95% (05-10-19 @ 08:13) (92% - 100%)  Wt(kg): --        05-09-19 @ 07:01  -  05-10-19 @ 07:00  --------------------------------------------------------  IN: 3828.8 mL / OUT: 2275 mL / NET: 1553.8 mL      Physical Exam:  	Gen: alert opens eyes, able to interact but can't communicate due to trach  	HEENT: anicteric, NGT+  	Pulm: coarse bilateral breath sounds  	CV: RRR, S1S2; no rub  	Abd: distended  	: [place]   	LE: trace edema              Neuro: able to hold hands up, follow commands, no asterixis      LABS/STUDIES  --------------------------------------------------------------------------------              9.4    10.9  >-----------<  225      [05-10-19 @ 00:53]              26.8     149  |  118  |  144  ----------------------------<  128      [05-10-19 @ 00:53]  4.5   |  17  |  2.53        Ca     8.2     [05-10-19 @ 00:53]      Mg     3.1     [05-10-19 @ 00:53]      Phos  7.2     [05-10-19 @ 00:53]    TPro  5.4  /  Alb  2.7  /  TBili  1.1  /  DBili  x   /  AST  41  /  ALT  54  /  AlkPhos  67  [05-10-19 @ 00:53]      PTT: 64.8       [05-10-19 @ 08:14]      Creatinine Trend:  SCr 2.53 [05-10 @ 00:53]  SCr 2.28 [05-09 @ 02:18]  SCr 2.16 [05-08 @ 01:14]  SCr 2.00 [05-07 @ 01:38]  SCr 1.67 [05-06 @ 01:30]    Urinalysis - [05-08-19 @ 11:22]      Color Yellow / Appearance Slightly Turbid / SG 1.024 / pH 5.5      Gluc Negative / Ketone Negative  / Bili Negative / Urobili Negative       Blood Moderate / Protein 30 mg/dL / Leuk Est Negative / Nitrite Negative      RBC 25 / WBC 3-5 / Hyaline Negative / Gran 6-10 / Sq Epi  / Non Sq Epi 6 / Bacteria Negative    Urine Creatinine 70      [05-08-19 @ 11:28]  Urine Protein 45      [05-08-19 @ 11:28]  Urine Sodium 26      [05-08-19 @ 11:28]  Urine Urea Nitrogen 1191      [05-08-19 @ 14:02]  Urine Potassium 71      [05-08-19 @ 11:28]    HbA1c 5.4      [04-25-19 @ 17:39]  TSH 1.48      [04-25-19 @ 17:58]  Lipid: chol 213, , HDL 38,       [04-25-19 @ 17:58]

## 2019-05-10 NOTE — CHART NOTE - NSCHARTNOTEFT_GEN_A_CORE
Trache sutures removed w/o event. Pt tolerating trache collar. Trache dressing clean, dry, intact. Trache surgical site well approximated.

## 2019-05-10 NOTE — CONSULT NOTE ADULT - PROBLEM SELECTOR RECOMMENDATION 2
Continue to wean vent as tolerated. He is on trach collar and appears comfortable
In the setting of pre renal TARAH as described above and high dose of steroids use.   Improve hemodynamics, consider albumin as above and free water through ng tube.  No indication for HD.
- Will need further workup as below  - This is often related with cardiac amyloidosis and that determination will help determine treatment plan  - We will need to know what type of Amyloidosis this is: AL vs AA vs transthyretin  - No pulmonary directed treatments until overall clinical status improves

## 2019-05-11 DIAGNOSIS — I50.31 ACUTE DIASTOLIC (CONGESTIVE) HEART FAILURE: ICD-10-CM

## 2019-05-11 DIAGNOSIS — N17.9 ACUTE KIDNEY FAILURE, UNSPECIFIED: ICD-10-CM

## 2019-05-11 LAB
ALBUMIN SERPL ELPH-MCNC: 2.9 G/DL — LOW (ref 3.3–5)
ALP SERPL-CCNC: 70 U/L — SIGNIFICANT CHANGE UP (ref 40–120)
ALT FLD-CCNC: 67 U/L — HIGH (ref 10–45)
ANION GAP SERPL CALC-SCNC: 20 MMOL/L — HIGH (ref 5–17)
ANION GAP SERPL CALC-SCNC: 22 MMOL/L — HIGH (ref 5–17)
APPEARANCE UR: CLEAR — SIGNIFICANT CHANGE UP
APTT BLD: 44.7 SEC — HIGH (ref 27.5–36.3)
APTT BLD: 64.6 SEC — HIGH (ref 27.5–36.3)
AST SERPL-CCNC: 55 U/L — HIGH (ref 10–40)
BASE EXCESS BLDV CALC-SCNC: -4.8 MMOL/L — LOW (ref -2–2)
BASE EXCESS BLDV CALC-SCNC: 3 MMOL/L — HIGH (ref -2–2)
BILIRUB SERPL-MCNC: 1.4 MG/DL — HIGH (ref 0.2–1.2)
BILIRUB UR-MCNC: NEGATIVE — SIGNIFICANT CHANGE UP
BUN SERPL-MCNC: 178 MG/DL — HIGH (ref 7–23)
BUN SERPL-MCNC: 195 MG/DL — HIGH (ref 7–23)
CALCIUM SERPL-MCNC: 8.2 MG/DL — LOW (ref 8.4–10.5)
CALCIUM SERPL-MCNC: 8.2 MG/DL — LOW (ref 8.4–10.5)
CHLORIDE SERPL-SCNC: 112 MMOL/L — HIGH (ref 96–108)
CHLORIDE SERPL-SCNC: 114 MMOL/L — HIGH (ref 96–108)
CO2 BLDV-SCNC: 21 MMOL/L — LOW (ref 22–30)
CO2 BLDV-SCNC: 29 MMOL/L — SIGNIFICANT CHANGE UP (ref 22–30)
CO2 SERPL-SCNC: 18 MMOL/L — LOW (ref 22–31)
CO2 SERPL-SCNC: 18 MMOL/L — LOW (ref 22–31)
COLOR SPEC: SIGNIFICANT CHANGE UP
CREAT SERPL-MCNC: 3.06 MG/DL — HIGH (ref 0.5–1.3)
CREAT SERPL-MCNC: 3.35 MG/DL — HIGH (ref 0.5–1.3)
DIFF PNL FLD: NEGATIVE — SIGNIFICANT CHANGE UP
GAS PNL BLDA: SIGNIFICANT CHANGE UP
GAS PNL BLDV: SIGNIFICANT CHANGE UP
GLUCOSE BLDC GLUCOMTR-MCNC: 110 MG/DL — HIGH (ref 70–99)
GLUCOSE BLDC GLUCOMTR-MCNC: 113 MG/DL — HIGH (ref 70–99)
GLUCOSE BLDC GLUCOMTR-MCNC: 119 MG/DL — HIGH (ref 70–99)
GLUCOSE BLDC GLUCOMTR-MCNC: 121 MG/DL — HIGH (ref 70–99)
GLUCOSE BLDC GLUCOMTR-MCNC: 128 MG/DL — HIGH (ref 70–99)
GLUCOSE BLDC GLUCOMTR-MCNC: 133 MG/DL — HIGH (ref 70–99)
GLUCOSE BLDC GLUCOMTR-MCNC: 134 MG/DL — HIGH (ref 70–99)
GLUCOSE BLDC GLUCOMTR-MCNC: 196 MG/DL — HIGH (ref 70–99)
GLUCOSE BLDC GLUCOMTR-MCNC: 98 MG/DL — SIGNIFICANT CHANGE UP (ref 70–99)
GLUCOSE SERPL-MCNC: 122 MG/DL — HIGH (ref 70–99)
GLUCOSE SERPL-MCNC: 160 MG/DL — HIGH (ref 70–99)
GLUCOSE UR QL: NEGATIVE — SIGNIFICANT CHANGE UP
HCO3 BLDV-SCNC: 20 MMOL/L — LOW (ref 21–29)
HCO3 BLDV-SCNC: 28 MMOL/L — SIGNIFICANT CHANGE UP (ref 21–29)
HCT VFR BLD CALC: 28.9 % — LOW (ref 39–50)
HCT VFR BLD CALC: 29.2 % — LOW (ref 39–50)
HGB BLD-MCNC: 10 G/DL — LOW (ref 13–17)
HGB BLD-MCNC: 9.6 G/DL — LOW (ref 13–17)
HOROWITZ INDEX BLDV+IHG-RTO: 44 — SIGNIFICANT CHANGE UP
HOROWITZ INDEX BLDV+IHG-RTO: 60 — SIGNIFICANT CHANGE UP
KETONES UR-MCNC: NEGATIVE — SIGNIFICANT CHANGE UP
LEUKOCYTE ESTERASE UR-ACNC: NEGATIVE — SIGNIFICANT CHANGE UP
MAGNESIUM SERPL-MCNC: 3.1 MG/DL — HIGH (ref 1.6–2.6)
MCHC RBC-ENTMCNC: 29 PG — SIGNIFICANT CHANGE UP (ref 27–34)
MCHC RBC-ENTMCNC: 29.6 PG — SIGNIFICANT CHANGE UP (ref 27–34)
MCHC RBC-ENTMCNC: 33.4 GM/DL — SIGNIFICANT CHANGE UP (ref 32–36)
MCHC RBC-ENTMCNC: 34.1 GM/DL — SIGNIFICANT CHANGE UP (ref 32–36)
MCV RBC AUTO: 86.8 FL — SIGNIFICANT CHANGE UP (ref 80–100)
MCV RBC AUTO: 87 FL — SIGNIFICANT CHANGE UP (ref 80–100)
NITRITE UR-MCNC: NEGATIVE — SIGNIFICANT CHANGE UP
PCO2 BLDV: 39 MMHG — SIGNIFICANT CHANGE UP (ref 35–50)
PCO2 BLDV: 47 MMHG — SIGNIFICANT CHANGE UP (ref 35–50)
PH BLDV: 7.34 — LOW (ref 7.35–7.45)
PH BLDV: 7.39 — SIGNIFICANT CHANGE UP (ref 7.35–7.45)
PH UR: 5.5 — SIGNIFICANT CHANGE UP (ref 5–8)
PHOSPHATE SERPL-MCNC: 9 MG/DL — HIGH (ref 2.5–4.5)
PLATELET # BLD AUTO: 249 K/UL — SIGNIFICANT CHANGE UP (ref 150–400)
PLATELET # BLD AUTO: 300 K/UL — SIGNIFICANT CHANGE UP (ref 150–400)
PO2 BLDV: 32 MMHG — SIGNIFICANT CHANGE UP (ref 25–45)
PO2 BLDV: 44 MMHG — SIGNIFICANT CHANGE UP (ref 25–45)
POTASSIUM SERPL-MCNC: 4.8 MMOL/L — SIGNIFICANT CHANGE UP (ref 3.5–5.3)
POTASSIUM SERPL-MCNC: 4.9 MMOL/L — SIGNIFICANT CHANGE UP (ref 3.5–5.3)
POTASSIUM SERPL-SCNC: 4.8 MMOL/L — SIGNIFICANT CHANGE UP (ref 3.5–5.3)
POTASSIUM SERPL-SCNC: 4.9 MMOL/L — SIGNIFICANT CHANGE UP (ref 3.5–5.3)
PROT SERPL-MCNC: 5.5 G/DL — LOW (ref 6–8.3)
PROT UR-MCNC: NEGATIVE — SIGNIFICANT CHANGE UP
RBC # BLD: 3.32 M/UL — LOW (ref 4.2–5.8)
RBC # BLD: 3.36 M/UL — LOW (ref 4.2–5.8)
RBC # FLD: 15.6 % — HIGH (ref 10.3–14.5)
RBC # FLD: 15.7 % — HIGH (ref 10.3–14.5)
SAO2 % BLDV: 55 % — LOW (ref 67–88)
SAO2 % BLDV: 71 % — SIGNIFICANT CHANGE UP (ref 67–88)
SODIUM SERPL-SCNC: 152 MMOL/L — HIGH (ref 135–145)
SODIUM SERPL-SCNC: 152 MMOL/L — HIGH (ref 135–145)
SP GR SPEC: 1.01 — SIGNIFICANT CHANGE UP (ref 1.01–1.02)
UROBILINOGEN FLD QL: NEGATIVE — SIGNIFICANT CHANGE UP
WBC # BLD: 12.8 K/UL — HIGH (ref 3.8–10.5)
WBC # BLD: 21.3 K/UL — HIGH (ref 3.8–10.5)
WBC # FLD AUTO: 12.8 K/UL — HIGH (ref 3.8–10.5)
WBC # FLD AUTO: 21.3 K/UL — HIGH (ref 3.8–10.5)

## 2019-05-11 PROCEDURE — 99233 SBSQ HOSP IP/OBS HIGH 50: CPT

## 2019-05-11 PROCEDURE — 99292 CRITICAL CARE ADDL 30 MIN: CPT | Mod: 25

## 2019-05-11 PROCEDURE — 99233 SBSQ HOSP IP/OBS HIGH 50: CPT | Mod: GC

## 2019-05-11 PROCEDURE — 36556 INSERT NON-TUNNEL CV CATH: CPT

## 2019-05-11 PROCEDURE — 71045 X-RAY EXAM CHEST 1 VIEW: CPT | Mod: 26,76

## 2019-05-11 PROCEDURE — 99291 CRITICAL CARE FIRST HOUR: CPT | Mod: 25

## 2019-05-11 PROCEDURE — 93926 LOWER EXTREMITY STUDY: CPT | Mod: 26,LT

## 2019-05-11 PROCEDURE — 36620 INSERTION CATHETER ARTERY: CPT

## 2019-05-11 PROCEDURE — 36800 INSERTION OF CANNULA: CPT | Mod: 78

## 2019-05-11 PROCEDURE — 76937 US GUIDE VASCULAR ACCESS: CPT | Mod: 26

## 2019-05-11 PROCEDURE — 32557 INSERT CATH PLEURA W/ IMAGE: CPT

## 2019-05-11 PROCEDURE — 93010 ELECTROCARDIOGRAM REPORT: CPT

## 2019-05-11 RX ORDER — AMIODARONE HYDROCHLORIDE 400 MG/1
150 TABLET ORAL ONCE
Refills: 0 | Status: COMPLETED | OUTPATIENT
Start: 2019-05-11 | End: 2019-05-11

## 2019-05-11 RX ORDER — SODIUM BICARBONATE 1 MEQ/ML
50 SYRINGE (ML) INTRAVENOUS ONCE
Refills: 0 | Status: COMPLETED | OUTPATIENT
Start: 2019-05-11 | End: 2019-05-11

## 2019-05-11 RX ORDER — SODIUM CHLORIDE 9 MG/ML
10 INJECTION INTRAMUSCULAR; INTRAVENOUS; SUBCUTANEOUS
Refills: 0 | Status: DISCONTINUED | OUTPATIENT
Start: 2019-05-11 | End: 2019-05-17

## 2019-05-11 RX ORDER — HYDROMORPHONE HYDROCHLORIDE 2 MG/ML
1 INJECTION INTRAMUSCULAR; INTRAVENOUS; SUBCUTANEOUS ONCE
Refills: 0 | Status: DISCONTINUED | OUTPATIENT
Start: 2019-05-11 | End: 2019-05-11

## 2019-05-11 RX ORDER — HUMAN INSULIN 100 [IU]/ML
6 INJECTION, SUSPENSION SUBCUTANEOUS ONCE
Refills: 0 | Status: COMPLETED | OUTPATIENT
Start: 2019-05-11 | End: 2019-05-11

## 2019-05-11 RX ORDER — CHLORHEXIDINE GLUCONATE 213 G/1000ML
1 SOLUTION TOPICAL
Refills: 0 | Status: DISCONTINUED | OUTPATIENT
Start: 2019-05-11 | End: 2019-05-26

## 2019-05-11 RX ORDER — HYDROMORPHONE HYDROCHLORIDE 2 MG/ML
0.5 INJECTION INTRAMUSCULAR; INTRAVENOUS; SUBCUTANEOUS ONCE
Refills: 0 | Status: DISCONTINUED | OUTPATIENT
Start: 2019-05-11 | End: 2019-05-11

## 2019-05-11 RX ORDER — INSULIN LISPRO 100/ML
VIAL (ML) SUBCUTANEOUS EVERY 6 HOURS
Refills: 0 | Status: DISCONTINUED | OUTPATIENT
Start: 2019-05-11 | End: 2019-05-18

## 2019-05-11 RX ORDER — METOPROLOL TARTRATE 50 MG
2.5 TABLET ORAL ONCE
Refills: 0 | Status: COMPLETED | OUTPATIENT
Start: 2019-05-11 | End: 2019-05-11

## 2019-05-11 RX ORDER — MAGNESIUM SULFATE 500 MG/ML
1 VIAL (ML) INJECTION ONCE
Refills: 0 | Status: COMPLETED | OUTPATIENT
Start: 2019-05-11 | End: 2019-05-11

## 2019-05-11 RX ORDER — ALBUMIN HUMAN 25 %
50 VIAL (ML) INTRAVENOUS ONCE
Refills: 0 | Status: COMPLETED | OUTPATIENT
Start: 2019-05-11 | End: 2019-05-11

## 2019-05-11 RX ADMIN — Medication 2: at 23:51

## 2019-05-11 RX ADMIN — HYDROMORPHONE HYDROCHLORIDE 0.5 MILLIGRAM(S): 2 INJECTION INTRAMUSCULAR; INTRAVENOUS; SUBCUTANEOUS at 22:32

## 2019-05-11 RX ADMIN — Medication 325 MILLIGRAM(S): at 12:50

## 2019-05-11 RX ADMIN — Medication 50 MILLILITER(S): at 15:11

## 2019-05-11 RX ADMIN — HYDROMORPHONE HYDROCHLORIDE 1 MILLIGRAM(S): 2 INJECTION INTRAMUSCULAR; INTRAVENOUS; SUBCUTANEOUS at 06:00

## 2019-05-11 RX ADMIN — AMIODARONE HYDROCHLORIDE 600 MILLIGRAM(S): 400 TABLET ORAL at 23:21

## 2019-05-11 RX ADMIN — HYDROMORPHONE HYDROCHLORIDE 1 MILLIGRAM(S): 2 INJECTION INTRAMUSCULAR; INTRAVENOUS; SUBCUTANEOUS at 06:30

## 2019-05-11 RX ADMIN — ESCITALOPRAM OXALATE 10 MILLIGRAM(S): 10 TABLET, FILM COATED ORAL at 12:50

## 2019-05-11 RX ADMIN — Medication 50 MILLIEQUIVALENT(S): at 09:26

## 2019-05-11 RX ADMIN — Medication 100 GRAM(S): at 09:26

## 2019-05-11 RX ADMIN — CHLORHEXIDINE GLUCONATE 15 MILLILITER(S): 213 SOLUTION TOPICAL at 05:01

## 2019-05-11 RX ADMIN — HYDROMORPHONE HYDROCHLORIDE 0.5 MILLIGRAM(S): 2 INJECTION INTRAMUSCULAR; INTRAVENOUS; SUBCUTANEOUS at 13:05

## 2019-05-11 RX ADMIN — Medication 50 MILLIEQUIVALENT(S): at 15:27

## 2019-05-11 RX ADMIN — Medication 3 MILLILITER(S): at 11:40

## 2019-05-11 RX ADMIN — HYDROMORPHONE HYDROCHLORIDE 0.5 MILLIGRAM(S): 2 INJECTION INTRAMUSCULAR; INTRAVENOUS; SUBCUTANEOUS at 12:50

## 2019-05-11 RX ADMIN — ATORVASTATIN CALCIUM 40 MILLIGRAM(S): 80 TABLET, FILM COATED ORAL at 21:14

## 2019-05-11 RX ADMIN — Medication 80 MILLIGRAM(S): at 18:08

## 2019-05-11 RX ADMIN — VASOPRESSIN 5 UNIT(S)/MIN: 20 INJECTION INTRAVENOUS at 07:00

## 2019-05-11 RX ADMIN — INSULIN HUMAN 3 UNIT(S)/HR: 100 INJECTION, SOLUTION SUBCUTANEOUS at 07:00

## 2019-05-11 RX ADMIN — Medication 100 MILLIGRAM(S): at 05:03

## 2019-05-11 RX ADMIN — HYDROMORPHONE HYDROCHLORIDE 1 MILLIGRAM(S): 2 INJECTION INTRAMUSCULAR; INTRAVENOUS; SUBCUTANEOUS at 06:45

## 2019-05-11 RX ADMIN — CHLORHEXIDINE GLUCONATE 1 APPLICATION(S): 213 SOLUTION TOPICAL at 05:02

## 2019-05-11 RX ADMIN — CHLORHEXIDINE GLUCONATE 15 MILLILITER(S): 213 SOLUTION TOPICAL at 18:07

## 2019-05-11 RX ADMIN — HYDROMORPHONE HYDROCHLORIDE 0.5 MILLIGRAM(S): 2 INJECTION INTRAMUSCULAR; INTRAVENOUS; SUBCUTANEOUS at 04:29

## 2019-05-11 RX ADMIN — Medication 3 MILLILITER(S): at 23:23

## 2019-05-11 RX ADMIN — Medication 2.5 MILLIGRAM(S): at 10:50

## 2019-05-11 RX ADMIN — HEPARIN SODIUM 9 UNIT(S)/HR: 5000 INJECTION INTRAVENOUS; SUBCUTANEOUS at 07:00

## 2019-05-11 RX ADMIN — HYDROMORPHONE HYDROCHLORIDE 0.5 MILLIGRAM(S): 2 INJECTION INTRAMUSCULAR; INTRAVENOUS; SUBCUTANEOUS at 04:44

## 2019-05-11 RX ADMIN — Medication 2.5 MILLIGRAM(S): at 09:38

## 2019-05-11 RX ADMIN — HUMAN INSULIN 6 UNIT(S): 100 INJECTION, SUSPENSION SUBCUTANEOUS at 13:24

## 2019-05-11 RX ADMIN — HYDROMORPHONE HYDROCHLORIDE 0.5 MILLIGRAM(S): 2 INJECTION INTRAMUSCULAR; INTRAVENOUS; SUBCUTANEOUS at 22:17

## 2019-05-11 RX ADMIN — CHLORHEXIDINE GLUCONATE 1 APPLICATION(S): 213 SOLUTION TOPICAL at 05:03

## 2019-05-11 RX ADMIN — HYDROMORPHONE HYDROCHLORIDE 1 MILLIGRAM(S): 2 INJECTION INTRAMUSCULAR; INTRAVENOUS; SUBCUTANEOUS at 06:15

## 2019-05-11 RX ADMIN — BUMETANIDE 15 MG/HR: 0.25 INJECTION INTRAMUSCULAR; INTRAVENOUS at 14:25

## 2019-05-11 RX ADMIN — PANTOPRAZOLE SODIUM 40 MILLIGRAM(S): 20 TABLET, DELAYED RELEASE ORAL at 05:02

## 2019-05-11 RX ADMIN — PANTOPRAZOLE SODIUM 40 MILLIGRAM(S): 20 TABLET, DELAYED RELEASE ORAL at 19:27

## 2019-05-11 NOTE — PROGRESS NOTE ADULT - ASSESSMENT
79 yo   male with PMH of HLD, Sleep apnea ( non compliant with CPAP), GERD, diverticulitis, depression, anxiety, and panic attack, presented to [facility] on 04/25/19 for LHC after having  abnormal NST done as outpatient, LHC done same day revealed TVD. therefore underwent CABG on 04/26/19, hospital course complicated with fever, shock, no on pressors, abx, developed julio cesar hence nephrology consulted.

## 2019-05-11 NOTE — PROGRESS NOTE ADULT - PROBLEM SELECTOR PLAN 2
- Very likely with cardiac involvement based on EKG and echo findings.   - Pathology from lung sent to [place] for typing.   - Please send serum free light chains.

## 2019-05-11 NOTE — PROGRESS NOTE ADULT - PROBLEM SELECTOR PLAN 2
The patient has an elevated bun in the setting of steroid use. Steroids being tapered, however BUN continues to trend up to 190s today. Pt had HD catheter placed today.  - Plan for 2hr HD session today for clearances; NO UF   - Hep panel sent for dialysis

## 2019-05-11 NOTE — PROGRESS NOTE ADULT - ASSESSMENT
Plan discussed in multidisciplinary rounds with CTU team and CT surgery. Mr Osman is a 78 year old man who underwent a non-urgent CABG (LIMA LAD and SVG to OM1 and RCA) on 4/26 with post-operative course complicated by acute respiratory failure with failure to wean from vent requiring tracheostomy. Due to persistent pulmonary infiltrates and history of possible , he underwent lung biopsy, which demonstrated evidence of amyloid deposition. On review of his echocardiogram pre-operatively, he has some characteristics suggestive of cardiac amyloid, but only had impaired relaxation. He currently is volume overloaded with progressively worsening renal failure an inadequate urine output to keep up with his intake.     His other notable comorbidities include sleep apnea ( non compliant with CPAP), GERD, diverticulitis, depression, anxiety, and panic attack, and a history of carpal tunnel syndrome.     Plan discussed in multidisciplinary rounds with CTU team and CT surgery. Please call me with questions at [phone].

## 2019-05-11 NOTE — PROGRESS NOTE ADULT - SUBJECTIVE AND OBJECTIVE BOX
[facility] DIVISION OF KIDNEY DISEASES AND HYPERTENSION -- FOLLOW UP NOTE  --------------------------------------------------------------------------------  Chief Complaint:  TARAH    24 hour events/subjective:  Pt remains in CTICU, Making 3+L of urine with Bumex. On vasopressin.      PAST HISTORY  --------------------------------------------------------------------------------  No significant changes to PMH, PSH, FHx, SHx, unless otherwise noted    ALLERGIES & MEDICATIONS  --------------------------------------------------------------------------------  Allergies    No Known Allergies    Intolerances      Standing Inpatient Medications  ALBUTerol/ipratropium for Nebulization 3 milliLiter(s) Nebulizer every 6 hours  aspirin 325 milliGRAM(s) Oral daily  atorvastatin 40 milliGRAM(s) Oral at bedtime  buMETAnide Infusion 3 mG/Hr IV Continuous <Continuous>  chlorhexidine 0.12% Liquid 15 milliLiter(s) Oral Mucosa two times a day  chlorhexidine 4% Liquid 1 Application(s) Topical <User Schedule>  chlorhexidine 4% Liquid 1 Application(s) Topical <User Schedule>  escitalopram 10 milliGRAM(s) Oral daily  heparin  Infusion 1300 Unit(s)/Hr IV Continuous <Continuous>  HYDROmorphone  Injectable 1 milliGRAM(s) IV Push once  HYDROmorphone  Injectable 1 milliGRAM(s) IV Push once  insulin lispro (HumaLOG) corrective regimen sliding scale   SubCutaneous every 6 hours  insulin NPH human recombinant 6 Unit(s) SubCutaneous once  insulin regular Infusion 3 Unit(s)/Hr IV Continuous <Continuous>  methylPREDNISolone sodium succinate Injectable   IV Push   methylPREDNISolone sodium succinate Injectable 80 milliGRAM(s) IV Push every 12 hours  pantoprazole  Injectable 40 milliGRAM(s) IV Push two times a day  sodium chloride 0.9%. 1000 milliLiter(s) IV Continuous <Continuous>  vasopressin Infusion 0.083 Unit(s)/Min IV Continuous <Continuous>    PRN Inpatient Medications  HYDROmorphone  Injectable 0.5 milliGRAM(s) IV Push every 8 hours PRN  sodium chloride 0.9% lock flush 10 milliLiter(s) IV Push every 1 hour PRN      REVIEW OF SYSTEMS  --------------------------------------------------------------------------------  Unable to assess.    VITALS/PHYSICAL EXAM  --------------------------------------------------------------------------------  T(C): 36.2 (05-11-19 @ 12:00), Max: 36.6 (05-10-19 @ 15:00)  HR: 113 (05-11-19 @ 13:00) (83 - 122)  BP: --  RR: 19 (05-11-19 @ 13:00) (9 - 29)  SpO2: 100% (05-11-19 @ 13:00) (87% - 100%)  Wt(kg): --        05-10-19 @ 07:01  -  05-11-19 @ 07:00  --------------------------------------------------------  IN: 4102.3 mL / OUT: 4435 mL / NET: -332.7 mL    05-11-19 @ 07:01  -  05-11-19 @ 13:10  --------------------------------------------------------  IN: 1082 mL / OUT: 755 mL / NET: 327 mL      Physical Exam:  	Gen: NAD  	HEENT: +NGT, +trach  	Pulm: CTA B/L  	CV: RRR, S1S2; no rub  	Abd: +BS, soft, nontender/nondistended              : De with clear yellow urine  	LE: Warm, +edema  	Vascular access: +RIJ non-tunneled HD catheter    LABS/STUDIES  --------------------------------------------------------------------------------              10.0   21.3  >-----------<  300      [05-11-19 @ 12:14]              29.2     152  |  112  |  195  ----------------------------<  122      [05-11-19 @ 12:14]  4.9   |  18  |  3.35        Ca     8.2     [05-11-19 @ 12:14]      Mg     3.1     [05-11-19 @ 01:00]      Phos  9.0     [05-11-19 @ 01:00]    TPro  5.5  /  Alb  2.9  /  TBili  1.4  /  DBili  x   /  AST  55  /  ALT  67  /  AlkPhos  70  [05-11-19 @ 01:00]      PTT: 60.3       [05-10-19 @ 17:02]      Creatinine Trend:  SCr 3.35 [05-11 @ 12:14]  SCr 3.06 [05-11 @ 01:00]  SCr 2.74 [05-10 @ 17:02]  SCr 2.53 [05-10 @ 00:53]  SCr 2.28 [05-09 @ 02:18]    Urinalysis - [05-08-19 @ 11:22]      Color Yellow / Appearance Slightly Turbid / SG 1.024 / pH 5.5      Gluc Negative / Ketone Negative  / Bili Negative / Urobili Negative       Blood Moderate / Protein 30 mg/dL / Leuk Est Negative / Nitrite Negative      RBC 25 / WBC 3-5 / Hyaline Negative / Gran 6-10 / Sq Epi  / Non Sq Epi 6 / Bacteria Negative    Urine Creatinine 70      [05-08-19 @ 11:28]  Urine Protein 45      [05-08-19 @ 11:28]  Urine Sodium 26      [05-08-19 @ 11:28]  Urine Urea Nitrogen 1191      [05-08-19 @ 14:02]  Urine Potassium 71      [05-08-19 @ 11:28]    HbA1c 5.4      [04-25-19 @ 17:39]  TSH 1.48      [04-25-19 @ 17:58]  Lipid: chol 213, , HDL 38,       [04-25-19 @ 17:58]      Immunofixation Serum:   No Monoclonal Band Identified    Reference Range: None Detected      [05-10-19 @ 12:48]  SPEP Interpretation: Normal Electrophoresis Pattern      [05-10-19 @ 12:48]

## 2019-05-11 NOTE — PROGRESS NOTE ADULT - PROBLEM SELECTOR PLAN 1
Pt with TARAH most likely hemodynamically mediated vs ischemic ATN in the setting of anemia from blood loss, septic shock vs renal amyloid (lung biopsy showed amyloid). SCr of 1.2 in 2016, 1.1 08/18 and 1.06 on 04/25/19. Scr remained stable until 05/02/19 when started to rise, now in 3 range. Excellent UO w/ Bumex gtt.  - C/w diuresis.   - Recommend to repeat UA, TP/Cr ratio, FLC and SIFE, given presence of amyloid in lung.    - Monitor I&O, daily weights, avoid nephrotoxics  - Adjust meds based on GFR  - Avoid hypotension

## 2019-05-11 NOTE — PROGRESS NOTE ADULT - SUBJECTIVE AND OBJECTIVE BOX
Subjective: Overnight a right pleural tube was placed, which drained 800 cc of serous fluid. He remains intubated and is sedated.     Medications:  ALBUTerol/ipratropium for Nebulization 3 milliLiter(s) Nebulizer every 6 hours  aspirin 325 milliGRAM(s) Oral daily  atorvastatin 40 milliGRAM(s) Oral at bedtime  buMETAnide Infusion 2 mG/Hr IV Continuous <Continuous>  escitalopram 10 milliGRAM(s) Oral daily  heparin  Infusion 1300 Unit(s)/Hr IV Continuous <Continuous>  insulin regular Infusion 3 Unit(s)/Hr IV Continuous <Continuous>  methylPREDNISolone sodium succinate Injectable 80 milliGRAM(s) IV Push every 12 hours  pantoprazole  Injectable 40 milliGRAM(s) IV Push two times a day  propofol Infusion 5 MICROgram(s)/kG/Min IV Continuous <Continuous>  vasopressin Infusion 0.083 Unit(s)/Min IV Continuous <Continuous>    Physical Exam:    Vitals:  T(C): 36.3 (19 @ 04:00), Max: 36.6 (05-10-19 @ 12:00)  HR: 97 (19 @ 06:45) (72 - 119)  ABP: 107/61 (19 @ 06:45) (98/52 - 153/73)  ABP(mean): 75 (19 @ 06:45) (62 - 97)  RR: 12 (19 @ 06:45) (9 - 30)  SpO2: 100% (19 @ 06:45) (87% - 100%)      Daily Weight in k.8 (11 May 2019 01:00)    I&O's Summary    10 May 2019 07:01  -  11 May 2019 07:00  --------------------------------------------------------  IN: 4102.3 mL / OUT: 3635 mL / NET: 467.3 mL    General: Comfortable.  HEENT: PERR. Trach intact without purulence.   Neck: Neck supple. JVP severely elevated. No masses  Chest: Coarse breath sounds heard bilaterally.   CV: Distant heart sounds. RRR with normal S1 and S2. No rubs or gallops. Radial pulses weak. Anasarcic.  Abdomen: Soft, non-distended, non-tender  Skin: No rashes  Neurology: Intubated and sedated. Unable to assess  Psych: Unable to assess    Labs:                        9.6    12.8  )-----------( 249      ( 11 May 2019 01:00 )             28.9     05-    152<H>  |  114<H>  |  178<H>  ----------------------------<  160<H>  4.8   |  18<L>  |  3.06<H>    Ca    8.2<L>      11 May 2019 01:00  Phos  9.0       Mg     3.1         TPro  5.5<L>  /  Alb  2.9<L>  /  TBili  1.4<H>  /  DBili  x   /  AST  55<H>  /  ALT  67<H>  /  AlkPhos  70  -  PTT - ( 10 May 2019 17:02 )  PTT:60.3 sec

## 2019-05-11 NOTE — PROGRESS NOTE ADULT - SUBJECTIVE AND OBJECTIVE BOX
CHRIS THAKKAR   MRN#: [id number]     The patient is a 79y Male who was seen, evaluated, & examined with the CTICU staff post-operatively with a multidisciplinary care plan formulated & implemented.  All available clinical, laboratory, radiographic, pharmacologic, and electrocardiographic data were reviewed & analyzed.      The patient was in the CTICU in critical condition secondary to:     persistent cardiopulmonary dysfunction  uncontrolled Type II Diabetes melitus-stress hyperglycemia    For support and evaluation & prevention of further decompensation secondary to persistent cardiopulmonary dysfunction, respiratory status required:     supplemental oxygen with full ventilatory support / mechanical ventilation   continuous pulse oximetry monitoring  following ABGs with A-line monitoring    Invasive hemodynamic monitoring with an A-line was required for continuous MAP/BP monitoring to ensure adequate cardiovascular support and to evaluate for & help prevent decompensation.     Metabolic stability, uncontrolled type II Diabetes mellitus-stress hyperglycemia, & infection prophylaxis required an IV regular Insulin drip & the following of serial glucose levels to help achieve & maintain euglycemia.      In addition:  Respiratory failure requiring trach, on trach collar for most of the day yesterday; will continue  Unclear etiology of respiratory failure - pulm biopsy shows pulmonary amyloid - will check labs for evidence of systemic disease and consult pulm   Heart on echo does not have characteristic amyloid appearance (yet) but LV is hypertrophied, e' is decreased (restrictive pattern) and he has low voltage on EKG  Off pressors  Off antibiotics and tapering steroids (was treated for BOOP)  Volume up, increasing BUN/Cr (normal on 4/30), only slightly negative overall; will increase Bumex drip, aim for 1L overall negative  Renal is consulted for dialysis - wants to hold off for now as he is making over 3L of urine despite elevated BUN of 178 and the fact that his overall fluid balance is not significantly negative nor enough to clear his electrolytes  Sodium still elevated - increasing free water    The patient required critical care management and I personally provided 30 minutes of non-continuous care to the patient, excluding separate procedures, in addition to  discussing the patient and plan at length with the CTICU staff and helping coordinate care. CHRIS THAKKAR   MRN#: [id number]     The patient is a 79y Male who was seen, evaluated, & examined with the CTICU staff post-operatively with a multidisciplinary care plan formulated & implemented.  All available clinical, laboratory, radiographic, pharmacologic, and electrocardiographic data were reviewed & analyzed.      The patient was in the CTICU in critical condition secondary to:     persistent cardiopulmonary dysfunction  uncontrolled Type II Diabetes melitus-stress hyperglycemia    For support and evaluation & prevention of further decompensation secondary to persistent cardiopulmonary dysfunction, respiratory status required:     supplemental oxygen with full ventilatory support / mechanical ventilation   continuous pulse oximetry monitoring  following ABGs with A-line monitoring    Invasive hemodynamic monitoring with an A-line was required for continuous MAP/BP monitoring to ensure adequate cardiovascular support and to evaluate for & help prevent decompensation.     Metabolic stability, uncontrolled type II Diabetes mellitus-stress hyperglycemia, & infection prophylaxis required an IV regular Insulin drip & the following of serial glucose levels to help achieve & maintain euglycemia.      In addition:  Respiratory failure requiring trach, on trach collar for most of the day yesterday; will continue  Unclear etiology of respiratory failure - pulm biopsy shows pulmonary amyloid - will check labs for evidence of systemic disease and consult pulm   Heart on echo does not have characteristic amyloid appearance (yet) but LV is hypertrophied, e' is decreased (consistent with restrictive pattern) and he has low voltage on EKG  Off pressors  Episode of AF with RVR - rate-controlled with Lopressor  Off antibiotics and tapering steroids (was treated for BOOP)  Volume up, increasing BUN/Cr (normal on 4/30), only slightly negative overall; Renal is on board, will initiate renal replacement therapy   Sodium still elevated - increasing free water    The patient required critical care management and I personally provided 80 minutes of non-continuous care to the patient, excluding separate procedures, in addition to  discussing the patient and plan at length with the CTICU staff and helping coordinate care.

## 2019-05-12 LAB
ALBUMIN SERPL ELPH-MCNC: 3 G/DL — LOW (ref 3.3–5)
ALP SERPL-CCNC: 79 U/L — SIGNIFICANT CHANGE UP (ref 40–120)
ALT FLD-CCNC: 90 U/L — HIGH (ref 10–45)
ANION GAP SERPL CALC-SCNC: 20 MMOL/L — HIGH (ref 5–17)
APTT BLD: 54.9 SEC — HIGH (ref 27.5–36.3)
APTT BLD: 59.9 SEC — HIGH (ref 27.5–36.3)
APTT BLD: 68.2 SEC — HIGH (ref 27.5–36.3)
APTT BLD: 70.6 SEC — HIGH (ref 27.5–36.3)
AST SERPL-CCNC: 72 U/L — HIGH (ref 10–40)
BILIRUB SERPL-MCNC: 1.2 MG/DL — SIGNIFICANT CHANGE UP (ref 0.2–1.2)
BUN SERPL-MCNC: 144 MG/DL — HIGH (ref 7–23)
CALCIUM SERPL-MCNC: 8 MG/DL — LOW (ref 8.4–10.5)
CHLORIDE SERPL-SCNC: 108 MMOL/L — SIGNIFICANT CHANGE UP (ref 96–108)
CO2 SERPL-SCNC: 20 MMOL/L — LOW (ref 22–31)
CREAT SERPL-MCNC: 2.98 MG/DL — HIGH (ref 0.5–1.3)
CREATININE, URINE RESULT: 15 MG/DL — SIGNIFICANT CHANGE UP
CULTURE RESULTS: SIGNIFICANT CHANGE UP
CULTURE RESULTS: SIGNIFICANT CHANGE UP
GAS PNL BLDA: SIGNIFICANT CHANGE UP
GLUCOSE BLDC GLUCOMTR-MCNC: 171 MG/DL — HIGH (ref 70–99)
GLUCOSE BLDC GLUCOMTR-MCNC: 214 MG/DL — HIGH (ref 70–99)
GLUCOSE SERPL-MCNC: 214 MG/DL — HIGH (ref 70–99)
HBV CORE IGM SER-ACNC: SIGNIFICANT CHANGE UP
HBV SURFACE AB SER-ACNC: <3 MIU/ML — LOW
HBV SURFACE AG SER-ACNC: SIGNIFICANT CHANGE UP
HCT VFR BLD CALC: 27.3 % — LOW (ref 39–50)
HGB BLD-MCNC: 9.4 G/DL — LOW (ref 13–17)
INR BLD: 1.38 RATIO — HIGH (ref 0.88–1.16)
MAGNESIUM SERPL-MCNC: 3 MG/DL — HIGH (ref 1.6–2.6)
MCHC RBC-ENTMCNC: 29.6 PG — SIGNIFICANT CHANGE UP (ref 27–34)
MCHC RBC-ENTMCNC: 34.4 GM/DL — SIGNIFICANT CHANGE UP (ref 32–36)
MCV RBC AUTO: 85.9 FL — SIGNIFICANT CHANGE UP (ref 80–100)
OB PNL STL: POSITIVE
PHOSPHATE SERPL-MCNC: 8.6 MG/DL — HIGH (ref 2.5–4.5)
PLATELET # BLD AUTO: 235 K/UL — SIGNIFICANT CHANGE UP (ref 150–400)
POTASSIUM SERPL-MCNC: 4.4 MMOL/L — SIGNIFICANT CHANGE UP (ref 3.5–5.3)
POTASSIUM SERPL-SCNC: 4.4 MMOL/L — SIGNIFICANT CHANGE UP (ref 3.5–5.3)
PROT ?TM UR-MCNC: <4 MG/DL — SIGNIFICANT CHANGE UP (ref 0–12)
PROT SERPL-MCNC: 5.6 G/DL — LOW (ref 6–8.3)
PROTHROM AB SERPL-ACNC: 16 SEC — HIGH (ref 10–12.9)
RBC # BLD: 3.18 M/UL — LOW (ref 4.2–5.8)
RBC # FLD: 15.3 % — HIGH (ref 10.3–14.5)
SODIUM SERPL-SCNC: 148 MMOL/L — HIGH (ref 135–145)
SPECIMEN SOURCE: SIGNIFICANT CHANGE UP
SPECIMEN SOURCE: SIGNIFICANT CHANGE UP
WBC # BLD: 15.2 K/UL — HIGH (ref 3.8–10.5)
WBC # FLD AUTO: 15.2 K/UL — HIGH (ref 3.8–10.5)

## 2019-05-12 PROCEDURE — 99233 SBSQ HOSP IP/OBS HIGH 50: CPT | Mod: GC

## 2019-05-12 PROCEDURE — 99291 CRITICAL CARE FIRST HOUR: CPT

## 2019-05-12 PROCEDURE — 99233 SBSQ HOSP IP/OBS HIGH 50: CPT

## 2019-05-12 PROCEDURE — 71045 X-RAY EXAM CHEST 1 VIEW: CPT | Mod: 26

## 2019-05-12 RX ORDER — CALCIUM GLUCONATE 100 MG/ML
2 VIAL (ML) INTRAVENOUS ONCE
Refills: 0 | Status: COMPLETED | OUTPATIENT
Start: 2019-05-12 | End: 2019-05-12

## 2019-05-12 RX ORDER — DEXMEDETOMIDINE HYDROCHLORIDE IN 0.9% SODIUM CHLORIDE 4 UG/ML
0.3 INJECTION INTRAVENOUS
Qty: 200 | Refills: 0 | Status: DISCONTINUED | OUTPATIENT
Start: 2019-05-12 | End: 2019-05-17

## 2019-05-12 RX ORDER — DILTIAZEM HCL 120 MG
5 CAPSULE, EXT RELEASE 24 HR ORAL
Qty: 125 | Refills: 0 | Status: DISCONTINUED | OUTPATIENT
Start: 2019-05-12 | End: 2019-05-15

## 2019-05-12 RX ORDER — HYDROMORPHONE HYDROCHLORIDE 2 MG/ML
0.5 INJECTION INTRAMUSCULAR; INTRAVENOUS; SUBCUTANEOUS ONCE
Refills: 0 | Status: DISCONTINUED | OUTPATIENT
Start: 2019-05-12 | End: 2019-05-12

## 2019-05-12 RX ORDER — NOREPINEPHRINE BITARTRATE/D5W 8 MG/250ML
0.03 PLASTIC BAG, INJECTION (ML) INTRAVENOUS
Qty: 8 | Refills: 0 | Status: DISCONTINUED | OUTPATIENT
Start: 2019-05-12 | End: 2019-05-26

## 2019-05-12 RX ADMIN — Medication 60 MILLIGRAM(S): at 17:28

## 2019-05-12 RX ADMIN — Medication 4: at 05:43

## 2019-05-12 RX ADMIN — SODIUM CHLORIDE 10 MILLILITER(S): 9 INJECTION INTRAMUSCULAR; INTRAVENOUS; SUBCUTANEOUS at 08:12

## 2019-05-12 RX ADMIN — VASOPRESSIN 5 UNIT(S)/MIN: 20 INJECTION INTRAVENOUS at 13:19

## 2019-05-12 RX ADMIN — Medication 2: at 17:27

## 2019-05-12 RX ADMIN — HEPARIN SODIUM 9 UNIT(S)/HR: 5000 INJECTION INTRAVENOUS; SUBCUTANEOUS at 08:11

## 2019-05-12 RX ADMIN — Medication 15 MG/HR: at 13:00

## 2019-05-12 RX ADMIN — Medication 80 MILLIGRAM(S): at 05:26

## 2019-05-12 RX ADMIN — BUMETANIDE 10 MG/HR: 0.25 INJECTION INTRAMUSCULAR; INTRAVENOUS at 08:11

## 2019-05-12 RX ADMIN — HYDROMORPHONE HYDROCHLORIDE 0.5 MILLIGRAM(S): 2 INJECTION INTRAMUSCULAR; INTRAVENOUS; SUBCUTANEOUS at 08:30

## 2019-05-12 RX ADMIN — PANTOPRAZOLE SODIUM 40 MILLIGRAM(S): 20 TABLET, DELAYED RELEASE ORAL at 17:27

## 2019-05-12 RX ADMIN — CHLORHEXIDINE GLUCONATE 1 APPLICATION(S): 213 SOLUTION TOPICAL at 05:27

## 2019-05-12 RX ADMIN — Medication 200 GRAM(S): at 16:29

## 2019-05-12 RX ADMIN — Medication 10 MG/HR: at 10:58

## 2019-05-12 RX ADMIN — HYDROMORPHONE HYDROCHLORIDE 0.5 MILLIGRAM(S): 2 INJECTION INTRAMUSCULAR; INTRAVENOUS; SUBCUTANEOUS at 08:15

## 2019-05-12 RX ADMIN — Medication 2: at 12:51

## 2019-05-12 RX ADMIN — ESCITALOPRAM OXALATE 10 MILLIGRAM(S): 10 TABLET, FILM COATED ORAL at 12:51

## 2019-05-12 RX ADMIN — CHLORHEXIDINE GLUCONATE 15 MILLILITER(S): 213 SOLUTION TOPICAL at 05:27

## 2019-05-12 RX ADMIN — CHLORHEXIDINE GLUCONATE 15 MILLILITER(S): 213 SOLUTION TOPICAL at 17:29

## 2019-05-12 RX ADMIN — ATORVASTATIN CALCIUM 40 MILLIGRAM(S): 80 TABLET, FILM COATED ORAL at 21:14

## 2019-05-12 RX ADMIN — Medication 325 MILLIGRAM(S): at 12:51

## 2019-05-12 RX ADMIN — PANTOPRAZOLE SODIUM 40 MILLIGRAM(S): 20 TABLET, DELAYED RELEASE ORAL at 05:27

## 2019-05-12 RX ADMIN — DEXMEDETOMIDINE HYDROCHLORIDE IN 0.9% SODIUM CHLORIDE 5.96 MICROGRAM(S)/KG/HR: 4 INJECTION INTRAVENOUS at 12:00

## 2019-05-12 NOTE — PROGRESS NOTE ADULT - ASSESSMENT
Mr Osman is a 78 year old man who underwent a non-urgent CABG (LIMA LAD and SVG to OM1 and RCA) on 4/26 with post-operative course complicated by acute respiratory failure with failure to wean from vent requiring tracheostomy. Due to persistent pulmonary infiltrates and history of possible , he underwent lung biopsy, which demonstrated evidence of amyloid deposition. On review of his echocardiogram pre-operatively, he has some characteristics suggestive of cardiac amyloid, but only had impaired relaxation. He currently is volume overloaded with progressively worsening renal failure an inadequate urine output to keep up with his intake.     His other notable comorbidities include sleep apnea ( non compliant with CPAP), GERD, diverticulitis, depression, anxiety, and panic attack, and a history of carpal tunnel syndrome.     Plan discussed in multidisciplinary rounds with CTU team and CT surgery. Please call me with questions at [phone].

## 2019-05-12 NOTE — PROGRESS NOTE ADULT - SUBJECTIVE AND OBJECTIVE BOX
5:30pm-6pm    Remained critically ill on continuos ICU monitoring.      OBJECTIVE:  ICU Vital Signs Last 24 Hrs  T(C): 37.4 (12 May 2019 16:00), Max: 37.4 (12 May 2019 16:00)  T(F): 99.3 (12 May 2019 16:00), Max: 99.3 (12 May 2019 16:00)  HR: 82 (12 May 2019 19:00) (81 - 135)  BP: --  BP(mean): --  ABP: 106/42 (12 May 2019 19:00) (94/42 - 134/72)  ABP(mean): 57 (12 May 2019 19:00) (56 - 92)  RR: 21 (12 May 2019 19:00) (17 - 50)  SpO2: 99% (12 May 2019 19:) (97% - 100%)    Mode: AC/ CMV (Assist Control/ Continuous Mandatory Ventilation), RR (machine): 10, TV (machine): 500, FiO2: 50, PEEP: 5, ITime: 1, MAP: 10, PIP: 25    -11 @ 07:12 @ 07:00  --------------------------------------------------------  IN: 3706 mL / OUT: 2595 mL / NET: 1111 mL    12 @ 07:01  12 @ 19:51  --------------------------------------------------------  IN: 1643 mL / OUT: 1135 mL / NET: 508 mL      CAPILLARY BLOOD GLUCOSE  196 (12 May 2019 00:00)      POCT Blood Glucose.: 171 mg/dL (12 May 2019 12:47)      PHYSICAL EXAM:  Daily Weight in k.6 (12 May 2019 01:00)  General: chronically debilitated   Neurology: A&Ox3, nonfocal, JOHNSON x 4  Eyes: PERRLA/ EOMI, Gross vision intact  ENT/Neck: Neck supple, + trac  trachea midline, No JVD, Gross hearing intact  Respiratory: B/L fine rales,  CV: Afib  no murmurs, rubs or gallops  Abdominal: Soft, NT, ND +BS,   Extremities: 2 + edema, + peripheral pulses  Skin: No Rashes, Hematoma, Ecchymosis  Incisions: sternal dressing   Tubes: +NGT       HOSPITAL MEDICATIONS:  MEDICATIONS  (STANDING):  aspirin 325 milliGRAM(s) Oral daily  atorvastatin 40 milliGRAM(s) Oral at bedtime  buMETAnide Infusion 2 mG/Hr (10 mL/Hr) IV Continuous <Continuous>  chlorhexidine 0.12% Liquid 15 milliLiter(s) Oral Mucosa two times a day  chlorhexidine 4% Liquid 1 Application(s) Topical <User Schedule>  chlorhexidine 4% Liquid 1 Application(s) Topical <User Schedule>  dexmedetomidine Infusion 0.3 MICROgram(s)/kG/Hr (5.962 mL/Hr) IV Continuous <Continuous>  diltiazem Infusion 15 mG/Hr (15 mL/Hr) IV Continuous <Continuous>  escitalopram 10 milliGRAM(s) Oral daily  heparin  Infusion 1300 Unit(s)/Hr (8.5 mL/Hr) IV Continuous <Continuous>  insulin lispro (HumaLOG) corrective regimen sliding scale   SubCutaneous every 6 hours  insulin regular Infusion 3 Unit(s)/Hr (3 mL/Hr) IV Continuous <Continuous>  methylPREDNISolone sodium succinate Injectable   IV Push   methylPREDNISolone sodium succinate Injectable 60 milliGRAM(s) IV Push every 12 hours  pantoprazole  Injectable 40 milliGRAM(s) IV Push two times a day  sodium chloride 0.9%. 1000 milliLiter(s) (10 mL/Hr) IV Continuous <Continuous>  vasopressin Infusion 0.083 Unit(s)/Min (5 mL/Hr) IV Continuous <Continuous>    MEDICATIONS  (PRN):  HYDROmorphone  Injectable 0.5 milliGRAM(s) IV Push every 8 hours PRN Severe Pain (7 - 10)  sodium chloride 0.9% lock flush 10 milliLiter(s) IV Push every 1 hour PRN Pre/post blood products, medications, blood draw, and to maintain line patency      LABS:                        9.4    15.2  )-----------( 235      ( 12 May 2019 01:31 )             27.3     05-12    148<H>  |  108  |  144<H>  ----------------------------<  214<H>  4.4   |  20<L>  |  2.98<H>    Ca    8.0<L>      12 May 2019 01:31  Phos  8.6       Mg     3.0         TPro  5.6<L>  /  Alb  3.0<L>  /  TBili  1.2  /  DBili  x   /  AST  72<H>  /  ALT  90<H>  /  AlkPhos  79      PT/INR - ( 12 May 2019 16:51 )   PT: 16.0 sec;   INR: 1.38 ratio         PTT - ( 12 May 2019 16:51 )  PTT:59.9 sec  Urinalysis Basic - ( 11 May 2019 14:12 )    Color: Light Yellow / Appearance: Clear / S.013 / pH: x  Gluc: x / Ketone: Negative  / Bili: Negative / Urobili: Negative   Blood: x / Protein: Negative / Nitrite: Negative   Leuk Esterase: Negative / RBC: x / WBC x   Sq Epi: x / Non Sq Epi: x / Bacteria: x      Arterial Blood Gas:   @ 17:20  7.43/30/104/19/98/-3.8  ABG lactate: --  Arterial Blood Gas:   @ 16:37  7.43/28/92/18/97/-4.6  ABG lactate: --  Arterial Blood Gas:   @ 09:21  7.42/32/167/21/99/-2.6  ABG lactate: --  Arterial Blood Gas:   @ 22:05  7.48/28/178/21/99/-1.8  ABG lactate: --  Arterial Blood Gas:   @ 17:38  7.51/28/176/23/99/.6  ABG lactate: --  Arterial Blood Gas:   @ 15:09  7.39/32/165/19/99/-5.0  ABG lactate: --  Arterial Blood Gas:   @ 14:41  7.45/38/87/26/97/2.6  ABG lactate: --  Arterial Blood Gas:   @ 14:08  7.37/33/122/18/98/-5.6  ABG lactate: --  Arterial Blood Gas:   @ 10:08  7.37/35/159/20/99/-4.4  ABG lactate: --  Arterial Blood Gas:   @ 08:55  7.29/39/82/18/94/-7.4  ABG lactate: --  Arterial Blood Gas:   @ 00:45  7.33/35/125/18/98/-6.7  ABG lactate: --    Venous Blood Gas:   @ 15:09  7.34/39/44/20/71  VBG Lactate: --  Venous Blood Gas:   @ 14:41  7.39/47/32/28/55  VBG Lactate: --      LIVER FUNCTIONS - ( 12 May 2019 01:31 )  Alb: 3.0 g/dL / Pro: 5.6 g/dL / ALK PHOS: 79 U/L / ALT: 90 U/L / AST: 72 U/L / GGT: x           Urinalysis Basic - ( 11 May 2019 14:12 )    Color: Light Yellow / Appearance: Clear / S.013 / pH: x  Gluc: x / Ketone: Negative  / Bili: Negative / Urobili: Negative   Blood: x / Protein: Negative / Nitrite: Negative   Leuk Esterase: Negative / RBC: x / WBC x   Sq Epi: x / Non Sq Epi: x / Bacteria: x    MICROBIOLOGY:     RADIOLOGY:  X Reviewed and interpreted by me        I have spent 30 minutes providing critical care management to this patient. 5:30pm-6pm    Remained critically ill on continuos ICU monitoring.      OBJECTIVE:  ICU Vital Signs Last 24 Hrs  T(C): 37.4 (12 May 2019 16:00), Max: 37.4 (12 May 2019 16:00)  T(F): 99.3 (12 May 2019 16:00), Max: 99.3 (12 May 2019 16:00)  HR: 82 (12 May 2019 19:00) (81 - 135)  BP: --  BP(mean): --  ABP: 106/42 (12 May 2019 19:00) (94/42 - 134/72)  ABP(mean): 57 (12 May 2019 19:00) (56 - 92)  RR: 21 (12 May 2019 19:00) (17 - 50)  SpO2: 99% (12 May 2019 19:) (97% - 100%)    Mode: AC/ CMV (Assist Control/ Continuous Mandatory Ventilation), RR (machine): 10, TV (machine): 500, FiO2: 50, PEEP: 5, ITime: 1, MAP: 10, PIP: 25    -11 @ 07:12 @ 07:00  --------------------------------------------------------  IN: 3706 mL / OUT: 2595 mL / NET: 1111 mL    12 @ 07:01  12 @ 19:51  --------------------------------------------------------  IN: 1643 mL / OUT: 1135 mL / NET: 508 mL      CAPILLARY BLOOD GLUCOSE  196 (12 May 2019 00:00)      POCT Blood Glucose.: 171 mg/dL (12 May 2019 12:47)      PHYSICAL EXAM:  Daily Weight in k.6 (12 May 2019 01:00)  General: chronically debilitated   Neurology: A&Ox3, nonfocal, JOHNSON x 4  Eyes: PERRLA/ EOMI, Gross vision intact  ENT/Neck: Neck supple, + trac  trachea midline, No JVD, Gross hearing intact  Respiratory: B/L fine rales,  CV: Afib  no murmurs, rubs or gallops  Abdominal: Soft, NT, ND +BS,   Extremities: 2 + edema, + peripheral pulses  Skin: No Rashes, Hematoma, Ecchymosis  Incisions: sternal dressing   Tubes: +NGT       HOSPITAL MEDICATIONS:  MEDICATIONS  (STANDING):  aspirin 325 milliGRAM(s) Oral daily  atorvastatin 40 milliGRAM(s) Oral at bedtime  buMETAnide Infusion 2 mG/Hr (10 mL/Hr) IV Continuous <Continuous>  chlorhexidine 0.12% Liquid 15 milliLiter(s) Oral Mucosa two times a day  chlorhexidine 4% Liquid 1 Application(s) Topical <User Schedule>  chlorhexidine 4% Liquid 1 Application(s) Topical <User Schedule>  dexmedetomidine Infusion 0.3 MICROgram(s)/kG/Hr (5.962 mL/Hr) IV Continuous <Continuous>  diltiazem Infusion 15 mG/Hr (15 mL/Hr) IV Continuous <Continuous>  escitalopram 10 milliGRAM(s) Oral daily  heparin  Infusion 1300 Unit(s)/Hr (8.5 mL/Hr) IV Continuous <Continuous>  insulin lispro (HumaLOG) corrective regimen sliding scale   SubCutaneous every 6 hours  insulin regular Infusion 3 Unit(s)/Hr (3 mL/Hr) IV Continuous <Continuous>  methylPREDNISolone sodium succinate Injectable   IV Push   methylPREDNISolone sodium succinate Injectable 60 milliGRAM(s) IV Push every 12 hours  pantoprazole  Injectable 40 milliGRAM(s) IV Push two times a day  sodium chloride 0.9%. 1000 milliLiter(s) (10 mL/Hr) IV Continuous <Continuous>  vasopressin Infusion 0.083 Unit(s)/Min (5 mL/Hr) IV Continuous <Continuous>    MEDICATIONS  (PRN):  HYDROmorphone  Injectable 0.5 milliGRAM(s) IV Push every 8 hours PRN Severe Pain (7 - 10)  sodium chloride 0.9% lock flush 10 milliLiter(s) IV Push every 1 hour PRN Pre/post blood products, medications, blood draw, and to maintain line patency      LABS:                        9.4    15.2  )-----------( 235      ( 12 May 2019 01:31 )             27.3     05-12    148<H>  |  108  |  144<H>  ----------------------------<  214<H>  4.4   |  20<L>  |  2.98<H>    Ca    8.0<L>      12 May 2019 01:31  Phos  8.6       Mg     3.0         TPro  5.6<L>  /  Alb  3.0<L>  /  TBili  1.2  /  DBili  x   /  AST  72<H>  /  ALT  90<H>  /  AlkPhos  79      PT/INR - ( 12 May 2019 16:51 )   PT: 16.0 sec;   INR: 1.38 ratio         PTT - ( 12 May 2019 16:51 )  PTT:59.9 sec  Urinalysis Basic - ( 11 May 2019 14:12 )    Color: Light Yellow / Appearance: Clear / S.013 / pH: x  Gluc: x / Ketone: Negative  / Bili: Negative / Urobili: Negative   Blood: x / Protein: Negative / Nitrite: Negative   Leuk Esterase: Negative / RBC: x / WBC x   Sq Epi: x / Non Sq Epi: x / Bacteria: x      Arterial Blood Gas:   @ 17:20  7.43/30/104/19/98/-3.8  ABG lactate: --  Arterial Blood Gas:   @ 16:37  7.43/28/92/18/97/-4.6  ABG lactate: --  Arterial Blood Gas:   @ 09:21  7.42/32/167/21/99/-2.6  ABG lactate: --  Arterial Blood Gas:   @ 22:05  7.48/28/178/21/99/-1.8  ABG lactate: --  Arterial Blood Gas:   @ 17:38  7.51/28/176/23/99/.6  ABG lactate: --  Arterial Blood Gas:   @ 15:09  7.39/32/165/19/99/-5.0  ABG lactate: --  Arterial Blood Gas:   @ 14:41  7.45/38/87/26/97/2.6  ABG lactate: --  Arterial Blood Gas:   @ 14:08  7.37/33/122/18/98/-5.6  ABG lactate: --  Arterial Blood Gas:   @ 10:08  7.37/35/159/20/99/-4.4  ABG lactate: --  Arterial Blood Gas:   @ 08:55  7.29/39/82/18/94/-7.4  ABG lactate: --  Arterial Blood Gas:   @ 00:45  7.33/35/125/18/98/-6.7  ABG lactate: --    Venous Blood Gas:   @ 15:09  7.34/39/44/20/71  VBG Lactate: --  Venous Blood Gas:   @ 14:41  7.39/47/32/28/55  VBG Lactate: --      LIVER FUNCTIONS - ( 12 May 2019 01:31 )  Alb: 3.0 g/dL / Pro: 5.6 g/dL / ALK PHOS: 79 U/L / ALT: 90 U/L / AST: 72 U/L / GGT: x           Urinalysis Basic - ( 11 May 2019 14:12 )    Color: Light Yellow / Appearance: Clear / S.013 / pH: x  Gluc: x / Ketone: Negative  / Bili: Negative / Urobili: Negative   Blood: x / Protein: Negative / Nitrite: Negative   Leuk Esterase: Negative / RBC: x / WBC x   Sq Epi: x / Non Sq Epi: x / Bacteria: x    MICROBIOLOGY:     RADIOLOGY:  X Reviewed and interpreted by me

## 2019-05-12 NOTE — PROGRESS NOTE ADULT - SUBJECTIVE AND OBJECTIVE BOX
[facility] DIVISION OF KIDNEY DISEASES AND HYPERTENSION -- FOLLOW UP NOTE  --------------------------------------------------------------------------------  Chief Complaint:  TARAH    24 hour events/subjective:  Pt s/p HD session yesterday.      PAST HISTORY  --------------------------------------------------------------------------------  No significant changes to PMH, PSH, FHx, SHx, unless otherwise noted    ALLERGIES & MEDICATIONS  --------------------------------------------------------------------------------  Allergies    No Known Allergies    Intolerances      Standing Inpatient Medications  aspirin 325 milliGRAM(s) Oral daily  atorvastatin 40 milliGRAM(s) Oral at bedtime  buMETAnide Infusion 2 mG/Hr IV Continuous <Continuous>  chlorhexidine 0.12% Liquid 15 milliLiter(s) Oral Mucosa two times a day  chlorhexidine 4% Liquid 1 Application(s) Topical <User Schedule>  chlorhexidine 4% Liquid 1 Application(s) Topical <User Schedule>  dexmedetomidine Infusion 0.3 MICROgram(s)/kG/Hr IV Continuous <Continuous>  diltiazem Infusion 10 mG/Hr IV Continuous <Continuous>  escitalopram 10 milliGRAM(s) Oral daily  heparin  Infusion 1300 Unit(s)/Hr IV Continuous <Continuous>  insulin lispro (HumaLOG) corrective regimen sliding scale   SubCutaneous every 6 hours  insulin regular Infusion 3 Unit(s)/Hr IV Continuous <Continuous>  methylPREDNISolone sodium succinate Injectable   IV Push   methylPREDNISolone sodium succinate Injectable 60 milliGRAM(s) IV Push every 12 hours  pantoprazole  Injectable 40 milliGRAM(s) IV Push two times a day  sodium chloride 0.9%. 1000 milliLiter(s) IV Continuous <Continuous>  vasopressin Infusion 0.083 Unit(s)/Min IV Continuous <Continuous>    PRN Inpatient Medications  HYDROmorphone  Injectable 0.5 milliGRAM(s) IV Push every 8 hours PRN  sodium chloride 0.9% lock flush 10 milliLiter(s) IV Push every 1 hour PRN      REVIEW OF SYSTEMS  --------------------------------------------------------------------------------  Unable to assess.     VITALS/PHYSICAL EXAM  --------------------------------------------------------------------------------  T(C): 37.2 (05-12-19 @ 12:00), Max: 37.3 (05-11-19 @ 16:20)  HR: 105 (05-12-19 @ 12:15) (99 - 135)  BP: --  RR: 25 (05-12-19 @ 12:15) (14 - 50)  SpO2: 98% (05-12-19 @ 12:15) (97% - 100%)  Wt(kg): --        05-11-19 @ 07:01  -  05-12-19 @ 07:00  --------------------------------------------------------  IN: 3706 mL / OUT: 2595 mL / NET: 1111 mL    05-12-19 @ 07:01  -  05-12-19 @ 12:28  --------------------------------------------------------  IN: 394.5 mL / OUT: 370 mL / NET: 24.5 mL      Physical Exam:  	Gen: NAD  	HEENT: +NGT, +trach  	Pulm: CTA B/L  	CV: RRR, S1S2; no rub  	Abd: +BS, soft, nontender/nondistended              : De with clear yellow urine  	LE: Warm, +edema  	Vascular access: +RIJ non-tunneled HD catheter    LABS/STUDIES  --------------------------------------------------------------------------------              9.4    15.2  >-----------<  235      [05-12-19 @ 01:31]              27.3     148  |  108  |  144  ----------------------------<  214      [05-12-19 @ 01:31]  4.4   |  20  |  2.98        Ca     8.0     [05-12-19 @ 01:31]      Mg     3.0     [05-12-19 @ 01:31]      Phos  8.6     [05-12-19 @ 01:31]    TPro  5.6  /  Alb  3.0  /  TBili  1.2  /  DBili  x   /  AST  72  /  ALT  90  /  AlkPhos  79  [05-12-19 @ 01:31]      PTT: 68.2       [05-12-19 @ 09:25]      Creatinine Trend:  SCr 2.98 [05-12 @ 01:31]  SCr 3.35 [05-11 @ 12:14]  SCr 3.06 [05-11 @ 01:00]  SCr 2.74 [05-10 @ 17:02]  SCr 2.53 [05-10 @ 00:53]    Urinalysis - [05-11-19 @ 14:12]      Color Light Yellow / Appearance Clear / SG 1.013 / pH 5.5      Gluc Negative / Ketone Negative  / Bili Negative / Urobili Negative       Blood Negative / Protein Negative / Leuk Est Negative / Nitrite Negative      RBC  / WBC  / Hyaline  / Gran  / Sq Epi  / Non Sq Epi  / Bacteria     Urine Creatinine 70      [05-08-19 @ 11:28]  Urine Protein 45      [05-08-19 @ 11:28]  Urine Sodium 26      [05-08-19 @ 11:28]  Urine Urea Nitrogen 1191      [05-08-19 @ 14:02]  Urine Potassium 71      [05-08-19 @ 11:28]    HbA1c 5.4      [04-25-19 @ 17:39]  TSH 1.48      [04-25-19 @ 17:58]  Lipid: chol 213, , HDL 38,       [04-25-19 @ 17:58]    HBsAb <3.0      [05-12-19 @ 00:08]  HBsAg Nonreact      [05-12-19 @ 00:08]    Immunofixation Serum:   No Monoclonal Band Identified    Reference Range: None Detected      [05-10-19 @ 12:48]  SPEP Interpretation: Normal Electrophoresis Pattern      [05-10-19 @ 12:48]

## 2019-05-12 NOTE — PROGRESS NOTE ADULT - ASSESSMENT
78 yr old male with H/O HTN, HLD, CAD, S/P Lung sx, Anxiety, Asthma, S/P CABG x3 POD 16  Post op course complicated with hypoxic respiratory failure suspected Bibasilar Pneumonia, severe Agitation requiring intubation. now S/P Trach, Lung Bx with Dx Pulmonary Amylodosis   A fib, hypotension requiring pressors.     AC mode ventilation,   titrate Fio2& Peep CXR with bibasilar infiltrate  S/P R chest tube for large pleural effusion   Off Abx culture negatives  On Solumedrol taper    on pressors for septic hypotension  ASA & Statin for CAD  Enteral nutrion, Guaic + stool, Protonix BID as patient on high dose steroids  Glycemic control  Cont Antidepressants  Enteral nutrition	  Diarrhaea Rectal tube in place     I have spent 30 minutes providing critical care management to this patient. 78 yr old male with H/O HTN, HLD, CAD, S/P Lung sx, Anxiety, Asthma, S/P CABG x3 POD 16  Post op course complicated with hypoxic respiratory failure suspected Bibasilar Pneumonia, severe Agitation requiring intubation. now S/P Trach, Lung Bx with Dx Pulmonary Amyloidosis   A fib, hypotension requiring pressors.     AC mode ventilation,   titrate Fio2& Peep CXR with bibasilar infiltrate  S/P R chest tube for large pleural effusion   Off Abx culture negatives  On Solumedrol taper    on pressors for septic hypotension, A- fib on Cardizem gtt ??,   decreasing BP consider Amio for rate control  Acute Kidney Injury on hemodialysis in non oliguric renal failure, Bumex gtt maintain net negative  ASA & Statin for CAD  Enteral nutrion, Guaic + stool, Protonix BID as patient on high dose steroids  Glycemic control, INS gtt  Cont Antidepressants  Enteral nutrition	  Diarrhaea Rectal tube in place     I have spent 30 minutes providing critical care management to this patient.

## 2019-05-12 NOTE — PROGRESS NOTE ADULT - ASSESSMENT
77 yo   male with PMH of HLD, Sleep apnea ( non compliant with CPAP), GERD, diverticulitis, depression, anxiety, and panic attack, presented to [facility] on 04/25/19 for LHC after having  abnormal NST done as outpatient, LHC done same day revealed TVD. therefore underwent CABG on 04/26/19, hospital course complicated with fever, shock, no on pressors, abx, developed julio cesar hence nephrology consulted.

## 2019-05-12 NOTE — PROGRESS NOTE ADULT - SUBJECTIVE AND OBJECTIVE BOX
Subjective: No overnight events. He remains agitated and has hypersensitivity to touch anywhere in his body. Was on propofol this morning with addition of vasopressin.    Medications:  aspirin 325 milliGRAM(s) Oral daily  atorvastatin 40 milliGRAM(s) Oral at bedtime  buMETAnide Infusion 2 mG/Hr IV Continuous <Continuous>  escitalopram 10 milliGRAM(s) Oral daily  heparin  Infusion 1300 Unit(s)/Hr IV Continuous <Continuous>  HYDROmorphone  Injectable 0.5 milliGRAM(s) IV Push every 8 hours PRN  insulin lispro (HumaLOG) corrective regimen sliding scale   SubCutaneous every 6 hours  insulin regular Infusion 3 Unit(s)/Hr IV Continuous <Continuous>  methylPREDNISolone sodium succinate Injectable 60 milliGRAM(s) IV Push every 12 hours  pantoprazole  Injectable 40 milliGRAM(s) IV Push two times a day  vasopressin Infusion 0.083 Unit(s)/Min IV Continuous <Continuous>    Physical Exam:    Vitals:  T(C): 36.9 (19 @ 04:00), Max: 37.3 (19 @ 16:20)  HR: 129 (19 @ 06:00) (96 - 129)  ABP: 110/56 (19 @ 06:00) (94/53 - 149/67)  ABP(mean): 74 (19 @ 06:00) (62 - 92)  RR: 22 (19 @ 06:00) (10 - 50)  SpO2: 100% (19 @ 06:00) (93% - 100%)  CVP(cm H2O): 9 with V waves to 28    Daily     Daily Weight in k.6 (12 May 2019 01:00)    I&O's Summary    11 May 2019 07:01  -  12 May 2019 07:00  --------------------------------------------------------  IN: 3706 mL / OUT: 2345 mL / NET: 1361 mL    General: No distress. Comfortable.  HEENT: EOM intact.  Neck: Neck supple. JVP not elevated but prominent V waves. No masses  Chest: Clear to auscultation bilaterally  CV: Tachycardic, distant heart sounds. Normal S1 and S2. No rubs or gallops. Radial pulses normal. Anasarcic.   Abdomen: Soft, non-distended, non-tender  Skin: No rashes  Neurology: Grimaces to touch.   Psych: Unable to assess.    Labs:                        9.4    15.2  )-----------( 235      ( 12 May 2019 01:31 )             27.3     05-12    148<H>  |  108  |  144<H>  ----------------------------<  214<H>  4.4   |  20<L>  |  2.98<H>    Ca    8.0<L>      12 May 2019 01:31  Phos  8.6       Mg     3.0         TPro  5.6<L>  /  Alb  3.0<L>  /  TBili  1.2  /  DBili  x   /  AST  72<H>  /  ALT  90<H>  /  AlkPhos  79  -12  PTT - ( 12 May 2019 01:31 )  PTT:70.6 sec

## 2019-05-12 NOTE — PROGRESS NOTE ADULT - ATTENDING COMMENTS
pt lesley and examined d/w the team pt seen and examined d/w the team [coverage for Dr arroyo]     Mr Osman is a 78 year old man who underwent a non-urgent CABG (LIMA LAD and SVG to OM1 and RCA) on 4/26 with post-operative course complicated by acute respiratory failure with failure to wean from vent requiring tracheostomy. Due to persistent pulmonary infiltrates and history of possible , he underwent lung biopsy, which demonstrated evidence of amyloid deposition. On review of his echocardiogram pre-operatively, he has some characteristics suggestive of cardiac amyloid, but only had impaired relaxation. He currently is volume overloaded with progressively worsening renal failure .  He is vent dependent.       Plan discussed WITH CT ICU ATTENDING      -  Acute diastolic heart failure secondary to restrictive cardiomyopathy. -will try to keep him euvolemic  as much as possible    · Amyloidosis, unspecified type.  --- - Very likely with cardiac involvement based on EKG and echo findings. echocardiography with some LV hypertrophy but reportedly not typical of the starry-armida pattern seen in amyloid  would suggest technetium pyrophosphate scan once patient more stable which is sensitive and specific for transthyretin cardiac amyloid  - Pathology from lung sent to [facility] for typing.   - PLEASE CHECK B2 MICROGLUBULIN  AS WELL SERUM PROTEIN ELECTROPHEORSES, AND  URINE LIGHT CHAINS    -: Acute hypoxemic respiratory failure. -NOt POSSIBLE TO WEAN FROM VENT AT PRESENT     - Acute renal failure. --- serum creatinine 2.8 ----RENAL F/U    -- Obstructive sleep apnea.--- tracheostomy is a treatment for ARCENIO  - if decannulated will need to discuss possibility of NIPPV with patient.    --- DVT proph - on Hep gtt  - GI proph  - Maintain O2 sat > 90%  - Aspiration precautions

## 2019-05-12 NOTE — PROGRESS NOTE ADULT - PROBLEM SELECTOR PLAN 2
The patient has an elevated bun in the setting of steroid use. Steroids being tapered, however BUN continues to trend up to 190s today. Pt s/p HD session on 5/11. BUN improves from 190s to 140s.  - Would monitor off HD today.   - BUN should improve with steroid taper

## 2019-05-12 NOTE — PROGRESS NOTE ADULT - PROBLEM SELECTOR PLAN 1
- Central filling pressures may not be significantly elevated. While IVC is severely dilated, this may reflect the prominent V waves more than elevated CVP per se. We can continue to try and remove volume as tolerated.

## 2019-05-13 DIAGNOSIS — A41.9 SEPSIS, UNSPECIFIED ORGANISM: ICD-10-CM

## 2019-05-13 DIAGNOSIS — R50.9 FEVER, UNSPECIFIED: ICD-10-CM

## 2019-05-13 LAB
ALBUMIN SERPL ELPH-MCNC: 2.6 G/DL — LOW (ref 3.3–5)
ALP SERPL-CCNC: 88 U/L — SIGNIFICANT CHANGE UP (ref 40–120)
ALT FLD-CCNC: 71 U/L — HIGH (ref 10–45)
ANION GAP SERPL CALC-SCNC: 22 MMOL/L — HIGH (ref 5–17)
AST SERPL-CCNC: 39 U/L — SIGNIFICANT CHANGE UP (ref 10–40)
BILIRUB SERPL-MCNC: 0.8 MG/DL — SIGNIFICANT CHANGE UP (ref 0.2–1.2)
BLD GP AB SCN SERPL QL: NEGATIVE — SIGNIFICANT CHANGE UP
BUN SERPL-MCNC: 179 MG/DL — HIGH (ref 7–23)
CALCIUM SERPL-MCNC: 7.6 MG/DL — LOW (ref 8.4–10.5)
CHLORIDE SERPL-SCNC: 105 MMOL/L — SIGNIFICANT CHANGE UP (ref 96–108)
CO2 SERPL-SCNC: 18 MMOL/L — LOW (ref 22–31)
CREAT SERPL-MCNC: 3.71 MG/DL — HIGH (ref 0.5–1.3)
GAS PNL BLDA: SIGNIFICANT CHANGE UP
GLUCOSE SERPL-MCNC: 220 MG/DL — HIGH (ref 70–99)
HCT VFR BLD CALC: 24.9 % — LOW (ref 39–50)
HGB BLD-MCNC: 8.6 G/DL — LOW (ref 13–17)
KAPPA LC SER QL IFE: 6.77 MG/DL — HIGH (ref 0.33–1.94)
KAPPA/LAMBDA FREE LIGHT CHAIN RATIO, SERUM: 0.63 RATIO — SIGNIFICANT CHANGE UP (ref 0.26–1.65)
LAMBDA LC SER QL IFE: 10.77 MG/DL — HIGH (ref 0.57–2.63)
MAGNESIUM SERPL-MCNC: 2.9 MG/DL — HIGH (ref 1.6–2.6)
MCHC RBC-ENTMCNC: 29.8 PG — SIGNIFICANT CHANGE UP (ref 27–34)
MCHC RBC-ENTMCNC: 34.7 GM/DL — SIGNIFICANT CHANGE UP (ref 32–36)
MCV RBC AUTO: 85.6 FL — SIGNIFICANT CHANGE UP (ref 80–100)
PHOSPHATE SERPL-MCNC: 9.2 MG/DL — HIGH (ref 2.5–4.5)
PLATELET # BLD AUTO: 225 K/UL — SIGNIFICANT CHANGE UP (ref 150–400)
POTASSIUM SERPL-MCNC: 4.2 MMOL/L — SIGNIFICANT CHANGE UP (ref 3.5–5.3)
POTASSIUM SERPL-SCNC: 4.2 MMOL/L — SIGNIFICANT CHANGE UP (ref 3.5–5.3)
PROCALCITONIN SERPL-MCNC: 0.36 NG/ML — HIGH (ref 0.02–0.1)
PROT SERPL-MCNC: 5.3 G/DL — LOW (ref 6–8.3)
RBC # BLD: 2.91 M/UL — LOW (ref 4.2–5.8)
RBC # FLD: 15.4 % — HIGH (ref 10.3–14.5)
RH IG SCN BLD-IMP: POSITIVE — SIGNIFICANT CHANGE UP
SODIUM SERPL-SCNC: 145 MMOL/L — SIGNIFICANT CHANGE UP (ref 135–145)
WBC # BLD: 20.4 K/UL — HIGH (ref 3.8–10.5)
WBC # FLD AUTO: 20.4 K/UL — HIGH (ref 3.8–10.5)

## 2019-05-13 PROCEDURE — 99233 SBSQ HOSP IP/OBS HIGH 50: CPT | Mod: GC

## 2019-05-13 PROCEDURE — 71045 X-RAY EXAM CHEST 1 VIEW: CPT | Mod: 26

## 2019-05-13 PROCEDURE — 93306 TTE W/DOPPLER COMPLETE: CPT | Mod: 26

## 2019-05-13 PROCEDURE — 99232 SBSQ HOSP IP/OBS MODERATE 35: CPT

## 2019-05-13 PROCEDURE — 99233 SBSQ HOSP IP/OBS HIGH 50: CPT

## 2019-05-13 PROCEDURE — 31645 BRNCHSC W/THER ASPIR 1ST: CPT

## 2019-05-13 PROCEDURE — 99291 CRITICAL CARE FIRST HOUR: CPT | Mod: 25

## 2019-05-13 RX ORDER — FENTANYL CITRATE 50 UG/ML
50 INJECTION INTRAVENOUS ONCE
Refills: 0 | Status: DISCONTINUED | OUTPATIENT
Start: 2019-05-13 | End: 2019-05-13

## 2019-05-13 RX ORDER — DIGOXIN 250 MCG
0.5 TABLET ORAL ONCE
Refills: 0 | Status: COMPLETED | OUTPATIENT
Start: 2019-05-13 | End: 2019-05-13

## 2019-05-13 RX ORDER — VANCOMYCIN HCL 1 G
1000 VIAL (EA) INTRAVENOUS ONCE
Refills: 0 | Status: COMPLETED | OUTPATIENT
Start: 2019-05-13 | End: 2019-05-13

## 2019-05-13 RX ORDER — DIGOXIN 250 MCG
0.25 TABLET ORAL EVERY 8 HOURS
Refills: 0 | Status: DISCONTINUED | OUTPATIENT
Start: 2019-05-13 | End: 2019-05-13

## 2019-05-13 RX ORDER — GABAPENTIN 400 MG/1
100 CAPSULE ORAL EVERY 8 HOURS
Refills: 0 | Status: DISCONTINUED | OUTPATIENT
Start: 2019-05-13 | End: 2019-05-15

## 2019-05-13 RX ADMIN — Medication 325 MILLIGRAM(S): at 12:08

## 2019-05-13 RX ADMIN — VASOPRESSIN 5 UNIT(S)/MIN: 20 INJECTION INTRAVENOUS at 07:43

## 2019-05-13 RX ADMIN — GABAPENTIN 100 MILLIGRAM(S): 400 CAPSULE ORAL at 18:09

## 2019-05-13 RX ADMIN — Medication 60 MILLIGRAM(S): at 06:05

## 2019-05-13 RX ADMIN — Medication 250 MILLIGRAM(S): at 08:39

## 2019-05-13 RX ADMIN — FENTANYL CITRATE 50 MICROGRAM(S): 50 INJECTION INTRAVENOUS at 14:05

## 2019-05-13 RX ADMIN — Medication 5 MICROGRAM(S)/KG/MIN: at 07:43

## 2019-05-13 RX ADMIN — PANTOPRAZOLE SODIUM 40 MILLIGRAM(S): 20 TABLET, DELAYED RELEASE ORAL at 06:03

## 2019-05-13 RX ADMIN — BUMETANIDE 10 MG/HR: 0.25 INJECTION INTRAMUSCULAR; INTRAVENOUS at 07:42

## 2019-05-13 RX ADMIN — FENTANYL CITRATE 50 MICROGRAM(S): 50 INJECTION INTRAVENOUS at 22:45

## 2019-05-13 RX ADMIN — Medication 2: at 12:08

## 2019-05-13 RX ADMIN — HYDROMORPHONE HYDROCHLORIDE 0.5 MILLIGRAM(S): 2 INJECTION INTRAMUSCULAR; INTRAVENOUS; SUBCUTANEOUS at 16:45

## 2019-05-13 RX ADMIN — CHLORHEXIDINE GLUCONATE 1 APPLICATION(S): 213 SOLUTION TOPICAL at 06:03

## 2019-05-13 RX ADMIN — HYDROMORPHONE HYDROCHLORIDE 0.5 MILLIGRAM(S): 2 INJECTION INTRAMUSCULAR; INTRAVENOUS; SUBCUTANEOUS at 16:30

## 2019-05-13 RX ADMIN — Medication 2: at 00:16

## 2019-05-13 RX ADMIN — Medication 5 MG/HR: at 07:41

## 2019-05-13 RX ADMIN — FENTANYL CITRATE 50 MICROGRAM(S): 50 INJECTION INTRAVENOUS at 23:00

## 2019-05-13 RX ADMIN — GABAPENTIN 100 MILLIGRAM(S): 400 CAPSULE ORAL at 21:11

## 2019-05-13 RX ADMIN — DEXMEDETOMIDINE HYDROCHLORIDE IN 0.9% SODIUM CHLORIDE 5.96 MICROGRAM(S)/KG/HR: 4 INJECTION INTRAVENOUS at 07:42

## 2019-05-13 RX ADMIN — Medication 0.5 MILLIGRAM(S): at 14:07

## 2019-05-13 RX ADMIN — FENTANYL CITRATE 50 MICROGRAM(S): 50 INJECTION INTRAVENOUS at 14:20

## 2019-05-13 RX ADMIN — CHLORHEXIDINE GLUCONATE 15 MILLILITER(S): 213 SOLUTION TOPICAL at 06:03

## 2019-05-13 RX ADMIN — ESCITALOPRAM OXALATE 10 MILLIGRAM(S): 10 TABLET, FILM COATED ORAL at 12:08

## 2019-05-13 RX ADMIN — Medication 2: at 06:46

## 2019-05-13 RX ADMIN — PANTOPRAZOLE SODIUM 40 MILLIGRAM(S): 20 TABLET, DELAYED RELEASE ORAL at 18:09

## 2019-05-13 RX ADMIN — ATORVASTATIN CALCIUM 40 MILLIGRAM(S): 80 TABLET, FILM COATED ORAL at 21:11

## 2019-05-13 RX ADMIN — CHLORHEXIDINE GLUCONATE 15 MILLILITER(S): 213 SOLUTION TOPICAL at 18:09

## 2019-05-13 NOTE — PROGRESS NOTE ADULT - PROBLEM SELECTOR PLAN 1
- For now, continue diuretics. Despite high dose bumex, he continues to not have adequate urine output to keep up with intake. He may need ultrafiltration.

## 2019-05-13 NOTE — PROGRESS NOTE ADULT - PROBLEM SELECTOR PLAN 3
- Very likely with cardiac involvement based on EKG and echo findings.   - Pathology from lung sent to [place] for typing.   - Serum free light chains ordered to further exclude AL amyloid.

## 2019-05-13 NOTE — PROGRESS NOTE ADULT - SUBJECTIVE AND OBJECTIVE BOX
CHIEF COMPLAINT: f/up resp failure/pulmonary amyloidosis-abnormal Ct--sedated on vent    Interval Events: none-no weaning    REVIEW OF SYSTEMS:  Constitutional: No fevers or chills. No weight loss. No fatigue or generalized malaise.  Eyes: No itching or discharge from the eyes  ENT: No ear pain. No ear discharge. No nasal congestion. No post nasal drip. No epistaxis. No throat pain. No sore throat. No difficulty swallowing.   CV: No chest pain. No palpitations. No lightheadedness or dizziness.   Resp: No dyspnea at rest. No dyspnea on exertion. No orthopnea. No wheezing. No cough. No stridor. No sputum production. No chest pain with respiration.  GI: No nausea. No vomiting. No diarrhea.  MSK: No joint pain or pain in any extremities  Integumentary: No skin lesions. No pedal edema.  Neurological: No gross motor weakness. No sensory changes.  [ ] All other systems negative  [+ ] Unable to assess ROS because _sedated____    OBJECTIVE:  ICU Vital Signs Last 24 Hrs  T(C): 37.7 (13 May 2019 04:00), Max: 37.7 (13 May 2019 04:00)  T(F): 99.9 (13 May 2019 04:00), Max: 99.9 (13 May 2019 04:00)  HR: 100 (13 May 2019 05:00) (80 - 135)  BP: --  BP(mean): --  ABP: 125/54 (13 May 2019 05:00) (94/42 - 137/55)  ABP(mean): 72 (13 May 2019 05:00) (55 - 92)  RR: 26 (13 May 2019 05:00) (17 - 41)  SpO2: 100% (13 May 2019 05:00) (97% - 100%)    Mode: AC/ CMV (Assist Control/ Continuous Mandatory Ventilation), RR (machine): 10, TV (machine): 500, FiO2: 50, PEEP: 5, ITime: 1, MAP: 8, PIP: 20    05-11 @ 07:01  -  -12 @ 07:00  --------------------------------------------------------  IN: 3706 mL / OUT: 2595 mL / NET: 1111 mL     @ 07:01   @ 06:10  --------------------------------------------------------  IN: 3082.2 mL / OUT: 2020 mL / NET: 1062.2 mL      CAPILLARY BLOOD GLUCOSE  196 (12 May 2019 00:00)      POCT Blood Glucose.: 171 mg/dL (12 May 2019 12:47)      PHYSICAL EXAM: on vent-sedated  General: sedated   HEENT: Atraumatic, normocephalic.                 Mallampatti Grade 3                No nasal congestion.                No tonsillar or pharyngeal exudates.  Lymph Nodes: No palpable lymphadenopathy  Neck: No JVD. No carotid bruit. trach in place  Respiratory: Normal chest expansion-reduced BS bases; Right CT in place                         Normal percussion                         Normal and equal air entry                         No wheeze, rhonchi or rales.  Cardiovascular: S1 S2 normal. No murmurs, rubs or gallops.   Abdomen: Soft, non-tender, non-distended. No organomegaly. Normoactive bowel sounds.  Extremities: Warm to touch. Peripheral pulse palpable. + pedal edema.   Skin: No rashes or skin lesions  Neurological: Motor and sensory examination equal and normal in all four extremities-(according to nursing)  Psychiatry: unable    HOSPITAL MEDICATIONS:  MEDICATIONS  (STANDING):  aspirin 325 milliGRAM(s) Oral daily  atorvastatin 40 milliGRAM(s) Oral at bedtime  buMETAnide Infusion 2 mG/Hr (10 mL/Hr) IV Continuous <Continuous>  chlorhexidine 0.12% Liquid 15 milliLiter(s) Oral Mucosa two times a day  chlorhexidine 4% Liquid 1 Application(s) Topical <User Schedule>  chlorhexidine 4% Liquid 1 Application(s) Topical <User Schedule>  dexmedetomidine Infusion 0.3 MICROgram(s)/kG/Hr (5.962 mL/Hr) IV Continuous <Continuous>  diltiazem Infusion 5 mG/Hr (5 mL/Hr) IV Continuous <Continuous>  escitalopram 10 milliGRAM(s) Oral daily  heparin  Infusion 1300 Unit(s)/Hr (8.5 mL/Hr) IV Continuous <Continuous>  insulin lispro (HumaLOG) corrective regimen sliding scale   SubCutaneous every 6 hours  insulin regular Infusion 3 Unit(s)/Hr (3 mL/Hr) IV Continuous <Continuous>  methylPREDNISolone sodium succinate Injectable   IV Push   methylPREDNISolone sodium succinate Injectable 40 milliGRAM(s) IV Push every 12 hours  norepinephrine Infusion 0.034 MICROgram(s)/kG/Min (5 mL/Hr) IV Continuous <Continuous>  pantoprazole  Injectable 40 milliGRAM(s) IV Push two times a day  sodium chloride 0.9%. 1000 milliLiter(s) (10 mL/Hr) IV Continuous <Continuous>  vasopressin Infusion 0.083 Unit(s)/Min (5 mL/Hr) IV Continuous <Continuous>    MEDICATIONS  (PRN):  HYDROmorphone  Injectable 0.5 milliGRAM(s) IV Push every 8 hours PRN Severe Pain (7 - 10)  sodium chloride 0.9% lock flush 10 milliLiter(s) IV Push every 1 hour PRN Pre/post blood products, medications, blood draw, and to maintain line patency      LABS:                        8.6    20.4  )-----------( 225      ( 13 May 2019 01:02 )             24.9     05-13    145  |  105  |  179<H>  ----------------------------<  220<H>  4.2   |  18<L>  |  3.71<H>    Ca    7.6<L>      13 May 2019 01:02  Phos  9.2     -  Mg     2.9     -    TPro  5.3<L>  /  Alb  2.6<L>  /  TBili  0.8  /  DBili  x   /  AST  39  /  ALT  71<H>  /  AlkPhos  88  05-13    PT/INR - ( 12 May 2019 16:51 )   PT: 16.0 sec;   INR: 1.38 ratio         PTT - ( 12 May 2019 22:13 )  PTT:54.9 sec  Urinalysis Basic - ( 11 May 2019 14:12 )    Color: Light Yellow / Appearance: Clear / S.013 / pH: x  Gluc: x / Ketone: Negative  / Bili: Negative / Urobili: Negative   Blood: x / Protein: Negative / Nitrite: Negative   Leuk Esterase: Negative / RBC: x / WBC x   Sq Epi: x / Non Sq Epi: x / Bacteria: x      Arterial Blood Gas:   @ 00:08  7.42/29/131/19/99/-4.6  ABG lactate: --  Arterial Blood Gas:   @ 17:20  7.43/30/104/19/98/-3.8  ABG lactate: --  Arterial Blood Gas:   @ 16:37  7.43/28/92/18/97/-4.6  ABG lactate: --  Arterial Blood Gas:   @ 09:21  7.42/32/167/21/99/-2.6  ABG lactate: --  Arterial Blood Gas:   @ 22:05  7.48/28/178/21/99/-1.8  ABG lactate: --  Arterial Blood Gas:   @ 17:38  7.51/28/176/23/99/.6  ABG lactate: --  Arterial Blood Gas:   @ 15:09  7.39/32/165/19/99/-5.0  ABG lactate: --  Arterial Blood Gas:   @ 14:41  7.45/38/87/26/97/2.6  ABG lactate: --  Arterial Blood Gas:   @ 14:08  7.37/33/122/18/98/-5.6  ABG lactate: --  Arterial Blood Gas:   @ 10:08  7.37/35/159/20/99/-4.4  ABG lactate: --  Arterial Blood Gas:   @ 08:55  7.29/39/82/18/94/-7.4  ABG lactate: --    Venous Blood Gas:   @ 15:09  7.34/39/44/20/71  VBG Lactate: --  Venous Blood Gas:   @ 14:41  7.39/47/32/28/55  VBG Lactate: --      MICROBIOLOGY:     RADIOLOGY:  [ ] Reviewed and interpreted by me    Point of Care Ultrasound Findings:    PFT:    EKG:

## 2019-05-13 NOTE — PROGRESS NOTE ADULT - ASSESSMENT
79 yo   male with PMH of HLD, Sleep apnea ( non compliant with CPAP), GERD, diverticulitis, depression, anxiety, and panic attack, presented to [facility] on 04/25/19 for LHC after having  abnormal NST done as outpatient, LHC done same day revealed TVD. therefore underwent CABG on 04/26/19, hospital course complicated with fever, shock, no on pressors, abx, developed julio cesar hence nephrology consulted.

## 2019-05-13 NOTE — PROGRESS NOTE ADULT - ASSESSMENT
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 His postop course was complicated by persistent respiratory failure requiring tracheostomy and he had a wedge biopsy showing Pulmonary Amyloidosis.    5/13-poor weaning (less than 2 hrs), CT in place

## 2019-05-13 NOTE — PROGRESS NOTE ADULT - SUBJECTIVE AND OBJECTIVE BOX
indication: ? mal  position  of the TRACH     Findings:  Bronchoscope inserted through trach , Tracheostoy tube noted to be in good position. Airway evaluation revealed Sharp Marcia. JAYE and LLL evaluation revealed clean airways.RUL, RML, RLL revealed Clear airways . . Bronchoscope then withdrawn from trach.  No bleeding noted

## 2019-05-13 NOTE — PROGRESS NOTE ADULT - ATTENDING COMMENTS
discussed with CTICU this morning given concern for LVOT obstruction avoiding UF with HD.  patient net negative the last few hours with bumex drip.  will monitor the need for UF daily.  If bumex drip inadequate than we ultimately would consider CRRT more than IHD.

## 2019-05-13 NOTE — PROGRESS NOTE ADULT - ASSESSMENT
Mr Osman is a 78 year old man who underwent a non-urgent CABG (LIMA LAD and SVG to OM1 and RCA) on 4/26 with post-operative course complicated by acute respiratory failure with failure to wean from vent requiring tracheostomy. Due to persistent pulmonary infiltrates and history of possible , he underwent lung biopsy, which demonstrated evidence of amyloid deposition. On review of his echocardiogram pre-operatively, he has some characteristics suggestive of cardiac amyloid, but only had impaired relaxation. He currently is volume overloaded with progressively worsening renal failure an inadequate urine output to keep up with his intake. He is vasodilated and on two vasopressor to maintain adequate blood pressure and he has a worsening leukocytosis, together concerning for development of sepsis.    His other notable comorbidities include sleep apnea ( non compliant with CPAP), GERD, diverticulitis, depression, anxiety, and panic attack, and a history of carpal tunnel syndrome.     Plan discussed in multidisciplinary rounds with CTU team and CT surgery. Please call me with questions at [phone].

## 2019-05-13 NOTE — PROGRESS NOTE ADULT - SUBJECTIVE AND OBJECTIVE BOX
CHRIS THAKKAR  MRN#:  [id number]    The patient is a 78y Male with PMH of HLD, Sleep apnea ( non compliant with CPAP), GERD, diverticulitis, depression, anxiety, and panic attacks, as well as strong family history of CAD, admitted with unstable angina, found to have coronary artery disease, LVH and diastolic heart failure, now recovering s/p C3L 4/26, post op course complicated by bleeding and later hypoxic respiratory failure, s/p tracheostomy who was seen, evaluated, & examined with the CTICU staff on rounds and later in the evening with a multidisciplinary care plan formulated & implemented.  All available clinical, laboratory, radiographic, pharmacologic, and electrocardiographic data were reviewed & analyzed.      The patient was in the CTICU in critical condition secondary to persistent cardiopulmonary dysfunction, hemodynamically significant anemia, acute renal insufficiency, vasogenic shock, persistent cardiovascular dysfunction, and hyperglycemia.     Respiratory status required full ventilator support with advancement to weaning amounts of pressure support throughout the day, close monitoring of respiratory rate and breathing pattern, the following of ABG’s with A-line monitoring, continuous pulse oximetry monitoring for support & to evaluate for & prevent further decompensation secondary to hypoxic respiratory failure, pneumonia, and pulmonary amyloidosis on biopsy. Further work up in process to determine subtype.    Invasive hemodynamic monitoring with a central venous catheter & an A-line were required for the continuous central venous and MAP/BP monitoring to ensure adequate cardiovascular support and to evaluate for & help prevent decompensation while receiving an IV Cardizem drip, an IV Vasopressin drip, an IV Norepinephrine drip, an IV Bumex drip, and intermittent hemodialysis secondary to hemodynamically significant anemia/acute postoperative blood loss anemia, vasogenic shock, acute on chronic diastolic heart failure, rapid atrial fibrillation and acute renal failure.    Patient has had fever, leukocytosis, thick pulmonary secretions with a right lung infiltrate on chest x ray consistent with pneumonia. He completed empiric treatment with IV Meropenem and IV Micafungin due to BAL positive for yeast.    Tolerating enteral feedings at goal rate.    Patient required acute postoperative critical care management and I provided 35 minutes of non-continuous care to the patient.  Discussed at length with the CTICU staff and helped coordinate care.

## 2019-05-13 NOTE — PROGRESS NOTE ADULT - ATTENDING COMMENTS
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 failure-try to DC CT, continue weaning w/ CPAP/PS as able--off sedation as able  GI-ppi/TF  PT/OT.  Jann Guadarrama MD-Pulmonary   [phone]

## 2019-05-13 NOTE — PROGRESS NOTE ADULT - SUBJECTIVE AND OBJECTIVE BOX
[facility] DIVISION OF KIDNEY DISEASES AND HYPERTENSION -- FOLLOW UP NOTE  --------------------------------------------------------------------------------  Chief Complaint:  TARAH    24 hour events/subjective:  Pt examined at bed side, trach to vent, on NG tube feeds, not opening eyes or following commands, on diuretics, received 1 unit of PRBC.       PAST HISTORY  --------------------------------------------------------------------------------  No significant changes to PMH, PSH, FHx, SHx, unless otherwise noted    ALLERGIES & MEDICATIONS  --------------------------------------------------------------------------------  Allergies    No Known Allergies    Intolerances      Standing Inpatient Medications  aspirin 325 milliGRAM(s) Oral daily  atorvastatin 40 milliGRAM(s) Oral at bedtime  buMETAnide Infusion 2 mG/Hr IV Continuous <Continuous>  chlorhexidine 0.12% Liquid 15 milliLiter(s) Oral Mucosa two times a day  chlorhexidine 4% Liquid 1 Application(s) Topical <User Schedule>  chlorhexidine 4% Liquid 1 Application(s) Topical <User Schedule>  dexmedetomidine Infusion 0.3 MICROgram(s)/kG/Hr IV Continuous <Continuous>  diltiazem Infusion 5 mG/Hr IV Continuous <Continuous>  escitalopram 10 milliGRAM(s) Oral daily  heparin  Infusion 1300 Unit(s)/Hr IV Continuous <Continuous>  insulin lispro (HumaLOG) corrective regimen sliding scale   SubCutaneous every 6 hours  insulin regular Infusion 3 Unit(s)/Hr IV Continuous <Continuous>  methylPREDNISolone sodium succinate Injectable   IV Push   methylPREDNISolone sodium succinate Injectable 40 milliGRAM(s) IV Push every 12 hours  norepinephrine Infusion 0.034 MICROgram(s)/kG/Min IV Continuous <Continuous>  pantoprazole  Injectable 40 milliGRAM(s) IV Push two times a day  sodium chloride 0.9%. 1000 milliLiter(s) IV Continuous <Continuous>  vancomycin  IVPB 1000 milliGRAM(s) IV Intermittent once  vasopressin Infusion 0.083 Unit(s)/Min IV Continuous <Continuous>    PRN Inpatient Medications  HYDROmorphone  Injectable 0.5 milliGRAM(s) IV Push every 8 hours PRN  sodium chloride 0.9% lock flush 10 milliLiter(s) IV Push every 1 hour PRN      REVIEW OF SYSTEMS  --------------------------------------------------------------------------------  Not able to provide ROS.     VITALS/PHYSICAL EXAM  --------------------------------------------------------------------------------  T(C): 37.8 (05-13-19 @ 08:00), Max: 37.8 (05-13-19 @ 08:00)  HR: 95 (05-13-19 @ 07:45) (80 - 135)  BP: --  RR: 22 (05-13-19 @ 07:45) (17 - 41)  SpO2: 100% (05-13-19 @ 07:45) (97% - 100%)  Wt(kg): --        05-12-19 @ 07:01  -  05-13-19 @ 07:00  --------------------------------------------------------  IN: 3513 mL / OUT: 2180 mL / NET: 1333 mL      Physical Exam:  	Gen: NAD  	HEENT: +NGT, +trach  	Pulm: CTA B/L  	CV: RRR, S1S2; no rub  	Abd: +BS, soft, nontender/nondistended              : De with clear yellow urine, scrotal edema.   	LE: Warm, +edema  	Vascular access: +RIJ non-tunneled HD catheter    LABS/STUDIES  --------------------------------------------------------------------------------              8.6    20.4  >-----------<  225      [05-13-19 @ 01:02]              24.9     145  |  105  |  179  ----------------------------<  220      [05-13-19 @ 01:02]  4.2   |  18  |  3.71        Ca     7.6     [05-13-19 @ 01:02]      Mg     2.9     [05-13-19 @ 01:02]      Phos  9.2     [05-13-19 @ 01:02]    TPro  5.3  /  Alb  2.6  /  TBili  0.8  /  DBili  x   /  AST  39  /  ALT  71  /  AlkPhos  88  [05-13-19 @ 01:02]    PT/INR: PT 16.0 , INR 1.38       [05-12-19 @ 16:51]  PTT: 54.9       [05-12-19 @ 22:13]      Creatinine Trend:  SCr 3.71 [05-13 @ 01:02]  SCr 2.98 [05-12 @ 01:31]  SCr 3.35 [05-11 @ 12:14]  SCr 3.06 [05-11 @ 01:00]  SCr 2.74 [05-10 @ 17:02] [facility] DIVISION OF KIDNEY DISEASES AND HYPERTENSION -- FOLLOW UP NOTE  --------------------------------------------------------------------------------  Chief Complaint:  TARAH    24 hour events/subjective:  Pt examined at bed side, trach to vent, on NG tube feeds, not opening eyes or following commands, on diuretics, received 1 unit of PRBC.       PAST HISTORY  --------------------------------------------------------------------------------  No significant changes to PMH, PSH, FHx, SHx, unless otherwise noted    ALLERGIES & MEDICATIONS  --------------------------------------------------------------------------------  Allergies    No Known Allergies    Intolerances      Standing Inpatient Medications  aspirin 325 milliGRAM(s) Oral daily  atorvastatin 40 milliGRAM(s) Oral at bedtime  buMETAnide Infusion 2 mG/Hr IV Continuous <Continuous>  chlorhexidine 0.12% Liquid 15 milliLiter(s) Oral Mucosa two times a day  chlorhexidine 4% Liquid 1 Application(s) Topical <User Schedule>  chlorhexidine 4% Liquid 1 Application(s) Topical <User Schedule>  dexmedetomidine Infusion 0.3 MICROgram(s)/kG/Hr IV Continuous <Continuous>  diltiazem Infusion 5 mG/Hr IV Continuous <Continuous>  escitalopram 10 milliGRAM(s) Oral daily  heparin  Infusion 1300 Unit(s)/Hr IV Continuous <Continuous>  insulin lispro (HumaLOG) corrective regimen sliding scale   SubCutaneous every 6 hours  insulin regular Infusion 3 Unit(s)/Hr IV Continuous <Continuous>  methylPREDNISolone sodium succinate Injectable   IV Push   methylPREDNISolone sodium succinate Injectable 40 milliGRAM(s) IV Push every 12 hours  norepinephrine Infusion 0.034 MICROgram(s)/kG/Min IV Continuous <Continuous>  pantoprazole  Injectable 40 milliGRAM(s) IV Push two times a day  sodium chloride 0.9%. 1000 milliLiter(s) IV Continuous <Continuous>  vancomycin  IVPB 1000 milliGRAM(s) IV Intermittent once  vasopressin Infusion 0.083 Unit(s)/Min IV Continuous <Continuous>    PRN Inpatient Medications  HYDROmorphone  Injectable 0.5 milliGRAM(s) IV Push every 8 hours PRN  sodium chloride 0.9% lock flush 10 milliLiter(s) IV Push every 1 hour PRN      REVIEW OF SYSTEMS  --------------------------------------------------------------------------------  Not able to provide ROS.     VITALS/PHYSICAL EXAM  --------------------------------------------------------------------------------  T(C): 37.8 (05-13-19 @ 08:00), Max: 37.8 (05-13-19 @ 08:00)  HR: 95 (05-13-19 @ 07:45) (80 - 135)  T(C): 37.9 (05-13-19 @ 12:00), Max: 37.9 (05-13-19 @ 12:00)  HR: 77 (05-13-19 @ 14:45) (77 - 103)  ABP: 116/45 (05-13-19 @ 14:45) (99/42 - 139/62)  ABP(mean): 62 (05-13-19 @ 14:45) (55 - 83)  RR: 18 (05-13-19 @ 14:45) (17 - 41)  SpO2: 99% (05-13-19 @ 14:45) (98% - 100%)  CVP(mm Hg): 7 (05-13-19 @ 14:45) (7 - 34)  RR: 22 (05-13-19 @ 07:45) (17 - 41)  SpO2: 100% (05-13-19 @ 07:45) (97% - 100%)  Wt(kg): --        05-12-19 @ 07:01  -  05-13-19 @ 07:00  --------------------------------------------------------  IN: 3513 mL / OUT: 2180 mL / NET: 1333 mL      Physical Exam:  	Gen: NAD  	HEENT: +NGT, +trach  	Pulm: CTA B/L  	CV: RRR, S1S2; no rub  	Abd: +BS, soft, nontender/nondistended              : De with clear yellow urine, scrotal edema.   	LE: Warm, +edema  	Vascular access: +RIJ non-tunneled HD catheter    LABS/STUDIES  --------------------------------------------------------------------------------              8.6    20.4  >-----------<  225      [05-13-19 @ 01:02]              24.9     145  |  105  |  179  ----------------------------<  220      [05-13-19 @ 01:02]  4.2   |  18  |  3.71        Ca     7.6     [05-13-19 @ 01:02]      Mg     2.9     [05-13-19 @ 01:02]      Phos  9.2     [05-13-19 @ 01:02]    TPro  5.3  /  Alb  2.6  /  TBili  0.8  /  DBili  x   /  AST  39  /  ALT  71  /  AlkPhos  88  [05-13-19 @ 01:02]    PT/INR: PT 16.0 , INR 1.38       [05-12-19 @ 16:51]  PTT: 54.9       [05-12-19 @ 22:13]      Creatinine Trend:  SCr 3.71 [05-13 @ 01:02]  SCr 2.98 [05-12 @ 01:31]  SCr 3.35 [05-11 @ 12:14]  SCr 3.06 [05-11 @ 01:00]  SCr 2.74 [05-10 @ 17:02]

## 2019-05-13 NOTE — PROGRESS NOTE ADULT - SUBJECTIVE AND OBJECTIVE BOX
Subjective: No overnight events. He remains agitated and delirious. He was started on diltiazem due to heart rate. Overnight a second vasopressor was added.     Medications:  aspirin 325 milliGRAM(s) Oral daily  atorvastatin 40 milliGRAM(s) Oral at bedtime  buMETAnide Infusion 2 mG/Hr IV Continuous <Continuous>  dexmedetomidine Infusion 0.3 MICROgram(s)/kG/Hr IV Continuous <Continuous>  diltiazem Infusion 5 mG/Hr IV Continuous <Continuous>  escitalopram 10 milliGRAM(s) Oral daily  heparin  Infusion 1300 Unit(s)/Hr IV Continuous <Continuous>  HYDROmorphone  Injectable 0.5 milliGRAM(s) IV Push every 8 hours PRN  insulin lispro (HumaLOG) corrective regimen sliding scale   SubCutaneous every 6 hours  insulin regular Infusion 3 Unit(s)/Hr IV Continuous <Continuous>  methylPREDNISolone sodium succinate Injectable   IV Push   methylPREDNISolone sodium succinate Injectable 40 milliGRAM(s) IV Push every 12 hours  norepinephrine Infusion 0.034 MICROgram(s)/kG/Min IV Continuous <Continuous>  pantoprazole  Injectable 40 milliGRAM(s) IV Push two times a day  vancomycin  IVPB 1000 milliGRAM(s) IV Intermittent once  vasopressin Infusion 0.083 Unit(s)/Min IV Continuous <Continuous>      Physical Exam:    Vitals:  T(C): 37.7 (19 @ 04:00), Max: 37.7 (19 @ 04:00)  HR: 100 (19 @ 05:00) (80 - 135)  ABP: 125/54 (19 @ 05:00) (94/42 - 137/55)  ABP(mean): 72 (19 @ 05:00) (55 - 81)  RR: 26 (19 @ 05:00) (17 - 41)  SpO2: 100% (19 @ 05:00) (97% - 100%)       Daily Weight in k.1 (13 May 2019 01:00)    I&O's Summary    12 May 2019 07:01  -  13 May 2019 07:00  --------------------------------------------------------  IN: 3082.2 mL / OUT: 2020 mL / NET: 1062.2 mL    General: No distress. Comfortable. Sedated  HEENT: PERR. Trach intact  Neck: Neck supple. JVP not elevated but V waves are noted, less prominent than yesterday. No masses  Chest: Clear to auscultation bilaterally  CV: Irregular rhythm, normal S1 and S2. No rubs or gallops. Radial pulses normal. Anasarcic.   Abdomen: Soft, non-distended, non-tender  Skin: No rashes or skin breakdown  Neurology: Sedated and intubated  Psych: Unable to assess    Labs:                        8.6    20.4  )-----------( 225      ( 13 May 2019 01:02 )             24.9     -    145  |  105  |  179<H>  ----------------------------<  220<H>  4.2   |  18<L>  |  3.71<H>    Ca    7.6<L>      13 May 2019 01:02  Phos  9.2       Mg     2.9         TPro  5.3<L>  /  Alb  2.6<L>  /  TBili  0.8  /  DBili  x   /  AST  39  /  ALT  71<H>  /  AlkPhos  88      PT/INR - ( 12 May 2019 16:51 )   PT: 16.0 sec;   INR: 1.38 ratio    PTT - ( 12 May 2019 22:13 )  PTT:54.9 sec

## 2019-05-13 NOTE — PROGRESS NOTE ADULT - PROBLEM SELECTOR PLAN 2
The patient has an elevated bun in the setting of steroid use. Steroids being tapered, however BUN continues to trend up to 170s today. Pt s/p HD session on 5/11. HD today with UF.

## 2019-05-13 NOTE — PROGRESS NOTE ADULT - PROBLEM SELECTOR PLAN 1
Pt with TARAH most likely hemodynamically mediated vs ischemic ATN in the setting of anemia from blood loss, septic shock vs renal amyloid (lung biopsy showed amyloid). SCr of 1.2 in 2016, 1.1 08/18 and 1.06 on 04/25/19. Started on HD on 05/11/19 due to BUN 190s worsening mental status, concerning for uremic encephalopathy. Labs reviewed today, will plan for 2nd session of HD today.   - Monitor I&O, daily weights, avoid nephrotoxics  - Adjust meds based on GFR  - Avoid hypotension Pt with TARAH most likely hemodynamically mediated vs ischemic ATN in the setting of anemia from blood loss, hemodynamic instability, and renal venous congestion. SCr of 1.2 in 2016, 1.1 08/18 and 1.06 on 04/25/19. Started on HD on 05/11/19 due to BUN 190s worsening mental status, concerning for uremic encephalopathy. Labs reviewed today, will plan for 2nd session of HD today.   - Monitor I&O, daily weights, avoid nephrotoxics  - Adjust meds based on GFR  - Avoid hypotension

## 2019-05-13 NOTE — PROGRESS NOTE ADULT - PROBLEM SELECTOR PLAN 3
Keep pt net negative.   - Monitor BMP and UO  -HD/UF today. Keep pt net negative.   - Monitor BMP and UO  -HD today.  continue diuretics

## 2019-05-13 NOTE — PROGRESS NOTE ADULT - PROBLEM SELECTOR PLAN 3
heme/onc evaluation   echocardiography with some LV hypertrophy but reportedly not typical of the starry-armida pattern seen in amyloid  - would suggest technetium pyrophosphate scan once patient more stable which is sensitive and specific for transthyretin cardiac amyloid  - could also consider cardiac biopsy  - Further serologic workup for Amyloid - including Immunoglobulins and serum light chains (to evaluate for AL amyloid)

## 2019-05-13 NOTE — PROVIDER CONTACT NOTE (OTHER) - SITUATION
performing full pt turn to clean and assess skin on backside. found pt lying on nebulizer/aerosol device, leaving a wound. removed object.

## 2019-05-14 LAB
ALBUMIN SERPL ELPH-MCNC: 2.7 G/DL — LOW (ref 3.3–5)
ALP SERPL-CCNC: 78 U/L — SIGNIFICANT CHANGE UP (ref 40–120)
ALT FLD-CCNC: 59 U/L — HIGH (ref 10–45)
ANION GAP SERPL CALC-SCNC: 22 MMOL/L — HIGH (ref 5–17)
APPEARANCE UR: ABNORMAL
APTT BLD: 59.1 SEC — HIGH (ref 27.5–36.3)
AST SERPL-CCNC: 46 U/L — HIGH (ref 10–40)
BILIRUB SERPL-MCNC: 0.9 MG/DL — SIGNIFICANT CHANGE UP (ref 0.2–1.2)
BILIRUB UR-MCNC: NEGATIVE — SIGNIFICANT CHANGE UP
BUN SERPL-MCNC: 151 MG/DL — HIGH (ref 7–23)
C DIFF GDH STL QL: NEGATIVE — SIGNIFICANT CHANGE UP
C DIFF GDH STL QL: SIGNIFICANT CHANGE UP
CALCIUM SERPL-MCNC: 7.4 MG/DL — LOW (ref 8.4–10.5)
CHLORIDE SERPL-SCNC: 102 MMOL/L — SIGNIFICANT CHANGE UP (ref 96–108)
CO2 SERPL-SCNC: 18 MMOL/L — LOW (ref 22–31)
COLOR SPEC: YELLOW — SIGNIFICANT CHANGE UP
CREAT SERPL-MCNC: 3.37 MG/DL — HIGH (ref 0.5–1.3)
CULTURE RESULTS: SIGNIFICANT CHANGE UP
CULTURE RESULTS: SIGNIFICANT CHANGE UP
DIFF PNL FLD: ABNORMAL
GAS PNL BLDA: SIGNIFICANT CHANGE UP
GLUCOSE BLDC GLUCOMTR-MCNC: 106 MG/DL — HIGH (ref 70–99)
GLUCOSE BLDC GLUCOMTR-MCNC: 146 MG/DL — HIGH (ref 70–99)
GLUCOSE BLDC GLUCOMTR-MCNC: 156 MG/DL — HIGH (ref 70–99)
GLUCOSE SERPL-MCNC: 158 MG/DL — HIGH (ref 70–99)
GLUCOSE UR QL: NEGATIVE — SIGNIFICANT CHANGE UP
GRAM STN FLD: SIGNIFICANT CHANGE UP
HCT VFR BLD CALC: 29 % — LOW (ref 39–50)
HGB BLD-MCNC: 10.1 G/DL — LOW (ref 13–17)
KETONES UR-MCNC: NEGATIVE — SIGNIFICANT CHANGE UP
LEUKOCYTE ESTERASE UR-ACNC: NEGATIVE — SIGNIFICANT CHANGE UP
MAGNESIUM SERPL-MCNC: 2.7 MG/DL — HIGH (ref 1.6–2.6)
MCHC RBC-ENTMCNC: 29.8 PG — SIGNIFICANT CHANGE UP (ref 27–34)
MCHC RBC-ENTMCNC: 35 GM/DL — SIGNIFICANT CHANGE UP (ref 32–36)
MCV RBC AUTO: 85 FL — SIGNIFICANT CHANGE UP (ref 80–100)
NITRITE UR-MCNC: NEGATIVE — SIGNIFICANT CHANGE UP
PH UR: 5 — SIGNIFICANT CHANGE UP (ref 5–8)
PHOSPHATE SERPL-MCNC: 8.6 MG/DL — HIGH (ref 2.5–4.5)
PLATELET # BLD AUTO: 223 K/UL — SIGNIFICANT CHANGE UP (ref 150–400)
POTASSIUM SERPL-MCNC: 3.9 MMOL/L — SIGNIFICANT CHANGE UP (ref 3.5–5.3)
POTASSIUM SERPL-SCNC: 3.9 MMOL/L — SIGNIFICANT CHANGE UP (ref 3.5–5.3)
PROT SERPL-MCNC: 5.4 G/DL — LOW (ref 6–8.3)
PROT UR-MCNC: SIGNIFICANT CHANGE UP
RBC # BLD: 3.41 M/UL — LOW (ref 4.2–5.8)
RBC # FLD: 14.6 % — HIGH (ref 10.3–14.5)
SODIUM SERPL-SCNC: 142 MMOL/L — SIGNIFICANT CHANGE UP (ref 135–145)
SP GR SPEC: 1.01 — SIGNIFICANT CHANGE UP (ref 1.01–1.02)
SPECIMEN SOURCE: SIGNIFICANT CHANGE UP
UROBILINOGEN FLD QL: NEGATIVE — SIGNIFICANT CHANGE UP
WBC # BLD: 29.6 K/UL — HIGH (ref 3.8–10.5)
WBC # FLD AUTO: 29.6 K/UL — HIGH (ref 3.8–10.5)

## 2019-05-14 PROCEDURE — 99232 SBSQ HOSP IP/OBS MODERATE 35: CPT

## 2019-05-14 PROCEDURE — 99233 SBSQ HOSP IP/OBS HIGH 50: CPT

## 2019-05-14 PROCEDURE — 71045 X-RAY EXAM CHEST 1 VIEW: CPT | Mod: 26

## 2019-05-14 PROCEDURE — 99291 CRITICAL CARE FIRST HOUR: CPT

## 2019-05-14 RX ORDER — VANCOMYCIN HCL 1 G
500 VIAL (EA) INTRAVENOUS EVERY 6 HOURS
Refills: 0 | Status: DISCONTINUED | OUTPATIENT
Start: 2019-05-14 | End: 2019-05-14

## 2019-05-14 RX ORDER — ESMOLOL HCL 100MG/10ML
25 VIAL (ML) INTRAVENOUS
Qty: 2500 | Refills: 0 | Status: DISCONTINUED | OUTPATIENT
Start: 2019-05-14 | End: 2019-05-14

## 2019-05-14 RX ORDER — MIDODRINE HYDROCHLORIDE 2.5 MG/1
10 TABLET ORAL EVERY 8 HOURS
Refills: 0 | Status: DISCONTINUED | OUTPATIENT
Start: 2019-05-14 | End: 2019-05-17

## 2019-05-14 RX ORDER — FUROSEMIDE 40 MG
20 TABLET ORAL ONCE
Refills: 0 | Status: COMPLETED | OUTPATIENT
Start: 2019-05-14 | End: 2019-05-14

## 2019-05-14 RX ORDER — HYDROMORPHONE HYDROCHLORIDE 2 MG/ML
0.5 INJECTION INTRAMUSCULAR; INTRAVENOUS; SUBCUTANEOUS ONCE
Refills: 0 | Status: DISCONTINUED | OUTPATIENT
Start: 2019-05-14 | End: 2019-05-14

## 2019-05-14 RX ADMIN — Medication 2: at 11:52

## 2019-05-14 RX ADMIN — CHLORHEXIDINE GLUCONATE 15 MILLILITER(S): 213 SOLUTION TOPICAL at 18:48

## 2019-05-14 RX ADMIN — HYDROMORPHONE HYDROCHLORIDE 0.5 MILLIGRAM(S): 2 INJECTION INTRAMUSCULAR; INTRAVENOUS; SUBCUTANEOUS at 04:15

## 2019-05-14 RX ADMIN — ESCITALOPRAM OXALATE 10 MILLIGRAM(S): 10 TABLET, FILM COATED ORAL at 13:43

## 2019-05-14 RX ADMIN — CHLORHEXIDINE GLUCONATE 1 APPLICATION(S): 213 SOLUTION TOPICAL at 04:50

## 2019-05-14 RX ADMIN — BUMETANIDE 10 MG/HR: 0.25 INJECTION INTRAMUSCULAR; INTRAVENOUS at 08:33

## 2019-05-14 RX ADMIN — Medication 5 MG/HR: at 08:33

## 2019-05-14 RX ADMIN — ATORVASTATIN CALCIUM 40 MILLIGRAM(S): 80 TABLET, FILM COATED ORAL at 21:24

## 2019-05-14 RX ADMIN — HEPARIN SODIUM 8.5 UNIT(S)/HR: 5000 INJECTION INTRAVENOUS; SUBCUTANEOUS at 08:34

## 2019-05-14 RX ADMIN — PANTOPRAZOLE SODIUM 40 MILLIGRAM(S): 20 TABLET, DELAYED RELEASE ORAL at 05:14

## 2019-05-14 RX ADMIN — Medication 500 MILLIGRAM(S): at 11:39

## 2019-05-14 RX ADMIN — Medication 325 MILLIGRAM(S): at 13:39

## 2019-05-14 RX ADMIN — VASOPRESSIN 5 UNIT(S)/MIN: 20 INJECTION INTRAVENOUS at 08:33

## 2019-05-14 RX ADMIN — BUMETANIDE 10 MG/HR: 0.25 INJECTION INTRAMUSCULAR; INTRAVENOUS at 21:25

## 2019-05-14 RX ADMIN — GABAPENTIN 100 MILLIGRAM(S): 400 CAPSULE ORAL at 05:14

## 2019-05-14 RX ADMIN — Medication 11.93 MICROGRAM(S)/KG/MIN: at 11:51

## 2019-05-14 RX ADMIN — HYDROMORPHONE HYDROCHLORIDE 0.5 MILLIGRAM(S): 2 INJECTION INTRAMUSCULAR; INTRAVENOUS; SUBCUTANEOUS at 12:15

## 2019-05-14 RX ADMIN — HYDROMORPHONE HYDROCHLORIDE 0.5 MILLIGRAM(S): 2 INJECTION INTRAMUSCULAR; INTRAVENOUS; SUBCUTANEOUS at 12:00

## 2019-05-14 RX ADMIN — Medication 2: at 00:36

## 2019-05-14 RX ADMIN — CHLORHEXIDINE GLUCONATE 15 MILLILITER(S): 213 SOLUTION TOPICAL at 05:14

## 2019-05-14 RX ADMIN — VASOPRESSIN 5 UNIT(S)/MIN: 20 INJECTION INTRAVENOUS at 21:26

## 2019-05-14 RX ADMIN — HYDROMORPHONE HYDROCHLORIDE 0.5 MILLIGRAM(S): 2 INJECTION INTRAMUSCULAR; INTRAVENOUS; SUBCUTANEOUS at 04:26

## 2019-05-14 RX ADMIN — Medication 5 MICROGRAM(S)/KG/MIN: at 08:34

## 2019-05-14 RX ADMIN — MIDODRINE HYDROCHLORIDE 10 MILLIGRAM(S): 2.5 TABLET ORAL at 21:24

## 2019-05-14 RX ADMIN — HYDROMORPHONE HYDROCHLORIDE 0.5 MILLIGRAM(S): 2 INJECTION INTRAMUSCULAR; INTRAVENOUS; SUBCUTANEOUS at 17:00

## 2019-05-14 RX ADMIN — GABAPENTIN 100 MILLIGRAM(S): 400 CAPSULE ORAL at 21:24

## 2019-05-14 RX ADMIN — HYDROMORPHONE HYDROCHLORIDE 0.5 MILLIGRAM(S): 2 INJECTION INTRAMUSCULAR; INTRAVENOUS; SUBCUTANEOUS at 22:30

## 2019-05-14 RX ADMIN — MIDODRINE HYDROCHLORIDE 10 MILLIGRAM(S): 2.5 TABLET ORAL at 10:11

## 2019-05-14 RX ADMIN — HYDROMORPHONE HYDROCHLORIDE 0.5 MILLIGRAM(S): 2 INJECTION INTRAMUSCULAR; INTRAVENOUS; SUBCUTANEOUS at 21:29

## 2019-05-14 RX ADMIN — HEPARIN SODIUM 8.5 UNIT(S)/HR: 5000 INJECTION INTRAVENOUS; SUBCUTANEOUS at 21:26

## 2019-05-14 RX ADMIN — HYDROMORPHONE HYDROCHLORIDE 0.5 MILLIGRAM(S): 2 INJECTION INTRAMUSCULAR; INTRAVENOUS; SUBCUTANEOUS at 16:45

## 2019-05-14 RX ADMIN — GABAPENTIN 100 MILLIGRAM(S): 400 CAPSULE ORAL at 13:39

## 2019-05-14 RX ADMIN — PANTOPRAZOLE SODIUM 40 MILLIGRAM(S): 20 TABLET, DELAYED RELEASE ORAL at 18:48

## 2019-05-14 NOTE — PROGRESS NOTE ADULT - PROBLEM SELECTOR PLAN 3
- Very likely with cardiac involvement based on EKG and echo findings.   - Pathology from lung sent to [facility] for typing.   - Serum free light chains show no evidence of monoclonal gammopathy.

## 2019-05-14 NOTE — PROGRESS NOTE ADULT - ASSESSMENT
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 His postop course was complicated by persistent respiratory failure requiring tracheostomy and he had a wedge biopsy showing Pulmonary Amyloidosis.    5/13-poor weaning (less than 2 hrs), CT in place  5/14-off solumedrol, CT in place 100 cc out, wean cpap/ps 5/15

## 2019-05-14 NOTE — PROGRESS NOTE ADULT - SUBJECTIVE AND OBJECTIVE BOX
[facility] DIVISION OF KIDNEY DISEASES AND HYPERTENSION -- FOLLOW UP NOTE  --------------------------------------------------------------------------------  Chief Complaint:  TARAH    24 hour events/subjective:  Pt examined at bed side, trach to vent, on NG tube feeds, pressors,  opening eyes,  on pain  on diuretics, received 1 unit of PRBC.       PAST HISTORY  --------------------------------------------------------------------------------  No significant changes to PMH, PSH, FHx, SHx, unless otherwise noted    ALLERGIES & MEDICATIONS  --------------------------------------------------------------------------------  Allergies    No Known Allergies    Intolerances      Standing Inpatient Medications  aspirin 325 milliGRAM(s) Oral daily  atorvastatin 40 milliGRAM(s) Oral at bedtime  buMETAnide Infusion 2 mG/Hr IV Continuous <Continuous>  chlorhexidine 0.12% Liquid 15 milliLiter(s) Oral Mucosa two times a day  chlorhexidine 4% Liquid 1 Application(s) Topical <User Schedule>  chlorhexidine 4% Liquid 1 Application(s) Topical <User Schedule>  dexmedetomidine Infusion 0.3 MICROgram(s)/kG/Hr IV Continuous <Continuous>  diltiazem Infusion 5 mG/Hr IV Continuous <Continuous>  escitalopram 10 milliGRAM(s) Oral daily  gabapentin 100 milliGRAM(s) Oral every 8 hours  heparin  Infusion 1300 Unit(s)/Hr IV Continuous <Continuous>  insulin lispro (HumaLOG) corrective regimen sliding scale   SubCutaneous every 6 hours  norepinephrine Infusion 0.034 MICROgram(s)/kG/Min IV Continuous <Continuous>  pantoprazole  Injectable 40 milliGRAM(s) IV Push two times a day  sodium chloride 0.9%. 1000 milliLiter(s) IV Continuous <Continuous>  vasopressin Infusion 0.083 Unit(s)/Min IV Continuous <Continuous>    PRN Inpatient Medications  HYDROmorphone  Injectable 0.5 milliGRAM(s) IV Push every 8 hours PRN  sodium chloride 0.9% lock flush 10 milliLiter(s) IV Push every 1 hour PRN      REVIEW OF SYSTEMS  --------------------------------------------------------------------------------  Gen: No weight changes, fatigue, fevers/chills, weakness  Skin: No rashes  Head/Eyes/Ears/Mouth: No headache; Normal hearing; Normal vision w/o blurriness; No sinus pain/discomfort, sore throat  Respiratory: No dyspnea, cough, wheezing, hemoptysis  CV: No chest pain, PND, orthopnea  GI: No abdominal pain, diarrhea, constipation, nausea, vomiting, melena, hematochezia  : No increased frequency, dysuria, hematuria, nocturia  MSK: No joint pain/swelling; no back pain; no edema  Neuro: No dizziness/lightheadedness, weakness, seizures, numbness, tingling  Heme: No easy bruising or bleeding  Endo: No heat/cold intolerance  Psych: No significant nervousness, anxiety, stress, depression    All other systems were reviewed and are negative, except as noted.    VITALS/PHYSICAL EXAM  --------------------------------------------------------------------------------  T(C): 36.6 (05-14-19 @ 08:00), Max: 37.9 (05-13-19 @ 12:00)  HR: 63 (05-14-19 @ 08:00) (61 - 100)  BP: --  RR: 16 (05-14-19 @ 08:00) (5 - 41)  SpO2: 100% (05-14-19 @ 08:00) (73% - 100%)  Wt(kg): --        05-13-19 @ 07:01  -  05-14-19 @ 07:00  --------------------------------------------------------  IN: 3750 mL / OUT: 3400 mL / NET: 350 mL      Physical Exam:  	Gen: NAD, well-appearing  	HEENT: PERRL, supple neck, clear oropharynx  	Pulm: CTA B/L  	CV: RRR, S1S2; no rub  	Back: No spinal or CVA tenderness; no sacral edema  	Abd: +BS, soft, nontender/nondistended  	: No suprapubic tenderness  	UE: Warm, FROM, no clubbing, intact strength; no edema; no asterixis  	LE: Warm, FROM, no clubbing, intact strength; no edema  	Neuro: No focal deficits, intact gait  	Psych: Normal affect and mood  	Skin: Warm, without rashes  	Vascular access:    LABS/STUDIES  --------------------------------------------------------------------------------              10.1   29.6  >-----------<  223      [05-14-19 @ 00:49]              29.0     142  |  102  |  151  ----------------------------<  158      [05-14-19 @ 00:49]  3.9   |  18  |  3.37        Ca     7.4     [05-14-19 @ 00:49]      Mg     2.7     [05-14-19 @ 00:49]      Phos  8.6     [05-14-19 @ 00:49]    TPro  5.4  /  Alb  2.7  /  TBili  0.9  /  DBili  x   /  AST  46  /  ALT  59  /  AlkPhos  78  [05-14-19 @ 00:49]    PT/INR: PT 16.0 , INR 1.38       [05-12-19 @ 16:51]  PTT: 59.1       [05-14-19 @ 00:49]      Creatinine Trend:  SCr 3.37 [05-14 @ 00:49]  SCr 3.71 [05-13 @ 01:02]  SCr 2.98 [05-12 @ 01:31]  SCr 3.35 [05-11 @ 12:14]  SCr 3.06 [05-11 @ 01:00]    Urinalysis - [05-11-19 @ 14:12]      Color Light Yellow / Appearance Clear / SG 1.013 / pH 5.5      Gluc Negative / Ketone Negative  / Bili Negative / Urobili Negative       Blood Negative / Protein Negative / Leuk Est Negative / Nitrite Negative      RBC  / WBC  / Hyaline  / Gran  / Sq Epi  / Non Sq Epi  / Bacteria     Urine Creatinine 70      [05-08-19 @ 11:28]  Urine Protein <4      [05-11-19 @ 16:11]  Urine Sodium 26      [05-08-19 @ 11:28]  Urine Urea Nitrogen 1191      [05-08-19 @ 14:02]  Urine Potassium 71      [05-08-19 @ 11:28]    HbA1c 5.4      [04-25-19 @ 17:39]  TSH 1.48      [04-25-19 @ 17:58]  Lipid: chol 213, , HDL 38,       [04-25-19 @ 17:58]    HBsAb <3.0      [05-12-19 @ 00:08]  HBsAg Nonreact      [05-12-19 @ 00:08]    Free Light Chains: kappa 6.77, lambda 10.77, ratio = 0.63      [05-12 @ 12:52]  Immunofixation Serum:   No Monoclonal Band Identified    Reference Range: None Detected      [05-10-19 @ 12:48]  SPEP Interpretation: Normal Electrophoresis Pattern      [05-10-19 @ 12:48] [facility] DIVISION OF KIDNEY DISEASES AND HYPERTENSION -- FOLLOW UP NOTE  --------------------------------------------------------------------------------  Chief Complaint:  TARAH    24 hour events/subjective:  Pt examined at bed side, trach to vent, on NG tube feeds, pressors,  on diuretics, non oliguric, opening eyes,  on pain, but not able to provide meaningful ros  had HD yesterday tolerated well w/o events.       PAST HISTORY  --------------------------------------------------------------------------------  No significant changes to PMH, PSH, FHx, SHx, unless otherwise noted    ALLERGIES & MEDICATIONS  --------------------------------------------------------------------------------  Allergies    No Known Allergies    Intolerances      Standing Inpatient Medications  aspirin 325 milliGRAM(s) Oral daily  atorvastatin 40 milliGRAM(s) Oral at bedtime  buMETAnide Infusion 2 mG/Hr IV Continuous <Continuous>  chlorhexidine 0.12% Liquid 15 milliLiter(s) Oral Mucosa two times a day  chlorhexidine 4% Liquid 1 Application(s) Topical <User Schedule>  chlorhexidine 4% Liquid 1 Application(s) Topical <User Schedule>  dexmedetomidine Infusion 0.3 MICROgram(s)/kG/Hr IV Continuous <Continuous>  diltiazem Infusion 5 mG/Hr IV Continuous <Continuous>  escitalopram 10 milliGRAM(s) Oral daily  gabapentin 100 milliGRAM(s) Oral every 8 hours  heparin  Infusion 1300 Unit(s)/Hr IV Continuous <Continuous>  insulin lispro (HumaLOG) corrective regimen sliding scale   SubCutaneous every 6 hours  norepinephrine Infusion 0.034 MICROgram(s)/kG/Min IV Continuous <Continuous>  pantoprazole  Injectable 40 milliGRAM(s) IV Push two times a day  sodium chloride 0.9%. 1000 milliLiter(s) IV Continuous <Continuous>  vasopressin Infusion 0.083 Unit(s)/Min IV Continuous <Continuous>    PRN Inpatient Medications  HYDROmorphone  Injectable 0.5 milliGRAM(s) IV Push every 8 hours PRN  sodium chloride 0.9% lock flush 10 milliLiter(s) IV Push every 1 hour PRN      REVIEW OF SYSTEMS  --------------------------------------------------------------------------------  Not able to obtain.     VITALS/PHYSICAL EXAM  --------------------------------------------------------------------------------  T(C): 36.6 (05-14-19 @ 08:00), Max: 37.9 (05-13-19 @ 12:00)  HR: 63 (05-14-19 @ 08:00) (61 - 100)  BP: --  RR: 16 (05-14-19 @ 08:00) (5 - 41)  SpO2: 100% (05-14-19 @ 08:00) (73% - 100%)  Wt(kg): --        05-13-19 @ 07:01  -  05-14-19 @ 07:00  --------------------------------------------------------  IN: 3750 mL / OUT: 3400 mL / NET: 350 mL      Physical Exam:    	Gen: critically ill.   	HEENT: +NGT, +trach  	Pulm: CTA B/L  	CV: RRR, S1S2; no rub  	Abd: +BS, soft, nontender/nondistended              : De with clear yellow urine, scrotal edema.   	LE: Warm, +edema  	Vascular access: +RIJ non-tunneled HD catheter    LABS/STUDIES  --------------------------------------------------------------------------------              10.1   29.6  >-----------<  223      [05-14-19 @ 00:49]              29.0     142  |  102  |  151  ----------------------------<  158      [05-14-19 @ 00:49]  3.9   |  18  |  3.37        Ca     7.4     [05-14-19 @ 00:49]      Mg     2.7     [05-14-19 @ 00:49]      Phos  8.6     [05-14-19 @ 00:49]    TPro  5.4  /  Alb  2.7  /  TBili  0.9  /  DBili  x   /  AST  46  /  ALT  59  /  AlkPhos  78  [05-14-19 @ 00:49]    PT/INR: PT 16.0 , INR 1.38       [05-12-19 @ 16:51]  PTT: 59.1       [05-14-19 @ 00:49]      Creatinine Trend:  SCr 3.37 [05-14 @ 00:49]  SCr 3.71 [05-13 @ 01:02]  SCr 2.98 [05-12 @ 01:31]  SCr 3.35 [05-11 @ 12:14]  SCr 3.06 [05-11 @ 01:00] [facility] DIVISION OF KIDNEY DISEASES AND HYPERTENSION -- FOLLOW UP NOTE  --------------------------------------------------------------------------------  Chief Complaint:  TARAH    24 hour events/subjective:  Pt examined at bed side, trach to vent, on NG tube feeds, pressors,  on diuretics, non oliguric, opening eyes,  on pain, but not able to provide meaningful ros  had HD yesterday tolerated well w/o events.       PAST HISTORY  --------------------------------------------------------------------------------  No significant changes to PMH, PSH, FHx, SHx, unless otherwise noted    ALLERGIES & MEDICATIONS  --------------------------------------------------------------------------------  Allergies    No Known Allergies    Intolerances      Standing Inpatient Medications  aspirin 325 milliGRAM(s) Oral daily  atorvastatin 40 milliGRAM(s) Oral at bedtime  buMETAnide Infusion 2 mG/Hr IV Continuous <Continuous>  chlorhexidine 0.12% Liquid 15 milliLiter(s) Oral Mucosa two times a day  chlorhexidine 4% Liquid 1 Application(s) Topical <User Schedule>  chlorhexidine 4% Liquid 1 Application(s) Topical <User Schedule>  dexmedetomidine Infusion 0.3 MICROgram(s)/kG/Hr IV Continuous <Continuous>  diltiazem Infusion 5 mG/Hr IV Continuous <Continuous>  escitalopram 10 milliGRAM(s) Oral daily  gabapentin 100 milliGRAM(s) Oral every 8 hours  heparin  Infusion 1300 Unit(s)/Hr IV Continuous <Continuous>  insulin lispro (HumaLOG) corrective regimen sliding scale   SubCutaneous every 6 hours  norepinephrine Infusion 0.034 MICROgram(s)/kG/Min IV Continuous <Continuous>  pantoprazole  Injectable 40 milliGRAM(s) IV Push two times a day  sodium chloride 0.9%. 1000 milliLiter(s) IV Continuous <Continuous>  vasopressin Infusion 0.083 Unit(s)/Min IV Continuous <Continuous>    PRN Inpatient Medications  HYDROmorphone  Injectable 0.5 milliGRAM(s) IV Push every 8 hours PRN  sodium chloride 0.9% lock flush 10 milliLiter(s) IV Push every 1 hour PRN      REVIEW OF SYSTEMS  --------------------------------------------------------------------------------  Not able to obtain.     VITALS/PHYSICAL EXAM  --------------------------------------------------------------------------------  T(C): 36.6 (05-14-19 @ 08:00), Max: 37.9 (05-13-19 @ 12:00)  HR: 63 (05-14-19 @ 08:00) (61 - 100)  T(C): 36.8 (05-14-19 @ 15:30), Max: 37.2 (05-13-19 @ 20:00)  HR: 71 (05-14-19 @ 17:14) (56 - 93)  BP: 96/42 (05-14-19 @ 12:15) (96/42 - 115/56)  BP(mean): 67 (05-14-19 @ 12:15) (67 - 81)  ABP: 138/50 (05-14-19 @ 16:30) (106/50 - 150/61)  ABP(mean): 72 (05-14-19 @ 16:30) (60 - 81)  RR: 21 (05-14-19 @ 16:30) (5 - 32)  SpO2: 99% (05-14-19 @ 17:14) (73% - 100%)  CVP(mm Hg): 10 (05-14-19 @ 16:30) (4 - 20)  RR: 16 (05-14-19 @ 08:00) (5 - 41)  SpO2: 100% (05-14-19 @ 08:00) (73% - 100%)  Wt(kg): --        05-13-19 @ 07:01  -  05-14-19 @ 07:00  --------------------------------------------------------  IN: 3750 mL / OUT: 3400 mL / NET: 350 mL      Physical Exam:    	Gen: critically ill.   	HEENT: +NGT, +trach  	Pulm: CTA B/L  	CV: RRR, S1S2; no rub  	Abd: +BS, soft, nontender/nondistended              : De with clear yellow urine, scrotal edema.   	LE: Warm, +edema  	Vascular access: +RIJ non-tunneled HD catheter    LABS/STUDIES  --------------------------------------------------------------------------------              10.1   29.6  >-----------<  223      [05-14-19 @ 00:49]              29.0     142  |  102  |  151  ----------------------------<  158      [05-14-19 @ 00:49]  3.9   |  18  |  3.37        Ca     7.4     [05-14-19 @ 00:49]      Mg     2.7     [05-14-19 @ 00:49]      Phos  8.6     [05-14-19 @ 00:49]    TPro  5.4  /  Alb  2.7  /  TBili  0.9  /  DBili  x   /  AST  46  /  ALT  59  /  AlkPhos  78  [05-14-19 @ 00:49]    PT/INR: PT 16.0 , INR 1.38       [05-12-19 @ 16:51]  PTT: 59.1       [05-14-19 @ 00:49]      Creatinine Trend:  SCr 3.37 [05-14 @ 00:49]  SCr 3.71 [05-13 @ 01:02]  SCr 2.98 [05-12 @ 01:31]  SCr 3.35 [05-11 @ 12:14]  SCr 3.06 [05-11 @ 01:00]

## 2019-05-14 NOTE — PROGRESS NOTE ADULT - SUBJECTIVE AND OBJECTIVE BOX
Subjective: No overnight events. He remains on both vasopressin and norepinephrine. More interactive today.     Medications:  aspirin 325 milliGRAM(s) Oral daily  atorvastatin 40 milliGRAM(s) Oral at bedtime  buMETAnide Infusion 2 mG/Hr IV Continuous <Continuous>  dexmedetomidine Infusion 0.3 MICROgram(s)/kG/Hr IV Continuous <Continuous>  diltiazem Infusion 5 mG/Hr IV Continuous <Continuous>  escitalopram 10 milliGRAM(s) Oral daily  gabapentin 100 milliGRAM(s) Oral every 8 hours  heparin  Infusion 1300 Unit(s)/Hr IV Continuous <Continuous>  HYDROmorphone  Injectable 0.5 milliGRAM(s) IV Push every 8 hours PRN  insulin lispro (HumaLOG) corrective regimen sliding scale   SubCutaneous every 6 hours  norepinephrine Infusion 0.034 MICROgram(s)/kG/Min IV Continuous <Continuous>  pantoprazole  Injectable 40 milliGRAM(s) IV Push two times a day  vasopressin Infusion 0.083 Unit(s)/Min IV Continuous <Continuous>    Physical Exam:    Vitals:  T(C): 36.8 (19 @ 23:00), Max: 37.9 (19 @ 12:00)  HR: 63 (19 @ 04:38) (61 - 100)  ABP: 148/54 (19 @ 03:00) (106/50 - 150/61)  ABP(mean): 77 (19 @ 03:00) (58 - 83)  RR: 15 (19 @ 03:00) (10 - 41)  SpO2: 99% (19 @ 04:38) (73% - 100%)  CVP(cm H2O): 9      Daily Weight in k.7 (14 May 2019 00:00)    I&O's Summary    12 May 2019 07:01  -  13 May 2019 07:00  --------------------------------------------------------  IN: 3513 mL / OUT: 2180 mL / NET: 1333 mL    13 May 2019 07:01  -  14 May 2019 06:56  --------------------------------------------------------  IN: 3384 mL / OUT: 2820 mL / NET: 564 mL    General: No distress. Comfortable.  HEENT: EOM intact. Trach intact  Neck: Neck supple. JVP not elevated but with prominent V waves. No masses  Chest: Clear to auscultation bilaterally  CV: RRR with normal S1 and S2. No rubs or gallops. Radial pulses normal. Mild edema in legs. Anasarca improved in arms.   Abdomen: Soft, non-distended, non-tender  Skin: No rashes or skin breakdown  Neurology: Responds to voice. Grimaces to touch throughout.   Psych: Unable to assess    Labs:                        10.1   29.6  )-----------( 223      ( 14 May 2019 00:49 )             29.0     05-    142  |  102  |  151<H>  ----------------------------<  158<H>  3.9   |  18<L>  |  3.37<H>    Ca    7.4<L>      14 May 2019 00:49  Phos  8.6       Mg     2.7         TPro  5.4<L>  /  Alb  2.7<L>  /  TBili  0.9  /  DBili  x   /  AST  46<H>  /  ALT  59<H>  /  AlkPhos  78  -14    PT/INR - ( 12 May 2019 16:51 )   PT: 16.0 sec;   INR: 1.38 ratio    PTT - ( 14 May 2019 00:49 )  PTT:59.1 sec    Immunoglobulin Free Light Chains, Serum (19 @ 12:52)    [place]/Lambda Free Light Chain Ratio, Serum: 0.63 Ratio    [place] Kappa: 6.77 mg/dL    [place] Lambda: 10.77 mg/dL

## 2019-05-14 NOTE — PROGRESS NOTE ADULT - SUBJECTIVE AND OBJECTIVE BOX
CHIEF COMPLAINT: f/up resp failure, pulm amyloidosis, asthma, osas-some wean cpap 5/15-for 5 hrs now    Interval Events: off solumedrol,     REVIEW OF SYSTEMS:  Constitutional: No fevers or chills. No weight loss. No fatigue or generalized malaise.  Eyes: No itching or discharge from the eyes  ENT: No ear pain. No ear discharge. No nasal congestion. No post nasal drip. No epistaxis. No throat pain. No sore throat. No difficulty swallowing.   CV: No chest pain. No palpitations. No lightheadedness or dizziness.   Resp: No dyspnea at rest. No dyspnea on exertion. No orthopnea. No wheezing. No cough. No stridor. No sputum production. No chest pain with respiration.  GI: No nausea. No vomiting. No diarrhea.  MSK: No joint pain or pain in any extremities  Integumentary: No skin lesions. No pedal edema.  Neurological: No gross motor weakness. No sensory changes.  [ ] All other systems negative  [+ ] Unable to assess ROS because _withdrawn_______    OBJECTIVE:  ICU Vital Signs Last 24 Hrs  T(C): 36.8 (13 May 2019 23:00), Max: 37.9 (13 May 2019 12:00)  T(F): 98.2 (13 May 2019 23:00), Max: 100.2 (13 May 2019 12:00)  HR: 65 (14 May 2019 03:00) (61 - 103)  BP: --  BP(mean): --  ABP: 148/54 (14 May 2019 03:00) (104/47 - 150/61)  ABP(mean): 77 (14 May 2019 03:00) (58 - 83)  RR: 15 (14 May 2019 03:00) (10 - 41)  SpO2: 100% (14 May 2019 03:00) (73% - 100%)    Mode: CPAP with PS, FiO2: 50, PEEP: 5, PS: 15, MAP: 8, PIP: 15    05-12 @ 07:01  -  05-13 @ 07:00  --------------------------------------------------------  IN: 3513 mL / OUT: 2180 mL / NET: 1333 mL    05-13 @ 07:01 - 05-14 @ 04:42  --------------------------------------------------------  IN: 3384 mL / OUT: 2820 mL / NET: 564 mL      CAPILLARY BLOOD GLUCOSE      POCT Blood Glucose.: 171 mg/dL (12 May 2019 12:47)      PHYSICAL EXAM: NAD on  vent.  General: Awake, alert, oriented X 3.   HEENT: Atraumatic, normocephalic.                 Mallampatti Grade 3                No nasal congestion.                No tonsillar or pharyngeal exudates.  Lymph Nodes: No palpable lymphadenopathy  Neck: No JVD. No carotid bruit.   Respiratory: abnormal chest expansion-bilateral--right CT in place                         Normal percussion                         Normal and equal air entry                         No wheeze, rhonchi or rales.  Cardiovascular: S1 S2 normal. No murmurs, rubs or gallops.   Abdomen: Soft, non-tender, non-distended. No organomegaly. Normoactive bowel sounds.  Extremities: Warm to touch. Peripheral pulse palpable. + pedal edema.   Skin: No rashes or skin lesions  Neurological: Motor and sensory examination equal and abnormal in all four extremities.  Psychiatry: inapppropriate mood and affect-depressed    HOSPITAL MEDICATIONS:  MEDICATIONS  (STANDING):  aspirin 325 milliGRAM(s) Oral daily  atorvastatin 40 milliGRAM(s) Oral at bedtime  buMETAnide Infusion 2 mG/Hr (10 mL/Hr) IV Continuous <Continuous>  chlorhexidine 0.12% Liquid 15 milliLiter(s) Oral Mucosa two times a day  chlorhexidine 4% Liquid 1 Application(s) Topical <User Schedule>  chlorhexidine 4% Liquid 1 Application(s) Topical <User Schedule>  dexmedetomidine Infusion 0.3 MICROgram(s)/kG/Hr (5.962 mL/Hr) IV Continuous <Continuous>  diltiazem Infusion 5 mG/Hr (5 mL/Hr) IV Continuous <Continuous>  escitalopram 10 milliGRAM(s) Oral daily  gabapentin 100 milliGRAM(s) Oral every 8 hours  heparin  Infusion 1300 Unit(s)/Hr (8.5 mL/Hr) IV Continuous <Continuous>  insulin lispro (HumaLOG) corrective regimen sliding scale   SubCutaneous every 6 hours  norepinephrine Infusion 0.034 MICROgram(s)/kG/Min (5 mL/Hr) IV Continuous <Continuous>  pantoprazole  Injectable 40 milliGRAM(s) IV Push two times a day  sodium chloride 0.9%. 1000 milliLiter(s) (10 mL/Hr) IV Continuous <Continuous>  vasopressin Infusion 0.083 Unit(s)/Min (5 mL/Hr) IV Continuous <Continuous>    MEDICATIONS  (PRN):  HYDROmorphone  Injectable 0.5 milliGRAM(s) IV Push every 8 hours PRN Severe Pain (7 - 10)  sodium chloride 0.9% lock flush 10 milliLiter(s) IV Push every 1 hour PRN Pre/post blood products, medications, blood draw, and to maintain line patency      LABS:                        10.1   29.6  )-----------( 223      ( 14 May 2019 00:49 )             29.0     05-14    142  |  102  |  151<H>  ----------------------------<  158<H>  3.9   |  18<L>  |  3.37<H>    Ca    7.4<L>      14 May 2019 00:49  Phos  8.6     05-14  Mg     2.7     05-14    TPro  5.4<L>  /  Alb  2.7<L>  /  TBili  0.9  /  DBili  x   /  AST  46<H>  /  ALT  59<H>  /  AlkPhos  78  05-14    PT/INR - ( 12 May 2019 16:51 )   PT: 16.0 sec;   INR: 1.38 ratio         PTT - ( 14 May 2019 00:49 )  PTT:59.1 sec    Arterial Blood Gas:  05-14 @ 01:42  7.37/37/149/21/98/-3.3  ABG lactate: --  Arterial Blood Gas:  05-14 @ 00:16  7.45/29/146/20/99/-2.7  ABG lactate: --  Arterial Blood Gas:  05-13 @ 18:26  7.48/30/142/22/99/-.6  ABG lactate: --  Arterial Blood Gas:  05-13 @ 12:01  7.42/28/121/18/98/-5.3  ABG lactate: --  Arterial Blood Gas:  05-13 @ 06:35  7.43/29/143/19/99/-4.4  ABG lactate: --  Arterial Blood Gas:  05-13 @ 00:08  7.42/29/131/19/99/-4.6  ABG lactate: --  Arterial Blood Gas:  05-12 @ 17:20  7.43/30/104/19/98/-3.8  ABG lactate: --  Arterial Blood Gas:  05-12 @ 16:37  7.43/28/92/18/97/-4.6  ABG lactate: --  Arterial Blood Gas:  05-12 @ 09:21  7.42/32/167/21/99/-2.6  ABG lactate: --        MICROBIOLOGY:     RADIOLOGY:  [ ] Reviewed and interpreted by me    Point of Care Ultrasound Findings:    PFT:    EKG:

## 2019-05-14 NOTE — PROGRESS NOTE ADULT - SUBJECTIVE AND OBJECTIVE BOX
CHRIS THAKKAR  MRN-[id number]  Patient is a 79y old  Male who presents with a chief complaint of S/P CABG (14 May 2019 06:55)    HPI:  This is a 77 yo   male with PMH of HLD, Sleep apnea ( non compliant with CPAP), GERD, diverticulitis, depression, anxiety, and panic attack. Denies significant PMH of heart disease but reports early CAD in family; mother  of MI at age 54 and uncle ( mothers brother) at age 37.  Presents to Dr Feng with c/c of chest discomfort associated with dyspnea ( on exertion after walking 100 feet)  and palpitations for the past 1 year.  CP is described as 'pinch like", intermittent, sporadic, lasting 1-2 min, localized in the left anterior chest non radiating; CP triggered by exercise and stress. Abnormal NST ( last week) : mild to moderate abnormal study with medium sized inferior and post- lateral ischemia without infarct, EF 50%.  Holter monitor revealed: frequent PVCs, ECHO revealed AR, concentric LVH, diastolic dysfunction, and MRGuille Referred here today for R and LHC. Presently asymptomatic, denies chest pain, dyspnea, dizziness, palpitations, N&V, HA, LE edema.    OF NOTE: patient was started on Toprol 25mg PO daily however he never picked it up from pharmacy because he states" he did not know about it"    PCP: Marleny Ceja (2019 08:32)      Surgery/Hospital course:  2019 C3L   Rintubated   Tracheostomy and Lung biopsy: pulmonary amyloidosis    cpap weaning   drips heparin, Levo, vaso, Bumex     Physical Exam:  Vital Signs Last 24 Hrs  T(C): 37.3 (14 May 2019 23:00), Max: 37.3 (14 May 2019 23:00)  T(F): 99.1 (14 May 2019 23:00), Max: 99.1 (14 May 2019 23:00)  HR: 69 (14 May 2019 23:00) (56 - 85)  BP: 96/42 (14 May 2019 12:15) (96/42 - 115/56)  BP(mean): 67 (14 May 2019 12:15) (67 - 81)  RR: 16 (14 May 2019 23:00) (5 - 36)  SpO2: 100% (14 May 2019 23:00) (93% - 100%)  Gen:  Awake, alert   CNS: genaralized weakness  Neck: + JVD  RES : clear , no wheezing    Chest:   + chest tubes                     CVS: atrail flutter  rhythm. Normal S1/S2  Abd: Soft, non-distended. Bowel sounds present.  Skin: No rash.  Ext:  + edema, A Line    ============================I/O===========================   I&O's Detail    13 May 2019 07:  -  14 May 2019 07:00  --------------------------------------------------------  IN:    bumetanide Infusion: 240 mL    dexmedetomidine Infusion: 24 mL    diltiazem Infusion: 120 mL    Enteral Tube Flush: 180 mL    Free Water: 500 mL    heparin Infusion: 204 mL    IV PiggyBack: 250 mL    Nepro with Carb Steady: 1080 mL    norepinephrine Infusion: 58 mL    Other: 600 mL    Packed Red Blood Cells: 350 mL    vasopressin Infusion: 144 mL  Total IN: 3750 mL    OUT:    Chest Tube: 190 mL    Indwelling Catheter - Urethral: 1710 mL    Other: 600 mL    Rectal Tube: 900 mL  Total OUT: 3400 mL    Total NET: 350 mL      14 May 2019 07:  -  14 May 2019 23:01  --------------------------------------------------------  IN:    bumetanide Infusion: 140 mL    diltiazem Infusion: 15 mL    Enteral Tube Flush: 240 mL    esmolol Infusion: 11.9 mL    heparin Infusion: 119 mL    Nepro with Carb Steady: 630 mL    norepinephrine Infusion: 21 mL    Other: 800 mL    vasopressin Infusion: 68 mL  Total IN: 2044.9 mL    OUT:    Chest Tube: 50 mL    Indwelling Catheter - Urethral: 1085 mL    Other: 800 mL    Rectal Tube: 400 mL  Total OUT: 2335 mL    Total NET: -290.1 mL        ============================ LABS =========================                        10.1   29.6  )-----------( 223      ( 14 May 2019 00:49 )             29.0     05-14    142  |  102  |  151<H>  ----------------------------<  158<H>  3.9   |  18<L>  |  3.37<H>    Ca    7.4<L>      14 May 2019 00:49  Phos  8.6       Mg     2.7         TPro  5.4<L>  /  Alb  2.7<L>  /  TBili  0.9  /  DBili  x   /  AST  46<H>  /  ALT  59<H>  /  AlkPhos  78      LIVER FUNCTIONS - ( 14 May 2019 00:49 )  Alb: 2.7 g/dL / Pro: 5.4 g/dL / ALK PHOS: 78 U/L / ALT: 59 U/L / AST: 46 U/L / GGT: x           PTT - ( 14 May 2019 00:49 )  PTT:59.1 sec  ABG - ( 14 May 2019 09:44 )  pH, Arterial: 7.41  pH, Blood: x     /  pCO2: 33    /  pO2: 99    / HCO3: 21    / Base Excess: -2.9  /  SaO2: 98                Urinalysis Basic - ( 14 May 2019 19:13 )    Color: Yellow / Appearance: Slightly Turbid / S.011 / pH: x  Gluc: x / Ketone: Negative  / Bili: Negative / Urobili: Negative   Blood: x / Protein: Trace / Nitrite: Negative   Leuk Esterase: Negative / RBC: 10 /hpf / WBC 4 /HPF   Sq Epi: x / Non Sq Epi: 3 /hpf / Bacteria: Negative      ======================Micro/Rad/Cardio=================  Culture: Reviewed   CXR: Reviewed  Echo:Reviewed  ======================================================  PAST MEDICAL & SURGICAL HISTORY:  Diverticulitis  Nocturia  Panic attacks  Anxiety  Depression  Asthma: Controlled  Sleep Apnea  History of partial colectomy  History of colon resection  History of eye surgery: PPV right  Cataract extraction status: right  Status post lung surgery: ? wedge resection  S/P eye surgery: &quot;removed fluid&quot; from rigth eye  Bunion  Sinus Disorder: surgery x 2  Inguinal Hernia Bilateral  Shoulder Problem: right rotator cuff  Carpal Tunnel Syndrome: right hand    ====================ASSESMENT ==============  2019 CABG  C3L  acute hypoxemic respiratory failure   Tracheostomy and Lung biopsy: pulmonary amyloidosis  Hypotension  ASHD/CAD  Pulmonary amyloidoss  atrial fibrillation/flutter  anxiety  depression    Plan:  ====================== NEUROLOGY=====================  aspirin 325 milliGRAM(s) Oral daily  dexmedetomidine Infusion 0.3 MICROgram(s)/kG/Hr (5.962 mL/Hr) IV Continuous <Continuous>  escitalopram 10 milliGRAM(s) Oral daily  gabapentin 100 milliGRAM(s) Oral every 8 hours  HYDROmorphone  Injectable 0.5 milliGRAM(s) IV Push every 8 hours PRN Severe Pain (7 - 10)    ==================== RESPIRATORY======================  Mechanical Ventilation:  Mode: CPAP with PS  FiO2: 50  PEEP: 5  PS: 15  cpap as tolerated   ====================CARDIOVASCULAR==================  diltiazem Infusion 5 mG/Hr (5 mL/Hr) IV Continuous <Continuous>  midodrine 10 milliGRAM(s) Oral every 8 hours  norepinephrine Infusion 0.034 MICROgram(s)/kG/Min (5 mL/Hr) IV Continuous <Continuous>  vasopressin Infusion 0.083 Unit(s)/Min (5 mL/Hr) IV Continuous <Continuous>  ===================HEMATOLOGIC/ONC ===================  heparin  Infusion 1300 Unit(s)/Hr (8.5 mL/Hr) IV Continuous <Continuous>  follow up ptt  ===================== RENAL =========================  De for monitoring urine output  buMETAnide Infusion 2 mG/Hr (10 mL/Hr) IV Continuous <Continuous>  ==================== GASTROINTESTINAL===================  pantoprazole  Injectable 40 milliGRAM(s) IV Push two times a day  sodium chloride 0.9% lock flush 10 milliLiter(s) IV Push every 1 hour PRN Pre/post blood products, medications, blood draw, and to maintain line patency  sodium chloride 0.9%. 1000 milliLiter(s) (10 mL/Hr) IV Continuous <Continuous>    =======================    ENDOCRINE  =====================  atorvastatin 40 milliGRAM(s) Oral at bedtime  insulin lispro (HumaLOG) corrective regimen sliding scale   SubCutaneous every 6 hours    Patient requires continuous monitoring with bedside rhythm monitoring,arterial line,pulse oximetry,ventilator monitoring;intermittent blood gas analysis.  Care plan discussed with ICU care team.  patient remain critical; required more than usual post op care; I have spent 35 minutes providing non routine post op care.

## 2019-05-14 NOTE — PROGRESS NOTE ADULT - ASSESSMENT
77 yo   male with PMH of HLD, Sleep apnea ( non compliant with CPAP), GERD, diverticulitis, depression, anxiety, and panic attack, presented to [facility] on 04/25/19 for LHC after having  abnormal NST done as outpatient, LHC done same day revealed TVD. therefore underwent CABG on 04/26/19, hospital course complicated with fever, shock, no on pressors, abx, developed julio cesar hence nephrology consulted.

## 2019-05-14 NOTE — PROGRESS NOTE ADULT - PROBLEM SELECTOR PLAN 1
Pt with TARAH most likely hemodynamically mediated vs ischemic ATN in the setting of anemia from blood loss, hemodynamic instability, and renal venous congestion. SCr of 1.2 in 2016, 1.1 08/18 and 1.06 on 04/25/19. Started on HD on 05/11/19 due to BUN 190s worsening mental status, concerning for uremic encephalopathy. Labs reviewed today, will plan for 2nd session of HD today.   - Monitor I&O, daily weights, avoid nephrotoxics  - Adjust meds based on GFR  - Avoid hypotension Pt with TARAH most likely hemodynamically mediated vs ischemic ATN in the setting of anemia from blood loss, hemodynamic instability, and renal venous congestion. SCr of 1.2 in 2016, 1.1 08/18 and 1.06 on 04/25/19. Started on HD on 05/11/19 due to BUN 190s worsening mental status, concerning for uremic encephalopathy. Labs reviewed today, will plan for 3 session of HD today w/o UF.  TTE hyperdynamic left ventricle with no evidence of obstruction.   - Monitor I&O, daily weights, avoid nephrotoxics  - Adjust meds based on GFR  - Avoid hypotension Pt with TARAH most likely hemodynamically mediated vs ischemic ATN in the setting of anemia from blood loss, hemodynamic instability, and renal venous congestion. SCr of 1.2 in 2016, 1.1 08/18 and 1.06 on 04/25/19. Started on HD on 05/11/19 due to BUN 190s worsening mental status, concerning for uremic encephalopathy. Labs reviewed today, will plan for 3 session of HD today w/o UF.  TTE hyperdynamic left ventricle with no evidence of obstruction.   - Monitor I&O, daily weights, avoid nephrotoxics  - Adjust meds based on GFR  - Avoid hypotension  - goal to keep even

## 2019-05-14 NOTE — PROGRESS NOTE ADULT - PROBLEM SELECTOR PLAN 2
- He is vasodilated with low diastolic blood pressure and increasing leukocytosis. However, he is afebrile and procalcitonin is only slightly elevated. Would consider sending C. diff.

## 2019-05-14 NOTE — PROGRESS NOTE ADULT - PROBLEM SELECTOR PLAN 2
The patient has an elevated bun in the setting of steroid use. Steroids being tapered, however BUN continues to trend up to 170s today. Pt s/p HD session on 5/11. HD today with UF. The patient has an elevated bun in the setting of steroid use, off steroids, BUN today  150s, will plan for HD w/o UF. Suggest to give albumin with diuretics. The patient has an elevated bun in the setting of steroid use, off steroids, BUN today  150s, will plan for HD w/o UF. S

## 2019-05-14 NOTE — PROGRESS NOTE ADULT - PROBLEM SELECTOR PLAN 2
heme onc ro for formal amyloid rx recommendations  await clarification of type of amyloid  DC CT as able

## 2019-05-14 NOTE — PROGRESS NOTE ADULT - PROBLEM SELECTOR PLAN 3
Keep pt net negative.   - Monitor BMP and UO  -HD today.  continue diuretics Keep pt net negative with diuretics, will limit UF given restrictive cardiomyopathy.   - Monitor BMP and UO Keep pt net even with diuretics, will limit UF given restrictive cardiomyopathy.   - Monitor BMP and UO

## 2019-05-14 NOTE — PROGRESS NOTE ADULT - ATTENDING COMMENTS
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 failure-try to DC CT, continue weaning w/ CPAP/PS as able--off sedation as able  GI-ppi/TF                               Other-recall of psych for MS here  PT/OT.  Jann Guadarrama MD-Pulmonary   [phone]

## 2019-05-14 NOTE — PROGRESS NOTE ADULT - ASSESSMENT
Mr Osman is a 78 year old man who underwent a non-urgent CABG (LIMA LAD and SVG to OM1 and RCA) on 4/26 with post-operative course complicated by acute respiratory failure with failure to wean from vent requiring tracheostomy. Due to persistent pulmonary infiltrates and history of possible , he underwent lung biopsy, which demonstrated evidence of amyloid deposition. On review of his echocardiogram pre-operatively, he has some characteristics suggestive of cardiac amyloid, but only had impaired relaxation. He currently is volume overloaded with progressively worsening renal failure, although he appears less anasarcic today. He is vasodilated and on two vasopressor to maintain adequate blood pressure and he has a worsening leukocytosis, but without fever and mildly elevated procalcitonin. His echo yesterday showed a mild intracavitary LV gradient. His heart rate is better controlled with diltiazem.     His other notable comorbidities include sleep apnea (non compliant with CPAP), GERD, diverticulitis, depression, anxiety, and panic attack, and a history of carpal tunnel syndrome.     Plan discussed in multidisciplinary rounds with CTU team and CT surgery. Please call me with questions at [phone].

## 2019-05-15 LAB
% GAMMA, URINE: 20.8 % — SIGNIFICANT CHANGE UP
ALBUMIN 24H MFR UR ELPH: 15 % — SIGNIFICANT CHANGE UP
ALBUMIN SERPL ELPH-MCNC: 2.5 G/DL — LOW (ref 3.3–5)
ALP SERPL-CCNC: 71 U/L — SIGNIFICANT CHANGE UP (ref 40–120)
ALPHA1 GLOB 24H MFR UR ELPH: 29.6 % — SIGNIFICANT CHANGE UP
ALPHA2 GLOB 24H MFR UR ELPH: 17.1 % — SIGNIFICANT CHANGE UP
ALT FLD-CCNC: 97 U/L — HIGH (ref 10–45)
ANION GAP SERPL CALC-SCNC: 19 MMOL/L — HIGH (ref 5–17)
APTT BLD: 42.6 SEC — HIGH (ref 27.5–36.3)
APTT BLD: 47.4 SEC — HIGH (ref 27.5–36.3)
APTT BLD: 51.2 SEC — HIGH (ref 27.5–36.3)
APTT BLD: 51.7 SEC — HIGH (ref 27.5–36.3)
AST SERPL-CCNC: 108 U/L — HIGH (ref 10–40)
B-GLOBULIN 24H MFR UR ELPH: 17.5 % — SIGNIFICANT CHANGE UP
BASOPHILS # BLD AUTO: 0 K/UL — SIGNIFICANT CHANGE UP (ref 0–0.2)
BASOPHILS NFR BLD AUTO: 0 % — SIGNIFICANT CHANGE UP (ref 0–2)
BILIRUB SERPL-MCNC: 1 MG/DL — SIGNIFICANT CHANGE UP (ref 0.2–1.2)
BUN SERPL-MCNC: 110 MG/DL — HIGH (ref 7–23)
CALCIUM SERPL-MCNC: 7.4 MG/DL — LOW (ref 8.4–10.5)
CHLORIDE SERPL-SCNC: 98 MMOL/L — SIGNIFICANT CHANGE UP (ref 96–108)
CO2 SERPL-SCNC: 21 MMOL/L — LOW (ref 22–31)
COLLECT DURATION TIME UR: 24 HR — SIGNIFICANT CHANGE UP
CREAT SERPL-MCNC: 2.8 MG/DL — HIGH (ref 0.5–1.3)
CRP SERPL-MCNC: 3.65 MG/DL — HIGH (ref 0–0.4)
EOSINOPHIL # BLD AUTO: 0.1 K/UL — SIGNIFICANT CHANGE UP (ref 0–0.5)
EOSINOPHIL NFR BLD AUTO: 0.3 % — SIGNIFICANT CHANGE UP (ref 0–6)
ERYTHROCYTE [SEDIMENTATION RATE] IN BLOOD: 29 MM/HR — HIGH (ref 0–20)
GAS PNL BLDA: SIGNIFICANT CHANGE UP
GLUCOSE SERPL-MCNC: 120 MG/DL — HIGH (ref 70–99)
HCT VFR BLD CALC: 29 % — LOW (ref 39–50)
HCT VFR BLD CALC: 29.1 % — LOW (ref 39–50)
HGB BLD-MCNC: 9.8 G/DL — LOW (ref 13–17)
HGB BLD-MCNC: 9.9 G/DL — LOW (ref 13–17)
INR BLD: 1.27 RATIO — HIGH (ref 0.88–1.16)
INTERPRETATION 24H UR IFE-IMP: SIGNIFICANT CHANGE UP
KAPPA LC 24H UR-MCNC: 1.01 MG/DL — SIGNIFICANT CHANGE UP
KAPPA LC 24H UR-MRATE: 28.28 MG/24 H — SIGNIFICANT CHANGE UP
LAMBDA LC 24H UR-MCNC: 0.85 MG/DL — SIGNIFICANT CHANGE UP
LAMBDA LC 24H UR-MRATE: 23.8 MG/24 H — SIGNIFICANT CHANGE UP
LG PLATELETS BLD QL AUTO: SLIGHT — SIGNIFICANT CHANGE UP
LYMPHOCYTES # BLD AUTO: 0.3 K/UL — LOW (ref 1–3.3)
LYMPHOCYTES # BLD AUTO: 0.7 % — LOW (ref 13–44)
M PROTEIN 24H UR ELPH-MRATE: 0 MG/24HR — SIGNIFICANT CHANGE UP (ref 0–0)
M PROTEIN 24H UR ELPH-MRATE: 0 MG/DL — SIGNIFICANT CHANGE UP
MAGNESIUM SERPL-MCNC: 2.4 MG/DL — SIGNIFICANT CHANGE UP (ref 1.6–2.6)
MCHC RBC-ENTMCNC: 29.3 PG — SIGNIFICANT CHANGE UP (ref 27–34)
MCHC RBC-ENTMCNC: 29.5 PG — SIGNIFICANT CHANGE UP (ref 27–34)
MCHC RBC-ENTMCNC: 33.9 GM/DL — SIGNIFICANT CHANGE UP (ref 32–36)
MCHC RBC-ENTMCNC: 34 GM/DL — SIGNIFICANT CHANGE UP (ref 32–36)
MCV RBC AUTO: 86.3 FL — SIGNIFICANT CHANGE UP (ref 80–100)
MCV RBC AUTO: 86.9 FL — SIGNIFICANT CHANGE UP (ref 80–100)
MONOCYTES # BLD AUTO: 1.5 K/UL — HIGH (ref 0–0.9)
MONOCYTES NFR BLD AUTO: 3.8 % — SIGNIFICANT CHANGE UP (ref 2–14)
NEUTROPHILS # BLD AUTO: 36.5 K/UL — HIGH (ref 1.8–7.4)
NEUTROPHILS NFR BLD AUTO: 95.2 % — HIGH (ref 43–77)
OB PNL STL: POSITIVE
PHOSPHATE SERPL-MCNC: 8.9 MG/DL — HIGH (ref 2.5–4.5)
PLAT MORPH BLD: NORMAL — SIGNIFICANT CHANGE UP
PLATELET # BLD AUTO: 179 K/UL — SIGNIFICANT CHANGE UP (ref 150–400)
PLATELET # BLD AUTO: 217 K/UL — SIGNIFICANT CHANGE UP (ref 150–400)
POTASSIUM SERPL-MCNC: 4 MMOL/L — SIGNIFICANT CHANGE UP (ref 3.5–5.3)
POTASSIUM SERPL-SCNC: 4 MMOL/L — SIGNIFICANT CHANGE UP (ref 3.5–5.3)
PROCALCITONIN SERPL-MCNC: 0.15 NG/ML — HIGH (ref 0.02–0.1)
PROT ?TM UR-MCNC: <4 MG/DL — SIGNIFICANT CHANGE UP (ref 0–12)
PROT PATTERN 24H UR ELPH-IMP: SIGNIFICANT CHANGE UP
PROT SERPL-MCNC: 5.5 G/DL — LOW (ref 6–8.3)
PROTEIN QUANT CALC, URINE: SIGNIFICANT CHANGE UP MG/24 H (ref 50–100)
PROTHROM AB SERPL-ACNC: 14.7 SEC — HIGH (ref 10–12.9)
RBC # BLD: 3.35 M/UL — LOW (ref 4.2–5.8)
RBC # BLD: 3.36 M/UL — LOW (ref 4.2–5.8)
RBC # FLD: 14.6 % — HIGH (ref 10.3–14.5)
RBC # FLD: 14.7 % — HIGH (ref 10.3–14.5)
RBC BLD AUTO: SIGNIFICANT CHANGE UP
SODIUM SERPL-SCNC: 138 MMOL/L — SIGNIFICANT CHANGE UP (ref 135–145)
SPECIMEN VOL 24H UR: 2800 ML/24 H — SIGNIFICANT CHANGE UP
TOTAL VOLUME - 24 HOUR: 2800 ML — SIGNIFICANT CHANGE UP
URINE CREATININE CALCULATION: 0.4 G/24 H — LOW (ref 1–2)
WBC # BLD: 29.3 K/UL — HIGH (ref 3.8–10.5)
WBC # BLD: 38.4 K/UL — HIGH (ref 3.8–10.5)
WBC # FLD AUTO: 29.3 K/UL — HIGH (ref 3.8–10.5)
WBC # FLD AUTO: 38.4 K/UL — HIGH (ref 3.8–10.5)

## 2019-05-15 PROCEDURE — 99232 SBSQ HOSP IP/OBS MODERATE 35: CPT

## 2019-05-15 PROCEDURE — 99233 SBSQ HOSP IP/OBS HIGH 50: CPT

## 2019-05-15 PROCEDURE — 93970 EXTREMITY STUDY: CPT | Mod: 26

## 2019-05-15 PROCEDURE — 99291 CRITICAL CARE FIRST HOUR: CPT

## 2019-05-15 PROCEDURE — 99292 CRITICAL CARE ADDL 30 MIN: CPT

## 2019-05-15 PROCEDURE — 99233 SBSQ HOSP IP/OBS HIGH 50: CPT | Mod: GC

## 2019-05-15 PROCEDURE — 71045 X-RAY EXAM CHEST 1 VIEW: CPT | Mod: 26

## 2019-05-15 RX ORDER — POTASSIUM CHLORIDE 20 MEQ
10 PACKET (EA) ORAL
Refills: 0 | Status: COMPLETED | OUTPATIENT
Start: 2019-05-15 | End: 2019-05-15

## 2019-05-15 RX ORDER — METOCLOPRAMIDE HCL 10 MG
5 TABLET ORAL EVERY 8 HOURS
Refills: 0 | Status: DISCONTINUED | OUTPATIENT
Start: 2019-05-15 | End: 2019-05-15

## 2019-05-15 RX ORDER — HYDROMORPHONE HYDROCHLORIDE 2 MG/ML
0.5 INJECTION INTRAMUSCULAR; INTRAVENOUS; SUBCUTANEOUS ONCE
Refills: 0 | Status: DISCONTINUED | OUTPATIENT
Start: 2019-05-15 | End: 2019-05-15

## 2019-05-15 RX ORDER — METOCLOPRAMIDE HCL 10 MG
5 TABLET ORAL EVERY 8 HOURS
Refills: 0 | Status: DISCONTINUED | OUTPATIENT
Start: 2019-05-15 | End: 2019-05-17

## 2019-05-15 RX ORDER — MEROPENEM 1 G/30ML
INJECTION INTRAVENOUS
Refills: 0 | Status: DISCONTINUED | OUTPATIENT
Start: 2019-05-15 | End: 2019-05-17

## 2019-05-15 RX ORDER — ACETAMINOPHEN 500 MG
1000 TABLET ORAL ONCE
Refills: 0 | Status: COMPLETED | OUTPATIENT
Start: 2019-05-15 | End: 2019-05-15

## 2019-05-15 RX ORDER — GABAPENTIN 400 MG/1
200 CAPSULE ORAL EVERY 8 HOURS
Refills: 0 | Status: DISCONTINUED | OUTPATIENT
Start: 2019-05-15 | End: 2019-05-17

## 2019-05-15 RX ORDER — MEROPENEM 1 G/30ML
500 INJECTION INTRAVENOUS EVERY 12 HOURS
Refills: 0 | Status: DISCONTINUED | OUTPATIENT
Start: 2019-05-16 | End: 2019-05-17

## 2019-05-15 RX ORDER — MEROPENEM 1 G/30ML
500 INJECTION INTRAVENOUS ONCE
Refills: 0 | Status: COMPLETED | OUTPATIENT
Start: 2019-05-15 | End: 2019-05-15

## 2019-05-15 RX ORDER — VANCOMYCIN HCL 1 G
1000 VIAL (EA) INTRAVENOUS EVERY 24 HOURS
Refills: 0 | Status: DISCONTINUED | OUTPATIENT
Start: 2019-05-15 | End: 2019-05-16

## 2019-05-15 RX ORDER — MEROPENEM 1 G/30ML
INJECTION INTRAVENOUS
Refills: 0 | Status: DISCONTINUED | OUTPATIENT
Start: 2019-05-15 | End: 2019-05-15

## 2019-05-15 RX ORDER — DILTIAZEM HCL 120 MG
3 CAPSULE, EXT RELEASE 24 HR ORAL
Qty: 125 | Refills: 0 | Status: DISCONTINUED | OUTPATIENT
Start: 2019-05-15 | End: 2019-05-17

## 2019-05-15 RX ORDER — CALCIUM GLUCONATE 100 MG/ML
1 VIAL (ML) INTRAVENOUS ONCE
Refills: 0 | Status: COMPLETED | OUTPATIENT
Start: 2019-05-15 | End: 2019-05-15

## 2019-05-15 RX ADMIN — Medication 1000 MILLIGRAM(S): at 22:00

## 2019-05-15 RX ADMIN — ESCITALOPRAM OXALATE 10 MILLIGRAM(S): 10 TABLET, FILM COATED ORAL at 12:31

## 2019-05-15 RX ADMIN — Medication 3 MG/HR: at 23:27

## 2019-05-15 RX ADMIN — PANTOPRAZOLE SODIUM 40 MILLIGRAM(S): 20 TABLET, DELAYED RELEASE ORAL at 05:35

## 2019-05-15 RX ADMIN — Medication 250 MILLIGRAM(S): at 16:20

## 2019-05-15 RX ADMIN — SODIUM CHLORIDE 10 MILLILITER(S): 9 INJECTION INTRAMUSCULAR; INTRAVENOUS; SUBCUTANEOUS at 07:15

## 2019-05-15 RX ADMIN — BUMETANIDE 10 MG/HR: 0.25 INJECTION INTRAMUSCULAR; INTRAVENOUS at 21:11

## 2019-05-15 RX ADMIN — MIDODRINE HYDROCHLORIDE 10 MILLIGRAM(S): 2.5 TABLET ORAL at 14:12

## 2019-05-15 RX ADMIN — HYDROMORPHONE HYDROCHLORIDE 0.5 MILLIGRAM(S): 2 INJECTION INTRAMUSCULAR; INTRAVENOUS; SUBCUTANEOUS at 11:30

## 2019-05-15 RX ADMIN — CHLORHEXIDINE GLUCONATE 15 MILLILITER(S): 213 SOLUTION TOPICAL at 05:36

## 2019-05-15 RX ADMIN — GABAPENTIN 200 MILLIGRAM(S): 400 CAPSULE ORAL at 23:27

## 2019-05-15 RX ADMIN — CHLORHEXIDINE GLUCONATE 15 MILLILITER(S): 213 SOLUTION TOPICAL at 17:28

## 2019-05-15 RX ADMIN — HYDROMORPHONE HYDROCHLORIDE 0.5 MILLIGRAM(S): 2 INJECTION INTRAMUSCULAR; INTRAVENOUS; SUBCUTANEOUS at 11:45

## 2019-05-15 RX ADMIN — HEPARIN SODIUM 9.5 UNIT(S)/HR: 5000 INJECTION INTRAVENOUS; SUBCUTANEOUS at 02:56

## 2019-05-15 RX ADMIN — Medication 400 MILLIGRAM(S): at 21:31

## 2019-05-15 RX ADMIN — Medication 325 MILLIGRAM(S): at 12:31

## 2019-05-15 RX ADMIN — BUMETANIDE 10 MG/HR: 0.25 INJECTION INTRAMUSCULAR; INTRAVENOUS at 07:15

## 2019-05-15 RX ADMIN — Medication 5 MILLIGRAM(S): at 21:11

## 2019-05-15 RX ADMIN — MIDODRINE HYDROCHLORIDE 10 MILLIGRAM(S): 2.5 TABLET ORAL at 21:11

## 2019-05-15 RX ADMIN — Medication 5 MICROGRAM(S)/KG/MIN: at 21:11

## 2019-05-15 RX ADMIN — CHLORHEXIDINE GLUCONATE 1 APPLICATION(S): 213 SOLUTION TOPICAL at 05:36

## 2019-05-15 RX ADMIN — HEPARIN SODIUM 10 UNIT(S)/HR: 5000 INJECTION INTRAVENOUS; SUBCUTANEOUS at 10:32

## 2019-05-15 RX ADMIN — MEROPENEM 100 MILLIGRAM(S): 1 INJECTION INTRAVENOUS at 15:31

## 2019-05-15 RX ADMIN — HEPARIN SODIUM 10 UNIT(S)/HR: 5000 INJECTION INTRAVENOUS; SUBCUTANEOUS at 21:39

## 2019-05-15 RX ADMIN — Medication 5 MICROGRAM(S)/KG/MIN: at 12:00

## 2019-05-15 RX ADMIN — Medication 200 GRAM(S): at 18:31

## 2019-05-15 RX ADMIN — MIDODRINE HYDROCHLORIDE 10 MILLIGRAM(S): 2.5 TABLET ORAL at 05:35

## 2019-05-15 RX ADMIN — Medication 50 MILLIEQUIVALENT(S): at 06:23

## 2019-05-15 RX ADMIN — HEPARIN SODIUM 10 UNIT(S)/HR: 5000 INJECTION INTRAVENOUS; SUBCUTANEOUS at 16:30

## 2019-05-15 RX ADMIN — Medication 5 MILLIGRAM(S): at 14:12

## 2019-05-15 RX ADMIN — GABAPENTIN 100 MILLIGRAM(S): 400 CAPSULE ORAL at 05:35

## 2019-05-15 RX ADMIN — HYDROMORPHONE HYDROCHLORIDE 0.5 MILLIGRAM(S): 2 INJECTION INTRAMUSCULAR; INTRAVENOUS; SUBCUTANEOUS at 23:27

## 2019-05-15 RX ADMIN — VASOPRESSIN 5 UNIT(S)/MIN: 20 INJECTION INTRAVENOUS at 21:12

## 2019-05-15 RX ADMIN — ATORVASTATIN CALCIUM 40 MILLIGRAM(S): 80 TABLET, FILM COATED ORAL at 21:11

## 2019-05-15 RX ADMIN — VASOPRESSIN 5 UNIT(S)/MIN: 20 INJECTION INTRAVENOUS at 07:15

## 2019-05-15 RX ADMIN — Medication 50 MILLIEQUIVALENT(S): at 06:52

## 2019-05-15 RX ADMIN — PANTOPRAZOLE SODIUM 40 MILLIGRAM(S): 20 TABLET, DELAYED RELEASE ORAL at 17:28

## 2019-05-15 RX ADMIN — HYDROMORPHONE HYDROCHLORIDE 0.5 MILLIGRAM(S): 2 INJECTION INTRAMUSCULAR; INTRAVENOUS; SUBCUTANEOUS at 23:45

## 2019-05-15 RX ADMIN — GABAPENTIN 100 MILLIGRAM(S): 400 CAPSULE ORAL at 14:12

## 2019-05-15 NOTE — PROGRESS NOTE ADULT - SUBJECTIVE AND OBJECTIVE BOX
CHRIS THAKKAR  MRN#:  [id number]    The patient is a 78y Male with PMH of HLD, Sleep apnea ( non compliant with CPAP), GERD, diverticulitis, depression, anxiety, and panic attacks, as well as strong family history of CAD, admitted with unstable angina, found to have coronary artery disease, LVH and diastolic heart failure, now recovering s/p C3L 4/26, post op course complicated by bleeding and later hypoxic respiratory failure, s/p tracheostomy with ongoing respiratory distress who was seen, evaluated, & examined with the CTICU staff on rounds and later in the evening with a multidisciplinary care plan formulated & implemented.  All available clinical, laboratory, radiographic, pharmacologic, and electrocardiographic data were reviewed & analyzed.      The patient was in the CTICU in critical condition secondary to persistent cardiopulmonary dysfunction, hemodynamically significant anemia, acute renal insufficiency, vasogenic shock, persistent cardiovascular dysfunction, and hyperglycemia.     Respiratory status required full ventilator support with advancement to weaning amounts of pressure support throughout the day, close monitoring of respiratory rate and breathing pattern, the following of ABG’s with A-line monitoring, continuous pulse oximetry monitoring for support & to evaluate for & prevent further decompensation secondary to hypoxic respiratory failure, pneumonia, and pulmonary amyloidosis on biopsy. Further work up in process to determine subtype.    Invasive hemodynamic monitoring with a central venous catheter & an A-line were required for the continuous central venous and MAP/BP monitoring to ensure adequate cardiovascular support and to evaluate for & help prevent decompensation while receiving an IV Cardizem drip, an IV Vasopressin drip, an IV Norepinephrine drip, an IV Bumex drip, and intermittent hemodialysis secondary to hemodynamically significant anemia/acute postoperative blood loss anemia, vasogenic shock, acute on chronic diastolic heart failure, rapid atrial fibrillation and acute renal failure.    Patient has had fever, leukocytosis, thick pulmonary secretions with a right lung infiltrate on chest x ray consistent with pneumonia. He completed empiric treatment with IV Meropenem and IV Micafungin due to BAL positive for yeast.    Tolerating enteral feedings at goal rate.    Patient required acute postoperative critical care management and I provided 35 minutes of non-continuous care to the patient.  Discussed at length with the CTICU staff and helped coordinate care. CHRIS THAKKAR  MRN#:  [id number]    The patient is a 78y Male with PMH of HLD, Sleep apnea ( non compliant with CPAP), GERD, diverticulitis, depression, anxiety, and panic attacks, as well as strong family history of CAD, admitted with unstable angina, found to have coronary artery disease, LVH and diastolic heart failure, now recovering s/p C3L 4/26, post op course complicated by bleeding and later hypoxic respiratory failure, s/p tracheostomy with ongoing respiratory distress who was seen, evaluated, & examined with the CTICU staff on rounds and later in the evening with a multidisciplinary care plan formulated & implemented.  All available clinical, laboratory, radiographic, pharmacologic, and electrocardiographic data were reviewed & analyzed.      The patient was in the CTICU in critical condition secondary to persistent cardiopulmonary dysfunction, hemodynamically significant anemia, acute renal insufficiency, vasogenic shock, persistent cardiovascular dysfunction, and hyperglycemia.     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 Further work up in process to determine subtype. Patient developed increased respiratory distress this evening during hemodialysis and required adjustment of ventilator settings to assist with patient ventilator synchrony.    Invasive hemodynamic monitoring with a central venous catheter & an A-line were required for the continuous central venous and MAP/BP monitoring to ensure adequate cardiovascular support and to evaluate for & help prevent decompensation while receiving an IV Vasopressin drip, an IV Norepinephrine drip, an IV Bumex drip, resumption of an IV Cardizem drip and intermittent hemodialysis secondary to hemodynamically significant anemia/acute postoperative blood loss anemia, vasogenic shock, acute on chronic diastolic heart failure, rapid atrial fibrillation and acute renal failure.    Patient has had fever, leukocytosis, thick pulmonary secretions with a right lung infiltrate on chest x ray consistent with pneumonia. He completed empiric treatment with IV Meropenem and IV Micafungin due to BAL positive for yeast. Meropenem and Vancomycin resumed empirically due to leukocytosis.    Tolerating enteral feedings at goal rate.    Patient required acute postoperative critical care management and I provided 45 minutes of non-continuous care to the patient.  Discussed at length with the CTICU staff and helped coordinate care. CHRIS THAKKAR  MRN#:  [id number]    The patient is a 78y Male with PMH of HLD, Sleep apnea ( non compliant with CPAP), GERD, diverticulitis, depression, anxiety, and panic attacks, as well as strong family history of CAD, admitted with unstable angina, found to have coronary artery disease, LVH and diastolic heart failure, now recovering s/p C3L 4/26, post op course complicated by bleeding and later hypoxic respiratory failure, s/p tracheostomy with ongoing respiratory distress who was seen, evaluated, & examined with the CTICU staff on rounds and later in the evening with a multidisciplinary care plan formulated & implemented.  All available clinical, laboratory, radiographic, pharmacologic, and electrocardiographic data were reviewed & analyzed.      The patient was in the CTICU in critical condition secondary to persistent cardiopulmonary dysfunction, hemodynamically significant anemia, acute renal insufficiency, vasogenic shock, persistent cardiovascular dysfunction, and hyperglycemia.     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 Further work up in process to determine subtype. Patient developed increased respiratory distress this evening during hemodialysis and required adjustment of ventilator settings to assist with patient ventilator synchrony.    Invasive hemodynamic monitoring with a central venous catheter & an A-line were required for the continuous central venous and MAP/BP monitoring to ensure adequate cardiovascular support and to evaluate for & help prevent decompensation while receiving an IV Vasopressin drip, an IV Norepinephrine drip, an IV Bumex drip, resumption of an IV Cardizem drip and intermittent hemodialysis secondary to hemodynamically significant anemia/acute postoperative blood loss anemia, vasogenic shock, acute on chronic diastolic heart failure, rapid atrial fibrillation and acute renal failure.    Patient has had fever, leukocytosis, thick pulmonary secretions with a right lung infiltrate on chest x ray consistent with pneumonia. He completed empiric treatment with IV Meropenem and IV Micafungin due to BAL positive for yeast. Meropenem and Vancomycin resumed empirically due to leukocytosis.    Glucose control to help maintain metabolic stability and prevent infection required monitoring of glucose intermittently and treatment with a lispro insulin sliding scale.    Tolerating enteral feedings at goal rate.    Patient required acute postoperative critical care management and I provided 45 minutes of non-continuous care to the patient.  Discussed at length with the CTICU staff and helped coordinate care.

## 2019-05-15 NOTE — PROGRESS NOTE ADULT - ATTENDING COMMENTS
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  Resp. failure-try to DC CT, continue weaning w/ TC as able--off sedation as able  GI-ppi/TF                               Other-recall of psych for MS here  PT/OT.  Jann Guadarrama MD-Pulmonary   [phone]

## 2019-05-15 NOTE — PROGRESS NOTE ADULT - ASSESSMENT
77 yo   male with PMH of HLD, Sleep apnea ( non compliant with CPAP), GERD, diverticulitis, depression, anxiety, and panic attack, presented to [facility] on 04/25/19 for LHC after having  abnormal NST done as outpatient, LHC done same day revealed TVD. therefore underwent CABG on 04/26/19, hospital course complicated with fever, shock, no on pressors, abx, developed julio cesar hence nephrology consulted.

## 2019-05-15 NOTE — PROGRESS NOTE ADULT - ASSESSMENT
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 His postop course was complicated by persistent respiratory failure requiring tracheostomy and he had a wedge biopsy showing Pulmonary Amyloidosis.    5/13-poor weaning (less than 2 hrs), CT in place  5/14-off solumedrol, CT in place 100 cc out, wean cpap/ps 5/15  5/15-TC for 12 hrs, renal and CHF f/up

## 2019-05-15 NOTE — CHART NOTE - NSCHARTNOTEFT_GEN_A_CORE
Nutrition Follow Up Note  Patient seen for: nutritional follow up     Source: RN, medical record, CTU rounds     Chart reviewed, events noted: 78 yo M with PMHx HLD, sleep apnea, GERD, diverticulitis, depression, anxiety, panic attacks with chest pain and SOB x 1 year. Admitted for CABG. S/p cardiac cath , found to have CAD, LVH and diastolic HF, S/p CABGx3 with LIMA to LAD; RSVG to OM, RCA . Hospital course c/b anemia/hypovolemia-shock, cardiovascular dysfunction, acidosis, and hyperglycemia. S/p tracheostomy on 2019 and left VATS, LLL wedge resection. Pt with TARAH and azotemia, plan for HD today. Amyloidosis work up    Diet: was previously receiving Vital 1.5 @ 55ml/[id number]. Was changed to Nepro on on  for worsening renal function.  Currently receiving Nepro @ 45ml/[id number]. Provides: 1944kcals/kg and 87gm protein. (24.4kcals/kg and 1.09gm protein/kg based on dosing Wt: 79.8kg)   EN provision: 100% x3 days.     Rectal tube out put: Pt received banatrol x1 daily to bulk stools. Per PA and NP, stool consistency is pasty.   : 600ml  : 700ml  15: 300ml thus far.     Patient reports: Pt resting in bed, per RN no GI distress at this time.       Daily Weight in k.2 (-15), Weight in k.8 (-), Weight in k.8 (-), Weight in k.7 (-14), Weight in k.1 (-), Weight in k.1 (-), Weight in k.1 (-)  % Weight Change    Pertinent Medications: MEDICATIONS  (STANDING):  aspirin 325 milliGRAM(s) Oral daily  atorvastatin 40 milliGRAM(s) Oral at bedtime  buMETAnide Infusion 2 mG/Hr (10 mL/Hr) IV Continuous <Continuous>  chlorhexidine 0.12% Liquid 15 milliLiter(s) Oral Mucosa two times a day  chlorhexidine 4% Liquid 1 Application(s) Topical <User Schedule>  chlorhexidine 4% Liquid 1 Application(s) Topical <User Schedule>  dexmedetomidine Infusion 0.3 MICROgram(s)/kG/Hr (5.962 mL/Hr) IV Continuous <Continuous>  escitalopram 10 milliGRAM(s) Oral daily  gabapentin 100 milliGRAM(s) Oral every 8 hours  heparin  Infusion 1300 Unit(s)/Hr (10 mL/Hr) IV Continuous <Continuous>  insulin lispro (HumaLOG) corrective regimen sliding scale   SubCutaneous every 6 hours  midodrine 10 milliGRAM(s) Oral every 8 hours  norepinephrine Infusion 0.034 MICROgram(s)/kG/Min (5 mL/Hr) IV Continuous <Continuous>  pantoprazole  Injectable 40 milliGRAM(s) IV Push two times a day  sodium chloride 0.9%. 1000 milliLiter(s) (10 mL/Hr) IV Continuous <Continuous>  vasopressin Infusion 0.083 Unit(s)/Min (5 mL/Hr) IV Continuous <Continuous>    MEDICATIONS  (PRN):  HYDROmorphone  Injectable 0.5 milliGRAM(s) IV Push every 8 hours PRN Severe Pain (7 - 10)  sodium chloride 0.9% lock flush 10 milliLiter(s) IV Push every 1 hour PRN Pre/post blood products, medications, blood draw, and to maintain line patency    Pertinent Labs: 05-15 @ 01:52: Na 138, <H>, Cr 2.80<H>, <H>, K+ 4.0, Phos 8.9<H>, Mg 2.4, Alk Phos 71, ALT/SGPT 97<H>, AST/SGOT 108<H>, HbA1c --    Finger Sticks:  POCT Blood Glucose.: 146 mg/dL ( @ 23:51)  POCT Blood Glucose.: 106 mg/dL ( @ 17:01)  POCT Blood Glucose.: 156 mg/dL ( @ 11:51)      Skin per nursing documentation: sacral wound and scapula wound   Edema: +4 generalized edema     Estimated Needs:   [X ] no change since previous assessment  [ ] recalculated:     Previous Nutrition Diagnosis: Increased nutrient needs; nutrient related knowledge deficit  Nutrition Diagnosis is: Ongoing, being addressed with EN      Interventions:     Recommend  1) Continue with Nepro @ 45ml/ips99egm, however add 1 no carb pro source daily to better meeting protein needs. EN regimen will provide: 2004kcals and 102gm protein  (25.2kclas.kg and 1.28gm pro/kg based on dosing wt: 79.5kg)   2) Continue banatrol x1 daily   3) Trend renal indices, LFT's, electrolytes and BG levels    Monitoring and Evaluation:     RD remains available upon request and will follow up per protocol.  Sarah Siegler RD, RDN, [facility] Pager #[phone]

## 2019-05-15 NOTE — PROGRESS NOTE ADULT - PROBLEM SELECTOR PLAN 1
- For now, continue diuretics to keep even. I do not think he will tolerate significant volume removal at the moment given low intracardiac filling pressures. However, he remains volume overloaded.

## 2019-05-15 NOTE — PROGRESS NOTE ADULT - SUBJECTIVE AND OBJECTIVE BOX
Subjective: No overnight events. He did not tolerate esmolol. He is on less vasopressor.     Medications:  aspirin 325 milliGRAM(s) Oral daily  atorvastatin 40 milliGRAM(s) Oral at bedtime  buMETAnide Infusion 2 mG/Hr IV Continuous <Continuous>  dexmedetomidine Infusion 0.3 MICROgram(s)/kG/Hr IV Continuous <Continuous>  escitalopram 10 milliGRAM(s) Oral daily  gabapentin 100 milliGRAM(s) Oral every 8 hours  heparin  Infusion 1300 Unit(s)/Hr IV Continuous <Continuous>  HYDROmorphone  Injectable 0.5 milliGRAM(s) IV Push every 8 hours PRN  insulin lispro (HumaLOG) corrective regimen sliding scale   SubCutaneous every 6 hours  midodrine 10 milliGRAM(s) Oral every 8 hours  norepinephrine Infusion 0.034 MICROgram(s)/kG/Min IV Continuous <Continuous>  pantoprazole  Injectable 40 milliGRAM(s) IV Push two times a day  vasopressin Infusion 0.083 Unit(s)/Min IV Continuous <Continuous>      Physical Exam:    Vitals:  T(C): 37.7 (05-15-19 @ 12:00), Max: 37.7 (05-15-19 @ 12:00)  HR: 106 (05-15-19 @ 12:00) (65 - 106)  ABP: 131/47 (05-15-19 @ 12:00) (96/65 - 149/57)  ABP(mean): 68 (05-15-19 @ 12:00) (58 - 85)  RR: 25 (05-15-19 @ 12:00) (8 - 36)  SpO2: 93% (05-15-19 @ 12:00) (84% - 100%)    Daily Weight in k.2 (15 May 2019 00:00)    I&O's Summary    14 May 2019 07:  -  15 May 2019 07:00  --------------------------------------------------------  IN: 2992.9 mL / OUT: 3310 mL / NET: -317.1 mL    15 May 2019 07:  -  15 May 2019 12:41  --------------------------------------------------------  IN: 400.5 mL / OUT: 380 mL / NET: 20.5 mL    General: No distress. Comfortable.  HEENT: EOM intact. Trace intact.   Neck: Neck supple. JVP not significantly elevated but prominent V waves. No masses  Chest: Clear to auscultation bilaterally  CV: Irregular rhythm with normal rate. Normal S1 and S2. No rubs or gallops. Radial pulses normal. 2+ dependent edema.   Abdomen: Soft, non-distended, non-tender  Skin: No rashes  Neurology: Responds to voice. Grimaces to touch.   Psych: Unable to assess.     Labs:                        9.8    29.3  )-----------( 179      ( 15 May 2019 01:52 )             29.0     05-15    138  |  98  |  110<H>  ----------------------------<  120<H>  4.0   |  21<L>  |  2.80<H>    Ca    7.4<L>      15 May 2019 01:52  Phos  8.9     05-15  Mg     2.4     05-15    TPro  5.5<L>  /  Alb  2.5<L>  /  TBili  1.0  /  DBili  x   /  AST  108<H>  /  ALT  97<H>  /  AlkPhos  71  05-15    PT/INR - ( 15 May 2019 01:52 )   PT: 14.7 sec;   INR: 1.27 ratio    PTT - ( 15 May 2019 09:24 )  PTT:47.4 sec

## 2019-05-15 NOTE — PROVIDER CONTACT NOTE (MEDICATION) - ASSESSMENT
Pt. in no acute distress, on TC50%, multiple infusions (of note, patient on heparin gtt- subtherapeutic PTT at this time). When RN turning and positioning patient, stool noted to be leaking around rectal tube. Stool was brick colored and liquid. Patient was cleaned and rectal tube working appropriately.

## 2019-05-15 NOTE — PROGRESS NOTE ADULT - ASSESSMENT
Mr Osman is a 78 year old man who underwent a non-urgent CABG (LIMA LAD and SVG to OM1 and RCA) on 4/26 with post-operative course complicated by acute respiratory failure with failure to wean from vent requiring tracheostomy. Due to persistent pulmonary infiltrates and history of possible , he underwent lung biopsy, which demonstrated evidence of amyloid deposition. On review of his echocardiogram pre-operatively, he has some characteristics suggestive of cardiac amyloid, but only had impaired relaxation. He currently is volume overloaded with progressively worsening renal failure, although he appears less anasarcic today. He is vasodilated and on two vasopressor to maintain adequate blood pressure and he has a worsening leukocytosis, but without fever and mildly elevated procalcitonin. His echo yesterday showed a mild intracavitary LV gradient. His heart rate is better controlled with diltiazem.     His other notable comorbidities include sleep apnea (non compliant with CPAP), GERD, diverticulitis, depression, anxiety, and panic attack, and a history of carpal tunnel syndrome.     Plan discussed in multidisciplinary rounds with CTU team and CT surgery. Please call me with questions at [phone].

## 2019-05-15 NOTE — PROGRESS NOTE ADULT - PROBLEM SELECTOR PLAN 2
- He is vasodilated with low diastolic blood pressure and increasing leukocytosis. However, he is afebrile and procalcitonin is only slightly elevated. Weaning vasopressors as tolerated with addition of midodrine.

## 2019-05-15 NOTE — PROGRESS NOTE ADULT - SUBJECTIVE AND OBJECTIVE BOX
CHIEF COMPLAINT:  f/up resp failure, pulm amyloidosis, asthma, osas-pain all over and some sob but somewhat aloof    Interval Events: 12 hrs TC    REVIEW OF SYSTEMS:  Constitutional: No fevers or chills. No weight loss. No fatigue or generalized malaise.  Eyes: No itching or discharge from the eyes  ENT: No ear pain. No ear discharge. No nasal congestion. No post nasal drip. No epistaxis. No throat pain. No sore throat. No difficulty swallowing.   CV: No chest pain. No palpitations. No lightheadedness or dizziness.   Resp: No dyspnea at rest. No dyspnea on exertion. No orthopnea. No wheezing. No cough. No stridor. No sputum production. No chest pain with respiration.  GI: No nausea. No vomiting. No diarrhea.  MSK: No joint pain or pain in any extremities  Integumentary: No skin lesions. No pedal edema.  Neurological: No gross motor weakness. No sensory changes.  [ ] All other systems negative  [+ ] Unable to assess ROS because pt aloof-not very communicative________    OBJECTIVE:  ICU Vital Signs Last 24 Hrs  T(C): 37.1 (15 May 2019 03:00), Max: 37.3 (14 May 2019 23:00)  T(F): 98.8 (15 May 2019 03:00), Max: 99.1 (14 May 2019 23:00)  HR: 78 (15 May 2019 04:15) (56 - 85)  BP: 96/42 (14 May 2019 12:15) (96/42 - 115/56)  BP(mean): 67 (14 May 2019 12:15) (67 - 81)  ABP: 132/54 (15 May 2019 04:15) (107/49 - 148/58)  ABP(mean): 78 (15 May 2019 04:15) (58 - 81)  RR: 12 (15 May 2019 04:15) (5 - 36)  SpO2: 100% (15 May 2019 04:15) (84% - 100%)    Mode: AC/ CMV (Assist Control/ Continuous Mandatory Ventilation), RR (machine): 10, TV (machine): 500, FiO2: 50, PEEP: 5, ITime: 1, MAP: 6, PIP: 12    05-13 @ 07:01  -  05-14 @ 07:00  --------------------------------------------------------  IN: 3750 mL / OUT: 3400 mL / NET: 350 mL    14 @ 07:01  -  05-15 @ 04:42  --------------------------------------------------------  IN: 2591.4 mL / OUT: 2815 mL / NET: -223.6 mL      CAPILLARY BLOOD GLUCOSE  109 (14 May 2019 17:00)      POCT Blood Glucose.: 146 mg/dL (14 May 2019 23:51)      PHYSICAL EXAM: on vent awake  General: Awake, alert, oriented X 3.   HEENT: Atraumatic, normocephalic.                 Mallampatti Grade 3                No nasal congestion.                No tonsillar or pharyngeal exudates.  Lymph Nodes: No palpable lymphadenopathy  Neck: No JVD. No carotid bruit.   Respiratory: Normal chest expansion                         Normal percussion                         Normal and equal air entry                         No wheeze, rhonchi or rales.  Cardiovascular: S1 S2 normal. No murmurs, rubs or gallops.   Abdomen: Soft, non-tender, non-distended. No organomegaly. Normoactive bowel sounds.  Extremities: Warm to touch. Peripheral pulse palpable. + pedal edema.   Skin: No rashes or skin lesions  Neurological: Motor and sensory examination equal and abnormal in all four extremities.  Psychiatry: inappropriate mood and affect.    HOSPITAL MEDICATIONS:  MEDICATIONS  (STANDING):  aspirin 325 milliGRAM(s) Oral daily  atorvastatin 40 milliGRAM(s) Oral at bedtime  buMETAnide Infusion 2 mG/Hr (10 mL/Hr) IV Continuous <Continuous>  chlorhexidine 0.12% Liquid 15 milliLiter(s) Oral Mucosa two times a day  chlorhexidine 4% Liquid 1 Application(s) Topical <User Schedule>  chlorhexidine 4% Liquid 1 Application(s) Topical <User Schedule>  dexmedetomidine Infusion 0.3 MICROgram(s)/kG/Hr (5.962 mL/Hr) IV Continuous <Continuous>  diltiazem Infusion 5 mG/Hr (5 mL/Hr) IV Continuous <Continuous>  escitalopram 10 milliGRAM(s) Oral daily  gabapentin 100 milliGRAM(s) Oral every 8 hours  heparin  Infusion 1300 Unit(s)/Hr (9.5 mL/Hr) IV Continuous <Continuous>  insulin lispro (HumaLOG) corrective regimen sliding scale   SubCutaneous every 6 hours  midodrine 10 milliGRAM(s) Oral every 8 hours  norepinephrine Infusion 0.034 MICROgram(s)/kG/Min (5 mL/Hr) IV Continuous <Continuous>  pantoprazole  Injectable 40 milliGRAM(s) IV Push two times a day  sodium chloride 0.9%. 1000 milliLiter(s) (10 mL/Hr) IV Continuous <Continuous>  vasopressin Infusion 0.083 Unit(s)/Min (5 mL/Hr) IV Continuous <Continuous>    MEDICATIONS  (PRN):  HYDROmorphone  Injectable 0.5 milliGRAM(s) IV Push every 8 hours PRN Severe Pain (7 - 10)  sodium chloride 0.9% lock flush 10 milliLiter(s) IV Push every 1 hour PRN Pre/post blood products, medications, blood draw, and to maintain line patency      LABS:                        9.8    29.3  )-----------( 179      ( 15 May 2019 01:52 )             29.0     -15    138  |  98  |  110<H>  ----------------------------<  120<H>  4.0   |  21<L>  |  2.80<H>    Ca    7.4<L>      15 May 2019 01:52  Phos  8.9     05-15  Mg     2.4     05-15    TPro  5.5<L>  /  Alb  2.5<L>  /  TBili  1.0  /  DBili  x   /  AST  108<H>  /  ALT  97<H>  /  AlkPhos  71  -15    PT/INR - ( 15 May 2019 01:52 )   PT: 14.7 sec;   INR: 1.27 ratio         PTT - ( 15 May 2019 01:52 )  PTT:42.6 sec  Urinalysis Basic - ( 14 May 2019 19:13 )    Color: Yellow / Appearance: Slightly Turbid / S.011 / pH: x  Gluc: x / Ketone: Negative  / Bili: Negative / Urobili: Negative   Blood: x / Protein: Trace / Nitrite: Negative   Leuk Esterase: Negative / RBC: 10 /hpf / WBC 4 /HPF   Sq Epi: x / Non Sq Epi: 3 /hpf / Bacteria: Negative      Arterial Blood Gas:  05-15 @ 01:39  7.45/34/185/23/99/.2  ABG lactate: --  Arterial Blood Gas:   @ 09:44  7.41/33/99/21/98/-2.9  ABG lactate: --  Arterial Blood Gas:   @ 05:20  7.42/33/148/21/99/-2.7  ABG lactate: --  Arterial Blood Gas:   @ 01:42  7.37/37/149/21/98/-3.3  ABG lactate: --  Arterial Blood Gas:   @ 00:16  7.45/29/146/20/99/-2.7  ABG lactate: --  Arterial Blood Gas:   @ 18:26  7.48/30/142/22/99/-.6  ABG lactate: --  Arterial Blood Gas:   @ 12:01  7.42/28/121/18/98/-5.3  ABG lactate: --  Arterial Blood Gas:   @ 06:35  7.43/29/143/19/99/-4.4  ABG lactate: --        MICROBIOLOGY:     RADIOLOGY:  [ ] Reviewed and interpreted by me    Point of Care Ultrasound Findings:    PFT:    EKG:

## 2019-05-15 NOTE — PROGRESS NOTE ADULT - PROBLEM SELECTOR PLAN 3
echocardiography with some LV hypertrophy but reportedly not typical of the starry-armida pattern seen in amyloid  - would suggest technetium pyrophosphate scan once patient more stable which is sensitive and specific for transthyretin cardiac amyloid  - could also consider cardiac biopsy as per cards  - Further serologic workup for Amyloid - including Immunoglobulins and serum light chains (to evaluate for AL amyloid) -no monoclonal gammopathy

## 2019-05-15 NOTE — PROGRESS NOTE ADULT - ASSESSMENT
78 yr old with ARCENIO admitted for chest pain and underwent a CABG last last week   Post op has been stable but is still intubated and became febrile last 24hrs.   No evidence of wd infection, alot of sputum and possible LLL infiltrate underwent Bronch/BAL with biopsy to r/o BOOP, started on steroids              fever down mostly but still with low grade  wbc still elevated   sp course of meropenem and micafungin without improvement leading us to do lung biopsy                   Discussed with CTU team and to be continued for now.  lung biopsy is positive for amyloid which may also be in kidney and heart.   Also have some GI bleeding and extremities are mottled but viable Has been off antibiotics fro the last week but today WBC dorothy to over 30 K  Cultures sent and pt started on vanco and meropenem.   no clearcut source .  will await cultures   the extent of the amyloid is unclear at present     Off antibiotics and micafungin  discussed with Dr. DRAPER  reculture for any clinically deterioration  ID will cove this weekend and monday  'taper steroids

## 2019-05-15 NOTE — PROGRESS NOTE ADULT - PROBLEM SELECTOR PLAN 1
Pt with TARAH most likely hemodynamically mediated vs ischemic ATN in the setting of anemia from blood loss, hemodynamic instability, and renal venous congestion. SCr of 1.2 in 2016, 1.1 08/18 and 1.06 on 04/25/19. Started on HD on 05/11/19 due to BUN 190s worsening mental status, concerning for uremic encephalopathy. Labs reviewed today, will plan for HD with 1 lit UF given anasarca.  TTE   - Monitor I&O, daily weights, avoid nephrotoxics  - Adjust meds based on GFR  - Avoid hypotension  - goal to keep even Pt with TARAH most likely hemodynamically mediated vs ischemic ATN in the setting of anemia from blood loss, hemodynamic instability, and renal venous congestion. SCr of 1.2 in 2016, 1.1 08/18 and 1.06 on 04/25/19. Started on HD on 05/11/19 due to BUN 190s worsening mental status, concerning for uremic encephalopathy. Labs reviewed today, will plan for HD with 0.5 lit UF given anasarca (Heart Failure note appreciated).  TTE   - Monitor I&O, daily weights, avoid nephrotoxics  - Adjust meds based on GFR  - Avoid hypotension  - goal to keep even to slightly negative.

## 2019-05-15 NOTE — PROGRESS NOTE ADULT - PROBLEM SELECTOR PLAN 3
- Very likely with cardiac involvement based on EKG and echo findings.   - Pathology from lung sent to [place] for typing.   - Serum free light chains show no evidence of monoclonal gammopathy.

## 2019-05-15 NOTE — PROGRESS NOTE ADULT - SUBJECTIVE AND OBJECTIVE BOX
[facility] DIVISION OF KIDNEY DISEASES AND HYPERTENSION -- FOLLOW UP NOTE  --------------------------------------------------------------------------------  Chief Complaint:  TARAH    24 hour events/subjective:  Pt examined at bed side, trach to vent, on NG tube feeds, on vasopressin only, on diuretics, non oliguric, opening eyes,  on pain, but not able to provide meaningful ros, remains edematous.       PAST HISTORY  --------------------------------------------------------------------------------  No significant changes to PMH, PSH, FHx, SHx, unless otherwise noted    ALLERGIES & MEDICATIONS  --------------------------------------------------------------------------------  Allergies    No Known Allergies    Intolerances      Standing Inpatient Medications  aspirin 325 milliGRAM(s) Oral daily  atorvastatin 40 milliGRAM(s) Oral at bedtime  buMETAnide Infusion 2 mG/Hr IV Continuous <Continuous>  chlorhexidine 0.12% Liquid 15 milliLiter(s) Oral Mucosa two times a day  chlorhexidine 4% Liquid 1 Application(s) Topical <User Schedule>  chlorhexidine 4% Liquid 1 Application(s) Topical <User Schedule>  dexmedetomidine Infusion 0.3 MICROgram(s)/kG/Hr IV Continuous <Continuous>  escitalopram 10 milliGRAM(s) Oral daily  gabapentin 100 milliGRAM(s) Oral every 8 hours  heparin  Infusion 1300 Unit(s)/Hr IV Continuous <Continuous>  insulin lispro (HumaLOG) corrective regimen sliding scale   SubCutaneous every 6 hours  midodrine 10 milliGRAM(s) Oral every 8 hours  norepinephrine Infusion 0.034 MICROgram(s)/kG/Min IV Continuous <Continuous>  pantoprazole  Injectable 40 milliGRAM(s) IV Push two times a day  sodium chloride 0.9%. 1000 milliLiter(s) IV Continuous <Continuous>  vasopressin Infusion 0.083 Unit(s)/Min IV Continuous <Continuous>    PRN Inpatient Medications  HYDROmorphone  Injectable 0.5 milliGRAM(s) IV Push every 8 hours PRN  sodium chloride 0.9% lock flush 10 milliLiter(s) IV Push every 1 hour PRN      REVIEW OF SYSTEMS  --------------------------------------------------------------------------------  Not able to obtain.     VITALS/PHYSICAL EXAM  --------------------------------------------------------------------------------  T(C): 37.2 (05-15-19 @ 08:00), Max: 37.3 (05-14-19 @ 23:00)  HR: 96 (05-15-19 @ 09:45) (56 - 96)  BP: 96/42 (05-14-19 @ 12:15) (96/42 - 115/56)  RR: 22 (05-15-19 @ 09:45) (8 - 36)  SpO2: 98% (05-15-19 @ 09:45) (84% - 100%)  Wt(kg): --        05-14-19 @ 07:01  -  05-15-19 @ 07:00  --------------------------------------------------------  IN: 2992.9 mL / OUT: 3310 mL / NET: -317.1 mL    05-15-19 @ 07:01  -  05-15-19 @ 09:54  --------------------------------------------------------  IN: 239.5 mL / OUT: 240 mL / NET: -0.5 mL      Physical Exam:    	Gen: critically ill.   	HEENT: +NGT, +trach  	Pulm: CTA B/L  	CV: RRR, S1S2; no rub  	Abd: +BS, soft, nontender/nondistended              : De with clear yellow urine, scrotal edema.   	LE: Warm, +edema  	Vascular access: +RIJ non-tunneled HD catheter    LABS/STUDIES  --------------------------------------------------------------------------------              9.8    29.3  >-----------<  179      [05-15-19 @ 01:52]              29.0     138  |  98  |  110  ----------------------------<  120      [05-15-19 @ 01:52]  4.0   |  21  |  2.80        Ca     7.4     [05-15-19 @ 01:52]      Mg     2.4     [05-15-19 @ 01:52]      Phos  8.9     [05-15-19 @ 01:52]    TPro  5.5  /  Alb  2.5  /  TBili  1.0  /  DBili  x   /  AST  108  /  ALT  97  /  AlkPhos  71  [05-15-19 @ 01:52]    PT/INR: PT 14.7 , INR 1.27       [05-15-19 @ 01:52]  PTT: 47.4       [05-15-19 @ 09:24]      Creatinine Trend:  SCr 2.80 [05-15 @ 01:52]  SCr 3.37 [05-14 @ 00:49]  SCr 3.71 [05-13 @ 01:02]  SCr 2.98 [05-12 @ 01:31]  SCr 3.35 [05-11 @ 12:14] [facility] DIVISION OF KIDNEY DISEASES AND HYPERTENSION -- FOLLOW UP NOTE  --------------------------------------------------------------------------------  Chief Complaint:  TARAH    24 hour events/subjective:  Pt examined at bed side, trach to vent, on NG tube feeds, on vasopressin only, on diuretics, non oliguric, opening eyes,  on pain, but not able to provide meaningful ros, remains edematous.     PAST HISTORY  --------------------------------------------------------------------------------  No significant changes to PMH, PSH, FHx, SHx, unless otherwise noted    ALLERGIES & MEDICATIONS  --------------------------------------------------------------------------------  Allergies    No Known Allergies    Intolerances      Standing Inpatient Medications  aspirin 325 milliGRAM(s) Oral daily  atorvastatin 40 milliGRAM(s) Oral at bedtime  buMETAnide Infusion 2 mG/Hr IV Continuous <Continuous>  chlorhexidine 0.12% Liquid 15 milliLiter(s) Oral Mucosa two times a day  chlorhexidine 4% Liquid 1 Application(s) Topical <User Schedule>  chlorhexidine 4% Liquid 1 Application(s) Topical <User Schedule>  dexmedetomidine Infusion 0.3 MICROgram(s)/kG/Hr IV Continuous <Continuous>  escitalopram 10 milliGRAM(s) Oral daily  gabapentin 100 milliGRAM(s) Oral every 8 hours  heparin  Infusion 1300 Unit(s)/Hr IV Continuous <Continuous>  insulin lispro (HumaLOG) corrective regimen sliding scale   SubCutaneous every 6 hours  midodrine 10 milliGRAM(s) Oral every 8 hours  norepinephrine Infusion 0.034 MICROgram(s)/kG/Min IV Continuous <Continuous>  pantoprazole  Injectable 40 milliGRAM(s) IV Push two times a day  sodium chloride 0.9%. 1000 milliLiter(s) IV Continuous <Continuous>  vasopressin Infusion 0.083 Unit(s)/Min IV Continuous <Continuous>    PRN Inpatient Medications  HYDROmorphone  Injectable 0.5 milliGRAM(s) IV Push every 8 hours PRN  sodium chloride 0.9% lock flush 10 milliLiter(s) IV Push every 1 hour PRN      REVIEW OF SYSTEMS  --------------------------------------------------------------------------------  Not able to obtain.     VITALS/PHYSICAL EXAM  --------------------------------------------------------------------------------  T(C): 37.2 (05-15-19 @ 08:00), Max: 37.3 (05-14-19 @ 23:00)  HR: 96 (05-15-19 @ 09:45) (56 - 96)  BP: 96/42 (05-14-19 @ 12:15) (96/42 - 115/56)  RR: 22 (05-15-19 @ 09:45) (8 - 36)  SpO2: 98% (05-15-19 @ 09:45) (84% - 100%)  Wt(kg): --        05-14-19 @ 07:01  -  05-15-19 @ 07:00  --------------------------------------------------------  IN: 2992.9 mL / OUT: 3310 mL / NET: -317.1 mL    05-15-19 @ 07:01  -  05-15-19 @ 09:54  --------------------------------------------------------  IN: 239.5 mL / OUT: 240 mL / NET: -0.5 mL      Physical Exam:    	Gen: critically ill.   	HEENT: +NGT, +trach  	Pulm: CTA B/L  	CV: RRR, S1S2; no rub  	Abd: +BS, soft, nontender/nondistended              : De with clear yellow urine, scrotal edema.   	LE: Warm, +edema  	Vascular access: +RIJ non-tunneled HD catheter    LABS/STUDIES  --------------------------------------------------------------------------------              9.8    29.3  >-----------<  179      [05-15-19 @ 01:52]              29.0     138  |  98  |  110  ----------------------------<  120      [05-15-19 @ 01:52]  4.0   |  21  |  2.80        Ca     7.4     [05-15-19 @ 01:52]      Mg     2.4     [05-15-19 @ 01:52]      Phos  8.9     [05-15-19 @ 01:52]    TPro  5.5  /  Alb  2.5  /  TBili  1.0  /  DBili  x   /  AST  108  /  ALT  97  /  AlkPhos  71  [05-15-19 @ 01:52]    PT/INR: PT 14.7 , INR 1.27       [05-15-19 @ 01:52]  PTT: 47.4       [05-15-19 @ 09:24]      Creatinine Trend:  SCr 2.80 [05-15 @ 01:52]  SCr 3.37 [05-14 @ 00:49]  SCr 3.71 [05-13 @ 01:02]  SCr 2.98 [05-12 @ 01:31]  SCr 3.35 [05-11 @ 12:14]

## 2019-05-15 NOTE — PROGRESS NOTE ADULT - PROBLEM SELECTOR PLAN 2
The patient has an elevated bun in the setting of steroid use, off steroids, BUN today  110s, will plan for HD with UF today

## 2019-05-15 NOTE — PROGRESS NOTE ADULT - SUBJECTIVE AND OBJECTIVE BOX
infectious diseases progress note:    Patient is a 79y old  Male who presents with a chief complaint of S/P CABG (15 May 2019 12:40)        Angina pectoris  Atherosclerosis of native coronary artery without angina pectoris          Allergies    No Known Allergies    Intolerances        ANTIBIOTICS/RELEVANT:  antimicrobials  meropenem  IVPB      vancomycin  IVPB 1000 milliGRAM(s) IV Intermittent every 24 hours    immunologic:    OTHER:  aspirin 325 milliGRAM(s) Oral daily  atorvastatin 40 milliGRAM(s) Oral at bedtime  buMETAnide Infusion 2 mG/Hr IV Continuous <Continuous>  chlorhexidine 0.12% Liquid 15 milliLiter(s) Oral Mucosa two times a day  chlorhexidine 4% Liquid 1 Application(s) Topical <User Schedule>  chlorhexidine 4% Liquid 1 Application(s) Topical <User Schedule>  dexmedetomidine Infusion 0.3 MICROgram(s)/kG/Hr IV Continuous <Continuous>  escitalopram 10 milliGRAM(s) Oral daily  gabapentin 100 milliGRAM(s) Oral every 8 hours  heparin  Infusion 1300 Unit(s)/Hr IV Continuous <Continuous>  HYDROmorphone  Injectable 0.5 milliGRAM(s) IV Push every 8 hours PRN  insulin lispro (HumaLOG) corrective regimen sliding scale   SubCutaneous every 6 hours  metoclopramide Injectable 5 milliGRAM(s) IV Push every 8 hours  midodrine 10 milliGRAM(s) Oral every 8 hours  norepinephrine Infusion 0.034 MICROgram(s)/kG/Min IV Continuous <Continuous>  pantoprazole  Injectable 40 milliGRAM(s) IV Push two times a day  sodium chloride 0.9% lock flush 10 milliLiter(s) IV Push every 1 hour PRN  sodium chloride 0.9%. 1000 milliLiter(s) IV Continuous <Continuous>  vasopressin Infusion 0.083 Unit(s)/Min IV Continuous <Continuous>      Objective:  Vital Signs Last 24 Hrs  T(C): 37.7 (15 May 2019 12:00), Max: 37.7 (15 May 2019 12:00)  T(F): 99.9 (15 May 2019 12:00), Max: 99.9 (15 May 2019 12:00)  HR: 105 (15 May 2019 15:45) (65 - 111)  BP: 140/71 (15 May 2019 15:00) (127/75 - 140/71)  BP(mean): 94 (15 May 2019 15:00) (92 - 102)  RR: 28 (15 May 2019 15:45) (8 - 36)  SpO2: 99% (15 May 2019 15:45) (84% - 100%)    PHYSICAL EXAM:     Eyes:RYLEY, EOMI  Ear/Nose/Throat: no oral lesion, no sinus tenderness on percussion	  Neck:no JVD, no lymphadenopathy, supple  Respiratory: CTA shagufta  Cardiovascular: S1S2 RRR, no murmurs  Gastrointestinal:soft, (+) BS, no HSM  Extremities: toes look mottled         LABS:                        9.9    38.4  )-----------( 217      ( 15 May 2019 12:45 )             29.1     05-15    138  |  98  |  110<H>  ----------------------------<  120<H>  4.0   |  21<L>  |  2.80<H>    Ca    7.4<L>      15 May 2019 01:52  Phos  8.9     -15  Mg     2.4     -15    TPro  5.5<L>  /  Alb  2.5<L>  /  TBili  1.0  /  DBili  x   /  AST  108<H>  /  ALT  97<H>  /  AlkPhos  71  05-15    PT/INR - ( 15 May 2019 01:52 )   PT: 14.7 sec;   INR: 1.27 ratio         PTT - ( 15 May 2019 09:24 )  PTT:47.4 sec  Urinalysis Basic - ( 14 May 2019 19:13 )    Color: Yellow / Appearance: Slightly Turbid / S.011 / pH: x  Gluc: x / Ketone: Negative  / Bili: Negative / Urobili: Negative   Blood: x / Protein: Trace / Nitrite: Negative   Leuk Esterase: Negative / RBC: 10 /hpf / WBC 4 /HPF   Sq Epi: x / Non Sq Epi: 3 /hpf / Bacteria: Negative          MICROBIOLOGY:    RECENT CULTURES:   @ 14:26 .Sputum trap       Moderate polymorphonuclear leukocytes per low power field  Moderate Squamous epithelial cells per low power field  Moderate Gram Variable Rods per oil power field  Moderate Gram Positive Cocci in Clusters per oil power field              @ 17:02 .Blood                No growth to date.     @ 18:11 .Blood                No growth to date.     @ 09:24 .Blood                No growth at 5 days.          RESPIRATORY CULTURES:      Clostridium difficile GDH Toxins A&amp;B, EIA:   Negative ( @ 11:07)          RADIOLOGY & ADDITIONAL STUDIES:        Pager [phone]  After 5 pm/weekends or if no response :[phone]

## 2019-05-15 NOTE — PROGRESS NOTE ADULT - SUBJECTIVE AND OBJECTIVE BOX
CRITICAL CARE ATTENDING - CTICU    MEDICATIONS  (STANDING):  aspirin 325 milliGRAM(s) Oral daily  atorvastatin 40 milliGRAM(s) Oral at bedtime  buMETAnide Infusion 2 mG/Hr (10 mL/Hr) IV Continuous <Continuous>  chlorhexidine 0.12% Liquid 15 milliLiter(s) Oral Mucosa two times a day  chlorhexidine 4% Liquid 1 Application(s) Topical <User Schedule>  chlorhexidine 4% Liquid 1 Application(s) Topical <User Schedule>  dexmedetomidine Infusion 0.3 MICROgram(s)/kG/Hr (5.962 mL/Hr) IV Continuous <Continuous>  escitalopram 10 milliGRAM(s) Oral daily  gabapentin 100 milliGRAM(s) Oral every 8 hours  heparin  Infusion 1300 Unit(s)/Hr (10 mL/Hr) IV Continuous <Continuous>  HYDROmorphone  Injectable 0.5 milliGRAM(s) IV Push once  insulin lispro (HumaLOG) corrective regimen sliding scale   SubCutaneous every 6 hours  midodrine 10 milliGRAM(s) Oral every 8 hours  norepinephrine Infusion 0.034 MICROgram(s)/kG/Min (5 mL/Hr) IV Continuous <Continuous>  pantoprazole  Injectable 40 milliGRAM(s) IV Push two times a day  sodium chloride 0.9%. 1000 milliLiter(s) (10 mL/Hr) IV Continuous <Continuous>  vasopressin Infusion 0.083 Unit(s)/Min (5 mL/Hr) IV Continuous <Continuous>                                    9.8    29.3  )-----------( 179      ( 15 May 2019 01:52 )             29.0       05-15    138  |  98  |  110<H>  ----------------------------<  120<H>  4.0   |  21<L>  |  2.80<H>    Ca    7.4<L>      15 May 2019 01:52  Phos  8.9     05-15  Mg     2.4     05-15    TPro  5.5<L>  /  Alb  2.5<L>  /  TBili  1.0  /  DBili  x   /  AST  108<H>  /  ALT  97<H>  /  AlkPhos  71  05-15      PT/INR - ( 15 May 2019 01:52 )   PT: 14.7 sec;   INR: 1.27 ratio         PTT - ( 15 May 2019 09:24 )  PTT:47.4 sec    Mode: trach collar  FiO2: 50      Daily     Daily Weight in k.2 (15 May 2019 00:00)       @ :01  -  05-15 @ 07:00  --------------------------------------------------------  IN: 2992.9 mL / OUT: 3310 mL / NET: -317.1 mL    05-15 @ 07:01  -  05-15 @ 12:00  --------------------------------------------------------  IN: 319.5 mL / OUT: 300 mL / NET: 19.5 mL        Critically Ill patient  : [ ] preoperative ,   [ x] post operative    Requires :  [x ] Arterial Line   [x ] Central Line  [ ] PA catheter  [ ] IABP  [ ] ECMO  [ ] LVAD  [x] Ventilator  [ ] pacemaker [ ] Impella.    Bedside evaluation , monitoring , treatment of hemodynamics , fluids , IVP/ IVCD meds.        Diagnosis:     POD / - CABG X 3 L    Post op ARDS    Amyloidosis / pneumonia    respiratory failure - Tracheostomy     Requires chest PT, pulmonary toilet, ambu bagging, suctioning to maintain SaO2,  patent airway and treat atelectasis.     Ventilator Management:  [ x]AC-rest    [x ]CPAP-PS Wean    [xTrach Collar     [ ]Extubate    [ ] T-Piece  [ ]peep>5     Difficult weaning process - multiple organ system involvement in critically ill patient     CHF- acute [ x]   chronic [x ]    systolic [ ]   diatolic [x ]          - Echo- EF - 70%            [ ] RV dysfunction          - Cxr-cardiomegally, edema          - Clinical-  [ ]inotropes   [ x]pressors   [ x]diuresis   [ ]IABP   [ ]ECMO   [ ]LVAD   [ x]Respiratory Failure    LVOT obstruction     Renal Failure - Acute Kidney Injury     [ x] Hemodialysis [ x] Hemofiltration:    [x negative fluid balance [ ] even fluid balance [ ] positive fluid balance, in a hemodynamically unstable patient.     fluid overload     Tolerates NG / NJ feeds at [ x] goal rate    [ ] trophic rate    [ ]       rate     IVCD anticoagulation with [ x] Heparin  [ ] Argatroban for  A Fib / Flutter    Anxiety    Hemodynamic lability,instability. Requires IVCD [ x] vasopressors [ ] inotropes  [ ] vasodilator  [ ]IVSS fluid  to maintain MAP, perfusion, C.I.                     -                         Discussed with CT surgeon, Physician's Assistant - Nurse Practitioner- Critical care medicine team.   Dicussed at  AM / PM rounds.   Chart, labs , films reviewed.    Total Time:30 min

## 2019-05-16 DIAGNOSIS — F05 DELIRIUM DUE TO KNOWN PHYSIOLOGICAL CONDITION: ICD-10-CM

## 2019-05-16 LAB
-  AMPICILLIN/SULBACTAM: SIGNIFICANT CHANGE UP
-  CEFAZOLIN: SIGNIFICANT CHANGE UP
-  CLINDAMYCIN: SIGNIFICANT CHANGE UP
-  ERYTHROMYCIN: SIGNIFICANT CHANGE UP
-  GENTAMICIN: SIGNIFICANT CHANGE UP
-  LINEZOLID: SIGNIFICANT CHANGE UP
-  OXACILLIN: SIGNIFICANT CHANGE UP
-  PENICILLIN: SIGNIFICANT CHANGE UP
-  RIFAMPIN: SIGNIFICANT CHANGE UP
-  TETRACYCLINE: SIGNIFICANT CHANGE UP
-  TRIMETHOPRIM/SULFAMETHOXAZOLE: SIGNIFICANT CHANGE UP
-  VANCOMYCIN: SIGNIFICANT CHANGE UP
ALBUMIN SERPL ELPH-MCNC: 2.8 G/DL — LOW (ref 3.3–5)
ALP SERPL-CCNC: 63 U/L — SIGNIFICANT CHANGE UP (ref 40–120)
ALT FLD-CCNC: 136 U/L — HIGH (ref 10–45)
ANION GAP SERPL CALC-SCNC: 23 MMOL/L — HIGH (ref 5–17)
APTT BLD: 62.8 SEC — HIGH (ref 27.5–36.3)
APTT BLD: 64.8 SEC — HIGH (ref 27.5–36.3)
APTT BLD: 71.4 SEC — HIGH (ref 27.5–36.3)
AST SERPL-CCNC: 173 U/L — HIGH (ref 10–40)
BILIRUB SERPL-MCNC: 1.4 MG/DL — HIGH (ref 0.2–1.2)
BLD GP AB SCN SERPL QL: NEGATIVE — SIGNIFICANT CHANGE UP
BUN SERPL-MCNC: 105 MG/DL — HIGH (ref 7–23)
CALCIUM SERPL-MCNC: 7.3 MG/DL — LOW (ref 8.4–10.5)
CHLORIDE SERPL-SCNC: 101 MMOL/L — SIGNIFICANT CHANGE UP (ref 96–108)
CO2 SERPL-SCNC: 17 MMOL/L — LOW (ref 22–31)
CREAT SERPL-MCNC: 2.66 MG/DL — HIGH (ref 0.5–1.3)
CULTURE RESULTS: SIGNIFICANT CHANGE UP
CULTURE RESULTS: SIGNIFICANT CHANGE UP
GAS PNL BLDA: SIGNIFICANT CHANGE UP
GLUCOSE BLDC GLUCOMTR-MCNC: 124 MG/DL — HIGH (ref 70–99)
GLUCOSE BLDC GLUCOMTR-MCNC: 144 MG/DL — HIGH (ref 70–99)
GLUCOSE SERPL-MCNC: 136 MG/DL — HIGH (ref 70–99)
GRAM STN FLD: SIGNIFICANT CHANGE UP
HCT VFR BLD CALC: 26.8 % — LOW (ref 39–50)
HGB BLD-MCNC: 9.1 G/DL — LOW (ref 13–17)
LDH SERPL L TO P-CCNC: 706 U/L — HIGH (ref 50–242)
MAGNESIUM SERPL-MCNC: 2.4 MG/DL — SIGNIFICANT CHANGE UP (ref 1.6–2.6)
MCHC RBC-ENTMCNC: 29.2 PG — SIGNIFICANT CHANGE UP (ref 27–34)
MCHC RBC-ENTMCNC: 34 GM/DL — SIGNIFICANT CHANGE UP (ref 32–36)
MCV RBC AUTO: 86 FL — SIGNIFICANT CHANGE UP (ref 80–100)
METHOD TYPE: SIGNIFICANT CHANGE UP
ORGANISM # SPEC MICROSCOPIC CNT: SIGNIFICANT CHANGE UP
ORGANISM # SPEC MICROSCOPIC CNT: SIGNIFICANT CHANGE UP
PHOSPHATE SERPL-MCNC: 9.1 MG/DL — HIGH (ref 2.5–4.5)
PLATELET # BLD AUTO: 186 K/UL — SIGNIFICANT CHANGE UP (ref 150–400)
POTASSIUM SERPL-MCNC: 4.2 MMOL/L — SIGNIFICANT CHANGE UP (ref 3.5–5.3)
POTASSIUM SERPL-SCNC: 4.2 MMOL/L — SIGNIFICANT CHANGE UP (ref 3.5–5.3)
PROT SERPL-MCNC: 5.3 G/DL — LOW (ref 6–8.3)
RBC # BLD: 3.11 M/UL — LOW (ref 4.2–5.8)
RBC # FLD: 14.9 % — HIGH (ref 10.3–14.5)
RH IG SCN BLD-IMP: POSITIVE — SIGNIFICANT CHANGE UP
SODIUM SERPL-SCNC: 141 MMOL/L — SIGNIFICANT CHANGE UP (ref 135–145)
SPECIMEN SOURCE: SIGNIFICANT CHANGE UP
VANCOMYCIN TROUGH SERPL-MCNC: 12.5 UG/ML — SIGNIFICANT CHANGE UP (ref 10–20)
WBC # BLD: 33.3 K/UL — HIGH (ref 3.8–10.5)
WBC # FLD AUTO: 33.3 K/UL — HIGH (ref 3.8–10.5)

## 2019-05-16 PROCEDURE — 99233 SBSQ HOSP IP/OBS HIGH 50: CPT

## 2019-05-16 PROCEDURE — 99233 SBSQ HOSP IP/OBS HIGH 50: CPT | Mod: GC

## 2019-05-16 PROCEDURE — 99291 CRITICAL CARE FIRST HOUR: CPT

## 2019-05-16 PROCEDURE — 99292 CRITICAL CARE ADDL 30 MIN: CPT

## 2019-05-16 PROCEDURE — 99232 SBSQ HOSP IP/OBS MODERATE 35: CPT

## 2019-05-16 PROCEDURE — 71260 CT THORAX DX C+: CPT | Mod: 26

## 2019-05-16 PROCEDURE — 99223 1ST HOSP IP/OBS HIGH 75: CPT

## 2019-05-16 PROCEDURE — 70470 CT HEAD/BRAIN W/O & W/DYE: CPT | Mod: 26

## 2019-05-16 PROCEDURE — 71045 X-RAY EXAM CHEST 1 VIEW: CPT | Mod: 26

## 2019-05-16 PROCEDURE — 99223 1ST HOSP IP/OBS HIGH 75: CPT | Mod: GC

## 2019-05-16 PROCEDURE — 74177 CT ABD & PELVIS W/CONTRAST: CPT | Mod: 26

## 2019-05-16 RX ORDER — CALCIUM GLUCONATE 100 MG/ML
1 VIAL (ML) INTRAVENOUS ONCE
Refills: 0 | Status: COMPLETED | OUTPATIENT
Start: 2019-05-16 | End: 2019-05-16

## 2019-05-16 RX ORDER — ALBUMIN HUMAN 25 %
250 VIAL (ML) INTRAVENOUS ONCE
Refills: 0 | Status: COMPLETED | OUTPATIENT
Start: 2019-05-16 | End: 2019-05-16

## 2019-05-16 RX ORDER — PROPOFOL 10 MG/ML
10 INJECTION, EMULSION INTRAVENOUS
Qty: 500 | Refills: 0 | Status: DISCONTINUED | OUTPATIENT
Start: 2019-05-16 | End: 2019-05-18

## 2019-05-16 RX ORDER — VECURONIUM BROMIDE 20 MG/1
5 INJECTION, POWDER, FOR SOLUTION INTRAVENOUS ONCE
Refills: 0 | Status: COMPLETED | OUTPATIENT
Start: 2019-05-16 | End: 2019-05-16

## 2019-05-16 RX ORDER — VECURONIUM BROMIDE 20 MG/1
10 INJECTION, POWDER, FOR SOLUTION INTRAVENOUS ONCE
Refills: 0 | Status: COMPLETED | OUTPATIENT
Start: 2019-05-16 | End: 2019-05-16

## 2019-05-16 RX ORDER — AMIODARONE HYDROCHLORIDE 400 MG/1
150 TABLET ORAL ONCE
Refills: 0 | Status: COMPLETED | OUTPATIENT
Start: 2019-05-16 | End: 2019-05-16

## 2019-05-16 RX ORDER — FENTANYL CITRATE 50 UG/ML
50 INJECTION INTRAVENOUS ONCE
Refills: 0 | Status: DISCONTINUED | OUTPATIENT
Start: 2019-05-16 | End: 2019-05-16

## 2019-05-16 RX ORDER — SODIUM BICARBONATE 1 MEQ/ML
50 SYRINGE (ML) INTRAVENOUS ONCE
Refills: 0 | Status: COMPLETED | OUTPATIENT
Start: 2019-05-16 | End: 2019-05-16

## 2019-05-16 RX ORDER — HYDROMORPHONE HYDROCHLORIDE 2 MG/ML
0.5 INJECTION INTRAMUSCULAR; INTRAVENOUS; SUBCUTANEOUS ONCE
Refills: 0 | Status: DISCONTINUED | OUTPATIENT
Start: 2019-05-16 | End: 2019-05-16

## 2019-05-16 RX ADMIN — AMIODARONE HYDROCHLORIDE 600 MILLIGRAM(S): 400 TABLET ORAL at 11:39

## 2019-05-16 RX ADMIN — Medication 125 MILLILITER(S): at 03:29

## 2019-05-16 RX ADMIN — Medication 200 GRAM(S): at 18:29

## 2019-05-16 RX ADMIN — HEPARIN SODIUM 10 UNIT(S)/HR: 5000 INJECTION INTRAVENOUS; SUBCUTANEOUS at 07:42

## 2019-05-16 RX ADMIN — Medication 3 MG/HR: at 07:42

## 2019-05-16 RX ADMIN — FENTANYL CITRATE 50 MICROGRAM(S): 50 INJECTION INTRAVENOUS at 10:15

## 2019-05-16 RX ADMIN — CHLORHEXIDINE GLUCONATE 15 MILLILITER(S): 213 SOLUTION TOPICAL at 05:05

## 2019-05-16 RX ADMIN — Medication 5 MILLIGRAM(S): at 05:05

## 2019-05-16 RX ADMIN — Medication 50 MILLIEQUIVALENT(S): at 10:00

## 2019-05-16 RX ADMIN — GABAPENTIN 200 MILLIGRAM(S): 400 CAPSULE ORAL at 15:15

## 2019-05-16 RX ADMIN — Medication 125 MILLILITER(S): at 10:40

## 2019-05-16 RX ADMIN — PANTOPRAZOLE SODIUM 40 MILLIGRAM(S): 20 TABLET, DELAYED RELEASE ORAL at 17:56

## 2019-05-16 RX ADMIN — Medication 125 MILLILITER(S): at 01:11

## 2019-05-16 RX ADMIN — GABAPENTIN 200 MILLIGRAM(S): 400 CAPSULE ORAL at 22:27

## 2019-05-16 RX ADMIN — VASOPRESSIN 5 UNIT(S)/MIN: 20 INJECTION INTRAVENOUS at 07:44

## 2019-05-16 RX ADMIN — Medication 200 GRAM(S): at 15:13

## 2019-05-16 RX ADMIN — GABAPENTIN 200 MILLIGRAM(S): 400 CAPSULE ORAL at 05:05

## 2019-05-16 RX ADMIN — VECURONIUM BROMIDE 10 MILLIGRAM(S): 20 INJECTION, POWDER, FOR SOLUTION INTRAVENOUS at 10:28

## 2019-05-16 RX ADMIN — Medication 200 GRAM(S): at 09:37

## 2019-05-16 RX ADMIN — MIDODRINE HYDROCHLORIDE 10 MILLIGRAM(S): 2.5 TABLET ORAL at 05:05

## 2019-05-16 RX ADMIN — Medication 2: at 11:55

## 2019-05-16 RX ADMIN — ESCITALOPRAM OXALATE 10 MILLIGRAM(S): 10 TABLET, FILM COATED ORAL at 15:15

## 2019-05-16 RX ADMIN — Medication 5 MILLIGRAM(S): at 22:27

## 2019-05-16 RX ADMIN — PROPOFOL 4.77 MICROGRAM(S)/KG/MIN: 10 INJECTION, EMULSION INTRAVENOUS at 07:43

## 2019-05-16 RX ADMIN — PANTOPRAZOLE SODIUM 40 MILLIGRAM(S): 20 TABLET, DELAYED RELEASE ORAL at 05:05

## 2019-05-16 RX ADMIN — FENTANYL CITRATE 50 MICROGRAM(S): 50 INJECTION INTRAVENOUS at 10:00

## 2019-05-16 RX ADMIN — Medication 5 MICROGRAM(S)/KG/MIN: at 07:43

## 2019-05-16 RX ADMIN — ATORVASTATIN CALCIUM 40 MILLIGRAM(S): 80 TABLET, FILM COATED ORAL at 22:27

## 2019-05-16 RX ADMIN — CHLORHEXIDINE GLUCONATE 1 APPLICATION(S): 213 SOLUTION TOPICAL at 05:15

## 2019-05-16 RX ADMIN — CHLORHEXIDINE GLUCONATE 15 MILLILITER(S): 213 SOLUTION TOPICAL at 17:00

## 2019-05-16 RX ADMIN — MEROPENEM 100 MILLIGRAM(S): 1 INJECTION INTRAVENOUS at 17:56

## 2019-05-16 RX ADMIN — MIDODRINE HYDROCHLORIDE 10 MILLIGRAM(S): 2.5 TABLET ORAL at 22:26

## 2019-05-16 RX ADMIN — HYDROMORPHONE HYDROCHLORIDE 0.5 MILLIGRAM(S): 2 INJECTION INTRAMUSCULAR; INTRAVENOUS; SUBCUTANEOUS at 23:30

## 2019-05-16 RX ADMIN — BUMETANIDE 10 MG/HR: 0.25 INJECTION INTRAMUSCULAR; INTRAVENOUS at 07:44

## 2019-05-16 RX ADMIN — MEROPENEM 100 MILLIGRAM(S): 1 INJECTION INTRAVENOUS at 05:04

## 2019-05-16 RX ADMIN — Medication 125 MILLILITER(S): at 09:55

## 2019-05-16 RX ADMIN — MIDODRINE HYDROCHLORIDE 10 MILLIGRAM(S): 2.5 TABLET ORAL at 15:15

## 2019-05-16 RX ADMIN — PROPOFOL 4.77 MICROGRAM(S)/KG/MIN: 10 INJECTION, EMULSION INTRAVENOUS at 03:54

## 2019-05-16 NOTE — PROGRESS NOTE ADULT - SUBJECTIVE AND OBJECTIVE BOX
CHIEF COMPLAINT:  f/up resp failure, pulm amyloidosis, asthma, osas-sedated on vent (diprovan)    Interval Events: 4 hrs TC, rectal tube-c.diff neg    REVIEW OF SYSTEMS:  Constitutional: No fevers or chills. No weight loss. No fatigue or generalized malaise.  Eyes: No itching or discharge from the eyes  ENT: No ear pain. No ear discharge. No nasal congestion. No post nasal drip. No epistaxis. No throat pain. No sore throat. No difficulty swallowing.   CV: No chest pain. No palpitations. No lightheadedness or dizziness.   Resp: No dyspnea at rest. No dyspnea on exertion. No orthopnea. No wheezing. No cough. No stridor. No sputum production. No chest pain with respiration.  GI: No nausea. No vomiting. No diarrhea.  MSK: No joint pain or pain in any extremities  Integumentary: No skin lesions. No pedal edema.  Neurological: No gross motor weakness. No sensory changes.  [ ] All other systems negative  [+] Unable to assess ROS because __sedation______    OBJECTIVE:  ICU Vital Signs Last 24 Hrs  T(C): 37.6 (16 May 2019 04:00), Max: 38.5 (15 May 2019 20:40)  T(F): 99.7 (16 May 2019 04:00), Max: 101.3 (15 May 2019 20:40)  HR: 88 (16 May 2019 04:00) (74 - 146)  BP: 126/57 (15 May 2019 16:00) (126/57 - 140/71)  BP(mean): 81 (15 May 2019 16:00) (81 - 102)  ABP: 120/51 (16 May 2019 04:00) (89/42 - 152/63)  ABP(mean): 68 (16 May 2019 04:00) (54 - 86)  RR: 21 (16 May 2019 04:00) (15 - 41)  SpO2: 99% (16 May 2019 04:00) (71% - 100%)    Mode: CPAP with PS, FiO2: 50, PEEP: 5, PS: 15, ITime: 1, MAP: 10, PIP: 23    05-14 @ 07:01  -  05-15 @ 07:00  --------------------------------------------------------  IN: 2992.9 mL / OUT: 3310 mL / NET: -317.1 mL    05-15 @ 07: @ 04:36  --------------------------------------------------------  IN: 2927.3 mL / OUT: 2385 mL / NET: 542.3 mL      CAPILLARY BLOOD GLUCOSE  109 (14 May 2019 17:00)      POCT Blood Glucose.: 146 mg/dL (14 May 2019 23:51)      PHYSICAL EXAM:  General: on vent sedated   HEENT: Atraumatic, normocephalic.                 Mallampatti Grade 3                No nasal congestion.                No tonsillar or pharyngeal exudates.  Lymph Nodes: No palpable lymphadenopathy  Neck: No JVD. No carotid bruit.   Respiratory: abnormal chest expansion-reduced BS bases    CT right-500 cc out                         Normal percussion                         Normal and equal air entry                         No wheeze, rhonchi or rales.  Cardiovascular: S1 S2 normal. No murmurs, rubs or gallops.   Abdomen: Soft, non-tender, non-distended. No organomegaly. Normoactive bowel sounds.  Extremities: Warm to touch. Peripheral pulse palpable. + pedal edema.   Skin: No rashes or skin lesions  Neurological: unable  Psychiatry: unable    HOSPITAL MEDICATIONS:  MEDICATIONS  (STANDING):  aspirin 325 milliGRAM(s) Oral daily  atorvastatin 40 milliGRAM(s) Oral at bedtime  buMETAnide Infusion 2 mG/Hr (10 mL/Hr) IV Continuous <Continuous>  chlorhexidine 0.12% Liquid 15 milliLiter(s) Oral Mucosa two times a day  chlorhexidine 4% Liquid 1 Application(s) Topical <User Schedule>  chlorhexidine 4% Liquid 1 Application(s) Topical <User Schedule>  dexmedetomidine Infusion 0.3 MICROgram(s)/kG/Hr (5.962 mL/Hr) IV Continuous <Continuous>  diltiazem Infusion 3 mG/Hr (3 mL/Hr) IV Continuous <Continuous>  escitalopram 10 milliGRAM(s) Oral daily  gabapentin 200 milliGRAM(s) Oral every 8 hours  heparin  Infusion 1300 Unit(s)/Hr (10 mL/Hr) IV Continuous <Continuous>  insulin lispro (HumaLOG) corrective regimen sliding scale   SubCutaneous every 6 hours  meropenem  IVPB      meropenem  IVPB 500 milliGRAM(s) IV Intermittent every 12 hours  metoclopramide Injectable 5 milliGRAM(s) IV Push every 8 hours  midodrine 10 milliGRAM(s) Oral every 8 hours  norepinephrine Infusion 0.034 MICROgram(s)/kG/Min (5 mL/Hr) IV Continuous <Continuous>  pantoprazole  Injectable 40 milliGRAM(s) IV Push two times a day  propofol Infusion 10 MICROgram(s)/kG/Min (4.77 mL/Hr) IV Continuous <Continuous>  sodium chloride 0.9%. 1000 milliLiter(s) (10 mL/Hr) IV Continuous <Continuous>  vancomycin  IVPB 1000 milliGRAM(s) IV Intermittent every 24 hours  vasopressin Infusion 0.083 Unit(s)/Min (5 mL/Hr) IV Continuous <Continuous>    MEDICATIONS  (PRN):  sodium chloride 0.9% lock flush 10 milliLiter(s) IV Push every 1 hour PRN Pre/post blood products, medications, blood draw, and to maintain line patency      LABS:                        9.9    38.4  )-----------( 217      ( 15 May 2019 12:45 )             29.1     05-15    138  |  98  |  110<H>  ----------------------------<  120<H>  4.0   |  21<L>  |  2.80<H>    Ca    7.4<L>      15 May 2019 01:52  Phos  8.9     05-15  Mg     2.4     05-15    TPro  5.5<L>  /  Alb  2.5<L>  /  TBili  1.0  /  DBili  x   /  AST  108<H>  /  ALT  97<H>  /  AlkPhos  71  05-15    PT/INR - ( 15 May 2019 01:52 )   PT: 14.7 sec;   INR: 1.27 ratio         PTT - ( 15 May 2019 21:50 )  PTT:51.7 sec  Urinalysis Basic - ( 14 May 2019 19:13 )    Color: Yellow / Appearance: Slightly Turbid / S.011 / pH: x  Gluc: x / Ketone: Negative  / Bili: Negative / Urobili: Negative   Blood: x / Protein: Trace / Nitrite: Negative   Leuk Esterase: Negative / RBC: 10 /hpf / WBC 4 /HPF   Sq Epi: x / Non Sq Epi: 3 /hpf / Bacteria: Negative      Arterial Blood Gas:   @ 00:48  7.50/23/115/18/98/-3.9  ABG lactate: --  Arterial Blood Gas:  05-15 @ 20:05  7.56/21/181/19/99/-1.4  ABG lactate: --  Arterial Blood Gas:  05-15 @ 17:56  7.47/27/122/19/98/-3.2  ABG lactate: --  Arterial Blood Gas:  05-15 @ 12:42  7.38/36/69/21/91/-2.9  ABG lactate: --  Arterial Blood Gas:  05-15 @ 09:14  7.43/31/85/20/96/-3.0  ABG lactate: --  Arterial Blood Gas:  05-15 @ 05:01  7.47/32/104/23/98/.1  ABG lactate: --  Arterial Blood Gas:  05-15 @ 01:39  7.45/34/185/23/99/.2  ABG lactate: --  Arterial Blood Gas:   @ 09:44  7.41/33/99/21/98/-2.9  ABG lactate: --  Arterial Blood Gas:   @ 05:20  7.42/33/148/21/99/-2.7  ABG lactate: --        MICROBIOLOGY:     RADIOLOGY:  [ ] Reviewed and interpreted by me    Point of Care Ultrasound Findings:    PFT:    EKG:

## 2019-05-16 NOTE — BEHAVIORAL HEALTH ASSESSMENT NOTE - SUMMARY
The patient is a 77y/o  retired male, with 2 adult children and several grandchildren, domiciled with wife in a private residence in [place], with PPHx of unspecified depressive d/o, no h/o inpatient psychiatric hospitalizations, no h/o SA/SIB, no h/o substance abuse, with PMH of HLD, Sleep apnea ( non compliant with CPAP), GERD, diverticulitis, depression, anxiety, and panic attacks, as well as strong family history of CAD, admitted with unstable angina, found to have coronary artery disease, LVH and diastolic heart failure, now recovering s/p C3L 4/26, post op course complicated by bleeding and later hypoxic respiratory failure, s/p tracheostomy with ongoing respiratory distress, currently on a tracheostomy.  Psychiatry consulted to assess for management of depression. The patient is a 77y/o  retired male, with 2 adult children and several grandchildren, domiciled with wife in a private residence in [place], with PPHx of unspecified depressive d/o, no h/o inpatient psychiatric hospitalizations, no h/o SA/SIB, no h/o substance abuse, with PMHx of HLD, Sleep apnea ( non compliant with CPAP), GERD, diverticulitis, as well as strong family history of CAD, admitted with unstable angina, found to have coronary artery disease, LVH and diastolic heart failure, now recovering s/p C3L 4/26, post op course complicated by bleeding and later hypoxic respiratory failure, s/p tracheostomy with ongoing respiratory distress, currently on a tracheostomy.  Psychiatry consulted to assess for management of depression. The patient is a 79y/o  retired male, with 2 adult children and several grandchildren, domiciled with wife in a private residence in [place], with PPHx of unspecified depressive d/o, no h/o inpatient psychiatric hospitalizations, no h/o SA/SIB, no h/o substance abuse, with PMHx of HLD, Sleep apnea ( non compliant with CPAP), GERD, diverticulitis, as well as strong family history of CAD, admitted with unstable angina, found to have coronary artery disease, LVH and diastolic heart failure, now recovering s/p C3L 4/26, post op course complicated by bleeding and later hypoxic respiratory failure, s/p tracheostomy with ongoing respiratory distress, currently on a tracheostomy.  Psychiatry consulted to assess for management of depression.  Patient seen and evaluated, sedated on propofol, unable to engage in a meaningful interview.  Spoke with patient's spouse, who report patient has a h/o depression, reports previously being on Zoloft in the past which was prescribed by a PMD.  Reports patient has been depressed after being let go from a job at [employer] 2 years ago, states patient has been down and depressed since then, however had still been functioning, attending to ADLs, babysitting grandchildren and paying the bills.  She has no concerns for suicidality.  D/w wife about inability to make changes to psychiatric medications d/t sedation and possible delirium.  Would recommend continuing patient's home Lexapro as is.

## 2019-05-16 NOTE — PROGRESS NOTE ADULT - ATTENDING COMMENTS
TARAH/ATN, septic shock, amyloid, lactic acidosis, hyperphosphatemia  Trach, edematous    - Transition to CRRT, goal net even; 4K fluids (orders placed by fellow)  - Dose meropenem Q8H while on CRRT  - Maintain MAP > 65  - D/w ICU team  - Remainder per fellow, will follow

## 2019-05-16 NOTE — PROGRESS NOTE ADULT - SUBJECTIVE AND OBJECTIVE BOX
[facility] DIVISION OF KIDNEY DISEASES AND HYPERTENSION -- FOLLOW UP NOTE  --------------------------------------------------------------------------------  Chief Complaint:  TARAH    24 hour events/subjective:  Pt examined at bed side, trach to vent, on NG tube feeds, on vasopressin only, on diuretics, non oliguric,  but not able to provide meaningful ros, remains edematous.     PAST HISTORY  --------------------------------------------------------------------------------  No significant changes to PMH, PSH, FHx, SHx, unless otherwise noted    ALLERGIES & MEDICATIONS  --------------------------------------------------------------------------------  Allergies    No Known Allergies    Intolerances      Standing Inpatient Medications  aspirin 325 milliGRAM(s) Oral daily  atorvastatin 40 milliGRAM(s) Oral at bedtime  buMETAnide Infusion 2 mG/Hr IV Continuous <Continuous>  chlorhexidine 0.12% Liquid 15 milliLiter(s) Oral Mucosa two times a day  chlorhexidine 4% Liquid 1 Application(s) Topical <User Schedule>  dexmedetomidine Infusion 0.3 MICROgram(s)/kG/Hr IV Continuous <Continuous>  diltiazem Infusion 3 mG/Hr IV Continuous <Continuous>  escitalopram 10 milliGRAM(s) Oral daily  gabapentin 200 milliGRAM(s) Oral every 8 hours  heparin  Infusion 1300 Unit(s)/Hr IV Continuous <Continuous>  insulin lispro (HumaLOG) corrective regimen sliding scale   SubCutaneous every 6 hours  meropenem  IVPB      meropenem  IVPB 500 milliGRAM(s) IV Intermittent every 12 hours  metoclopramide Injectable 5 milliGRAM(s) IV Push every 8 hours  midodrine 10 milliGRAM(s) Oral every 8 hours  norepinephrine Infusion 0.034 MICROgram(s)/kG/Min IV Continuous <Continuous>  pantoprazole  Injectable 40 milliGRAM(s) IV Push two times a day  propofol Infusion 10 MICROgram(s)/kG/Min IV Continuous <Continuous>  vasopressin Infusion 0.083 Unit(s)/Min IV Continuous <Continuous>    PRN Inpatient Medications  sodium chloride 0.9% lock flush 10 milliLiter(s) IV Push every 1 hour PRN      REVIEW OF SYSTEMS  --------------------------------------------------------------------------------  Unable to obtain    VITALS/PHYSICAL EXAM  --------------------------------------------------------------------------------  T(C): 37.7 (05-16-19 @ 08:00), Max: 38.5 (05-15-19 @ 20:40)  HR: 102 (05-16-19 @ 08:35) (86 - 146)  BP: 126/57 (05-15-19 @ 16:00) (126/57 - 140/71)  RR: 36 (05-16-19 @ 08:00) (19 - 41)  SpO2: 100% (05-16-19 @ 08:00) (71% - 100%)  Wt(kg): --        05-15-19 @ 07:01  -  05-16-19 @ 07:00  --------------------------------------------------------  IN: 3206.7 mL / OUT: 2525 mL / NET: 681.7 mL      Physical Exam:  	Gen: critically ill.   	HEENT: +NGT, +trach  	Pulm: CTA B/L  	CV: RRR, S1S2; no rub  	Abd: +BS, soft, nontender/nondistended              : De with clear yellow urine, scrotal edema.   	LE: Warm, +edema  	Vascular access: +RIJ non-tunneled HD catheter    LABS/STUDIES  --------------------------------------------------------------------------------              9.1    33.3  >-----------<  186      [05-16-19 @ 05:15]              26.8     141  |  101  |  105  ----------------------------<  136      [05-16-19 @ 05:15]  4.2   |  17  |  2.66        Ca     7.3     [05-16-19 @ 05:15]      Mg     2.4     [05-16-19 @ 05:15]      Phos  9.1     [05-16-19 @ 05:15]    TPro  5.3  /  Alb  2.8  /  TBili  1.4  /  DBili  x   /  AST  173  /  ALT  136  /  AlkPhos  63  [05-16-19 @ 05:15]    PT/INR: PT 14.7 , INR 1.27       [05-15-19 @ 01:52]  PTT: 64.8       [05-16-19 @ 05:15]      Creatinine Trend:  SCr 2.66 [05-16 @ 05:15]  SCr 2.80 [05-15 @ 01:52]  SCr 3.37 [05-14 @ 00:49]  SCr 3.71 [05-13 @ 01:02]  SCr 2.98 [05-12 @ 01:31]

## 2019-05-16 NOTE — PROGRESS NOTE ADULT - ASSESSMENT
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 His postop course was complicated by persistent respiratory failure requiring tracheostomy and he had a wedge biopsy showing Pulmonary Amyloidosis.    5/13-poor weaning (less than 2 hrs), CT in place  5/14-off solumedrol, CT in place 100 cc out, wean cpap/ps 5/15  5/15-TC for 12 hrs, renal and CHF f/up  5/16-4 hrs TC, over breath vent-on diprovan; mult pressors/rectal tube/c. diff neg-on meropenem/vanco

## 2019-05-16 NOTE — PROGRESS NOTE ADULT - PROBLEM SELECTOR PLAN 1
Pt with TARAH most likely hemodynamically mediated vs ischemic ATN in the setting of anemia from blood loss, hemodynamic instability, and renal venous congestion. SCr of 1.2 in 2016, 1.1 08/18 and 1.06 on 04/25/19. Started on HD on 05/11/19 due to BUN 190s worsening mental status, concerning for uremic encephalopathy. Labs reviewed today. Last HD 5/15/19 with 0.5 L UF.   - Monitor I&O, daily weights, avoid nephrotoxics  - Adjust meds based on GFR  - Avoid hypotension  - goal to keep even to slightly negative.

## 2019-05-16 NOTE — PROGRESS NOTE ADULT - SUBJECTIVE AND OBJECTIVE BOX
CRITICAL CARE ATTENDING - CTICU    MEDICATIONS  (STANDING):  aspirin 325 milliGRAM(s) Oral daily  atorvastatin 40 milliGRAM(s) Oral at bedtime  chlorhexidine 0.12% Liquid 15 milliLiter(s) Oral Mucosa two times a day  chlorhexidine 4% Liquid 1 Application(s) Topical <User Schedule>  CRRT Treatment    <Continuous>  dexmedetomidine Infusion 0.3 MICROgram(s)/kG/Hr (5.962 mL/Hr) IV Continuous <Continuous>  diltiazem Infusion 3 mG/Hr (3 mL/Hr) IV Continuous <Continuous>  escitalopram 10 milliGRAM(s) Oral daily  gabapentin 200 milliGRAM(s) Oral every 8 hours  heparin  Infusion 1300 Unit(s)/Hr (10 mL/Hr) IV Continuous <Continuous>  insulin lispro (HumaLOG) corrective regimen sliding scale   SubCutaneous every 6 hours  meropenem  IVPB      meropenem  IVPB 500 milliGRAM(s) IV Intermittent every 12 hours  metoclopramide Injectable 5 milliGRAM(s) IV Push every 8 hours  midodrine 10 milliGRAM(s) Oral every 8 hours  norepinephrine Infusion 0.034 MICROgram(s)/kG/Min (5 mL/Hr) IV Continuous <Continuous>  pantoprazole  Injectable 40 milliGRAM(s) IV Push two times a day  PrismaSATE Dialysate BGK 4 / 2.5 5000 milliLiter(s) (1200 mL/Hr) CRRT <Continuous>  PrismaSOL Filtration BGK 4 / 2.5 5000 milliLiter(s) (1000 mL/Hr) CRRT <Continuous>  PrismaSOL Filtration BGK 4 / 2.5 5000 milliLiter(s) (200 mL/Hr) CRRT <Continuous>  propofol Infusion 10 MICROgram(s)/kG/Min (4.77 mL/Hr) IV Continuous <Continuous>  vasopressin Infusion 0.083 Unit(s)/Min (5 mL/Hr) IV Continuous <Continuous>                                    9.1    33.3  )-----------( 186      ( 16 May 2019 05:15 )             26.8       05-16    141  |  101  |  105<H>  ----------------------------<  136<H>  4.2   |  17<L>  |  2.66<H>    Ca    7.3<L>      16 May 2019 05:15  Phos  9.1       Mg     2.4         TPro  5.3<L>  /  Alb  2.8<L>  /  TBili  1.4<H>  /  DBili  x   /  AST  173<H>  /  ALT  136<H>  /  AlkPhos  63        PT/INR - ( 15 May 2019 01:52 )   PT: 14.7 sec;   INR: 1.27 ratio         PTT - ( 16 May 2019 11:46 )  PTT:71.4 sec    Mode: AC/ CMV (Assist Control/ Continuous Mandatory Ventilation)  RR (machine): 18  TV (machine): 500  FiO2: 50  PEEP: 5  ITime: 1  MAP: 10  PIP: 21      Daily     Daily Weight in k.1 (16 May 2019 00:00)      05-15 @ 07:  -   @ 07:00  --------------------------------------------------------  IN: 3206.7 mL / OUT: 2525 mL / NET: 681.7 mL     @ 07:  -   @ 13:50  --------------------------------------------------------  IN: 920.3 mL / OUT: 560 mL / NET: 360.3 mL        Critically Ill patient  : [ ] preoperative ,   [ x] post operative    Requires :  [ x] Arterial Line   [x ] Central Line  [ ] PA catheter  [ ] IABP  [ ] ECMO  [ ] LVAD  [x ] Ventilator  [ ] pacemaker [ ] Impella.    Bedside evaluation , monitoring , treatment of hemodynamics , fluids , IVP/ IVCD meds.        Diagnosis:     POD  - CABG X 3 L    Post op ARDS ? Pneumonia ?    Pulmonary Amyloidosis    CHF- acute [ x]   chronic [x ]    systolic [ ]   diatolic [ x]          - Echo- EF -  LVH / LVOT obstruction            [ ] RV dysfunction          - Cxr-cardiomegally, edema          - Clinical-  [ ]inotropes   [x ]pressors   [ ]diuresis   [ ]IABP   [ ]ECMO   [ ]LVAD   [x ]Respiratory Failure    SIRS    Hemodynamic lability,instability. Requires IVCD [ x] vasopressors [ ] inotropes  [ ] vasodilator  [x ]IVSS fluid  to maintain MAP, perfusion, C.I.     respiratory failure - Tracheostomy     Requires chest PT, pulmonary toilet, ambu bagging, suctioning to maintain SaO2,  patent airway and treat atelectasis.     Ventilator Management:  [ x]AC-rest    [ ]CPAP-PS Wean    [ ]Trach Collar     [ ]Extubate    [ ] T-Piece  [ ]peep>5     Difficult weaning process - multiple organ system involvement in critically ill patient     Tolerates NG / NJ feeds at [ x] goal rate    [ ] trophic rate    [ ]       rate     Metabolic Acidosis - lactic  Acid     Renal Failure - Acute Kidney Injury     Dialysis yesterday     CT Chest / Abdomen / pelvis    IVCD anticoagulation with [x ] Heparin  [ ] Argatroban for  A Fib                         -                     Discussed with CT surgeon, Physician's Assistant - Nurse Practitioner- Critical care medicine team.   Dicussed at  AM / PM rounds.   Chart, labs , films reviewed.    Total Time: 30 min

## 2019-05-16 NOTE — BEHAVIORAL HEALTH ASSESSMENT NOTE - HPI (INCLUDE ILLNESS QUALITY, SEVERITY, DURATION, TIMING, CONTEXT, MODIFYING FACTORS, ASSOCIATED SIGNS AND SYMPTOMS)
The patient is a 77y/o  retired male, with 2 adult children and several grandchildren, domiciled with wife in a private residence in [place], with PPHx of unspecified depressive d/o, no h/o inpatient psychiatric hospitalizations, no h/o SA/SIB, no h/o substance abuse, with PMH of HLD, Sleep apnea ( non compliant with CPAP), GERD, diverticulitis, depression, anxiety, and panic attacks, as well as strong family history of CAD, admitted with unstable angina, found to have coronary artery disease, LVH and diastolic heart failure, now recovering s/p C3L 4/26, post op course complicated by bleeding and later hypoxic respiratory failure, s/p tracheostomy with ongoing respiratory distress, currently on a tracheostomy.  Psychiatry consulted to assess for management of depression.  Patient seen and evaluated, sedated, on propofol drip, not responsive to tactile stimuli.   Spoke with patient's spouse, Lacy Osman ([phone]), who reports patient has a h/o depression, had previously been on Zoloft in the past which was prescribed by a PMD.  States that prior to hospitalization was receiving therapy at [facility] with a therapist, wife unsure if patient sees a psychiatrist there or is currently on psychiatric medications.  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  She reports since then that patient has been down, depressed, however states was still able to function in attending to his own ADLs, babysitting the grandchildren and paying the bills.  She denies patient has ever seen a psychiatrist (to her knowledge), denies patient has ever been psychiatrically hospitalized, or has h/o of SA or substance abuse.  She denies having concerns for patient's safety.  Reports cognitively that prior to hospitalization, was oriented x 3, paying the bills, denies patient is diagnosed with dementia.  Reports since this hospitalization that patient has not been able to communicate, states patient attempts to mouth words however is unable to decipher what he is mouthing.  Reports patient has not gotten to the level of being able to communicate via writing.  Reports patient is "disgusted with being in the hospital and wants to go home", although wife reports patient's inability to communicate.  Reports patient has been grimacing, unable to determine patient's pain symptoms or cause of grimacing. The patient is a 79y/o  retired male, with 2 adult children and several grandchildren, domiciled with wife in a private residence in [place], with PPHx of unspecified depressive d/o, no h/o inpatient psychiatric hospitalizations, no h/o SA/SIB, no h/o substance abuse, with PMHx of HLD, Sleep apnea ( non compliant with CPAP), GERD, diverticulitis, as well as strong family history of CAD, admitted with unstable angina, found to have coronary artery disease, LVH and diastolic heart failure, now recovering s/p C3L 4/26, post op course complicated by bleeding and later hypoxic respiratory failure, s/p tracheostomy with ongoing respiratory distress, currently on a tracheostomy.  Psychiatry consulted to assess for management of depression.  Patient seen and evaluated, sedated, on propofol drip, not responsive to tactile stimuli.   Spoke with patient's spouse, Lacy Osman ([phone]), who reports patient has a h/o depression, had previously been on Zoloft in the past which was prescribed by a PMD.  States that prior to hospitalization was receiving therapy at [facility] with a therapist, wife unsure if patient sees a psychiatrist there or is currently on psychiatric medications.  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  She reports since then that patient has been down, depressed, however states was still able to function in attending to his own ADLs, babysitting the grandchildren and paying the bills.  She denies patient has ever seen a psychiatrist (to her knowledge), denies patient has ever been psychiatrically hospitalized, or has h/o of SA or substance abuse.  She denies having concerns for patient's safety.  Reports cognitively that prior to hospitalization, was oriented x 3, paying the bills, denies patient is diagnosed with dementia.  Reports since this hospitalization that patient has not been able to communicate, states patient attempts to mouth words however is unable to decipher what he is mouthing.  Reports patient has not gotten to the level of being able to communicate via writing.  Reports patient is "disgusted with being in the hospital and wants to go home", although wife reports patient's inability to communicate.  Reports patient has been grimacing, unable to determine patient's pain symptoms or cause of grimacing.

## 2019-05-16 NOTE — BEHAVIORAL HEALTH ASSESSMENT NOTE - NSBHCHARTREVIEWINVESTIGATE_PSY_A_CORE FT
< from: 12 Lead ECG (05.11.19 @ 00:46) >      Ventricular Rate 105 BPM    Atrial Rate 107 BPM    QRS Duration 102 ms    Q-T Interval 314 ms    QTC Calculation(Bezet) 415 ms    R Axis -60 degrees    T Axis 84 degrees    Diagnosis Line Atrial flutter with variable block  LEFT AXIS DEVIATION  LOWVOLTAGE QRS  INFERIOR INFARCT , AGE UNDETERMINED  CANNOT RULE OUT ANTEROSEPTAL INFARCT , AGE UNDETERMINED  ABNORMAL ECG    Confirmed by MD PATRICE, MAUREEN ([id number]) on 5/11/2019 7:38:24 PM    < end of copied text >

## 2019-05-16 NOTE — PROGRESS NOTE ADULT - SUBJECTIVE AND OBJECTIVE BOX
infectious diseases progress note:    Patient is a 79y old  Male who presents with a chief complaint of sob (16 May 2019 04:35)        Angina pectoris  Atherosclerosis of native coronary artery without angina pectoris             Allergies    No Known Allergies    Intolerances        ANTIBIOTICS/RELEVANT:  antimicrobials  meropenem  IVPB      meropenem  IVPB 500 milliGRAM(s) IV Intermittent every 12 hours    immunologic:    OTHER:  aspirin 325 milliGRAM(s) Oral daily  atorvastatin 40 milliGRAM(s) Oral at bedtime  buMETAnide Infusion 2 mG/Hr IV Continuous <Continuous>  chlorhexidine 0.12% Liquid 15 milliLiter(s) Oral Mucosa two times a day  chlorhexidine 4% Liquid 1 Application(s) Topical <User Schedule>  dexmedetomidine Infusion 0.3 MICROgram(s)/kG/Hr IV Continuous <Continuous>  diltiazem Infusion 3 mG/Hr IV Continuous <Continuous>  escitalopram 10 milliGRAM(s) Oral daily  gabapentin 200 milliGRAM(s) Oral every 8 hours  heparin  Infusion 1300 Unit(s)/Hr IV Continuous <Continuous>  insulin lispro (HumaLOG) corrective regimen sliding scale   SubCutaneous every 6 hours  metoclopramide Injectable 5 milliGRAM(s) IV Push every 8 hours  midodrine 10 milliGRAM(s) Oral every 8 hours  norepinephrine Infusion 0.034 MICROgram(s)/kG/Min IV Continuous <Continuous>  pantoprazole  Injectable 40 milliGRAM(s) IV Push two times a day  propofol Infusion 10 MICROgram(s)/kG/Min IV Continuous <Continuous>  sodium chloride 0.9% lock flush 10 milliLiter(s) IV Push every 1 hour PRN  sodium chloride 0.9%. 1000 milliLiter(s) IV Continuous <Continuous>  vasopressin Infusion 0.083 Unit(s)/Min IV Continuous <Continuous>      Objective:  Vital Signs Last 24 Hrs  T(C): 37.6 (16 May 2019 04:00), Max: 38.5 (15 May 2019 20:40)  T(F): 99.7 (16 May 2019 04:00), Max: 101.3 (15 May 2019 20:40)  HR: 94 (16 May 2019 07:00) (79 - 146)  BP: 126/57 (15 May 2019 16:00) (126/57 - 140/71)  BP(mean): 81 (15 May 2019 16:00) (81 - 102)  RR: 33 (16 May 2019 07:00) (19 - 41)  SpO2: 99% (16 May 2019 07:00) (71% - 100%)    PHYSICAL EXAM:     Eyes:RYLEY, EOMI  Ear/Nose/Throat: no oral lesion, no sinus tenderness on percussion	  Neck:no JVD, no lymphadenopathy, supple  Respiratory: CTA shagufta  Cardiovascular: S1S2 RRR, no murmurs  Gastrointestinal:soft, (+) BS, no HSM   mottling         LABS:                        9.1    33.3  )-----------( 186      ( 16 May 2019 05:15 )             26.8     05-16    141  |  101  |  105<H>  ----------------------------<  136<H>  4.2   |  17<L>  |  2.66<H>    Ca    7.3<L>      16 May 2019 05:15  Phos  9.1       Mg     2.4         TPro  5.3<L>  /  Alb  2.8<L>  /  TBili  1.4<H>  /  DBili  x   /  AST  173<H>  /  ALT  136<H>  /  AlkPhos  63  05-16    PT/INR - ( 15 May 2019 01:52 )   PT: 14.7 sec;   INR: 1.27 ratio         PTT - ( 16 May 2019 05:15 )  PTT:64.8 sec  Urinalysis Basic - ( 14 May 2019 19:13 )    Color: Yellow / Appearance: Slightly Turbid / S.011 / pH: x  Gluc: x / Ketone: Negative  / Bili: Negative / Urobili: Negative   Blood: x / Protein: Trace / Nitrite: Negative   Leuk Esterase: Negative / RBC: 10 /hpf / WBC 4 /HPF   Sq Epi: x / Non Sq Epi: 3 /hpf / Bacteria: Negative          MICROBIOLOGY:    RECENT CULTURES:  05-15 @ 00:49 .Blood                No growth to date.     @ 14:26 .Sputum trap       Moderate polymorphonuclear leukocytes per low power field  Moderate Squamous epithelial cells per low power field  Moderate Gram Variable Rods per oil power field  Moderate Gram Positive Cocci in Clusters per oil power field           Numerous Staphylococcus aureus     @ 17:02 .Blood                No growth to date.     @ 18:11 .Blood                No growth to date.     @ 09:24 .Blood                No growth at 5 days.          RESPIRATORY CULTURES:      Clostridium difficile GDH Toxins A&amp;B, EIA:   Negative ( @ 11:07)          RADIOLOGY & ADDITIONAL STUDIES:        Pager [phone]  After 5 pm/weekends or if no response :[phone]

## 2019-05-16 NOTE — PROGRESS NOTE ADULT - SUBJECTIVE AND OBJECTIVE BOX
CHRIS THAKKAR  MRN#:  [id number]    The patient is a 78y Male with PMH of HLD, Sleep apnea ( non compliant with CPAP), GERD, diverticulitis, depression, anxiety, and panic attacks, as well as strong family history of CAD, admitted with unstable angina, found to have coronary artery disease, LVH and diastolic heart failure, now recovering s/p C3L 4/26, post op course complicated by bleeding and later hypoxic respiratory failure, s/p tracheostomy with ongoing respiratory distress who was seen, evaluated, & examined with the CTICU staff on rounds, overnight and during the early morning hours with a multidisciplinary care plan formulated & implemented.  All available clinical, laboratory, radiographic, pharmacologic, and electrocardiographic data were reviewed & analyzed.      The patient was in the CTICU in critical condition secondary to persistent cardiopulmonary dysfunction, hemodynamically significant anemia, acute renal insufficiency, vasogenic shock, persistent cardiovascular dysfunction, and hyperglycemia.     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 Further work up in process to determine subtype.     Invasive hemodynamic monitoring with a central venous catheter & an A-line were required for the continuous central venous and MAP/BP monitoring to ensure adequate cardiovascular support and to evaluate for & help prevent decompensation while receiving volume resuscitation, an IV Vasopressin drip, an IV Norepinephrine drip, an IV Bumex drip, resumption of an IV Cardizem drip and intermittent hemodialysis secondary to hemodynamically significant anemia/acute postoperative blood loss anemia, vasogenic shock, acute on chronic diastolic heart failure, rapid atrial fibrillation, lactic acidosis and acute renal failure.    Patient has had fever, leukocytosis, thick pulmonary secretions with a right lung infiltrate on chest x ray consistent with pneumonia. He completed empiric treatment with IV Meropenem and IV Micafungin due to BAL positive for yeast. Meropenem and Vancomycin resumed empirically due to leukocytosis.    Glucose control to help maintain metabolic stability and prevent infection required monitoring of glucose intermittently and treatment with a lispro insulin sliding scale.    Tolerating enteral feedings at goal rate.    Patient required acute postoperative critical care management and I provided 35 minutes of non-continuous care to the patient.  Discussed at length with the CTICU staff and helped coordinate care.

## 2019-05-16 NOTE — BEHAVIORAL HEALTH ASSESSMENT NOTE - NSBHCHARTREVIEWVS_PSY_A_CORE FT
Vital Signs Last 24 Hrs  T(C): 37.7 (16 May 2019 08:00), Max: 38.5 (15 May 2019 20:40)  T(F): 99.9 (16 May 2019 08:00), Max: 101.3 (15 May 2019 20:40)  HR: 103 (16 May 2019 09:45) (86 - 146)  BP: 126/57 (15 May 2019 16:00) (126/57 - 140/71)  BP(mean): 81 (15 May 2019 16:00) (81 - 102)  RR: 29 (16 May 2019 09:45) (0 - 41)  SpO2: 79% (16 May 2019 09:45) (71% - 100%)

## 2019-05-16 NOTE — BEHAVIORAL HEALTH ASSESSMENT NOTE - NSBHCHARTREVIEWLAB_PSY_A_CORE FT
9.1    33.3  )-----------( 186      ( 16 May 2019 05:15 )             26.8     05-16    141  |  101  |  105<H>  ----------------------------<  136<H>  4.2   |  17<L>  |  2.66<H>    Ca    7.3<L>      16 May 2019 05:15  Phos  9.1     -  Mg     2.4     -    TPro  5.3<L>  /  Alb  2.8<L>  /  TBili  1.4<H>  /  DBili  x   /  AST  173<H>  /  ALT  136<H>  /  AlkPhos  63  05-16    Urinalysis Basic - ( 14 May 2019 19:13 )    Color: Yellow / Appearance: Slightly Turbid / S.011 / pH: x  Gluc: x / Ketone: Negative  / Bili: Negative / Urobili: Negative   Blood: x / Protein: Trace / Nitrite: Negative   Leuk Esterase: Negative / RBC: 10 /hpf / WBC 4 /HPF   Sq Epi: x / Non Sq Epi: 3 /hpf / Bacteria: Negative

## 2019-05-16 NOTE — PROGRESS NOTE ADULT - ASSESSMENT
78 yr old with ARCENIO admitted for chest pain and underwent a CABG last last week   Post op has been stable but is still intubated and became febrile last 24hrs.   No evidence of wd infection, alot of sputum and possible LLL infiltrate underwent Bronch/BAL with biopsy to r/o BOOP, started on steroids              fever down mostly but still with low grade  wbc still elevated   sp course of meropenem and micafungin without improvement leading us to do lung biopsy        lung biopsy is positive for amyloid which may also be in kidney and heart.   Also have some GI bleeding and extremities are mottled but viable Has been off antibiotics fro the last week but today WBC dorothy to over 30 K  Cultures sent and pt started on vanco and meropenem.   no clearcut source .  will await cultures   the extent of the amyloid is unclear at present        discussed with Dr. DRAPER  reculture for any clinically deterioration  hold vanco aqnd dose by level  HD was not completed yesterday   toes are alittle mottled   I

## 2019-05-16 NOTE — CONSULT NOTE ADULT - SUBJECTIVE AND OBJECTIVE BOX
78 yo M with a PMH HLD, ARCENIO (noncompliant with CPAP), GERD, diverticulitis, depression, anxiety, and panic attacks who was noting dyspnea on exertion with chest pressure and palpitations for 1 year. He was admitted on 4/25/19 for non-urgent right and left heart cath, found to have multi-vessel disease, and was scheduled for a 3 vessel CABG, which he had on 4/26. His post-op course was complicated by respiratory distress/hypoxic respiratory failure. He was unable to be weaned off of ventilation, and had a lot of bloody sputum. He therefore had a tracheostomy on 5/4, along with bronchoscopy/BAL with left lower lobe biopsy. The biopsy was positive for amyloid. His course was also complicated by PNA (suspected ventilator-acquired), for which he was on Vancomycin (4/28-5/2), Cefepime (4/28-29), Meropenem (4/29-5/9), and Micafungin (5/5-5/10). In addition, he developed acute renal failure, for which he started HD on 5/11 with UF, changed to CRRT. Hematology was consulted in the setting of amyloidosis and worsening leukocytosis.    Allergies    No Known Allergies    Intolerances        MEDICATIONS  (STANDING):  aspirin 325 milliGRAM(s) Oral daily  atorvastatin 40 milliGRAM(s) Oral at bedtime  chlorhexidine 0.12% Liquid 15 milliLiter(s) Oral Mucosa two times a day  chlorhexidine 4% Liquid 1 Application(s) Topical <User Schedule>  CRRT Treatment    <Continuous>  dexmedetomidine Infusion 0.3 MICROgram(s)/kG/Hr (5.962 mL/Hr) IV Continuous <Continuous>  diltiazem Infusion 3 mG/Hr (3 mL/Hr) IV Continuous <Continuous>  escitalopram 10 milliGRAM(s) Oral daily  gabapentin 200 milliGRAM(s) Oral every 8 hours  heparin  Infusion 1300 Unit(s)/Hr (10 mL/Hr) IV Continuous <Continuous>  insulin lispro (HumaLOG) corrective regimen sliding scale   SubCutaneous every 6 hours  meropenem  IVPB      meropenem  IVPB 500 milliGRAM(s) IV Intermittent every 12 hours  metoclopramide Injectable 5 milliGRAM(s) IV Push every 8 hours  midodrine 10 milliGRAM(s) Oral every 8 hours  norepinephrine Infusion 0.034 MICROgram(s)/kG/Min (5 mL/Hr) IV Continuous <Continuous>  pantoprazole  Injectable 40 milliGRAM(s) IV Push two times a day  PrismaSATE Dialysate BGK 4 / 2.5 5000 milliLiter(s) (1200 mL/Hr) CRRT <Continuous>  PrismaSOL Filtration BGK 4 / 2.5 5000 milliLiter(s) (1000 mL/Hr) CRRT <Continuous>  PrismaSOL Filtration BGK 4 / 2.5 5000 milliLiter(s) (200 mL/Hr) CRRT <Continuous>  propofol Infusion 10 MICROgram(s)/kG/Min (4.77 mL/Hr) IV Continuous <Continuous>  vasopressin Infusion 0.083 Unit(s)/Min (5 mL/Hr) IV Continuous <Continuous>    MEDICATIONS  (PRN):  sodium chloride 0.9% lock flush 10 milliLiter(s) IV Push every 1 hour PRN Pre/post blood products, medications, blood draw, and to maintain line patency      PAST MEDICAL & SURGICAL HISTORY:  Diverticulitis  Nocturia  Panic attacks  Anxiety  Depression  Asthma: Controlled  Sleep Apnea  History of partial colectomy  History of colon resection  History of eye surgery: PPV right  Cataract extraction status: right  Status post lung surgery: ? wedge resection  S/P eye surgery: &quot;removed fluid&quot; from rigth eye  Bunion  Sinus Disorder: surgery x 2  Inguinal Hernia Bilateral  Shoulder Problem: right rotator cuff  Carpal Tunnel Syndrome: right hand      FAMILY HISTORY:  Family history of acute myocardial infarction (Sibling): mom at 54   mothers brother at 37      SOCIAL HISTORY: No EtOH, no tobacco    REVIEW OF SYSTEMS:  Unable to obtain ROS, as patient is intubated and sedated        T(F): 99.9 (05-16-19 @ 08:00), Max: 101.3 (05-15-19 @ 20:40)  HR: 130 (05-16-19 @ 11:00)  BP: 126/57 (05-15-19 @ 16:00)  RR: 18 (05-16-19 @ 11:00)  SpO2: 79% (05-16-19 @ 11:00)  Wt(kg): --    GENERAL: NAD, well-developed  HEAD:  Atraumatic, Normocephalic  EYES: EOMI, PERRLA, conjunctiva and sclera clear  NECK: Supple, No JVD  CHEST/LUNG: Clear to auscultation bilaterally; No wheeze  HEART: Regular rate and rhythm; No murmurs, rubs, or gallops  ABDOMEN: Soft, Nontender, Nondistended; Bowel sounds present  EXTREMITIES:  2+ Peripheral Pulses, No clubbing, cyanosis, or edema  NEUROLOGY: non-focal  SKIN: No rashes or lesions                          9.1    33.3  )-----------( 186      ( 16 May 2019 05:15 )             26.8       05-16    141  |  101  |  105<H>  ----------------------------<  136<H>  4.2   |  17<L>  |  2.66<H>    Ca    7.3<L>      16 May 2019 05:15  Phos  9.1     05-16  Mg     2.4     05-16    TPro  5.3<L>  /  Alb  2.8<L>  /  TBili  1.4<H>  /  DBili  x   /  AST  173<H>  /  ALT  136<H>  /  AlkPhos  63  05-16      Lactate Dehydrogenase, Serum: 706 U/L (05-16 @ 09:07)  Magnesium, Serum: 2.4 mg/dL (05-16 @ 05:15)  Phosphorus Level, Serum: 9.1 mg/dL (05-16 @ 05:15) 80 yo M with a PMH HLD, ARCENIO (noncompliant with CPAP), GERD, diverticulitis, depression, anxiety, and panic attacks who was noting dyspnea on exertion with chest pressure and palpitations for 1 year. He was admitted on 4/25/19 for non-urgent right and left heart cath, found to have multi-vessel disease, and was scheduled for a 3 vessel CABG, which he had on 4/26. His post-op course was complicated by respiratory distress/hypoxic respiratory failure. He was unable to be weaned off of ventilation, and had a lot of bloody sputum. He therefore had a tracheostomy on 5/4, along with bronchoscopy/BAL with left lower lobe biopsy. The biopsy was positive for amyloid. His course was also complicated by PNA (suspected ventilator-acquired), for which he was on Vancomycin (4/28-5/2), Cefepime (4/28-29), Meropenem (4/29-5/9), and Micafungin (5/5-5/10). In addition, he developed acute renal failure, for which he started HD on 5/11 with UF, changed to CRRT. Finally, he is on a heparin gtt for ?paroxysmal afib. Hematology was consulted in the setting of amyloidosis and worsening leukocytosis.    Allergies    No Known Allergies    Intolerances        MEDICATIONS  (STANDING):  aspirin 325 milliGRAM(s) Oral daily  atorvastatin 40 milliGRAM(s) Oral at bedtime  chlorhexidine 0.12% Liquid 15 milliLiter(s) Oral Mucosa two times a day  chlorhexidine 4% Liquid 1 Application(s) Topical <User Schedule>  CRRT Treatment    <Continuous>  dexmedetomidine Infusion 0.3 MICROgram(s)/kG/Hr (5.962 mL/Hr) IV Continuous <Continuous>  diltiazem Infusion 3 mG/Hr (3 mL/Hr) IV Continuous <Continuous>  escitalopram 10 milliGRAM(s) Oral daily  gabapentin 200 milliGRAM(s) Oral every 8 hours  heparin  Infusion 1300 Unit(s)/Hr (10 mL/Hr) IV Continuous <Continuous>  insulin lispro (HumaLOG) corrective regimen sliding scale   SubCutaneous every 6 hours  meropenem  IVPB      meropenem  IVPB 500 milliGRAM(s) IV Intermittent every 12 hours  metoclopramide Injectable 5 milliGRAM(s) IV Push every 8 hours  midodrine 10 milliGRAM(s) Oral every 8 hours  norepinephrine Infusion 0.034 MICROgram(s)/kG/Min (5 mL/Hr) IV Continuous <Continuous>  pantoprazole  Injectable 40 milliGRAM(s) IV Push two times a day  PrismaSATE Dialysate BGK 4 / 2.5 5000 milliLiter(s) (1200 mL/Hr) CRRT <Continuous>  PrismaSOL Filtration BGK 4 / 2.5 5000 milliLiter(s) (1000 mL/Hr) CRRT <Continuous>  PrismaSOL Filtration BGK 4 / 2.5 5000 milliLiter(s) (200 mL/Hr) CRRT <Continuous>  propofol Infusion 10 MICROgram(s)/kG/Min (4.77 mL/Hr) IV Continuous <Continuous>  vasopressin Infusion 0.083 Unit(s)/Min (5 mL/Hr) IV Continuous <Continuous>    MEDICATIONS  (PRN):  sodium chloride 0.9% lock flush 10 milliLiter(s) IV Push every 1 hour PRN Pre/post blood products, medications, blood draw, and to maintain line patency      PAST MEDICAL & SURGICAL HISTORY:  Diverticulitis  Nocturia  Panic attacks  Anxiety  Depression  Asthma: Controlled  Sleep Apnea  History of partial colectomy  History of colon resection  History of eye surgery: PPV right  Cataract extraction status: right  Status post lung surgery: ? wedge resection  S/P eye surgery: &quot;removed fluid&quot; from rigth eye  Bunion  Sinus Disorder: surgery x 2  Inguinal Hernia Bilateral  Shoulder Problem: right rotator cuff  Carpal Tunnel Syndrome: right hand      FAMILY HISTORY:  Family history of acute myocardial infarction (Sibling): mom at 54   mothers brother at 37      SOCIAL HISTORY: No EtOH, no tobacco    REVIEW OF SYSTEMS:  Unable to obtain ROS, as patient is intubated and sedated        T(F): 99.9 (05-16-19 @ 08:00), Max: 101.3 (05-15-19 @ 20:40)  HR: 130 (05-16-19 @ 11:00)  BP: 126/57 (05-15-19 @ 16:00)  RR: 18 (05-16-19 @ 11:00)  SpO2: 79% (05-16-19 @ 11:00)  Wt(kg): --    GENERAL: NAD, well-developed  HEAD:  Atraumatic, Normocephalic  EYES: EOMI, PERRLA, conjunctiva and sclera clear  NECK: Supple, No JVD  CHEST/LUNG: Clear to auscultation bilaterally; No wheeze  HEART: Regular rate and rhythm; No murmurs, rubs, or gallops  ABDOMEN: Soft, Nontender, Nondistended; Bowel sounds present  EXTREMITIES:  2+ Peripheral Pulses, No clubbing, cyanosis, or edema  NEUROLOGY: non-focal  SKIN: No rashes or lesions                          9.1    33.3  )-----------( 186      ( 16 May 2019 05:15 )             26.8       05-16    141  |  101  |  105<H>  ----------------------------<  136<H>  4.2   |  17<L>  |  2.66<H>    Ca    7.3<L>      16 May 2019 05:15  Phos  9.1     05-16  Mg     2.4     05-16    TPro  5.3<L>  /  Alb  2.8<L>  /  TBili  1.4<H>  /  DBili  x   /  AST  173<H>  /  ALT  136<H>  /  AlkPhos  63  05-16      Lactate Dehydrogenase, Serum: 706 U/L (05-16 @ 09:07)  Magnesium, Serum: 2.4 mg/dL (05-16 @ 05:15)  Phosphorus Level, Serum: 9.1 mg/dL (05-16 @ 05:15) 78 yo M with a PMH HLD, ARCENIO (noncompliant with CPAP), GERD, diverticulitis, depression, anxiety, and panic attacks who was noting dyspnea on exertion with chest pressure and palpitations for 1 year. He was admitted on 4/25/19 for non-urgent right and left heart cath, found to have multi-vessel disease, and was scheduled for a 3 vessel CABG, which he had on 4/26. His post-op course was complicated by respiratory distress/hypoxic respiratory failure. He was unable to be weaned off of ventilation, and had a lot of bloody sputum. He therefore had a tracheostomy on 5/4, along with bronchoscopy/BAL with left lower lobe biopsy. The biopsy was positive for amyloid. His course was also complicated by PNA (suspected ventilator-acquired), for which he was on Vancomycin (4/28-5/2), Cefepime (4/28-29), Meropenem (4/29-5/9), and Micafungin (5/5-5/10). In addition, he developed acute renal failure, for which he started HD on 5/11 with UF, changed to CRRT. Finally, he has been on a heparin gtt for paroxysmal afib. Hematology was consulted in the setting of amyloidosis and worsening leukocytosis.    Allergies    No Known Allergies    Intolerances        MEDICATIONS  (STANDING):  aspirin 325 milliGRAM(s) Oral daily  atorvastatin 40 milliGRAM(s) Oral at bedtime  chlorhexidine 0.12% Liquid 15 milliLiter(s) Oral Mucosa two times a day  chlorhexidine 4% Liquid 1 Application(s) Topical <User Schedule>  CRRT Treatment    <Continuous>  dexmedetomidine Infusion 0.3 MICROgram(s)/kG/Hr (5.962 mL/Hr) IV Continuous <Continuous>  diltiazem Infusion 3 mG/Hr (3 mL/Hr) IV Continuous <Continuous>  escitalopram 10 milliGRAM(s) Oral daily  gabapentin 200 milliGRAM(s) Oral every 8 hours  heparin  Infusion 1300 Unit(s)/Hr (10 mL/Hr) IV Continuous <Continuous>  insulin lispro (HumaLOG) corrective regimen sliding scale   SubCutaneous every 6 hours  meropenem  IVPB      meropenem  IVPB 500 milliGRAM(s) IV Intermittent every 12 hours  metoclopramide Injectable 5 milliGRAM(s) IV Push every 8 hours  midodrine 10 milliGRAM(s) Oral every 8 hours  norepinephrine Infusion 0.034 MICROgram(s)/kG/Min (5 mL/Hr) IV Continuous <Continuous>  pantoprazole  Injectable 40 milliGRAM(s) IV Push two times a day  PrismaSATE Dialysate BGK 4 / 2.5 5000 milliLiter(s) (1200 mL/Hr) CRRT <Continuous>  PrismaSOL Filtration BGK 4 / 2.5 5000 milliLiter(s) (1000 mL/Hr) CRRT <Continuous>  PrismaSOL Filtration BGK 4 / 2.5 5000 milliLiter(s) (200 mL/Hr) CRRT <Continuous>  propofol Infusion 10 MICROgram(s)/kG/Min (4.77 mL/Hr) IV Continuous <Continuous>  vasopressin Infusion 0.083 Unit(s)/Min (5 mL/Hr) IV Continuous <Continuous>    MEDICATIONS  (PRN):  sodium chloride 0.9% lock flush 10 milliLiter(s) IV Push every 1 hour PRN Pre/post blood products, medications, blood draw, and to maintain line patency      PAST MEDICAL & SURGICAL HISTORY:  Diverticulitis  Nocturia  Panic attacks  Anxiety  Depression  Asthma: Controlled  Sleep Apnea  History of partial colectomy  History of colon resection  History of eye surgery: PPV right  Cataract extraction status: right  Status post lung surgery: ? wedge resection  S/P eye surgery: &quot;removed fluid&quot; from rigth eye  Bunion  Sinus Disorder: surgery x 2  Inguinal Hernia Bilateral  Shoulder Problem: right rotator cuff  Carpal Tunnel Syndrome: right hand      FAMILY HISTORY:  Family history of acute myocardial infarction (Sibling): mom at 54   mothers brother at 37      SOCIAL HISTORY: No EtOH, no tobacco    REVIEW OF SYSTEMS:  Unable to obtain ROS, as patient is intubated and sedated        T(F): 99.9 (05-16-19 @ 08:00), Max: 101.3 (05-15-19 @ 20:40)  HR: 130 (05-16-19 @ 11:00)  BP: 126/57 (05-15-19 @ 16:00)  RR: 18 (05-16-19 @ 11:00)  SpO2: 79% (05-16-19 @ 11:00)  Wt(kg): --    GENERAL: NAD, well-developed  HEAD:  Atraumatic, Normocephalic  EYES: EOMI, PERRLA, conjunctiva and sclera clear  NECK: Supple, No JVD  CHEST/LUNG: Clear to auscultation bilaterally; No wheeze  HEART: Regular rate and rhythm; No murmurs, rubs, or gallops  ABDOMEN: Soft, Nontender, Nondistended; Bowel sounds present  EXTREMITIES:  2+ Peripheral Pulses, No clubbing, cyanosis, or edema  NEUROLOGY: non-focal  SKIN: No rashes or lesions                          9.1    33.3  )-----------( 186      ( 16 May 2019 05:15 )             26.8       05-16    141  |  101  |  105<H>  ----------------------------<  136<H>  4.2   |  17<L>  |  2.66<H>    Ca    7.3<L>      16 May 2019 05:15  Phos  9.1     05-16  Mg     2.4     05-16    TPro  5.3<L>  /  Alb  2.8<L>  /  TBili  1.4<H>  /  DBili  x   /  AST  173<H>  /  ALT  136<H>  /  AlkPhos  63  05-16      Lactate Dehydrogenase, Serum: 706 U/L (05-16 @ 09:07)  Magnesium, Serum: 2.4 mg/dL (05-16 @ 05:15)  Phosphorus Level, Serum: 9.1 mg/dL (05-16 @ 05:15) 80 yo M with a PMH HLD, ARCENIO (noncompliant with CPAP), GERD, diverticulitis, depression, anxiety, panic attacks, and carpal tunnel who was noting dyspnea on exertion with chest pressure and palpitations for 1 year. He was admitted on 4/25/19 for non-urgent right and left heart cath, found to have multi-vessel disease, and was scheduled for a 3 vessel CABG, which he had on 4/26. His post-op course was complicated by respiratory distress/hypoxic respiratory failure. He was unable to be weaned off of ventilation, and had a lot of bloody sputum. He therefore had a tracheostomy on 5/4, along with bronchoscopy/BAL with left lower lobe biopsy. The biopsy was positive for amyloid. His course was also complicated by PNA (suspected ventilator-acquired), for which he was on Vancomycin (4/28-5/2), Cefepime (4/28-29), Meropenem (4/29-5/9), and Micafungin (5/5-5/10). In addition, he developed acute renal failure, for which he started HD on 5/11 with UF, changed to CRRT. Finally, he has been on a heparin gtt for paroxysmal afib. Hematology was consulted in the setting of amyloidosis and worsening leukocytosis.    Allergies    No Known Allergies    Intolerances        MEDICATIONS  (STANDING):  aspirin 325 milliGRAM(s) Oral daily  atorvastatin 40 milliGRAM(s) Oral at bedtime  chlorhexidine 0.12% Liquid 15 milliLiter(s) Oral Mucosa two times a day  chlorhexidine 4% Liquid 1 Application(s) Topical <User Schedule>  CRRT Treatment    <Continuous>  dexmedetomidine Infusion 0.3 MICROgram(s)/kG/Hr (5.962 mL/Hr) IV Continuous <Continuous>  diltiazem Infusion 3 mG/Hr (3 mL/Hr) IV Continuous <Continuous>  escitalopram 10 milliGRAM(s) Oral daily  gabapentin 200 milliGRAM(s) Oral every 8 hours  heparin  Infusion 1300 Unit(s)/Hr (10 mL/Hr) IV Continuous <Continuous>  insulin lispro (HumaLOG) corrective regimen sliding scale   SubCutaneous every 6 hours  meropenem  IVPB      meropenem  IVPB 500 milliGRAM(s) IV Intermittent every 12 hours  metoclopramide Injectable 5 milliGRAM(s) IV Push every 8 hours  midodrine 10 milliGRAM(s) Oral every 8 hours  norepinephrine Infusion 0.034 MICROgram(s)/kG/Min (5 mL/Hr) IV Continuous <Continuous>  pantoprazole  Injectable 40 milliGRAM(s) IV Push two times a day  PrismaSATE Dialysate BGK 4 / 2.5 5000 milliLiter(s) (1200 mL/Hr) CRRT <Continuous>  PrismaSOL Filtration BGK 4 / 2.5 5000 milliLiter(s) (1000 mL/Hr) CRRT <Continuous>  PrismaSOL Filtration BGK 4 / 2.5 5000 milliLiter(s) (200 mL/Hr) CRRT <Continuous>  propofol Infusion 10 MICROgram(s)/kG/Min (4.77 mL/Hr) IV Continuous <Continuous>  vasopressin Infusion 0.083 Unit(s)/Min (5 mL/Hr) IV Continuous <Continuous>    MEDICATIONS  (PRN):  sodium chloride 0.9% lock flush 10 milliLiter(s) IV Push every 1 hour PRN Pre/post blood products, medications, blood draw, and to maintain line patency      PAST MEDICAL & SURGICAL HISTORY:  Diverticulitis  Nocturia  Panic attacks  Anxiety  Depression  Asthma: Controlled  Sleep Apnea  History of partial colectomy  History of colon resection  History of eye surgery: PPV right  Cataract extraction status: right  Status post lung surgery: ? wedge resection  S/P eye surgery: &quot;removed fluid&quot; from rigth eye  Bunion  Sinus Disorder: surgery x 2  Inguinal Hernia Bilateral  Shoulder Problem: right rotator cuff  Carpal Tunnel Syndrome: right hand      FAMILY HISTORY:  Family history of acute myocardial infarction (Sibling): mom at 54   mothers brother at 37      SOCIAL HISTORY: No EtOH, no tobacco    REVIEW OF SYSTEMS:  Unable to obtain ROS, as patient is intubated and sedated        T(F): 99.9 (05-16-19 @ 08:00), Max: 101.3 (05-15-19 @ 20:40)  HR: 130 (05-16-19 @ 11:00)  BP: 126/57 (05-15-19 @ 16:00)  RR: 18 (05-16-19 @ 11:00)  SpO2: 79% (05-16-19 @ 11:00)  Wt(kg): --    GENERAL: NAD, well-developed  HEAD:  Atraumatic, Normocephalic  EYES: EOMI, PERRLA, conjunctiva and sclera clear  NECK: Supple, No JVD  CHEST/LUNG: Clear to auscultation bilaterally; No wheeze  HEART: Regular rate and rhythm; No murmurs, rubs, or gallops  ABDOMEN: Soft, Nontender, Nondistended; Bowel sounds present  EXTREMITIES:  2+ Peripheral Pulses, No clubbing, cyanosis, or edema  NEUROLOGY: non-focal  SKIN: No rashes or lesions                          9.1    33.3  )-----------( 186      ( 16 May 2019 05:15 )             26.8       05-16    141  |  101  |  105<H>  ----------------------------<  136<H>  4.2   |  17<L>  |  2.66<H>    Ca    7.3<L>      16 May 2019 05:15  Phos  9.1     05-16  Mg     2.4     05-16    TPro  5.3<L>  /  Alb  2.8<L>  /  TBili  1.4<H>  /  DBili  x   /  AST  173<H>  /  ALT  136<H>  /  AlkPhos  63  05-16      Lactate Dehydrogenase, Serum: 706 U/L (05-16 @ 09:07)  Magnesium, Serum: 2.4 mg/dL (05-16 @ 05:15)  Phosphorus Level, Serum: 9.1 mg/dL (05-16 @ 05:15) 80 yo M with a PMH HLD, ARCENIO (noncompliant with CPAP), GERD, diverticulitis, depression, anxiety, panic attacks, and carpal tunnel who was noting dyspnea on exertion with chest pressure and palpitations for 1 year. He was admitted on 4/25/19 for non-urgent right and left heart cath, found to have multi-vessel disease, and was scheduled for a 3 vessel CABG, which he had on 4/26. His post-op course was complicated by respiratory distress/hypoxic respiratory failure. He was unable to be weaned off of ventilation, and had a lot of bloody sputum. He therefore had a tracheostomy on 5/4, along with bronchoscopy/BAL with left lower lobe biopsy. The biopsy was positive for amyloid. His course was also complicated by PNA (suspected ventilator-acquired), for which he was on Vancomycin (4/28-5/2), Cefepime (4/28-29), Meropenem (4/29-5/9), and Micafungin (5/5-5/10). In addition, he developed acute renal failure, for which he started HD on 5/11 with UF, changed to CRRT. Finally, he has been on a heparin gtt for paroxysmal afib. Hematology was consulted in the setting of amyloidosis and worsening leukocytosis.    Allergies    No Known Allergies    Intolerances        MEDICATIONS  (STANDING):  aspirin 325 milliGRAM(s) Oral daily  atorvastatin 40 milliGRAM(s) Oral at bedtime  chlorhexidine 0.12% Liquid 15 milliLiter(s) Oral Mucosa two times a day  chlorhexidine 4% Liquid 1 Application(s) Topical <User Schedule>  CRRT Treatment    <Continuous>  dexmedetomidine Infusion 0.3 MICROgram(s)/kG/Hr (5.962 mL/Hr) IV Continuous <Continuous>  diltiazem Infusion 3 mG/Hr (3 mL/Hr) IV Continuous <Continuous>  escitalopram 10 milliGRAM(s) Oral daily  gabapentin 200 milliGRAM(s) Oral every 8 hours  heparin  Infusion 1300 Unit(s)/Hr (10 mL/Hr) IV Continuous <Continuous>  insulin lispro (HumaLOG) corrective regimen sliding scale   SubCutaneous every 6 hours  meropenem  IVPB      meropenem  IVPB 500 milliGRAM(s) IV Intermittent every 12 hours  metoclopramide Injectable 5 milliGRAM(s) IV Push every 8 hours  midodrine 10 milliGRAM(s) Oral every 8 hours  norepinephrine Infusion 0.034 MICROgram(s)/kG/Min (5 mL/Hr) IV Continuous <Continuous>  pantoprazole  Injectable 40 milliGRAM(s) IV Push two times a day  PrismaSATE Dialysate BGK 4 / 2.5 5000 milliLiter(s) (1200 mL/Hr) CRRT <Continuous>  PrismaSOL Filtration BGK 4 / 2.5 5000 milliLiter(s) (1000 mL/Hr) CRRT <Continuous>  PrismaSOL Filtration BGK 4 / 2.5 5000 milliLiter(s) (200 mL/Hr) CRRT <Continuous>  propofol Infusion 10 MICROgram(s)/kG/Min (4.77 mL/Hr) IV Continuous <Continuous>  vasopressin Infusion 0.083 Unit(s)/Min (5 mL/Hr) IV Continuous <Continuous>    MEDICATIONS  (PRN):  sodium chloride 0.9% lock flush 10 milliLiter(s) IV Push every 1 hour PRN Pre/post blood products, medications, blood draw, and to maintain line patency      PAST MEDICAL & SURGICAL HISTORY:  Diverticulitis  Nocturia  Panic attacks  Anxiety  Depression  Asthma: Controlled  Sleep Apnea  History of partial colectomy  History of colon resection  History of eye surgery: PPV right  Cataract extraction status: right  Status post lung surgery: ? wedge resection  S/P eye surgery: &quot;removed fluid&quot; from rigth eye  Bunion  Sinus Disorder: surgery x 2  Inguinal Hernia Bilateral  Shoulder Problem: right rotator cuff  Carpal Tunnel Syndrome: right hand      FAMILY HISTORY:  Family history of acute myocardial infarction (Sibling): mom at 54   mothers brother at 37      SOCIAL HISTORY: No EtOH, no tobacco    REVIEW OF SYSTEMS:  Unable to obtain ROS, as patient is intubated and sedated        T(F): 99.9 (05-16-19 @ 08:00), Max: 101.3 (05-15-19 @ 20:40)  HR: 130 (05-16-19 @ 11:00)  BP: 126/57 (05-15-19 @ 16:00)  RR: 18 (05-16-19 @ 11:00)  SpO2: 79% (05-16-19 @ 11:00)  Wt(kg): --    GENERAL: NAD, intubated and sedated, NGT  HEAD:  Atraumatic, Normocephalic  EYES: EOMI, PERRLA, conjunctiva and sclera clear  Mouth: no macroglossia  NECK: Supple, No JVD  CHEST/LUNG: Clear to auscultation bilaterally; No wheeze. R sided chest tube in place  HEART: Regular rate and rhythm; No murmurs, rubs, or gallops  ABDOMEN: Soft, Nontender, Nondistended; Bowel sounds hypoactive  EXTREMITIES:  cyanotic nails. 1+ edema.  NEUROLOGY: non-focal  SKIN: Some small ecchymoses across upper extremities                          9.1    33.3  )-----------( 186      ( 16 May 2019 05:15 )             26.8       05-16    141  |  101  |  105<H>  ----------------------------<  136<H>  4.2   |  17<L>  |  2.66<H>    Ca    7.3<L>      16 May 2019 05:15  Phos  9.1     05-16  Mg     2.4     05-16    TPro  5.3<L>  /  Alb  2.8<L>  /  TBili  1.4<H>  /  DBili  x   /  AST  173<H>  /  ALT  136<H>  /  AlkPhos  63  05-16      Lactate Dehydrogenase, Serum: 706 U/L (05-16 @ 09:07)  Magnesium, Serum: 2.4 mg/dL (05-16 @ 05:15)  Phosphorus Level, Serum: 9.1 mg/dL (05-16 @ 05:15)

## 2019-05-16 NOTE — BEHAVIORAL HEALTH ASSESSMENT NOTE - CASE SUMMARY
Ms. Cartagena and this MD attempted to interview pt, he was sedated and did not respond tp verbal nor touching. I agree with the above findings

## 2019-05-16 NOTE — CONSULT NOTE ADULT - ASSESSMENT
80 yo M with a PMH HLD, ARCENIO (noncompliant with CPAP), GERD, diverticulitis, depression, anxiety, and panic attacks who was noting dyspnea on exertion with chest pressure and palpitations for 1 year, s/p 3-vessel CABG c/b hypoxic respiratory failure now on tracheostomy, acute on chronic renal failure, ventilator-acquired pneumonia, and acute renal failure. Hematology called for lung biopsy showing amyloidosis, and for worsening leukocytosis.    #Amyloidosis    #Leukocytosis 78 yo M with a PMH HLD, ARCENIO (noncompliant with CPAP), GERD, diverticulitis, depression, anxiety, and panic attacks who was noting dyspnea on exertion with chest pressure and palpitations for 1 year, s/p 3-vessel CABG c/b hypoxic respiratory failure now on tracheostomy, acute on chronic renal failure, ventilator-acquired pneumonia, and acute renal failure. Hematology called for lung biopsy showing amyloidosis, and for worsening leukocytosis.    #Amyloidosis  - Based on lung biopsy, which has a specificity of %. Also with acute renal failure and heart failure with preserved ejection fraction (but with concentric hypertrophy), concerning for renal and cardiac involvement as well. CT chest shows interlobular septal thickening that can be seen in diffuse parenchymal amyloidosis (seen more with AL than AA amyloidosis)  - S/p SPEP, SIFE, UPEP, and UIFE. Results do not show monoclonal antibodies, proteinuria, or Bence-Quinn protein. He has slightly elevated free light chains but no abnormalities in the ratio. 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 There appeared to be no renal symptoms prior to surgery (creatinine normal)  - Would want to obtain the type of amyloid prior to any treatment. Would therefore want to get immunohistochemical staining for the specimens  - Please check quantitative immunoglobulin levels    #Leukocytosis  - Peripheral smear seems to show neutrophilic predominance with left shift including bands but no other obvious progenitor cells. Appears to be consistent with infection. Patient's hemoglobin (anemia likely 2/2 blood loss from surgery) and platelet counts have been unchanged  - CT appears to be consistent with multifocal pneumonia  - Would continue with antibiotics and could likely hold off on marrow at this time    PENDING DISCUSSION WITH ATTENDING 80 yo M with a PMH HLD, ARCENIO (noncompliant with CPAP), GERD, diverticulitis, depression, anxiety, panic attacks, and carpal tunnel who was noting dyspnea on exertion with chest pressure and palpitations for 1 year, s/p 3-vessel CABG c/b hypoxic respiratory failure now on tracheostomy, acute on chronic renal failure, ventilator-acquired pneumonia, and acute renal failure. Hematology called for lung biopsy showing amyloidosis, and for worsening leukocytosis.    #Amyloidosis  - Based on lung biopsy, which has a specificity of %. Also with acute renal failure and heart failure with preserved ejection fraction (but with concentric hypertrophy), concerning for renal and cardiac involvement as well. CT chest shows interlobular septal thickening that can be seen in diffuse parenchymal amyloidosis (seen more with AL than AA amyloidosis)  - S/p SPEP, SIFE, UPEP, and UIFE. Results do not show monoclonal antibodies, proteinuria, or Bence-Quinn protein. He has slightly elevated free light chains but no abnormalities in the ratio. 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 There appeared to be no renal symptoms prior to surgery (creatinine normal)  - Would want to obtain the type of amyloid prior to any treatment. Would therefore want to get immunohistochemical staining from the lung biopsy for the specimens. Would also obtain abdominal fat pad and transthyretin technetium pyrophosphate  - Please check quantitative immunoglobulin levels    #Leukocytosis  - Peripheral smear seems to show neutrophilic predominance with left shift including bands but no other obvious progenitor cells. Appears to be consistent with infection. Patient's hemoglobin (anemia likely 2/2 blood loss from surgery) and platelet counts have been unchanged  - CT appears to be consistent with multifocal pneumonia  - Would continue with antibiotics and could likely hold off on marrow or flow at this time    PENDING DISCUSSION WITH ATTENDING 80 yo M with a PMH HLD, ARCENIO (noncompliant with CPAP), GERD, diverticulitis, depression, anxiety, panic attacks, and carpal tunnel who was noting dyspnea on exertion with chest pressure and palpitations for 1 year, s/p 3-vessel CABG c/b hypoxic respiratory failure now on tracheostomy, acute on chronic renal failure, ventilator-acquired pneumonia, and acute renal failure. Hematology called for lung biopsy showing amyloidosis, and for worsening leukocytosis.    #Amyloidosis  - Based on lung biopsy, which has a specificity of %. Also with acute renal failure and heart failure with preserved ejection fraction (but with concentric hypertrophy), concerning for renal and cardiac involvement as well. CT chest shows interlobular septal thickening that can be seen in diffuse parenchymal amyloidosis (seen more with AL than AA amyloidosis)  - S/p SPEP, SIFE, UPEP, and UIFE. Results do not show monoclonal antibodies, proteinuria, or Bence-Quinn protein. He has slightly elevated free light chains but no abnormalities in the ratio. 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 There appeared to be no renal symptoms prior to surgery (creatinine normal)  - Would want to obtain the type of amyloid prior to any treatment. Would therefore want to get immunohistochemical staining from the lung biopsy for the specimens. Would also obtain abdominal fat pad and transthyretin technetium pyrophosphate  - Please check quantitative immunoglobulin levels    #Leukocytosis  - Peripheral smear seems to show neutrophilic predominance with left shift including bands but no other obvious progenitor cells. Appears to be consistent with infection. Patient's hemoglobin (anemia likely 2/2 blood loss from surgery) and platelet counts have been unchanged  - CT appears to be consistent with multifocal pneumonia  - Would continue with antibiotics and could likely hold off on marrow or flow at this time

## 2019-05-16 NOTE — BEHAVIORAL HEALTH ASSESSMENT NOTE - MOOD
"Chief Complaint   Patient presents with     Cough     cough  x 4 days, wheezing, difficulty breathing x yesterday        Initial /80 (BP Location: Right arm, Patient Position: Chair, Cuff Size: Adult Regular)  Pulse 90  Temp 98.3  F (36.8  C) (Oral)  Resp 16  Wt 216 lb 11.2 oz (98.3 kg)  SpO2 92%  BMI 36.06 kg/m2 Estimated body mass index is 36.06 kg/(m^2) as calculated from the following:    Height as of 9/21/16: 5' 5\" (1.651 m).    Weight as of this encounter: 216 lb 11.2 oz (98.3 kg).  Medication Reconciliation: complete    " Other

## 2019-05-16 NOTE — BEHAVIORAL HEALTH ASSESSMENT NOTE - RISK ASSESSMENT
Risk factors: acute/chronic medical conditions, acute/cognitive impairments, chronic pain    Protective factors: no h/o SA/SIB, no h/o psych admissions,  no active substance abuse, with adult children and grandchildren, domiciled, intact marriage, social supports, positive therapeutic relationship, engaged in treatment    Overall, pt is a low risk of harm to self/others and does not require psychiatric admission for safety and stabilization.

## 2019-05-16 NOTE — PROGRESS NOTE ADULT - ATTENDING COMMENTS
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  Resp. failure-try to DC CT, continue weaning w/ TC as able--off sedation as able  GI-ppi/TF                               Other-recall of psych for MS here  PT/OT.  Jann Guadarrama MD-Pulmonary   [phone]

## 2019-05-16 NOTE — BEHAVIORAL HEALTH ASSESSMENT NOTE - NSBHCONSULTRECOMMENDOTHER_PSY_A_CORE FT
1. Check: B12, folate, RPR, U-tox; consider CT head    2. Minimize use of benzos, opioids, anticholinergics, or other deliriogenic agents when possible.  Maintain sleep wake cycle.  Provide frequent reorientation and redirection.  Family member at bedside if possible. Assess for need for glasses and hearing aid (if applicable).    3. Pt does not meet criteria for psychiatric hospitalization.  Recommend outpt psych f/u at [facility] after d/c- [phone].   [facility] Psych will follow.

## 2019-05-16 NOTE — PROGRESS NOTE ADULT - PROBLEM SELECTOR PLAN 2
The patient has an elevated bun in the setting of steroid use, off steroids, BUN today 105s improving slowly

## 2019-05-16 NOTE — CONSULT NOTE ADULT - ATTENDING COMMENTS
Amyloidosis identified on lung biopsy  patient showing signs of renal and cardiac involvement  spep and sflc is negative- we can see this sometimes in AL amyloid- not fully ruled out as yet  follow up biopsy results sent to [facility] to identify the type of amyloid  if systemic AL amyloid- could attempt therapy if patient stabilizes but improvement will take time  will follow

## 2019-05-16 NOTE — PROGRESS NOTE ADULT - ASSESSMENT
79 yo   male with PMH of HLD, Sleep apnea ( non compliant with CPAP), GERD, diverticulitis, depression, anxiety, and panic attack, presented to [facility] on 04/25/19 for LHC after having  abnormal NST done as outpatient, LHC done same day revealed TVD. therefore underwent CABG on 04/26/19, hospital course complicated with fever, shock, no on pressors, abx, developed julio cesar hence nephrology consulted.

## 2019-05-17 LAB
ALBUMIN SERPL ELPH-MCNC: 2.4 G/DL — LOW (ref 3.3–5)
ALP SERPL-CCNC: 60 U/L — SIGNIFICANT CHANGE UP (ref 40–120)
ALT FLD-CCNC: 1004 U/L — HIGH (ref 10–45)
AMYLASE P1 CFR SERPL: 372 U/L — HIGH (ref 25–125)
ANION GAP SERPL CALC-SCNC: 25 MMOL/L — HIGH (ref 5–17)
APTT BLD: 56.9 SEC — HIGH (ref 27.5–36.3)
APTT BLD: 58.5 SEC — HIGH (ref 27.5–36.3)
AST SERPL-CCNC: 1106 U/L — HIGH (ref 10–40)
BASE EXCESS BLDMV CALC-SCNC: -2.7 MMOL/L — SIGNIFICANT CHANGE UP (ref -3–3)
BASE EXCESS BLDMV CALC-SCNC: -4.5 MMOL/L — LOW (ref -3–3)
BASE EXCESS BLDMV CALC-SCNC: -4.6 MMOL/L — LOW (ref -3–3)
BASE EXCESS BLDMV CALC-SCNC: -4.8 MMOL/L — LOW (ref -3–3)
BASE EXCESS BLDMV CALC-SCNC: -7.3 MMOL/L — LOW (ref -3–3)
BASE EXCESS BLDMV CALC-SCNC: -7.6 MMOL/L — LOW (ref -3–3)
BASE EXCESS BLDMV CALC-SCNC: -7.7 MMOL/L — LOW (ref -3–3)
BASE EXCESS BLDMV CALC-SCNC: -9.8 MMOL/L — LOW (ref -3–3)
BASE EXCESS BLDV CALC-SCNC: -7.6 MMOL/L — LOW (ref -2–2)
BILIRUB SERPL-MCNC: 2.2 MG/DL — HIGH (ref 0.2–1.2)
BUN SERPL-MCNC: 82 MG/DL — HIGH (ref 7–23)
CALCIUM SERPL-MCNC: 7.1 MG/DL — LOW (ref 8.4–10.5)
CHLORIDE SERPL-SCNC: 99 MMOL/L — SIGNIFICANT CHANGE UP (ref 96–108)
CO2 BLDMV-SCNC: 19 MMOL/L — LOW (ref 21–29)
CO2 BLDMV-SCNC: 20 MMOL/L — LOW (ref 21–29)
CO2 BLDMV-SCNC: 21 MMOL/L — SIGNIFICANT CHANGE UP (ref 21–29)
CO2 BLDMV-SCNC: 21 MMOL/L — SIGNIFICANT CHANGE UP (ref 21–29)
CO2 BLDMV-SCNC: 22 MMOL/L — SIGNIFICANT CHANGE UP (ref 21–29)
CO2 BLDMV-SCNC: 22 MMOL/L — SIGNIFICANT CHANGE UP (ref 21–29)
CO2 BLDMV-SCNC: 23 MMOL/L — SIGNIFICANT CHANGE UP (ref 21–29)
CO2 BLDMV-SCNC: 24 MMOL/L — SIGNIFICANT CHANGE UP (ref 21–29)
CO2 BLDV-SCNC: 17 MMOL/L — LOW (ref 22–30)
CO2 SERPL-SCNC: 14 MMOL/L — LOW (ref 22–31)
CREAT SERPL-MCNC: 2.27 MG/DL — HIGH (ref 0.5–1.3)
GAS PNL BLDA: SIGNIFICANT CHANGE UP
GAS PNL BLDMV: SIGNIFICANT CHANGE UP
GAS PNL BLDV: SIGNIFICANT CHANGE UP
GLUCOSE BLDC GLUCOMTR-MCNC: 181 MG/DL — HIGH (ref 70–99)
GLUCOSE BLDC GLUCOMTR-MCNC: 185 MG/DL — HIGH (ref 70–99)
GLUCOSE BLDC GLUCOMTR-MCNC: 280 MG/DL — HIGH (ref 70–99)
GLUCOSE BLDC GLUCOMTR-MCNC: 71 MG/DL — SIGNIFICANT CHANGE UP (ref 70–99)
GLUCOSE SERPL-MCNC: 113 MG/DL — HIGH (ref 70–99)
HAV IGM SER-ACNC: SIGNIFICANT CHANGE UP
HBV CORE IGM SER-ACNC: SIGNIFICANT CHANGE UP
HBV SURFACE AG SER-ACNC: SIGNIFICANT CHANGE UP
HCO3 BLDMV-SCNC: 18 MMOL/L — LOW (ref 20–28)
HCO3 BLDMV-SCNC: 19 MMOL/L — LOW (ref 20–28)
HCO3 BLDMV-SCNC: 19 MMOL/L — LOW (ref 20–28)
HCO3 BLDMV-SCNC: 20 MMOL/L — SIGNIFICANT CHANGE UP (ref 20–28)
HCO3 BLDMV-SCNC: 20 MMOL/L — SIGNIFICANT CHANGE UP (ref 20–28)
HCO3 BLDMV-SCNC: 21 MMOL/L — SIGNIFICANT CHANGE UP (ref 20–28)
HCO3 BLDMV-SCNC: 22 MMOL/L — SIGNIFICANT CHANGE UP (ref 20–28)
HCO3 BLDMV-SCNC: 22 MMOL/L — SIGNIFICANT CHANGE UP (ref 20–28)
HCO3 BLDV-SCNC: 16 MMOL/L — LOW (ref 21–29)
HCT VFR BLD CALC: 28.9 % — LOW (ref 39–50)
HCV AB S/CO SERPL IA: 0.16 S/CO — SIGNIFICANT CHANGE UP (ref 0–0.99)
HCV AB SERPL-IMP: SIGNIFICANT CHANGE UP
HGB BLD-MCNC: 9.7 G/DL — LOW (ref 13–17)
HOROWITZ INDEX BLDMV+IHG-RTO: 50 — SIGNIFICANT CHANGE UP
HOROWITZ INDEX BLDV+IHG-RTO: 40 — SIGNIFICANT CHANGE UP
LIDOCAIN IGE QN: 78 U/L — HIGH (ref 7–60)
MAGNESIUM SERPL-MCNC: 2.6 MG/DL — SIGNIFICANT CHANGE UP (ref 1.6–2.6)
MCHC RBC-ENTMCNC: 29 PG — SIGNIFICANT CHANGE UP (ref 27–34)
MCHC RBC-ENTMCNC: 33.5 GM/DL — SIGNIFICANT CHANGE UP (ref 32–36)
MCV RBC AUTO: 86.6 FL — SIGNIFICANT CHANGE UP (ref 80–100)
O2 CT VFR BLD CALC: 32 MMHG — SIGNIFICANT CHANGE UP (ref 30–65)
O2 CT VFR BLD CALC: 33 MMHG — SIGNIFICANT CHANGE UP (ref 30–65)
O2 CT VFR BLD CALC: 35 MMHG — SIGNIFICANT CHANGE UP (ref 30–65)
O2 CT VFR BLD CALC: 39 MMHG — SIGNIFICANT CHANGE UP (ref 30–65)
O2 CT VFR BLD CALC: 39 MMHG — SIGNIFICANT CHANGE UP (ref 30–65)
O2 CT VFR BLD CALC: 40 MMHG — SIGNIFICANT CHANGE UP (ref 30–65)
O2 CT VFR BLD CALC: 41 MMHG — SIGNIFICANT CHANGE UP (ref 30–65)
O2 CT VFR BLD CALC: 42 MMHG — SIGNIFICANT CHANGE UP (ref 30–65)
PCO2 BLDMV: 36 MMHG — SIGNIFICANT CHANGE UP (ref 30–65)
PCO2 BLDMV: 42 MMHG — SIGNIFICANT CHANGE UP (ref 30–65)
PCO2 BLDMV: 42 MMHG — SIGNIFICANT CHANGE UP (ref 30–65)
PCO2 BLDMV: 47 MMHG — SIGNIFICANT CHANGE UP (ref 30–65)
PCO2 BLDMV: 49 MMHG — SIGNIFICANT CHANGE UP (ref 30–65)
PCO2 BLDMV: 50 MMHG — SIGNIFICANT CHANGE UP (ref 30–65)
PCO2 BLDMV: 50 MMHG — SIGNIFICANT CHANGE UP (ref 30–65)
PCO2 BLDMV: 53 MMHG — SIGNIFICANT CHANGE UP (ref 30–65)
PCO2 BLDV: 30 MMHG — LOW (ref 35–50)
PH BLDMV: 7.18 — CRITICAL LOW (ref 7.32–7.45)
PH BLDMV: 7.2 — CRITICAL LOW (ref 7.32–7.45)
PH BLDMV: 7.21 — LOW (ref 7.32–7.45)
PH BLDMV: 7.23 — LOW (ref 7.32–7.45)
PH BLDMV: 7.26 — LOW (ref 7.32–7.45)
PH BLDMV: 7.32 — SIGNIFICANT CHANGE UP (ref 7.32–7.45)
PH BLDMV: 7.34 — SIGNIFICANT CHANGE UP (ref 7.32–7.45)
PH BLDMV: 7.36 — SIGNIFICANT CHANGE UP (ref 7.32–7.45)
PH BLDV: 7.36 — SIGNIFICANT CHANGE UP (ref 7.35–7.45)
PHOSPHATE SERPL-MCNC: 9.6 MG/DL — HIGH (ref 2.5–4.5)
PLATELET # BLD AUTO: 138 K/UL — LOW (ref 150–400)
PO2 BLDV: 34 MMHG — SIGNIFICANT CHANGE UP (ref 25–45)
POTASSIUM SERPL-MCNC: 5.6 MMOL/L — HIGH (ref 3.5–5.3)
POTASSIUM SERPL-SCNC: 5.6 MMOL/L — HIGH (ref 3.5–5.3)
PROT SERPL-MCNC: 5 G/DL — LOW (ref 6–8.3)
RBC # BLD: 3.34 M/UL — LOW (ref 4.2–5.8)
RBC # FLD: 14.4 % — SIGNIFICANT CHANGE UP (ref 10.3–14.5)
SAO2 % BLDMV: 50 % — LOW (ref 60–90)
SAO2 % BLDMV: 51 % — LOW (ref 60–90)
SAO2 % BLDMV: 52 % — LOW (ref 60–90)
SAO2 % BLDMV: 52 % — LOW (ref 60–90)
SAO2 % BLDMV: 54 % — LOW (ref 60–90)
SAO2 % BLDMV: 54 % — LOW (ref 60–90)
SAO2 % BLDMV: 56 % — LOW (ref 60–90)
SAO2 % BLDMV: 60 % — SIGNIFICANT CHANGE UP (ref 60–90)
SAO2 % BLDV: 49 % — LOW (ref 67–88)
SODIUM SERPL-SCNC: 138 MMOL/L — SIGNIFICANT CHANGE UP (ref 135–145)
TRIGL SERPL-MCNC: 221 MG/DL — HIGH (ref 10–149)
WBC # BLD: 39.5 K/UL — HIGH (ref 3.8–10.5)
WBC # FLD AUTO: 39.5 K/UL — HIGH (ref 3.8–10.5)

## 2019-05-17 PROCEDURE — 99232 SBSQ HOSP IP/OBS MODERATE 35: CPT

## 2019-05-17 PROCEDURE — 93308 TTE F-UP OR LMTD: CPT | Mod: 26

## 2019-05-17 PROCEDURE — 36800 INSERTION OF CANNULA: CPT

## 2019-05-17 PROCEDURE — 71045 X-RAY EXAM CHEST 1 VIEW: CPT | Mod: 26

## 2019-05-17 PROCEDURE — 36556 INSERT NON-TUNNEL CV CATH: CPT | Mod: 78,LT,59

## 2019-05-17 PROCEDURE — 99291 CRITICAL CARE FIRST HOUR: CPT | Mod: 25

## 2019-05-17 PROCEDURE — 36620 INSERTION CATHETER ARTERY: CPT | Mod: 78,59

## 2019-05-17 PROCEDURE — 93321 DOPPLER ECHO F-UP/LMTD STD: CPT | Mod: 26

## 2019-05-17 PROCEDURE — 99233 SBSQ HOSP IP/OBS HIGH 50: CPT | Mod: GC

## 2019-05-17 PROCEDURE — 93503 INSERT/PLACE HEART CATHETER: CPT | Mod: 59

## 2019-05-17 RX ORDER — INSULIN HUMAN 100 [IU]/ML
6 INJECTION, SOLUTION SUBCUTANEOUS ONCE
Refills: 0 | Status: COMPLETED | OUTPATIENT
Start: 2019-05-17 | End: 2019-05-17

## 2019-05-17 RX ORDER — ALBUMIN HUMAN 25 %
250 VIAL (ML) INTRAVENOUS ONCE
Refills: 0 | Status: COMPLETED | OUTPATIENT
Start: 2019-05-17 | End: 2019-05-17

## 2019-05-17 RX ORDER — SODIUM BICARBONATE 1 MEQ/ML
50 SYRINGE (ML) INTRAVENOUS ONCE
Refills: 0 | Status: COMPLETED | OUTPATIENT
Start: 2019-05-17 | End: 2019-05-17

## 2019-05-17 RX ORDER — ESMOLOL HCL 100MG/10ML
50 VIAL (ML) INTRAVENOUS
Qty: 2500 | Refills: 0 | Status: DISCONTINUED | OUTPATIENT
Start: 2019-05-17 | End: 2019-05-17

## 2019-05-17 RX ORDER — CALCIUM CHLORIDE
1000 POWDER (GRAM) MISCELLANEOUS ONCE
Refills: 0 | Status: COMPLETED | OUTPATIENT
Start: 2019-05-17 | End: 2019-05-17

## 2019-05-17 RX ORDER — DEXTROSE 50 % IN WATER 50 %
50 SYRINGE (ML) INTRAVENOUS ONCE
Refills: 0 | Status: COMPLETED | OUTPATIENT
Start: 2019-05-17 | End: 2019-05-17

## 2019-05-17 RX ORDER — DOBUTAMINE HCL 250MG/20ML
2.5 VIAL (ML) INTRAVENOUS
Qty: 500 | Refills: 0 | Status: DISCONTINUED | OUTPATIENT
Start: 2019-05-17 | End: 2019-05-17

## 2019-05-17 RX ORDER — ACETAMINOPHEN 500 MG
650 TABLET ORAL ONCE
Refills: 0 | Status: COMPLETED | OUTPATIENT
Start: 2019-05-17 | End: 2019-05-17

## 2019-05-17 RX ORDER — MICAFUNGIN SODIUM 100 MG/1
100 INJECTION, POWDER, LYOPHILIZED, FOR SOLUTION INTRAVENOUS ONCE
Refills: 0 | Status: COMPLETED | OUTPATIENT
Start: 2019-05-17 | End: 2019-05-17

## 2019-05-17 RX ORDER — INSULIN HUMAN 100 [IU]/ML
4 INJECTION, SOLUTION SUBCUTANEOUS ONCE
Refills: 0 | Status: COMPLETED | OUTPATIENT
Start: 2019-05-17 | End: 2019-05-17

## 2019-05-17 RX ORDER — CALCIUM GLUCONATE 100 MG/ML
1 VIAL (ML) INTRAVENOUS EVERY 4 HOURS
Refills: 0 | Status: DISCONTINUED | OUTPATIENT
Start: 2019-05-17 | End: 2019-05-17

## 2019-05-17 RX ORDER — IMMUNE GLOBULIN (HUMAN) 10 G/100ML
70 INJECTION INTRAVENOUS; SUBCUTANEOUS DAILY
Refills: 0 | Status: DISCONTINUED | OUTPATIENT
Start: 2019-05-17 | End: 2019-05-17

## 2019-05-17 RX ORDER — CALCIUM GLUCONATE 100 MG/ML
1 VIAL (ML) INTRAVENOUS ONCE
Refills: 0 | Status: COMPLETED | OUTPATIENT
Start: 2019-05-17 | End: 2019-05-17

## 2019-05-17 RX ORDER — MEROPENEM 1 G/30ML
1000 INJECTION INTRAVENOUS EVERY 8 HOURS
Refills: 0 | Status: DISCONTINUED | OUTPATIENT
Start: 2019-05-17 | End: 2019-05-22

## 2019-05-17 RX ORDER — LINEZOLID 600 MG/300ML
600 INJECTION, SOLUTION INTRAVENOUS EVERY 12 HOURS
Refills: 0 | Status: DISCONTINUED | OUTPATIENT
Start: 2019-05-17 | End: 2019-05-17

## 2019-05-17 RX ORDER — HYDROCORTISONE 20 MG
50 TABLET ORAL EVERY 6 HOURS
Refills: 0 | Status: DISCONTINUED | OUTPATIENT
Start: 2019-05-17 | End: 2019-05-22

## 2019-05-17 RX ORDER — LINEZOLID 600 MG/300ML
600 INJECTION, SOLUTION INTRAVENOUS ONCE
Refills: 0 | Status: COMPLETED | OUTPATIENT
Start: 2019-05-17 | End: 2019-05-17

## 2019-05-17 RX ORDER — IMMUNE GLOBULIN (HUMAN) 10 G/100ML
35 INJECTION INTRAVENOUS; SUBCUTANEOUS DAILY
Refills: 0 | Status: COMPLETED | OUTPATIENT
Start: 2019-05-18 | End: 2019-05-19

## 2019-05-17 RX ORDER — INSULIN HUMAN 100 [IU]/ML
6 INJECTION, SOLUTION SUBCUTANEOUS ONCE
Refills: 0 | Status: DISCONTINUED | OUTPATIENT
Start: 2019-05-17 | End: 2019-05-17

## 2019-05-17 RX ORDER — DIPHENHYDRAMINE HCL 50 MG
25 CAPSULE ORAL ONCE
Refills: 0 | Status: COMPLETED | OUTPATIENT
Start: 2019-05-17 | End: 2019-05-17

## 2019-05-17 RX ORDER — MICAFUNGIN SODIUM 100 MG/1
100 INJECTION, POWDER, LYOPHILIZED, FOR SOLUTION INTRAVENOUS EVERY 24 HOURS
Refills: 0 | Status: DISCONTINUED | OUTPATIENT
Start: 2019-05-18 | End: 2019-05-22

## 2019-05-17 RX ORDER — SODIUM BICARBONATE 1 MEQ/ML
50 SYRINGE (ML) INTRAVENOUS
Refills: 0 | Status: COMPLETED | OUTPATIENT
Start: 2019-05-17 | End: 2019-05-17

## 2019-05-17 RX ORDER — SODIUM CHLORIDE 9 MG/ML
1000 INJECTION INTRAMUSCULAR; INTRAVENOUS; SUBCUTANEOUS
Refills: 0 | Status: DISCONTINUED | OUTPATIENT
Start: 2019-05-17 | End: 2019-05-19

## 2019-05-17 RX ORDER — DEXTROSE 10 % IN WATER 10 %
1000 INTRAVENOUS SOLUTION INTRAVENOUS
Refills: 0 | Status: DISCONTINUED | OUTPATIENT
Start: 2019-05-17 | End: 2019-05-24

## 2019-05-17 RX ORDER — VANCOMYCIN HCL 1 G
750 VIAL (EA) INTRAVENOUS EVERY 12 HOURS
Refills: 0 | Status: DISCONTINUED | OUTPATIENT
Start: 2019-05-17 | End: 2019-05-20

## 2019-05-17 RX ORDER — IMMUNE GLOBULIN (HUMAN) 10 G/100ML
70 INJECTION INTRAVENOUS; SUBCUTANEOUS ONCE
Refills: 0 | Status: COMPLETED | OUTPATIENT
Start: 2019-05-17 | End: 2019-05-17

## 2019-05-17 RX ORDER — THIAMINE MONONITRATE (VIT B1) 100 MG
200 TABLET ORAL
Refills: 0 | Status: DISCONTINUED | OUTPATIENT
Start: 2019-05-17 | End: 2019-05-26

## 2019-05-17 RX ORDER — VANCOMYCIN HCL 1 G
1000 VIAL (EA) INTRAVENOUS ONCE
Refills: 0 | Status: COMPLETED | OUTPATIENT
Start: 2019-05-17 | End: 2019-05-17

## 2019-05-17 RX ORDER — VANCOMYCIN HCL 500 MG
5 VIAL (EA) INTRAVENOUS ONCE
Refills: 0 | Status: COMPLETED | OUTPATIENT
Start: 2019-05-17 | End: 2019-05-17

## 2019-05-17 RX ORDER — DEXTROSE 50 % IN WATER 50 %
50 SYRINGE (ML) INTRAVENOUS ONCE
Refills: 0 | Status: DISCONTINUED | OUTPATIENT
Start: 2019-05-17 | End: 2019-05-26

## 2019-05-17 RX ADMIN — Medication 50 MILLIGRAM(S): at 17:53

## 2019-05-17 RX ADMIN — MEROPENEM 100 MILLIGRAM(S): 1 INJECTION INTRAVENOUS at 14:04

## 2019-05-17 RX ADMIN — Medication 50 MILLIGRAM(S): at 23:50

## 2019-05-17 RX ADMIN — Medication 500 MILLILITER(S): at 21:25

## 2019-05-17 RX ADMIN — Medication 50 MILLIEQUIVALENT(S): at 12:29

## 2019-05-17 RX ADMIN — PANTOPRAZOLE SODIUM 40 MILLIGRAM(S): 20 TABLET, DELAYED RELEASE ORAL at 17:53

## 2019-05-17 RX ADMIN — LINEZOLID 300 MILLIGRAM(S): 600 INJECTION, SOLUTION INTRAVENOUS at 04:36

## 2019-05-17 RX ADMIN — Medication 50 MILLIEQUIVALENT(S): at 08:49

## 2019-05-17 RX ADMIN — Medication 50 MILLILITER(S): at 10:26

## 2019-05-17 RX ADMIN — CHLORHEXIDINE GLUCONATE 15 MILLILITER(S): 213 SOLUTION TOPICAL at 17:55

## 2019-05-17 RX ADMIN — INSULIN HUMAN 6 UNIT(S): 100 INJECTION, SOLUTION SUBCUTANEOUS at 10:39

## 2019-05-17 RX ADMIN — Medication 30 MILLILITER(S): at 18:49

## 2019-05-17 RX ADMIN — MICAFUNGIN SODIUM 105 MILLIGRAM(S): 100 INJECTION, POWDER, LYOPHILIZED, FOR SOLUTION INTRAVENOUS at 04:11

## 2019-05-17 RX ADMIN — Medication 200 GRAM(S): at 03:52

## 2019-05-17 RX ADMIN — Medication 200 MILLIGRAM(S): at 21:24

## 2019-05-17 RX ADMIN — Medication 50 MILLILITER(S): at 08:52

## 2019-05-17 RX ADMIN — Medication 50 MILLILITER(S): at 18:30

## 2019-05-17 RX ADMIN — Medication 800 GRAM(S): at 09:21

## 2019-05-17 RX ADMIN — IMMUNE GLOBULIN (HUMAN) 116.67 GRAM(S): 10 INJECTION INTRAVENOUS; SUBCUTANEOUS at 12:47

## 2019-05-17 RX ADMIN — MICAFUNGIN SODIUM 105 MILLIGRAM(S): 100 INJECTION, POWDER, LYOPHILIZED, FOR SOLUTION INTRAVENOUS at 23:50

## 2019-05-17 RX ADMIN — Medication 500 MILLILITER(S): at 10:26

## 2019-05-17 RX ADMIN — Medication 250 MILLIGRAM(S): at 19:44

## 2019-05-17 RX ADMIN — CHLORHEXIDINE GLUCONATE 1 APPLICATION(S): 213 SOLUTION TOPICAL at 06:30

## 2019-05-17 RX ADMIN — CHLORHEXIDINE GLUCONATE 15 MILLILITER(S): 213 SOLUTION TOPICAL at 06:30

## 2019-05-17 RX ADMIN — Medication 50 MILLIEQUIVALENT(S): at 03:00

## 2019-05-17 RX ADMIN — Medication 200 GRAM(S): at 03:30

## 2019-05-17 RX ADMIN — VECURONIUM BROMIDE 5 MILLIGRAM(S): 20 INJECTION, POWDER, FOR SOLUTION INTRAVENOUS at 02:00

## 2019-05-17 RX ADMIN — VASOPRESSIN 5 UNIT(S)/MIN: 20 INJECTION INTRAVENOUS at 07:00

## 2019-05-17 RX ADMIN — INSULIN HUMAN 4 UNIT(S): 100 INJECTION, SOLUTION SUBCUTANEOUS at 08:48

## 2019-05-17 RX ADMIN — Medication 125 MILLILITER(S): at 15:54

## 2019-05-17 RX ADMIN — Medication 1000 MILLIGRAM(S): at 21:24

## 2019-05-17 RX ADMIN — Medication 125 MILLILITER(S): at 09:19

## 2019-05-17 RX ADMIN — Medication 650 MILLIGRAM(S): at 11:40

## 2019-05-17 RX ADMIN — Medication 500 MILLILITER(S): at 10:45

## 2019-05-17 RX ADMIN — MEROPENEM 100 MILLIGRAM(S): 1 INJECTION INTRAVENOUS at 21:23

## 2019-05-17 RX ADMIN — Medication 200 GRAM(S): at 08:48

## 2019-05-17 RX ADMIN — Medication 50 MILLIEQUIVALENT(S): at 10:27

## 2019-05-17 RX ADMIN — Medication 25 MILLIGRAM(S): at 11:40

## 2019-05-17 RX ADMIN — Medication 5 MICROGRAM(S)/KG/MIN: at 07:00

## 2019-05-17 RX ADMIN — Medication 50 MILLIEQUIVALENT(S): at 01:40

## 2019-05-17 RX ADMIN — HYDROMORPHONE HYDROCHLORIDE 0.5 MILLIGRAM(S): 2 INJECTION INTRAMUSCULAR; INTRAVENOUS; SUBCUTANEOUS at 00:00

## 2019-05-17 RX ADMIN — Medication 50 MILLIEQUIVALENT(S): at 02:15

## 2019-05-17 RX ADMIN — Medication 50 MILLIGRAM(S): at 10:28

## 2019-05-17 RX ADMIN — PANTOPRAZOLE SODIUM 40 MILLIGRAM(S): 20 TABLET, DELAYED RELEASE ORAL at 06:31

## 2019-05-17 RX ADMIN — Medication 5 MILLIGRAM(S): at 06:31

## 2019-05-17 RX ADMIN — Medication 200 GRAM(S): at 17:54

## 2019-05-17 RX ADMIN — PROPOFOL 4.77 MICROGRAM(S)/KG/MIN: 10 INJECTION, EMULSION INTRAVENOUS at 07:00

## 2019-05-17 RX ADMIN — MEROPENEM 100 MILLIGRAM(S): 1 INJECTION INTRAVENOUS at 06:31

## 2019-05-17 RX ADMIN — Medication 125 MILLILITER(S): at 08:36

## 2019-05-17 RX ADMIN — Medication 50 MILLIEQUIVALENT(S): at 02:00

## 2019-05-17 RX ADMIN — Medication 200 GRAM(S): at 10:25

## 2019-05-17 RX ADMIN — Medication 50 MILLILITER(S): at 18:48

## 2019-05-17 RX ADMIN — Medication 500 MILLILITER(S): at 15:20

## 2019-05-17 RX ADMIN — Medication 325 MILLIGRAM(S): at 14:03

## 2019-05-17 RX ADMIN — Medication 200 MILLIGRAM(S): at 10:27

## 2019-05-17 RX ADMIN — Medication 250 MILLIGRAM(S): at 03:30

## 2019-05-17 NOTE — CHART NOTE - NSCHARTNOTEFT_GEN_A_CORE
Date; 5/17/19    Time; 0130    Under sterile conditions and with proper draping of the patient, a pulmonary artery catheter was floated through the introducer catheter in the ____Rt ij________ vein. With the ballon inflated with 1.5ml of air, the PA catheter was advanced while monitoring the pressure tracing from the central venous system to the right ventricle and to the pulmonary artery. Wedge tracing was observed at ___48__ cm. The balloon was deflated, PA pressure tracing was noted again. The position of the catheter was verified by CXR. The initial readings are:  -CVP= 12    mmHg  -PA sys/mcadams=45/19     mmHg    -C.O. =  4.2    lit/min  -C.I. =   1.9   lit/min/m^2           Complications:  None

## 2019-05-17 NOTE — PROGRESS NOTE ADULT - ASSESSMENT
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 His postop course was complicated by persistent respiratory failure requiring tracheostomy and he had a wedge biopsy showing Pulmonary Amyloidosis.    5/13-poor weaning (less than 2 hrs), CT in place  5/14-off solumedrol, CT in place 100 cc out, wean cpap/ps 5/15  5/15-TC for 12 hrs, renal and CHF f/up  5/16-4 hrs TC, over breath vent-on diprovan; mult pressors/rectal tube/c. diff neg-on meropenem/vanco  5/17-septic-HD-prbc, no weaning--ct cap-c/w multifocal PNA

## 2019-05-17 NOTE — PROGRESS NOTE ADULT - ASSESSMENT
79 yo   male with PMH of HLD, Sleep apnea ( non compliant with CPAP), GERD, diverticulitis, depression, anxiety, and panic attack, presented to [facility] on 04/25/19 for LHC after having  abnormal NST done as outpatient, LHC done same day revealed TVD. therefore underwent CABG on 04/26/19, hospital course complicated with fever, shock, no on pressors, abx, developed julio cesar hence nephrology consulted.

## 2019-05-17 NOTE — PROGRESS NOTE ADULT - ATTENDING COMMENTS
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  Resp. failure-no weaning until sepsis controlled  GI-ppi/TF                               Other-recall of psych for MS here  PT/OT.  Jann Guadarrama MD-Pulmonary   [phone]

## 2019-05-17 NOTE — PROGRESS NOTE ADULT - ATTENDING COMMENTS
TARAH/ATN, septic shock, amyloid, lactic acidosis, hyperphosphatemia, hyperkalemia.  Trach, edematous    - CRRT with CVVHDF, goal net even; 0K fluids (orders placed by fellow)  - Maintain MAP > 65  - D/w ICU team  - Remainder per fellow, will follow    CRRT Treatment:   Modality: CVVHDF, Filter: Pete HF 1000, Target Blood Flow: 250 mL/Min  Target Fluid Balance: Titrate to net EVEN fluid balance (05-17-19 @ 07:16)

## 2019-05-17 NOTE — PROGRESS NOTE ADULT - PROBLEM SELECTOR PLAN 1
Pt with TARAH most likely hemodynamically mediated vs ischemic ATN in the setting of anemia from blood loss, hemodynamic instability, and renal venous congestion. SCr of 1.2 in 2016, 1.1 08/18 and 1.06 on 04/25/19. Started on HD on 05/11/19 due to BUN 190s worsening mental status, concerning for uremic encephalopathy. Labs reviewed today. Last HD 5/15/19 and switch to CRRT on 5/16/19 due to shock. Plan to continue CRRT today with net even goal. Monitor I&O, daily weights, avoid nephrotoxics. Adjust meds based on GFR.  Avoid hypotension.

## 2019-05-17 NOTE — PROGRESS NOTE ADULT - SUBJECTIVE AND OBJECTIVE BOX
infectious diseases progress note:    Patient is a 79y old  Male who presents with a chief complaint of sob (17 May 2019 04:44)        Angina pectoris  Atherosclerosis of native coronary artery without angina pectoris        R   Allergies    No Known Allergies    Intolerances        ANTIBIOTICS/RELEVANT:  antimicrobials  meropenem  IVPB      meropenem  IVPB 500 milliGRAM(s) IV Intermittent every 12 hours    immunologic:    OTHER:  albumin human  5% IVPB 250 milliLiter(s) IV Intermittent once  aspirin 325 milliGRAM(s) Oral daily  chlorhexidine 0.12% Liquid 15 milliLiter(s) Oral Mucosa two times a day  chlorhexidine 4% Liquid 1 Application(s) Topical <User Schedule>  CRRT Treatment    <Continuous>  DOBUTamine Infusion 2.5 MICROgram(s)/kG/Min IV Continuous <Continuous>  heparin  Infusion 1300 Unit(s)/Hr IV Continuous <Continuous>  hydroxocobalamin IVPB 5 Gram(s) IV Intermittent once  insulin lispro (HumaLOG) corrective regimen sliding scale   SubCutaneous every 6 hours  metoclopramide Injectable 5 milliGRAM(s) IV Push every 8 hours  norepinephrine Infusion 0.034 MICROgram(s)/kG/Min IV Continuous <Continuous>  pantoprazole  Injectable 40 milliGRAM(s) IV Push two times a day  PrismaSATE Dialysate BK 0 / 3.5 5000 milliLiter(s) CRRT <Continuous>  PrismaSOL Filtration BGK 0 / 2.5 5000 milliLiter(s) CRRT <Continuous>  PrismaSOL Filtration BGK 0 / 2.5 5000 milliLiter(s) CRRT <Continuous>  propofol Infusion 10 MICROgram(s)/kG/Min IV Continuous <Continuous>  vasopressin Infusion 0.083 Unit(s)/Min IV Continuous <Continuous>      Objective:  Vital Signs Last 24 Hrs  T(C): 35.5 (17 May 2019 04:00), Max: 38.2 (16 May 2019 12:00)  T(F): 95.9 (17 May 2019 04:00), Max: 100.8 (16 May 2019 12:00)  HR: 90 (17 May 2019 07:00) (62 - 130)  BP: 104/59 (16 May 2019 11:15) (104/59 - 104/59)  BP(mean): 78 (16 May 2019 11:15) (78 - 78)  RR: 15 (17 May 2019 07:00) (0 - 38)  SpO2: 97% (17 May 2019 07:00) (61% - 100%)   no acute distress  Eyes:RYLEY, EOMI  Ear/Nose/Throat: no oral lesion, no sinus tenderness on percussion	  Neck:no JVD, no lymphadenopathy, supple  Respiratory: CTA shagufta  Cardiovascular: S1S2 RRR, no murmurs  Gastrointestinal:soft, (+) BS, no HSM  Extremities:no e/e/c        LABS:                        9.7    39.5  )-----------( 138      ( 17 May 2019 00:45 )             28.9     05-17    138  |  99  |  82<H>  ----------------------------<  113<H>  5.6<H>   |  14<L>  |  2.27<H>    Ca    7.1<L>      17 May 2019 00:45  Phos  9.6     05-17  Mg     2.6     05-17    TPro  5.0<L>  /  Alb  2.4<L>  /  TBili  2.2<H>  /  DBili  x   /  AST  1106<H>  /  ALT  1004<H>  /  AlkPhos  60  05-17    PTT - ( 16 May 2019 20:21 )  PTT:62.8 sec        MICROBIOLOGY:    RECENT CULTURES:  05-16 @ 03:49 .Sputum       Numerous polymorphonuclear leukocytes seen per low power field  Few Squamous epithelial cells seen per low power field  Moderate Gram Positive Cocci in Clusters seen per oil power field  Moderate Gram Negative Rods seen per oil power field  Few Gram Negative Diplococci seen per oil power field           Numerous Staphylococcus aureus  Normal Respiratory Annamaria present    05-15 @ 19:26 .Blood                No growth to date.    05-15 @ 00:49 .Blood                No growth to date.    05-14 @ 14:26 .Sputum trap   KARAN    Moderate polymorphonuclear leukocytes per low power field  Moderate Squamous epithelial cells per low power field  Moderate Gram Variable Rods per oil power field  Moderate Gram Positive Cocci in Clusters per oil power field    Methicillin resistant Staphylococcus aureus  Methicillin resistant Staphylococcus aureus     Numerous Methicillin resistant Staphylococcus aureus  Normal Respiratory Annamaria absent    05-13 @ 17:02 .Blood                No growth to date.    05-11 @ 18:11 .Blood                No growth at 5 days.          RESPIRATORY CULTURES:      Clostridium difficile GDH Toxins A&amp;B, EIA:   Negative (05-14 @ 11:07)          RADIOLOGY & ADDITIONAL STUDIES:        Pager [phone]  After 5 pm/weekends or if no response :[phone]

## 2019-05-17 NOTE — PROGRESS NOTE ADULT - ASSESSMENT
78 yr old with ARCENIO admitted for chest pain and underwent a CABG last last week   Post op has been stable but is still intubated and became febrile last 24hrs.   No evidence of wd infection, alot of sputum and possible LLL infiltrate underwent Bronch/BAL with biopsy to r/o BOOP, started on steroids              fever down mostly but still with low grade  wbc still elevated   sp course of meropenem and micafungin without improvement leading us to do lung biopsy        lung biopsy is positive for amyloid which may also be in kidney and heart.   Also have some GI bleeding and extremities are mottled but viable Has been off antibiotics fro the last week but today WBC dorothy to over 30 K  Cultures sent and pt started on vanco and meropenem.   no clearcut source .  will await cultures   the extent of the amyloid is unclear at present   but pt has mosd and looks preterminal  family discussing the GOC  can redose  vanco prn  discussed  with ctu and Dr. Draper in detail   ID to cover this weekend        discussed with Dr. DRAPER  reculture for any clinically deterioration  hold vanco aqnd dose by level  HD was not completed yesterday   toes are alittle mottled   I 78 yr old with ARCENIO admitted for chest pain and underwent a CABG last last week   Post op has been stable but is still intubated and became febrile last 24hrs.   No evidence of wd infection, alot of sputum and possible LLL infiltrate underwent Bronch/BAL with biopsy to r/o BOOP, started on steroids       wbc still elevated   sp course of meropenem and micafungin without improvement leading us to do lung biopsy        lung biopsy is positive for amyloid which may also be in kidney and heart.   Also have some GI bleeding and extremities are mottled but viable Had been off antibiotics from the last week but yesterday  WBC dorothy to over 30 K  Cultures sent and pt started on vanco and meropenem.   no clearcut source .  will await cultures  so far the sputum has MRSA   the extent of the amyloid is unclear at present   but pt has MOSD and looks preterminal  family discussing the [facility]  MRSA PNA ? superimposed on chf, pulmonary amyloid, TARAH, hepatic injury   Discussed in detail with Dr. Draper and CTU team     dose of vanco will need to be adjusted based on levels and HD     discussed  with ctu and Dr. Draper in detail   ID to cover this weekend        discussed with Dr. DRAPER  reculture for any clinically deterioration  hold vanco aqnd dose by level  HD was not completed yesterday   toes are alittle mottled   I

## 2019-05-17 NOTE — PROGRESS NOTE ADULT - SUBJECTIVE AND OBJECTIVE BOX
[facility] DIVISION OF KIDNEY DISEASES AND HYPERTENSION -- FOLLOW UP NOTE  --------------------------------------------------------------------------------  Chief Complaint: TARAH    24 hour events/subjective:  Pt examined at bed side,  trach to vent, on NG tube feeds, on pressor support, noted to have worsening lactic acidosis. Pt. on CRRT tolerating well.    PAST HISTORY  --------------------------------------------------------------------------------  No significant changes to PMH, PSH, FHx, SHx, unless otherwise noted    ALLERGIES & MEDICATIONS  --------------------------------------------------------------------------------  Allergies    No Known Allergies    Intolerances      Standing Inpatient Medications  aspirin 325 milliGRAM(s) Oral daily  calcium gluconate IVPB 1 Gram(s) IV Intermittent once  calcium gluconate IVPB 1 Gram(s) IV Intermittent once  chlorhexidine 0.12% Liquid 15 milliLiter(s) Oral Mucosa two times a day  chlorhexidine 4% Liquid 1 Application(s) Topical <User Schedule>  CRRT Treatment    <Continuous>  dextrose 50% Injectable 50 milliLiter(s) IV Push once  heparin  Infusion 1300 Unit(s)/Hr IV Continuous <Continuous>  hydroxocobalamin IVPB 5 Gram(s) IV Intermittent once  insulin lispro (HumaLOG) corrective regimen sliding scale   SubCutaneous every 6 hours  insulin regular  human recombinant. 4 Unit(s) SubCutaneous once  linezolid  IVPB 600 milliGRAM(s) IV Intermittent every 12 hours  meropenem  IVPB      meropenem  IVPB 500 milliGRAM(s) IV Intermittent every 12 hours  norepinephrine Infusion 0.034 MICROgram(s)/kG/Min IV Continuous <Continuous>  pantoprazole  Injectable 40 milliGRAM(s) IV Push two times a day  PrismaSATE Dialysate BK 0 / 3.5 5000 milliLiter(s) CRRT <Continuous>  PrismaSOL Filtration BGK 0 / 2.5 5000 milliLiter(s) CRRT <Continuous>  PrismaSOL Filtration BGK 0 / 2.5 5000 milliLiter(s) CRRT <Continuous>  propofol Infusion 10 MICROgram(s)/kG/Min IV Continuous <Continuous>  sodium bicarbonate  Injectable 50 milliEquivalent(s) IV Push once  vasopressin Infusion 0.083 Unit(s)/Min IV Continuous <Continuous>    PRN Inpatient Medications      REVIEW OF SYSTEMS  --------------------------------------------------------------------------------  Unable to obtain    VITALS/PHYSICAL EXAM  --------------------------------------------------------------------------------  T(C): 34.8 (05-17-19 @ 08:00), Max: 38.2 (05-16-19 @ 12:00)  HR: 99 (05-17-19 @ 08:30) (62 - 130)  BP: 104/59 (05-16-19 @ 11:15) (104/59 - 104/59)  RR: 20 (05-17-19 @ 08:30) (10 - 32)  SpO2: 95% (05-17-19 @ 08:30) (61% - 100%)  Wt(kg): --        05-16-19 @ 07:01  -  05-17-19 @ 07:00  --------------------------------------------------------  IN: 3969.4 mL / OUT: 2945 mL / NET: 1024.4 mL    05-17-19 @ 07:01  -  05-17-19 @ 08:42  --------------------------------------------------------  IN: 199.9 mL / OUT: 0 mL / NET: 199.9 mL        Physical Exam:  	Gen: critically ill.   	HEENT: +NGT, +trach  	Pulm: CTA B/L  	CV: RRR, S1S2; no rub  	Abd: +BS, soft, nontender/nondistended              : De with clear yellow urine, scrotal edema.   	LE: Warm, +edema  	Vascular access: +RIJ non-tunneled HD catheter      LABS/STUDIES  --------------------------------------------------------------------------------              9.7    39.5  >-----------<  138      [05-17-19 @ 00:45]              28.9     138  |  99  |  82  ----------------------------<  113      [05-17-19 @ 00:45]  5.6   |  14  |  2.27        Ca     7.1     [05-17-19 @ 00:45]      Mg     2.6     [05-17-19 @ 00:45]      Phos  9.6     [05-17-19 @ 00:45]    TPro  5.0  /  Alb  2.4  /  TBili  2.2  /  DBili  x   /  AST  1106  /  ALT  1004  /  AlkPhos  60  [05-17-19 @ 00:45]      PTT: 62.8       [05-16-19 @ 20:21]          [05-16-19 @ 09:07]    Creatinine Trend:  SCr 2.27 [05-17 @ 00:45]  SCr 2.66 [05-16 @ 05:15]  SCr 2.80 [05-15 @ 01:52]  SCr 3.37 [05-14 @ 00:49]  SCr 3.71 [05-13 @ 01:02]    Urinalysis - [05-14-19 @ 19:13]      Color Yellow / Appearance Slightly Turbid / SG 1.011 / pH 5.0      Gluc Negative / Ketone Negative  / Bili Negative / Urobili Negative       Blood Moderate / Protein Trace / Leuk Est Negative / Nitrite Negative      RBC 10 / WBC 4 / Hyaline 32 / Gran  / Sq Epi  / Non Sq Epi 3 / Bacteria Negative    Urine Protein <4      [05-11-19 @ 16:11]    HbA1c 5.4      [04-25-19 @ 17:39]  TSH 1.48      [04-25-19 @ 17:58]  Lipid: chol --, , HDL --, LDL --      [05-17-19 @ 03:45]    HBsAb <3.0      [05-12-19 @ 00:08]  HBsAg Nonreact      [05-12-19 @ 00:08]    Free Light Chains: kappa 6.77, lambda 10.77, ratio = 0.63      [05-12 @ 12:52]  Immunofixation Serum:   No Monoclonal Band Identified    Reference Range: None Detected      [05-10-19 @ 12:48]  SPEP Interpretation: Normal Electrophoresis Pattern      [05-10-19 @ 12:48]  Immunofixation Urine: No Monoclonal Band Identified      [05-11-19 @ 16:11]  UPEP Interpretation: Normal Electrophoresis Pattern      [05-11-19 @ 16:11]

## 2019-05-17 NOTE — PROGRESS NOTE ADULT - SUBJECTIVE AND OBJECTIVE BOX
CHIEF COMPLAINT: f/up resp failure, pulm amyloidosis, asthma, osas-sedated on vent (diprovan)      Interval Events: septic/hypotensive/hypothermic, on HD, s/p ct cap; vanco/mycafungin/zyvox-heating blanket/prbc    REVIEW OF SYSTEMS:  Constitutional: No fevers or chills. No weight loss. No fatigue or generalized malaise.  Eyes: No itching or discharge from the eyes  ENT: No ear pain. No ear discharge. No nasal congestion. No post nasal drip. No epistaxis. No throat pain. No sore throat. No difficulty swallowing.   CV: No chest pain. No palpitations. No lightheadedness or dizziness.   Resp: No dyspnea at rest. No dyspnea on exertion. No orthopnea. No wheezing. No cough. No stridor. No sputum production. No chest pain with respiration.  GI: No nausea. No vomiting. No diarrhea.  MSK: No joint pain or pain in any extremities  Integumentary: No skin lesions. No pedal edema.  Neurological: No gross motor weakness. No sensory changes.  [ ] All other systems negative  [ +] Unable to assess ROS because _sedate_______    OBJECTIVE:  ICU Vital Signs Last 24 Hrs  T(C): 35.5 (17 May 2019 04:00), Max: 38.2 (16 May 2019 12:00)  T(F): 95.9 (17 May 2019 04:00), Max: 100.8 (16 May 2019 12:00)  HR: 98 (17 May 2019 04:37) (62 - 130)  BP: 104/59 (16 May 2019 11:15) (104/59 - 104/59)  BP(mean): 78 (16 May 2019 11:15) (78 - 78)  ABP: 113/42 (17 May 2019 04:00) (-8/-8 - 153/55)  ABP(mean): 58 (17 May 2019 04:00) (-8 - 84)  RR: 12 (17 May 2019 04:00) (0 - 38)  SpO2: 100% (17 May 2019 04:00) (61% - 100%)    Mode: AC/ CMV (Assist Control/ Continuous Mandatory Ventilation), RR (machine): 18, TV (machine): 600, FiO2: 50, PEEP: 5, ITime: 1, MAP: 8, PIP: 19    05-15 @ 07:01 - 05-16 @ 07:00  --------------------------------------------------------  IN: 3206.7 mL / OUT: 2525 mL / NET: 681.7 mL    05-16 @ 07:01 - 05-17 @ 04:44  --------------------------------------------------------  IN: 3303 mL / OUT: 1963 mL / NET: 1340 mL      CAPILLARY BLOOD GLUCOSE      POCT Blood Glucose.: 124 mg/dL (16 May 2019 23:47)      PHYSICAL EXAM:  General: Ashen-sedate on vent  HEENT: Atraumatic, normocephalic.                 Mallampatti Grade 3                No nasal congestion.                No tonsillar or pharyngeal exudates.  Lymph Nodes: No palpable lymphadenopathy  Neck: No JVD. No carotid bruit.   Respiratory: Normal chest expansion-reduced BS bases--R CT                         Normal percussion                         Normal and equal air entry                         No wheeze, rhonchi or rales.  Cardiovascular: S1 S2 normal. No murmurs, rubs or gallops.   Abdomen: Soft, non-distended. No organomegaly. Normoactive bowel sounds.  Extremities: Warm to touch. Peripheral pulse palpable. +pedal edema.   Skin: No rashes or skin lesions  Neurological: unable  Psychiatry: unable    HOSPITAL MEDICATIONS:  MEDICATIONS  (STANDING):  aspirin 325 milliGRAM(s) Oral daily  chlorhexidine 0.12% Liquid 15 milliLiter(s) Oral Mucosa two times a day  chlorhexidine 4% Liquid 1 Application(s) Topical <User Schedule>  CRRT Treatment    <Continuous>  DOBUTamine Infusion 2.5 MICROgram(s)/kG/Min (5.963 mL/Hr) IV Continuous <Continuous>  heparin  Infusion 1300 Unit(s)/Hr (10 mL/Hr) IV Continuous <Continuous>  insulin lispro (HumaLOG) corrective regimen sliding scale   SubCutaneous every 6 hours  meropenem  IVPB      meropenem  IVPB 500 milliGRAM(s) IV Intermittent every 12 hours  metoclopramide Injectable 5 milliGRAM(s) IV Push every 8 hours  norepinephrine Infusion 0.034 MICROgram(s)/kG/Min (5 mL/Hr) IV Continuous <Continuous>  pantoprazole  Injectable 40 milliGRAM(s) IV Push two times a day  PrismaSATE Dialysate BGK 4 / 2.5 5000 milliLiter(s) (1200 mL/Hr) CRRT <Continuous>  PrismaSOL Filtration BGK 4 / 2.5 5000 milliLiter(s) (1000 mL/Hr) CRRT <Continuous>  PrismaSOL Filtration BGK 4 / 2.5 5000 milliLiter(s) (200 mL/Hr) CRRT <Continuous>  propofol Infusion 10 MICROgram(s)/kG/Min (4.77 mL/Hr) IV Continuous <Continuous>  sodium bicarbonate  Injectable 50 milliEquivalent(s) IV Push every 1 hour  vasopressin Infusion 0.083 Unit(s)/Min (5 mL/Hr) IV Continuous <Continuous>    MEDICATIONS  (PRN):      LABS:                        9.7    39.5  )-----------( 138      ( 17 May 2019 00:45 )             28.9     05-17    138  |  99  |  82<H>  ----------------------------<  113<H>  5.6<H>   |  14<L>  |  2.27<H>    Ca    7.1<L>      17 May 2019 00:45  Phos  9.6     05-17  Mg     2.6     05-17    TPro  5.0<L>  /  Alb  2.4<L>  /  TBili  2.2<H>  /  DBili  x   /  AST  1106<H>  /  ALT  1004<H>  /  AlkPhos  60  05-17    PTT - ( 16 May 2019 20:21 )  PTT:62.8 sec    Arterial Blood Gas:  05-17 @ 02:15  7.25/44/82/18/92/-8.0  ABG lactate: --  Arterial Blood Gas:  05-17 @ 00:42  7.42/23/113/15/98/-8.1  ABG lactate: --  Arterial Blood Gas:  05-16 @ 16:55  7.49/27/102/20/98/-1.7  ABG lactate: --  Arterial Blood Gas:  05-16 @ 14:30  7.48/27/98/20/98/-2.4  ABG lactate: --  Arterial Blood Gas:  05-16 @ 11:41  7.39/33/76/20/94/-4.0  ABG lactate: --  Arterial Blood Gas:  05-16 @ 10:38  7.42/33/100/21/97/-2.4  ABG lactate: --  Arterial Blood Gas:  05-16 @ 08:59  7.44/23/86/15/96/-7.8  ABG lactate: --  Arterial Blood Gas:  05-16 @ 04:59  7.48/27/89/20/96/-2.8  ABG lactate: --  Arterial Blood Gas:  05-16 @ 00:48  7.50/23/115/18/98/-3.9  ABG lactate: --  Arterial Blood Gas:  05-15 @ 20:05  7.56/21/181/19/99/-1.4  ABG lactate: --  Arterial Blood Gas:  05-15 @ 17:56  7.47/27/122/19/98/-3.2  ABG lactate: --  Arterial Blood Gas:  05-15 @ 12:42  7.38/36/69/21/91/-2.9  ABG lactate: --  Arterial Blood Gas:  05-15 @ 09:14  7.43/31/85/20/96/-3.0  ABG lactate: --  Arterial Blood Gas:  05-15 @ 05:01  7.47/32/104/23/98/.1  ABG lactate: --    Venous Blood Gas:  05-17 @ 00:59  7.36/30/34/16/49  VBG Lactate: --      MICROBIOLOGY:     RADIOLOGY: < from: CT Chest w/ IV Cont (05.16.19 @ 14:01) >    LUNGS AND LARGE AIRWAYS: Tracheostomy tube terminates below the thoracic   inlet. Tracheal secretions. There is short segment occlusion of the left   lower lobe bronchus, likely from secretions. Near complete opacification   of the left lower lobe which may reflect a combination of atelectasis and   pneumonia. Patchy opacities in the left upper and right lower lobes.   Interlobular septal thickening. Right upper lobe wedge resection.  PLEURA: Right chest tube. Trace left pleural effusion  VESSELS: Coronary atherosclerosis. Right IJ vascular sheath with tip in   the superiorvena cava. Left central line in distal SVC. Left PICC is   partially noted in the left axilla.  HEART: Epicardial leads. Cardiomegaly. No pericardial effusion. Aortic   valvular and mitral annular calcifications. CABG.  MEDIASTINUM AND RORY: Anteriormediastinal hematoma measuring 3.2 x 2.4   cm, likely postsurgical.   CHEST WALL AND LOWER NECK: Median sternotomy.    ABDOMEN AND PELVIS:    LIVER: The left hepatic lobe is more hypodense in appearance on arterial   phase but homogeneous on portal venous phase.  BILE DUCTS: Normal caliber.  GALLBLADDER: Cholelithiasis.  SPLEEN: Multiple wedge-shaped defects.  PANCREAS: Within normal limits.  ADRENALS: Within normal limits.  KIDNEYS/URETERS: Bilateral renal cysts.    BLADDER: Collapsed around a De catheter. Trace free fluid in the   presacral space.  REPRODUCTIVE ORGANS: Prostate within normal limits.    BOWEL: Enteric tube terminates in the stomach. Rectal tube. No bowel   obstruction. Appendix is nonvisualized. No evidence of bowel ischemia.   The large bowel is likely collapsed. Sutures around rectosigmoid colon.  PERITONEUM: Trace free fluid in the abdomen and pelvis.  VESSELS: The celiac axis, superior mesenteric, inferior mesenteric, and   bilateral renal arteries are patent. Thedistal branches of the superior   mesenteric artery are not well visualized. Mesenteric veins are patent.   Marked atherosclerosis. The infrarenal abdominal aorta shows borderline   focal ectasia of 3 cm.  RETROPERITONEUM: No lymphadenopathy.    ABDOMINAL WALL: Anasarca. Fat-containing right inguinal hernia.  BONES: Degenerative changes.    IMPRESSION:     Splenic infarcts.  No evidence of mesenteric ischemia.  Multifocal pneumonia. Trace left pleural effusion.  Small anterior mediastinal hematoma, likely postsurgical.                        YOUSUF HDZ M.D., RADIOLOGY RESIDENT    < end of copied text >    [ ] Reviewed and interpreted by me    Point of Care Ultrasound Findings:    PFT:    EKG:

## 2019-05-18 DIAGNOSIS — E87.6 HYPOKALEMIA: ICD-10-CM

## 2019-05-18 LAB
-  AMPICILLIN/SULBACTAM: SIGNIFICANT CHANGE UP
-  CEFAZOLIN: SIGNIFICANT CHANGE UP
-  CLINDAMYCIN: SIGNIFICANT CHANGE UP
-  ERYTHROMYCIN: SIGNIFICANT CHANGE UP
-  GENTAMICIN: SIGNIFICANT CHANGE UP
-  LINEZOLID: SIGNIFICANT CHANGE UP
-  OXACILLIN: SIGNIFICANT CHANGE UP
-  PENICILLIN: SIGNIFICANT CHANGE UP
-  RIFAMPIN: SIGNIFICANT CHANGE UP
-  TETRACYCLINE: SIGNIFICANT CHANGE UP
-  TRIMETHOPRIM/SULFAMETHOXAZOLE: SIGNIFICANT CHANGE UP
-  VANCOMYCIN: SIGNIFICANT CHANGE UP
ALBUMIN SERPL ELPH-MCNC: 2.7 G/DL — LOW (ref 3.3–5)
ALBUMIN SERPL ELPH-MCNC: 2.8 G/DL — LOW (ref 3.3–5)
ALBUMIN SERPL ELPH-MCNC: 2.9 G/DL — LOW (ref 3.3–5)
ALP SERPL-CCNC: 133 U/L — HIGH (ref 40–120)
ALP SERPL-CCNC: 79 U/L — SIGNIFICANT CHANGE UP (ref 40–120)
ALP SERPL-CCNC: 98 U/L — SIGNIFICANT CHANGE UP (ref 40–120)
ALT FLD-CCNC: 4596 U/L — HIGH (ref 10–45)
ALT FLD-CCNC: 4968 U/L — HIGH (ref 10–45)
ALT FLD-CCNC: 4986 U/L — HIGH (ref 10–45)
AMMONIA BLD-MCNC: 82 UMOL/L — HIGH (ref 11–55)
AMYLASE P1 CFR SERPL: 657 U/L — HIGH (ref 25–125)
ANION GAP SERPL CALC-SCNC: 17 MMOL/L — SIGNIFICANT CHANGE UP (ref 5–17)
ANION GAP SERPL CALC-SCNC: 18 MMOL/L — HIGH (ref 5–17)
ANION GAP SERPL CALC-SCNC: 21 MMOL/L — HIGH (ref 5–17)
APTT BLD: 43.1 SEC — HIGH (ref 27.5–36.3)
APTT BLD: 43.4 SEC — HIGH (ref 27.5–36.3)
APTT BLD: 63.9 SEC — HIGH (ref 27.5–36.3)
AST SERPL-CCNC: >7000 U/L — HIGH (ref 10–40)
BASE EXCESS BLDMV CALC-SCNC: -2.4 MMOL/L — SIGNIFICANT CHANGE UP (ref -3–3)
BASE EXCESS BLDMV CALC-SCNC: -3.2 MMOL/L — LOW (ref -3–3)
BASE EXCESS BLDMV CALC-SCNC: -3.7 MMOL/L — LOW (ref -3–3)
BASE EXCESS BLDMV CALC-SCNC: -4.6 MMOL/L — LOW (ref -3–3)
BASE EXCESS BLDMV CALC-SCNC: -4.6 MMOL/L — LOW (ref -3–3)
BILIRUB SERPL-MCNC: 4.5 MG/DL — HIGH (ref 0.2–1.2)
BILIRUB SERPL-MCNC: 5.2 MG/DL — HIGH (ref 0.2–1.2)
BILIRUB SERPL-MCNC: 7.3 MG/DL — HIGH (ref 0.2–1.2)
BUN SERPL-MCNC: 29 MG/DL — HIGH (ref 7–23)
BUN SERPL-MCNC: 34 MG/DL — HIGH (ref 7–23)
BUN SERPL-MCNC: 40 MG/DL — HIGH (ref 7–23)
CALCIUM SERPL-MCNC: 7.5 MG/DL — LOW (ref 8.4–10.5)
CALCIUM SERPL-MCNC: 7.6 MG/DL — LOW (ref 8.4–10.5)
CALCIUM SERPL-MCNC: 7.8 MG/DL — LOW (ref 8.4–10.5)
CHLORIDE SERPL-SCNC: 98 MMOL/L — SIGNIFICANT CHANGE UP (ref 96–108)
CHLORIDE SERPL-SCNC: 99 MMOL/L — SIGNIFICANT CHANGE UP (ref 96–108)
CHLORIDE SERPL-SCNC: 99 MMOL/L — SIGNIFICANT CHANGE UP (ref 96–108)
CO2 BLDMV-SCNC: 21 MMOL/L — SIGNIFICANT CHANGE UP (ref 21–29)
CO2 BLDMV-SCNC: 22 MMOL/L — SIGNIFICANT CHANGE UP (ref 21–29)
CO2 BLDMV-SCNC: 23 MMOL/L — SIGNIFICANT CHANGE UP (ref 21–29)
CO2 SERPL-SCNC: 17 MMOL/L — LOW (ref 22–31)
CO2 SERPL-SCNC: 17 MMOL/L — LOW (ref 22–31)
CO2 SERPL-SCNC: 18 MMOL/L — LOW (ref 22–31)
CREAT SERPL-MCNC: 0.84 MG/DL — SIGNIFICANT CHANGE UP (ref 0.5–1.3)
CREAT SERPL-MCNC: 1.01 MG/DL — SIGNIFICANT CHANGE UP (ref 0.5–1.3)
CREAT SERPL-MCNC: 1.16 MG/DL — SIGNIFICANT CHANGE UP (ref 0.5–1.3)
CULTURE RESULTS: SIGNIFICANT CHANGE UP
CULTURE RESULTS: SIGNIFICANT CHANGE UP
FIBRINOGEN PPP-MCNC: 255 MG/DL — LOW (ref 350–510)
FIBRINOGEN PPP-MCNC: 261 MG/DL — LOW (ref 350–510)
FIBRINOGEN PPP-MCNC: 300 MG/DL — LOW (ref 350–510)
GAS PNL BLDA: SIGNIFICANT CHANGE UP
GAS PNL BLDMV: SIGNIFICANT CHANGE UP
GLUCOSE BLDC GLUCOMTR-MCNC: 113 MG/DL — HIGH (ref 70–99)
GLUCOSE BLDC GLUCOMTR-MCNC: 132 MG/DL — HIGH (ref 70–99)
GLUCOSE BLDC GLUCOMTR-MCNC: 65 MG/DL — LOW (ref 70–99)
GLUCOSE BLDC GLUCOMTR-MCNC: 71 MG/DL — SIGNIFICANT CHANGE UP (ref 70–99)
GLUCOSE BLDC GLUCOMTR-MCNC: 74 MG/DL — SIGNIFICANT CHANGE UP (ref 70–99)
GLUCOSE BLDC GLUCOMTR-MCNC: 75 MG/DL — SIGNIFICANT CHANGE UP (ref 70–99)
GLUCOSE BLDC GLUCOMTR-MCNC: 75 MG/DL — SIGNIFICANT CHANGE UP (ref 70–99)
GLUCOSE BLDC GLUCOMTR-MCNC: 79 MG/DL — SIGNIFICANT CHANGE UP (ref 70–99)
GLUCOSE BLDC GLUCOMTR-MCNC: 82 MG/DL — SIGNIFICANT CHANGE UP (ref 70–99)
GLUCOSE BLDC GLUCOMTR-MCNC: 83 MG/DL — SIGNIFICANT CHANGE UP (ref 70–99)
GLUCOSE BLDC GLUCOMTR-MCNC: 84 MG/DL — SIGNIFICANT CHANGE UP (ref 70–99)
GLUCOSE BLDC GLUCOMTR-MCNC: 86 MG/DL — SIGNIFICANT CHANGE UP (ref 70–99)
GLUCOSE BLDC GLUCOMTR-MCNC: 87 MG/DL — SIGNIFICANT CHANGE UP (ref 70–99)
GLUCOSE BLDC GLUCOMTR-MCNC: 89 MG/DL — SIGNIFICANT CHANGE UP (ref 70–99)
GLUCOSE BLDC GLUCOMTR-MCNC: 93 MG/DL — SIGNIFICANT CHANGE UP (ref 70–99)
GLUCOSE SERPL-MCNC: 110 MG/DL — HIGH (ref 70–99)
GLUCOSE SERPL-MCNC: 79 MG/DL — SIGNIFICANT CHANGE UP (ref 70–99)
GLUCOSE SERPL-MCNC: 89 MG/DL — SIGNIFICANT CHANGE UP (ref 70–99)
HAV IGM SER-ACNC: SIGNIFICANT CHANGE UP
HBV CORE IGM SER-ACNC: SIGNIFICANT CHANGE UP
HBV SURFACE AG SER-ACNC: SIGNIFICANT CHANGE UP
HCO3 BLDMV-SCNC: 20 MMOL/L — SIGNIFICANT CHANGE UP (ref 20–28)
HCO3 BLDMV-SCNC: 20 MMOL/L — SIGNIFICANT CHANGE UP (ref 20–28)
HCO3 BLDMV-SCNC: 21 MMOL/L — SIGNIFICANT CHANGE UP (ref 20–28)
HCT VFR BLD CALC: 22.2 % — LOW (ref 39–50)
HCT VFR BLD CALC: 29.3 % — LOW (ref 39–50)
HCT VFR BLD CALC: 31.4 % — LOW (ref 39–50)
HCV AB S/CO SERPL IA: 0.23 S/CO — SIGNIFICANT CHANGE UP (ref 0–0.99)
HCV AB SERPL-IMP: SIGNIFICANT CHANGE UP
HEPARIN-PF4 AB RESULT: <0.6 U/ML — SIGNIFICANT CHANGE UP (ref 0–0.9)
HGB BLD-MCNC: 10.1 G/DL — LOW (ref 13–17)
HGB BLD-MCNC: 11 G/DL — LOW (ref 13–17)
HGB BLD-MCNC: 7.8 G/DL — LOW (ref 13–17)
HOROWITZ INDEX BLDMV+IHG-RTO: 50 — SIGNIFICANT CHANGE UP
IGA FLD-MCNC: 179 MG/DL — SIGNIFICANT CHANGE UP (ref 84–499)
IGG FLD-MCNC: 1583 MG/DL — SIGNIFICANT CHANGE UP (ref 610–1660)
IGM SERPL-MCNC: 22 MG/DL — LOW (ref 35–242)
INR BLD: 2.65 RATIO — HIGH (ref 0.88–1.16)
INR BLD: 2.66 RATIO — HIGH (ref 0.88–1.16)
INR BLD: 3.5 RATIO — HIGH (ref 0.88–1.16)
KAPPA LC SER QL IFE: 12.4 MG/DL — HIGH (ref 0.33–1.94)
KAPPA/LAMBDA FREE LIGHT CHAIN RATIO, SERUM: 1.07 RATIO — SIGNIFICANT CHANGE UP (ref 0.26–1.65)
LAMBDA LC SER QL IFE: 11.58 MG/DL — HIGH (ref 0.57–2.63)
LIDOCAIN IGE QN: 218 U/L — HIGH (ref 7–60)
MAGNESIUM SERPL-MCNC: 2.1 MG/DL — SIGNIFICANT CHANGE UP (ref 1.6–2.6)
MAGNESIUM SERPL-MCNC: 2.2 MG/DL — SIGNIFICANT CHANGE UP (ref 1.6–2.6)
MCHC RBC-ENTMCNC: 30.1 PG — SIGNIFICANT CHANGE UP (ref 27–34)
MCHC RBC-ENTMCNC: 30.6 PG — SIGNIFICANT CHANGE UP (ref 27–34)
MCHC RBC-ENTMCNC: 31.2 PG — SIGNIFICANT CHANGE UP (ref 27–34)
MCHC RBC-ENTMCNC: 34.3 GM/DL — SIGNIFICANT CHANGE UP (ref 32–36)
MCHC RBC-ENTMCNC: 35.1 GM/DL — SIGNIFICANT CHANGE UP (ref 32–36)
MCHC RBC-ENTMCNC: 35.4 GM/DL — SIGNIFICANT CHANGE UP (ref 32–36)
MCV RBC AUTO: 87 FL — SIGNIFICANT CHANGE UP (ref 80–100)
MCV RBC AUTO: 87.6 FL — SIGNIFICANT CHANGE UP (ref 80–100)
MCV RBC AUTO: 88.2 FL — SIGNIFICANT CHANGE UP (ref 80–100)
METHOD TYPE: SIGNIFICANT CHANGE UP
O2 CT VFR BLD CALC: 28 MMHG — LOW (ref 30–65)
O2 CT VFR BLD CALC: 30 MMHG — SIGNIFICANT CHANGE UP (ref 30–65)
O2 CT VFR BLD CALC: 31 MMHG — SIGNIFICANT CHANGE UP (ref 30–65)
O2 CT VFR BLD CALC: 33 MMHG — SIGNIFICANT CHANGE UP (ref 30–65)
O2 CT VFR BLD CALC: 35 MMHG — SIGNIFICANT CHANGE UP (ref 30–65)
ORGANISM # SPEC MICROSCOPIC CNT: SIGNIFICANT CHANGE UP
ORGANISM # SPEC MICROSCOPIC CNT: SIGNIFICANT CHANGE UP
PCO2 BLDMV: 34 MMHG — SIGNIFICANT CHANGE UP (ref 30–65)
PCO2 BLDMV: 37 MMHG — SIGNIFICANT CHANGE UP (ref 30–65)
PCO2 BLDMV: 39 MMHG — SIGNIFICANT CHANGE UP (ref 30–65)
PF4 HEPARIN CMPLX AB SER-ACNC: NEGATIVE — SIGNIFICANT CHANGE UP
PH BLDMV: 7.33 — SIGNIFICANT CHANGE UP (ref 7.32–7.45)
PH BLDMV: 7.35 — SIGNIFICANT CHANGE UP (ref 7.32–7.45)
PH BLDMV: 7.35 — SIGNIFICANT CHANGE UP (ref 7.32–7.45)
PH BLDMV: 7.36 — SIGNIFICANT CHANGE UP (ref 7.32–7.45)
PH BLDMV: 7.41 — SIGNIFICANT CHANGE UP (ref 7.32–7.45)
PHOSPHATE SERPL-MCNC: 3.9 MG/DL — SIGNIFICANT CHANGE UP (ref 2.5–4.5)
PHOSPHATE SERPL-MCNC: 5.9 MG/DL — HIGH (ref 2.5–4.5)
PLATELET # BLD AUTO: 28 K/UL — LOW (ref 150–400)
PLATELET # BLD AUTO: 30 K/UL — LOW (ref 150–400)
PLATELET # BLD AUTO: 37 K/UL — LOW (ref 150–400)
POTASSIUM SERPL-MCNC: 3.1 MMOL/L — LOW (ref 3.5–5.3)
POTASSIUM SERPL-MCNC: 3.6 MMOL/L — SIGNIFICANT CHANGE UP (ref 3.5–5.3)
POTASSIUM SERPL-MCNC: 3.8 MMOL/L — SIGNIFICANT CHANGE UP (ref 3.5–5.3)
POTASSIUM SERPL-SCNC: 3.1 MMOL/L — LOW (ref 3.5–5.3)
POTASSIUM SERPL-SCNC: 3.6 MMOL/L — SIGNIFICANT CHANGE UP (ref 3.5–5.3)
POTASSIUM SERPL-SCNC: 3.8 MMOL/L — SIGNIFICANT CHANGE UP (ref 3.5–5.3)
PROT SERPL-MCNC: 5.3 G/DL — LOW (ref 6–8.3)
PROT SERPL-MCNC: 5.5 G/DL — LOW (ref 6–8.3)
PROT SERPL-MCNC: 5.5 G/DL — LOW (ref 6–8.3)
PROTHROM AB SERPL-ACNC: 31.1 SEC — HIGH (ref 10–12.9)
PROTHROM AB SERPL-ACNC: 31.5 SEC — HIGH (ref 10–12.9)
PROTHROM AB SERPL-ACNC: 41.5 SEC — HIGH (ref 10–12.9)
RBC # BLD: 2.51 M/UL — LOW (ref 4.2–5.8)
RBC # BLD: 3.34 M/UL — LOW (ref 4.2–5.8)
RBC # BLD: 3.61 M/UL — LOW (ref 4.2–5.8)
RBC # FLD: 13.9 % — SIGNIFICANT CHANGE UP (ref 10.3–14.5)
RBC # FLD: 14 % — SIGNIFICANT CHANGE UP (ref 10.3–14.5)
RBC # FLD: 14 % — SIGNIFICANT CHANGE UP (ref 10.3–14.5)
SAO2 % BLDMV: 40 % — LOW (ref 60–90)
SAO2 % BLDMV: 47 % — LOW (ref 60–90)
SAO2 % BLDMV: 51 % — LOW (ref 60–90)
SAO2 % BLDMV: 53 % — LOW (ref 60–90)
SAO2 % BLDMV: 54 % — LOW (ref 60–90)
SODIUM SERPL-SCNC: 133 MMOL/L — LOW (ref 135–145)
SODIUM SERPL-SCNC: 135 MMOL/L — SIGNIFICANT CHANGE UP (ref 135–145)
SODIUM SERPL-SCNC: 136 MMOL/L — SIGNIFICANT CHANGE UP (ref 135–145)
SPECIMEN SOURCE: SIGNIFICANT CHANGE UP
SPECIMEN SOURCE: SIGNIFICANT CHANGE UP
VANCOMYCIN TROUGH SERPL-MCNC: 14.3 UG/ML — SIGNIFICANT CHANGE UP (ref 10–20)
VANCOMYCIN TROUGH SERPL-MCNC: 18.1 UG/ML — SIGNIFICANT CHANGE UP (ref 10–20)
WBC # BLD: 24.1 K/UL — HIGH (ref 3.8–10.5)
WBC # BLD: 26.3 K/UL — HIGH (ref 3.8–10.5)
WBC # BLD: 28.3 K/UL — HIGH (ref 3.8–10.5)
WBC # FLD AUTO: 24.1 K/UL — HIGH (ref 3.8–10.5)
WBC # FLD AUTO: 26.3 K/UL — HIGH (ref 3.8–10.5)
WBC # FLD AUTO: 28.3 K/UL — HIGH (ref 3.8–10.5)

## 2019-05-18 PROCEDURE — 99291 CRITICAL CARE FIRST HOUR: CPT

## 2019-05-18 PROCEDURE — 99233 SBSQ HOSP IP/OBS HIGH 50: CPT | Mod: GC

## 2019-05-18 PROCEDURE — 71045 X-RAY EXAM CHEST 1 VIEW: CPT | Mod: 26

## 2019-05-18 PROCEDURE — 99232 SBSQ HOSP IP/OBS MODERATE 35: CPT

## 2019-05-18 RX ORDER — CALCIUM CHLORIDE
1000 POWDER (GRAM) MISCELLANEOUS
Refills: 0 | Status: DISCONTINUED | OUTPATIENT
Start: 2019-05-18 | End: 2019-05-25

## 2019-05-18 RX ORDER — POTASSIUM CHLORIDE 20 MEQ
10 PACKET (EA) ORAL ONCE
Refills: 0 | Status: COMPLETED | OUTPATIENT
Start: 2019-05-18 | End: 2019-05-18

## 2019-05-18 RX ORDER — DEXTROSE 50 % IN WATER 50 %
50 SYRINGE (ML) INTRAVENOUS ONCE
Refills: 0 | Status: COMPLETED | OUTPATIENT
Start: 2019-05-18 | End: 2019-05-18

## 2019-05-18 RX ORDER — FENTANYL CITRATE 50 UG/ML
25 INJECTION INTRAVENOUS ONCE
Refills: 0 | Status: DISCONTINUED | OUTPATIENT
Start: 2019-05-18 | End: 2019-05-18

## 2019-05-18 RX ORDER — DEXMEDETOMIDINE HYDROCHLORIDE IN 0.9% SODIUM CHLORIDE 4 UG/ML
0.5 INJECTION INTRAVENOUS
Qty: 200 | Refills: 0 | Status: DISCONTINUED | OUTPATIENT
Start: 2019-05-18 | End: 2019-05-20

## 2019-05-18 RX ORDER — POTASSIUM CHLORIDE 20 MEQ
10 PACKET (EA) ORAL
Refills: 0 | Status: COMPLETED | OUTPATIENT
Start: 2019-05-18 | End: 2019-05-18

## 2019-05-18 RX ORDER — CALCIUM CHLORIDE
1000 POWDER (GRAM) MISCELLANEOUS ONCE
Refills: 0 | Status: COMPLETED | OUTPATIENT
Start: 2019-05-18 | End: 2019-05-18

## 2019-05-18 RX ADMIN — CHLORHEXIDINE GLUCONATE 1 APPLICATION(S): 213 SOLUTION TOPICAL at 05:29

## 2019-05-18 RX ADMIN — Medication 50 MILLIEQUIVALENT(S): at 14:00

## 2019-05-18 RX ADMIN — Medication 1000 MILLIGRAM(S): at 17:10

## 2019-05-18 RX ADMIN — FENTANYL CITRATE 25 MICROGRAM(S): 50 INJECTION INTRAVENOUS at 15:00

## 2019-05-18 RX ADMIN — PANTOPRAZOLE SODIUM 40 MILLIGRAM(S): 20 TABLET, DELAYED RELEASE ORAL at 05:28

## 2019-05-18 RX ADMIN — PANTOPRAZOLE SODIUM 40 MILLIGRAM(S): 20 TABLET, DELAYED RELEASE ORAL at 17:10

## 2019-05-18 RX ADMIN — Medication 50 MILLIEQUIVALENT(S): at 14:45

## 2019-05-18 RX ADMIN — FENTANYL CITRATE 25 MICROGRAM(S): 50 INJECTION INTRAVENOUS at 11:00

## 2019-05-18 RX ADMIN — Medication 1000 MILLIGRAM(S): at 04:15

## 2019-05-18 RX ADMIN — Medication 50 MILLILITER(S): at 10:00

## 2019-05-18 RX ADMIN — Medication 200 MILLIGRAM(S): at 09:36

## 2019-05-18 RX ADMIN — VASOPRESSIN 5 UNIT(S)/MIN: 20 INJECTION INTRAVENOUS at 19:46

## 2019-05-18 RX ADMIN — Medication 1000 MILLIGRAM(S): at 02:22

## 2019-05-18 RX ADMIN — FENTANYL CITRATE 25 MICROGRAM(S): 50 INJECTION INTRAVENOUS at 15:15

## 2019-05-18 RX ADMIN — Medication 1000 MILLIGRAM(S): at 21:07

## 2019-05-18 RX ADMIN — Medication 1000 MILLIGRAM(S): at 09:36

## 2019-05-18 RX ADMIN — Medication 250 MILLIGRAM(S): at 20:52

## 2019-05-18 RX ADMIN — DEXMEDETOMIDINE HYDROCHLORIDE IN 0.9% SODIUM CHLORIDE 9.94 MICROGRAM(S)/KG/HR: 4 INJECTION INTRAVENOUS at 21:03

## 2019-05-18 RX ADMIN — Medication 100 MILLIEQUIVALENT(S): at 22:36

## 2019-05-18 RX ADMIN — CHLORHEXIDINE GLUCONATE 15 MILLILITER(S): 213 SOLUTION TOPICAL at 05:29

## 2019-05-18 RX ADMIN — MEROPENEM 100 MILLIGRAM(S): 1 INJECTION INTRAVENOUS at 21:07

## 2019-05-18 RX ADMIN — Medication 50 MILLIGRAM(S): at 05:28

## 2019-05-18 RX ADMIN — Medication 100 MILLIEQUIVALENT(S): at 20:39

## 2019-05-18 RX ADMIN — IMMUNE GLOBULIN (HUMAN) 58.33 GRAM(S): 10 INJECTION INTRAVENOUS; SUBCUTANEOUS at 13:50

## 2019-05-18 RX ADMIN — FENTANYL CITRATE 25 MICROGRAM(S): 50 INJECTION INTRAVENOUS at 23:34

## 2019-05-18 RX ADMIN — VASOPRESSIN 5 UNIT(S)/MIN: 20 INJECTION INTRAVENOUS at 12:15

## 2019-05-18 RX ADMIN — MEROPENEM 100 MILLIGRAM(S): 1 INJECTION INTRAVENOUS at 13:13

## 2019-05-18 RX ADMIN — Medication 200 MILLIGRAM(S): at 21:07

## 2019-05-18 RX ADMIN — Medication 50 MILLIGRAM(S): at 17:11

## 2019-05-18 RX ADMIN — CHLORHEXIDINE GLUCONATE 15 MILLILITER(S): 213 SOLUTION TOPICAL at 17:11

## 2019-05-18 RX ADMIN — Medication 50 MILLIGRAM(S): at 23:50

## 2019-05-18 RX ADMIN — MICAFUNGIN SODIUM 105 MILLIGRAM(S): 100 INJECTION, POWDER, LYOPHILIZED, FOR SOLUTION INTRAVENOUS at 23:50

## 2019-05-18 RX ADMIN — Medication 50 MILLIEQUIVALENT(S): at 16:30

## 2019-05-18 RX ADMIN — Medication 50 MILLIGRAM(S): at 11:54

## 2019-05-18 RX ADMIN — MEROPENEM 100 MILLIGRAM(S): 1 INJECTION INTRAVENOUS at 05:28

## 2019-05-18 RX ADMIN — Medication 50 MILLIEQUIVALENT(S): at 16:00

## 2019-05-18 RX ADMIN — Medication 250 MILLIGRAM(S): at 08:31

## 2019-05-18 RX ADMIN — Medication 50 MILLIEQUIVALENT(S): at 04:00

## 2019-05-18 RX ADMIN — FENTANYL CITRATE 25 MICROGRAM(S): 50 INJECTION INTRAVENOUS at 11:15

## 2019-05-18 NOTE — PROGRESS NOTE ADULT - ATTENDING COMMENTS
TARAH/ATN, septic shock, amyloid, lactic acidosis, hyperphosphatemia, hypocalcemia, hypokalemia.  Trach, edematous    Was hyperkalemic yesterday, now with hypokalemia on 0K CRRT -- will adjust CRRT dialysate potassium concentration to maintain normokalemia. For hypocalcemia, cont. intermittent doses, but can transition to continuous infusion if needed    - CRRT with CVVHDF, goal net even  - Maintain MAP > 65  - Dose medications for CRRT; check vanc levels at least daily  - D/w ICU team  - Remainder per fellow, will follow    Modality: CVVHDF, Filter: Pete HF 1000, Target Blood Flow: 250 mL/Min  Target Fluid Balance: Titrate to net EVEN fluid balance (05-18-19 @ 09:47)

## 2019-05-18 NOTE — PROGRESS NOTE ADULT - ASSESSMENT
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 His postop course was complicated by persistent respiratory failure requiring tracheostomy and he had a wedge biopsy showing Pulmonary Amyloidosis.    5/13-poor weaning (less than 2 hrs), CT in place  5/14-off solumedrol, CT in place 100 cc out, wean cpap/ps 5/15  5/15-TC for 12 hrs, renal and CHF f/up  5/16-4 hrs TC, over breath vent-on diprovan; mult pressors/rectal tube/c. diff neg-on meropenem/vanco  5/17-septic-HD-prbc, no weaning--ct cap-c/w multifocal PNA\  5/18-mult PRBC, no wean, CVVHD-gamma globulin

## 2019-05-18 NOTE — PROGRESS NOTE ADULT - ASSESSMENT
77 yo   male with PMH of HLD, Sleep apnea ( non compliant with CPAP), GERD, diverticulitis, depression, anxiety, and panic attack, presented to [facility] on 04/25/19 for LHC after having  abnormal NST done as outpatient, LHC done same day revealed TVD. therefore underwent CABG on 04/26/19, hospital course complicated with fever, shock, no on pressors, abx, developed julio cesar hence nephrology consulted.

## 2019-05-18 NOTE — PROGRESS NOTE ADULT - SUBJECTIVE AND OBJECTIVE BOX
CHIEF COMPLAINT: f/up resp failure, pulm amyloidosis, asthma, osas-sedated on vent -progressive decline    Interval Events: multiple PRBC, CVVHD    REVIEW OF SYSTEMS:  Constitutional: No fevers or chills. No weight loss. No fatigue or generalized malaise.  Eyes: No itching or discharge from the eyes  ENT: No ear pain. No ear discharge. No nasal congestion. No post nasal drip. No epistaxis. No throat pain. No sore throat. No difficulty swallowing.   CV: No chest pain. No palpitations. No lightheadedness or dizziness.   Resp: No dyspnea at rest. No dyspnea on exertion. No orthopnea. No wheezing. No cough. No stridor. No sputum production. No chest pain with respiration.  GI: No nausea. No vomiting. No diarrhea.  MSK: No joint pain or pain in any extremities  Integumentary: No skin lesions. No pedal edema.  Neurological: No gross motor weakness. No sensory changes.  [ ] All other systems negative  [+ ] Unable to assess ROS because __sedated______    OBJECTIVE:  ICU Vital Signs Last 24 Hrs  T(C): 35.9 (17 May 2019 23:00), Max: 36.4 (17 May 2019 19:00)  T(F): 96.6 (17 May 2019 23:00), Max: 97.5 (17 May 2019 19:00)  HR: 106 (18 May 2019 02:45) (78 - 114)  BP: --  BP(mean): --  ABP: 131/52 (18 May 2019 02:45) (98/54 - 149/55)  ABP(mean): 71 (18 May 2019 02:45) (56 - 104)  RR: 19 (18 May 2019 02:45) (14 - 34)  SpO2: 96% (18 May 2019 02:45) (65% - 100%)    Mode: AC/ CMV (Assist Control/ Continuous Mandatory Ventilation), RR (machine): 12, TV (machine): 550, FiO2: 50, PEEP: 5, ITime: 1, MAP: 8, PIP: 23    05-16 @ 07:01  -  05-17 @ 07:00  --------------------------------------------------------  IN: 3969.4 mL / OUT: 2945 mL / NET: 1024.4 mL    05-17 @ 07:01  - 05-18 @ 04:38  --------------------------------------------------------  IN: 6167.9 mL / OUT: 2375 mL / NET: 3792.9 mL      CAPILLARY BLOOD GLUCOSE  280 (17 May 2019 18:45)      POCT Blood Glucose.: 83 mg/dL (18 May 2019 03:05)      PHYSICAL EXAM:  General: on vent-/sedated-crit ill-NGT  HEENT: Atraumatic, normocephalic.                 Mallampatti Grade 3                No nasal congestion.                No tonsillar or pharyngeal exudates.  Lymph Nodes: No palpable lymphadenopathy  Neck: No JVD. No carotid bruit.   Respiratory: Normal chest expansion                         Normal percussion                         Normal and equal air entry                         No wheeze, rhonchi or rales.  Cardiovascular: S1 S2 normal. No murmurs, rubs or gallops.   Abdomen: Soft, non-distended. No organomegaly. Normoactive bowel sounds.  Extremities: Warm to touch. Peripheral pulse palpable. total body and pedal edema.   Skin: No rashes or skin lesions  Neurological and Psychiatry: unable    HOSPITAL MEDICATIONS:  MEDICATIONS  (STANDING):  aspirin 325 milliGRAM(s) Oral daily  calcium chloride Injectable 1000 milliGRAM(s) IV Push <User Schedule>  chlorhexidine 0.12% Liquid 15 milliLiter(s) Oral Mucosa two times a day  chlorhexidine 4% Liquid 1 Application(s) Topical <User Schedule>  CRRT Treatment    <Continuous>  dextrose 10%. 1000 milliLiter(s) (50 mL/Hr) IV Continuous <Continuous>  dextrose 50% Injectable 50 milliLiter(s) IV Push once  hydrocortisone sodium succinate Injectable 50 milliGRAM(s) IV Push every 6 hours  immune   globulin 10% (GAMMAGARD) IVPB 35 Gram(s) IV Intermittent daily  meropenem  IVPB 1000 milliGRAM(s) IV Intermittent every 8 hours  micafungin IVPB 100 milliGRAM(s) IV Intermittent every 24 hours  norepinephrine Infusion 0.034 MICROgram(s)/kG/Min (5 mL/Hr) IV Continuous <Continuous>  pantoprazole  Injectable 40 milliGRAM(s) IV Push two times a day  PrismaSATE Dialysate BK 0 / 3.5 5000 milliLiter(s) (1500 mL/Hr) CRRT <Continuous>  PrismaSOL Filtration BGK 0 / 2.5 5000 milliLiter(s) (1000 mL/Hr) CRRT <Continuous>  PrismaSOL Filtration BGK 0 / 2.5 5000 milliLiter(s) (200 mL/Hr) CRRT <Continuous>  propofol Infusion 10 MICROgram(s)/kG/Min (4.77 mL/Hr) IV Continuous <Continuous>  sodium chloride 0.9%. 1000 milliLiter(s) (10 mL/Hr) IV Continuous <Continuous>  thiamine Injectable 200 milliGRAM(s) IV Push <User Schedule>  vancomycin  IVPB 750 milliGRAM(s) IV Intermittent every 12 hours  vasopressin Infusion 0.083 Unit(s)/Min (5 mL/Hr) IV Continuous <Continuous>    MEDICATIONS  (PRN):      LABS:                        7.8    28.3  )-----------( 37       ( 18 May 2019 00:55 )             22.2     05-18    136  |  98  |  40<H>  ----------------------------<  110<H>  3.6   |  17<L>  |  1.16    Ca    7.5<L>      18 May 2019 00:55  Phos  5.9     05-18  Mg     2.2     05-18    TPro  5.3<L>  /  Alb  2.7<L>  /  TBili  4.5<H>  /  DBili  x   /  AST  >7000<H>  /  ALT  4968<H>  /  AlkPhos  79  05-18    PT/INR - ( 18 May 2019 00:55 )   PT: 41.5 sec;   INR: 3.50 ratio         PTT - ( 18 May 2019 00:55 )  PTT:63.9 sec    Arterial Blood Gas:  05-18 @ 03:32  7.42/29/104/18/97/-4.7  ABG lactate: --  Arterial Blood Gas:  05-18 @ 00:49  7.40/29/96/17/98/-6.4  ABG lactate: --  Arterial Blood Gas:  05-17 @ 22:12  7.37/33/134/19/97/-5.2  ABG lactate: --  Arterial Blood Gas:  05-17 @ 19:58  7.35/33/106/18/98/-6.6  ABG lactate: --  Arterial Blood Gas:  05-17 @ 18:21  7.42/29/113/19/98/-4.3  ABG lactate: --  Arterial Blood Gas:  05-17 @ 14:19  7.41/34/116/21/96/-2.7  ABG lactate: --  Arterial Blood Gas:  05-17 @ 11:49  7.31/42/99/20/94/-4.8  ABG lactate: --  Arterial Blood Gas:  05-17 @ 10:08  7.25/44/78/19/92/-7.6  ABG lactate: --  Arterial Blood Gas:  05-17 @ 08:18  7.28/41/78/18/93/-7.6  ABG lactate: --  Arterial Blood Gas:  05-17 @ 06:44  7.30/30/146/14/98/-10.8  ABG lactate: --  Arterial Blood Gas:  05-17 @ 02:15  7.25/44/82/18/92/-8.0  ABG lactate: --  Arterial Blood Gas:  05-17 @ 00:42  7.42/23/113/15/98/-8.1  ABG lactate: --  Arterial Blood Gas:  05-16 @ 16:55  7.49/27/102/20/98/-1.7  ABG lactate: --  Arterial Blood Gas:  05-16 @ 14:30  7.48/27/98/20/98/-2.4  ABG lactate: --  Arterial Blood Gas:  05-16 @ 11:41  7.39/33/76/20/94/-4.0  ABG lactate: --  Arterial Blood Gas:  05-16 @ 10:38  7.42/33/100/21/97/-2.4  ABG lactate: --  Arterial Blood Gas:  05-16 @ 08:59  7.44/23/86/15/96/-7.8  ABG lactate: --  Arterial Blood Gas:  05-16 @ 04:59  7.48/27/89/20/96/-2.8  ABG lactate: --    Venous Blood Gas:  05-17 @ 00:59  7.36/30/34/16/49  VBG Lactate: --      MICROBIOLOGY:     RADIOLOGY:  [ ] Reviewed and interpreted by me    Point of Care Ultrasound Findings:    PFT:    EKG:

## 2019-05-18 NOTE — PROGRESS NOTE ADULT - SUBJECTIVE AND OBJECTIVE BOX
[facility] Division of Kidney Diseases & Hypertension  FOLLOW UP NOTE  [phone]--------------------------------------------------------------------------------  Chief Complaint:Angina pectoris  Atherosclerosis of native coronary artery without angina pectoris      24 hour events/subjective: No acute events overnight. Patient remains on CRRT without issues in circuit. Patient remains intubated and sedated as well. No obvious issues as per primary team.        PAST HISTORY  --------------------------------------------------------------------------------  No significant changes to PMH, PSH, FHx, SHx, unless otherwise noted    ALLERGIES & MEDICATIONS  --------------------------------------------------------------------------------  Allergies    No Known Allergies    Intolerances      Standing Inpatient Medications  aspirin 325 milliGRAM(s) Oral daily  calcium chloride Injectable 1000 milliGRAM(s) IV Push <User Schedule>  hydrocortisone sodium succinate Injectable 50 milliGRAM(s) IV Push every 6 hours  immune   globulin 10% (GAMMAGARD) IVPB 35 Gram(s) IV Intermittent daily  meropenem  IVPB 1000 milliGRAM(s) IV Intermittent every 8 hours  micafungin IVPB 100 milliGRAM(s) IV Intermittent every 24 hours  norepinephrine Infusion 0.034 MICROgram(s)/kG/Min IV Continuous <Continuous>  pantoprazole  Injectable 40 milliGRAM(s) IV Push two times a day  PrismaSATE Dialysate BK 0 / 3.5 5000 milliLiter(s) CRRT <Continuous>  PrismaSOL Filtration BGK 0 / 2.5 5000 milliLiter(s) CRRT <Continuous>  PrismaSOL Filtration BGK 0 / 2.5 5000 milliLiter(s) CRRT <Continuous>  propofol Infusion 10 MICROgram(s)/kG/Min IV Continuous <Continuous>  sodium chloride 0.9%. 1000 milliLiter(s) IV Continuous <Continuous>  thiamine Injectable 200 milliGRAM(s) IV Push <User Schedule>  vancomycin  IVPB 750 milliGRAM(s) IV Intermittent every 12 hours  vasopressin Infusion 0.083 Unit(s)/Min IV Continuous <Continuous>    PRN Inpatient Medications      REVIEW OF SYSTEMS: Unable to obtain 2/2 clinical condition.    VITALS/PHYSICAL EXAM  --------------------------------------------------------------------------------  T(C): 36.9 (05-18-19 @ 03:00), Max: 36.9 (05-18-19 @ 03:00)  HR: 85 (05-18-19 @ 07:56) (70 - 114)  BP: SBP: 100-130  RR: 17 (05-18-19 @ 07:45) (14 - 34)  SpO2: 98% (05-18-19 @ 07:56) (59% - 100%)  Wt(kg): --        05-17-19 @ 07:01  -  05-18-19 @ 07:00  --------------------------------------------------------  IN: 7251.4 mL / OUT: 3332 mL / NET: 3919.4 mL    05-18-19 @ 07:01  -  05-18-19 @ 08:21  --------------------------------------------------------  IN: 64 mL / OUT: 0 mL / NET: 64 mL      Physical Exam:  	Gen: critically ill.   	HEENT: +NGT, +trach  	Pulm: CTA B/L  	CV: RRR, S1S2; no rub  	Abd: +BS, soft, nondistended              : De. Minimal urine output.  	LE: Warm, 2+ edema  	Vascular access: +RIJ non-tunneled HD catheter    LABS/STUDIES  --------------------------------------------------------------------------------              10.1   24.1  >-----------<  30       [05-18-19 @ 05:28]              29.3     133  |  99  |  34  ----------------------------<  79      [05-18-19 @ 05:28]  3.8   |  17  |  1.01        Ca     7.8     [05-18-19 @ 05:28]      Mg     2.2     [05-18-19 @ 00:55]      Phos  5.9     [05-18-19 @ 00:55]    TPro  5.5  /  Alb  2.8  /  TBili  5.2  /  DBili  x   /  AST  >7000  /  ALT  4986  /  AlkPhos  98  [05-18-19 @ 05:28]    PT/INR: PT 31.1 , INR 2.65       [05-18-19 @ 05:28]  PTT: 43.4       [05-18-19 @ 05:28]          [05-16-19 @ 09:07]    Creatinine Trend:  SCr 1.01 [05-18 @ 05:28]  SCr 1.16 [05-18 @ 00:55]  SCr 2.27 [05-17 @ 00:45]  SCr 2.66 [05-16 @ 05:15]  SCr 2.80 [05-15 @ 01:52]    Urinalysis - [05-14-19 @ 19:13]      Color Yellow / Appearance Slightly Turbid / SG 1.011 / pH 5.0      Gluc Negative / Ketone Negative  / Bili Negative / Urobili Negative       Blood Moderate / Protein Trace / Leuk Est Negative / Nitrite Negative      RBC 10 / WBC 4 / Hyaline 32 / Gran  / Sq Epi  / Non Sq Epi 3 / Bacteria Negative    Urine Protein <4      [05-11-19 @ 16:11]    Lipid: chol --, , HDL --, LDL --      [05-17-19 @ 03:45]    HBsAb <3.0      [05-12-19 @ 00:08]  HBsAg Nonreact      [05-17-19 @ 09:15]  HCV 0.16, Nonreact      [05-17-19 @ 09:15]    Free Light Chains: kappa 6.77, lambda 10.77, ratio = 0.63      [05-12 @ 12:52]  Immunofixation Urine: No Monoclonal Band Identified      [05-11-19 @ 16:11]  UPEP Interpretation: Normal Electrophoresis Pattern      [05-11-19 @ 16:11]

## 2019-05-18 NOTE — PROGRESS NOTE ADULT - SUBJECTIVE AND OBJECTIVE BOX
CRITICAL CARE ATTENDING - CTICU    MEDICATIONS  (STANDING):  aspirin 325 milliGRAM(s) Oral daily  calcium chloride Injectable 1000 milliGRAM(s) IV Push <User Schedule>  chlorhexidine 0.12% Liquid 15 milliLiter(s) Oral Mucosa two times a day  chlorhexidine 4% Liquid 1 Application(s) Topical <User Schedule>  CRRT Treatment    <Continuous>  dextrose 10%. 1000 milliLiter(s) (50 mL/Hr) IV Continuous <Continuous>  dextrose 50% Injectable 50 milliLiter(s) IV Push once  dextrose 50% Injectable 50 milliLiter(s) IV Push once  fentaNYL    Injectable 25 MICROGram(s) IV Push once  hydrocortisone sodium succinate Injectable 50 milliGRAM(s) IV Push every 6 hours  immune   globulin 10% (GAMMAGARD) IVPB 35 Gram(s) IV Intermittent daily  meropenem  IVPB 1000 milliGRAM(s) IV Intermittent every 8 hours  micafungin IVPB 100 milliGRAM(s) IV Intermittent every 24 hours  norepinephrine Infusion 0.034 MICROgram(s)/kG/Min (5 mL/Hr) IV Continuous <Continuous>  pantoprazole  Injectable 40 milliGRAM(s) IV Push two times a day  PrismaSATE Dialysate BK 0 / 3.5 5000 milliLiter(s) (1500 mL/Hr) CRRT <Continuous>  PrismaSOL Filtration BGK 4 / 2.5 5000 milliLiter(s) (1000 mL/Hr) CRRT <Continuous>  PrismaSOL Filtration BGK 4 / 2.5 5000 milliLiter(s) (200 mL/Hr) CRRT <Continuous>  propofol Infusion 10 MICROgram(s)/kG/Min (4.77 mL/Hr) IV Continuous <Continuous>  sodium chloride 0.9%. 1000 milliLiter(s) (10 mL/Hr) IV Continuous <Continuous>  thiamine Injectable 200 milliGRAM(s) IV Push <User Schedule>  vancomycin  IVPB 750 milliGRAM(s) IV Intermittent every 12 hours  vasopressin Infusion 0.083 Unit(s)/Min (5 mL/Hr) IV Continuous <Continuous>                                    10.1   24.1  )-----------( 30       ( 18 May 2019 05:28 )             29.3       05-18    133<L>  |  99  |  34<H>  ----------------------------<  79  3.8   |  17<L>  |  1.01    Ca    7.8<L>      18 May 2019 05:28  Phos  5.9     05-18  Mg     2.2     05-18    TPro  5.5<L>  /  Alb  2.8<L>  /  TBili  5.2<H>  /  DBili  x   /  AST  >7000<H>  /  ALT  4986<H>  /  AlkPhos  98  05-18      PT/INR - ( 18 May 2019 05:28 )   PT: 31.1 sec;   INR: 2.65 ratio         PTT - ( 18 May 2019 05:28 )  PTT:43.4 sec    Mode: AC/ CMV (Assist Control/ Continuous Mandatory Ventilation)  RR (machine): 12  TV (machine): 550  FiO2: 50  PEEP: 5  ITime: 1  MAP: 8  PIP: 25      Daily     Daily       05-17 @ 07:01  -  05-18 @ 07:00  --------------------------------------------------------  IN: 7251.4 mL / OUT: 3427 mL / NET: 3824.4 mL    05-18 @ 07:01 - 05-18 @ 11:19  --------------------------------------------------------  IN: 523 mL / OUT: 577 mL / NET: -54 mL        Critically Ill patient  : [ ] preoperative ,   [x ] post operative    Requires :  [x] Arterial Line   [x ] Central Line  [x ] PA catheter  [ ] IABP  [ ] ECMO  [ ] LVAD  [x ] Ventilator  [ x] pacemaker [ ] Impella.    Bedside evaluation , monitoring , treatment of hemodynamics , fluids , IVP/ IVCD meds.        Diagnosis:     POD 4/26 - CABG X 3 L    Post Op ARDS - Pulmonary Amyloidosis    ARDS    Staph pneumonia    Septic Shock    SIRS / MODS    Hemodynamic lability,instability. Requires IVCD [ x] vasopressors [ ] inotropes  [ ] vasodilator  [ x]IVSS fluid  to maintain MAP, perfusion, C.I.      Renal Failure - Acute Kidney Injury     CVVHD - will start to remove 50 cc/ hr    fluid overload / anasarca    respiratory failure - Tracheostomy     Requires chest PT, pulmonary toilet, ambu bagging, suctioning to maintain SaO2,  patent airway and treat atelectasis.     Ventilator Management:  [ x]AC-rest    [ ]CPAP-PS Wean    [ ]Trach Collar     [ ]Extubate    [ ] T-Piece  [ ]peep>5     Difficult weaning process - multiple organ system involvement in critically ill patient     CHF- acute [x ]   chronic [x ]    systolic [ ]   diatolic [x ]          - Echo- EF -  70%  LVH            [ ] RV dysfunction          - Cxr-cardiomegally, edema          - Clinical-  [ ]inotropes   [ x]pressors   [ ]diuresis   [ ]IABP   [ ]ECMO   [ ]LVAD   [x ]Respiratory Failure    Thrombocytopenia     Hyponatremia     DIC    Episodes of Hypoglycemia    Lactic acidosis     Ischemic extremities                        -                     Discussed with CT surgeon, Physician's Assistant - Nurse Practitioner- Critical care medicine team.   Dicussed at  AM / PM rounds.   Chart, labs , films reviewed.    Total Time: 30 min

## 2019-05-18 NOTE — PROGRESS NOTE ADULT - ATTENDING COMMENTS
as above-CT chest c/w PNA--MRSA sputum--- ABX restarted (vanco/meropenem)5/15 plus mycafungin  Resp failure--pulm amyloidosis, PNA, CHF, debility-------on vent-sat above 90%  Pulm Amyloid--no definitive rx at present           COPD-duoneb via ETT q 6  CHF-as per Gaby et al  Renal insuff/fluid OL--CVVHD in place  PNA-MRSA-vanco/meropenem/mycafungin--as per ID                          GOC/prog critical/guarded  GI-ppi/TF                                   Jann Guadarrama MD-Pulmonary   [phone]

## 2019-05-19 LAB
ALBUMIN SERPL ELPH-MCNC: 2.4 G/DL — LOW (ref 3.3–5)
ALP SERPL-CCNC: 134 U/L — HIGH (ref 40–120)
ALT FLD-CCNC: 3309 U/L — HIGH (ref 10–45)
AMYLASE P1 CFR SERPL: 759 U/L — HIGH (ref 25–125)
ANION GAP SERPL CALC-SCNC: 12 MMOL/L — SIGNIFICANT CHANGE UP (ref 5–17)
APTT BLD: 42 SEC — HIGH (ref 27.5–36.3)
AST SERPL-CCNC: 4503 U/L — HIGH (ref 10–40)
BASE EXCESS BLDMV CALC-SCNC: -2.9 MMOL/L — SIGNIFICANT CHANGE UP (ref -3–3)
BILIRUB SERPL-MCNC: 7.5 MG/DL — HIGH (ref 0.2–1.2)
BUN SERPL-MCNC: 24 MG/DL — HIGH (ref 7–23)
CALCIUM SERPL-MCNC: 7.7 MG/DL — LOW (ref 8.4–10.5)
CHLORIDE SERPL-SCNC: 102 MMOL/L — SIGNIFICANT CHANGE UP (ref 96–108)
CO2 BLDMV-SCNC: 22 MMOL/L — SIGNIFICANT CHANGE UP (ref 21–29)
CO2 SERPL-SCNC: 18 MMOL/L — LOW (ref 22–31)
CREAT SERPL-MCNC: 0.72 MG/DL — SIGNIFICANT CHANGE UP (ref 0.5–1.3)
GAS PNL BLDA: SIGNIFICANT CHANGE UP
GAS PNL BLDMV: SIGNIFICANT CHANGE UP
GLUCOSE BLDC GLUCOMTR-MCNC: 103 MG/DL — HIGH (ref 70–99)
GLUCOSE BLDC GLUCOMTR-MCNC: 113 MG/DL — HIGH (ref 70–99)
GLUCOSE BLDC GLUCOMTR-MCNC: 113 MG/DL — HIGH (ref 70–99)
GLUCOSE BLDC GLUCOMTR-MCNC: 114 MG/DL — HIGH (ref 70–99)
GLUCOSE BLDC GLUCOMTR-MCNC: 81 MG/DL — SIGNIFICANT CHANGE UP (ref 70–99)
GLUCOSE BLDC GLUCOMTR-MCNC: 87 MG/DL — SIGNIFICANT CHANGE UP (ref 70–99)
GLUCOSE BLDC GLUCOMTR-MCNC: 87 MG/DL — SIGNIFICANT CHANGE UP (ref 70–99)
GLUCOSE BLDC GLUCOMTR-MCNC: 89 MG/DL — SIGNIFICANT CHANGE UP (ref 70–99)
GLUCOSE SERPL-MCNC: 82 MG/DL — SIGNIFICANT CHANGE UP (ref 70–99)
HCO3 BLDMV-SCNC: 21 MMOL/L — SIGNIFICANT CHANGE UP (ref 20–28)
HCT VFR BLD CALC: 29.1 % — LOW (ref 39–50)
HGB BLD-MCNC: 9.7 G/DL — LOW (ref 13–17)
HOROWITZ INDEX BLDMV+IHG-RTO: 50 — SIGNIFICANT CHANGE UP
INR BLD: 2.35 RATIO — HIGH (ref 0.88–1.16)
LIDOCAIN IGE QN: 513 U/L — HIGH (ref 7–60)
MAGNESIUM SERPL-MCNC: 2 MG/DL — SIGNIFICANT CHANGE UP (ref 1.6–2.6)
MCHC RBC-ENTMCNC: 29.4 PG — SIGNIFICANT CHANGE UP (ref 27–34)
MCHC RBC-ENTMCNC: 33.4 GM/DL — SIGNIFICANT CHANGE UP (ref 32–36)
MCV RBC AUTO: 87.9 FL — SIGNIFICANT CHANGE UP (ref 80–100)
O2 CT VFR BLD CALC: 32 MMHG — SIGNIFICANT CHANGE UP (ref 30–65)
PCO2 BLDMV: 33 MMHG — SIGNIFICANT CHANGE UP (ref 30–65)
PH BLDMV: 7.41 — SIGNIFICANT CHANGE UP (ref 7.32–7.45)
PHOSPHATE SERPL-MCNC: 2.8 MG/DL — SIGNIFICANT CHANGE UP (ref 2.5–4.5)
PLATELET # BLD AUTO: 41 K/UL — LOW (ref 150–400)
POTASSIUM SERPL-MCNC: 3.5 MMOL/L — SIGNIFICANT CHANGE UP (ref 3.5–5.3)
POTASSIUM SERPL-SCNC: 3.5 MMOL/L — SIGNIFICANT CHANGE UP (ref 3.5–5.3)
PROT SERPL-MCNC: 5.6 G/DL — LOW (ref 6–8.3)
PROTHROM AB SERPL-ACNC: 27.7 SEC — HIGH (ref 10–12.9)
RBC # BLD: 3.31 M/UL — LOW (ref 4.2–5.8)
RBC # FLD: 14 % — SIGNIFICANT CHANGE UP (ref 10.3–14.5)
SAO2 % BLDMV: 53 % — LOW (ref 60–90)
SODIUM SERPL-SCNC: 132 MMOL/L — LOW (ref 135–145)
VANCOMYCIN TROUGH SERPL-MCNC: 12.2 UG/ML — SIGNIFICANT CHANGE UP (ref 10–20)
VANCOMYCIN TROUGH SERPL-MCNC: 14.5 UG/ML — SIGNIFICANT CHANGE UP (ref 10–20)
WBC # BLD: 21.5 K/UL — HIGH (ref 3.8–10.5)
WBC # FLD AUTO: 21.5 K/UL — HIGH (ref 3.8–10.5)

## 2019-05-19 PROCEDURE — 99232 SBSQ HOSP IP/OBS MODERATE 35: CPT

## 2019-05-19 PROCEDURE — 71045 X-RAY EXAM CHEST 1 VIEW: CPT | Mod: 26

## 2019-05-19 PROCEDURE — 99233 SBSQ HOSP IP/OBS HIGH 50: CPT

## 2019-05-19 PROCEDURE — 99233 SBSQ HOSP IP/OBS HIGH 50: CPT | Mod: GC

## 2019-05-19 PROCEDURE — 99291 CRITICAL CARE FIRST HOUR: CPT

## 2019-05-19 RX ORDER — POTASSIUM CHLORIDE 20 MEQ
10 PACKET (EA) ORAL ONCE
Refills: 0 | Status: COMPLETED | OUTPATIENT
Start: 2019-05-19 | End: 2019-05-19

## 2019-05-19 RX ORDER — METRONIDAZOLE 500 MG
500 TABLET ORAL EVERY 8 HOURS
Refills: 0 | Status: DISCONTINUED | OUTPATIENT
Start: 2019-05-19 | End: 2019-05-19

## 2019-05-19 RX ORDER — POTASSIUM CHLORIDE 20 MEQ
10 PACKET (EA) ORAL
Refills: 0 | Status: COMPLETED | OUTPATIENT
Start: 2019-05-19 | End: 2019-05-19

## 2019-05-19 RX ORDER — METRONIDAZOLE 500 MG
500 TABLET ORAL ONCE
Refills: 0 | Status: COMPLETED | OUTPATIENT
Start: 2019-05-19 | End: 2019-05-19

## 2019-05-19 RX ORDER — METRONIDAZOLE 500 MG
TABLET ORAL
Refills: 0 | Status: DISCONTINUED | OUTPATIENT
Start: 2019-05-19 | End: 2019-05-19

## 2019-05-19 RX ORDER — MAGNESIUM SULFATE 500 MG/ML
1 VIAL (ML) INJECTION ONCE
Refills: 0 | Status: COMPLETED | OUTPATIENT
Start: 2019-05-19 | End: 2019-05-19

## 2019-05-19 RX ADMIN — Medication 100 MILLIGRAM(S): at 08:56

## 2019-05-19 RX ADMIN — Medication 1000 MILLIGRAM(S): at 21:05

## 2019-05-19 RX ADMIN — MEROPENEM 100 MILLIGRAM(S): 1 INJECTION INTRAVENOUS at 05:02

## 2019-05-19 RX ADMIN — PANTOPRAZOLE SODIUM 40 MILLIGRAM(S): 20 TABLET, DELAYED RELEASE ORAL at 18:12

## 2019-05-19 RX ADMIN — MICAFUNGIN SODIUM 105 MILLIGRAM(S): 100 INJECTION, POWDER, LYOPHILIZED, FOR SOLUTION INTRAVENOUS at 23:06

## 2019-05-19 RX ADMIN — Medication 100 MILLIEQUIVALENT(S): at 02:07

## 2019-05-19 RX ADMIN — Medication 1000 MILLIGRAM(S): at 16:01

## 2019-05-19 RX ADMIN — CHLORHEXIDINE GLUCONATE 1 APPLICATION(S): 213 SOLUTION TOPICAL at 05:54

## 2019-05-19 RX ADMIN — MEROPENEM 100 MILLIGRAM(S): 1 INJECTION INTRAVENOUS at 21:05

## 2019-05-19 RX ADMIN — Medication 50 MILLIGRAM(S): at 05:03

## 2019-05-19 RX ADMIN — Medication 50 MILLIEQUIVALENT(S): at 04:08

## 2019-05-19 RX ADMIN — Medication 50 MILLIEQUIVALENT(S): at 09:00

## 2019-05-19 RX ADMIN — Medication 200 MILLIGRAM(S): at 21:06

## 2019-05-19 RX ADMIN — FENTANYL CITRATE 25 MICROGRAM(S): 50 INJECTION INTRAVENOUS at 00:00

## 2019-05-19 RX ADMIN — Medication 200 MILLIGRAM(S): at 11:05

## 2019-05-19 RX ADMIN — Medication 1000 MILLIGRAM(S): at 04:37

## 2019-05-19 RX ADMIN — CHLORHEXIDINE GLUCONATE 15 MILLILITER(S): 213 SOLUTION TOPICAL at 18:11

## 2019-05-19 RX ADMIN — MEROPENEM 100 MILLIGRAM(S): 1 INJECTION INTRAVENOUS at 14:46

## 2019-05-19 RX ADMIN — Medication 250 MILLIGRAM(S): at 18:11

## 2019-05-19 RX ADMIN — Medication 50 MILLIEQUIVALENT(S): at 04:37

## 2019-05-19 RX ADMIN — Medication 50 MILLIEQUIVALENT(S): at 05:19

## 2019-05-19 RX ADMIN — PANTOPRAZOLE SODIUM 40 MILLIGRAM(S): 20 TABLET, DELAYED RELEASE ORAL at 05:03

## 2019-05-19 RX ADMIN — Medication 100 MILLIEQUIVALENT(S): at 02:30

## 2019-05-19 RX ADMIN — Medication 50 MILLIGRAM(S): at 23:09

## 2019-05-19 RX ADMIN — CHLORHEXIDINE GLUCONATE 15 MILLILITER(S): 213 SOLUTION TOPICAL at 05:53

## 2019-05-19 RX ADMIN — Medication 1000 MILLIGRAM(S): at 10:08

## 2019-05-19 RX ADMIN — IMMUNE GLOBULIN (HUMAN) 58.33 GRAM(S): 10 INJECTION INTRAVENOUS; SUBCUTANEOUS at 11:54

## 2019-05-19 RX ADMIN — Medication 250 MILLIGRAM(S): at 06:30

## 2019-05-19 RX ADMIN — Medication 50 MILLIGRAM(S): at 18:11

## 2019-05-19 RX ADMIN — Medication 50 MILLIEQUIVALENT(S): at 11:04

## 2019-05-19 RX ADMIN — Medication 100 MILLIGRAM(S): at 16:46

## 2019-05-19 RX ADMIN — Medication 50 MILLIGRAM(S): at 11:53

## 2019-05-19 RX ADMIN — Medication 100 GRAM(S): at 11:15

## 2019-05-19 RX ADMIN — VASOPRESSIN 5 UNIT(S)/MIN: 20 INJECTION INTRAVENOUS at 15:47

## 2019-05-19 NOTE — PROGRESS NOTE ADULT - ATTENDING COMMENTS
TARAH/ATN, septic shock, amyloid, lactic acidosis, hyperphosphatemia, hypocalcemia, hypokalemia.  Trach, edematous    Adjusting CRRT solutions to better balance phosphorus and potassium. For hypocalcemia, cont. intermittent doses, but can transition to continuous infusion if needed.    - CRRT with CVVHDF, goal net even  - Maintain MAP > 65  - Dose medications for CRRT; check vanc levels at least daily  - D/w ICU team  - Remainder per fellow, will follow    Modality: CVVHDF, Filter: Pete HF 1000, Target Blood Flow: 250 mL/Min  Target Fluid Balance: Titrate to net EVEN fluid balance (05-18-19 @ 09:47)

## 2019-05-19 NOTE — PROGRESS NOTE ADULT - SUBJECTIVE AND OBJECTIVE BOX
79y old  Male who presents with a chief complaint of sob (19 May 2019 04:51)      Interval history:  On hypothermic blanket, + secretions more oral, still on pressors, + melena. On CVVH.       No Known Allergies      Antimicrobials:  meropenem  IVPB 1000 milliGRAM(s) IV Intermittent every 8 hours  metroNIDAZOLE  IVPB 500 milliGRAM(s) IV Intermittent every 8 hours  micafungin IVPB 100 milliGRAM(s) IV Intermittent every 24 hours  vancomycin  IVPB 750 milliGRAM(s) IV Intermittent every 12 hours      REVIEW OF SYSTEMS:  Unable to obtain due to pt's underlying mental status.       Vital Signs Last 24 Hrs  T(C): 35.7 (05-19-19 @ 13:00), Max: 36.9 (05-19-19 @ 00:00)  T(F): 96.3 (05-19-19 @ 13:00), Max: 98.4 (05-19-19 @ 00:00)  HR: 80 (05-19-19 @ 13:30) (77 - 101)  RR: 15 (05-19-19 @ 13:30) (14 - 27)  SpO2: 100% (05-19-19 @ 13:30) (77% - 100%)      PHYSICAL EXAM:  Patient with eyes closed, not responding to verbal commands.   + tach, + OG.   + rt sided hd shiley and rt TLC.   Cardiovascular: S1S2 normal.  Lungs: +rt sided CT, + entry lt lung.  Gastrointestinal: soft, nondistended.  Extremities: + edema.  + osborne  + rectal tube.                           9.7    21.5  )-----------( 41       ( 19 May 2019 00:59 )             29.1   05-19    132<L>  |  102  |  24<H>  ----------------------------<  82  3.5   |  18<L>  |  0.72    Ca    7.7<L>      19 May 2019 00:59  Phos  2.8     05-19  Mg     2.0     05-19    TPro  5.6<L>  /  Alb  2.4<L>  /  TBili  7.5<H>  /  DBili  x   /  AST  4503<H>  /  ALT  3309<H>  /  AlkPhos  134<H>  05-19      LIVER FUNCTIONS - ( 19 May 2019 00:59 )  Alb: 2.4 g/dL / Pro: 5.6 g/dL / ALK PHOS: 134 U/L / ALT: 3309 U/L / AST: 4503 U/L / GGT: x               Culture - Blood (collected 17 May 2019 05:28)  Source: .Blood  Preliminary Report (18 May 2019 06:01):    No growth to date.    Culture - Blood (collected 17 May 2019 05:28)  Source: .Blood  Preliminary Report (18 May 2019 06:01):    No growth to date.    Culture - Blood (collected 17 May 2019 05:28)  Source: .Blood  Preliminary Report (18 May 2019 06:01):    No growth to date.      Radiology:  < from: Xray Chest 1 View- PORTABLE-Routine (05.19.19 @ 01:35) >  IMPRESSION:  Tracheostomy tube in place. Enteric tube in the stomach. Right IJ   approach central venous catheter tip at the superior cavoatrial junction.   Right subclavian approach Ripley-Ariadna catheter tip in the main pulmonary   artery. Right inferior pleural pigtail drainage catheter in place.     Asymmetric left-sided pulmonary edema and small left pleural effusion. No   pneumothorax. The heart is mildly enlarged. Status post mean sternotomy   and CABG. Degenerative changes of the spine.

## 2019-05-19 NOTE — PROGRESS NOTE ADULT - ASSESSMENT
77 yo   male with PMH of HLD, Sleep apnea ( non compliant with CPAP), GERD, diverticulitis, depression, anxiety, and panic attack, presented to [facility] on 04/25/19 for LHC after having  abnormal NST done as outpatient, LHC done same day revealed TVD. therefore underwent CABG on 04/26/19, hospital course complicated with fever, shock, no on pressors, abx, developed julio cesar hence nephrology consulted.

## 2019-05-19 NOTE — PROGRESS NOTE ADULT - ATTENDING COMMENTS
as above-CT chest c/w PNA--MRSA sputum--- ABX restarted (vanco/meropenem)5/15 plus mycafungin-no signif improvement remains CRITICALLY ill  Resp failure--pulm amyloidosis, PNA, CHF, debility-------on vent-sat above 90%  Pulm Amyloid--no definitive rx at present           COPD-duoneb via ETT q 6  CHF-as per Gaby et al-pressors                       Heme-prbc and plts prn  Renal insuff/fluid OL--CVVHD in place  PNA-MRSA-vanco/meropenem/mycafungin--as per ID                          GOC/prog critical/guarded  GI-ppi/TF (on hold)                                  Jann Guadarrama MD-Pulmonary   [phone]

## 2019-05-19 NOTE — PROGRESS NOTE ADULT - SUBJECTIVE AND OBJECTIVE BOX
[facility] Division of Kidney Diseases & Hypertension  FOLLOW UP NOTE  [phone]--------------------------------------------------------------------------------  Chief Complaint:Angina pectoris  Atherosclerosis of native coronary artery without angina pectoris      24 hour events/subjective: Patient seen at bedside. Remains on CRRT. No issues with circuit overnight.        PAST HISTORY  --------------------------------------------------------------------------------  No significant changes to PMH, PSH, FHx, SHx, unless otherwise noted    ALLERGIES & MEDICATIONS  --------------------------------------------------------------------------------  Allergies    No Known Allergies    Intolerances      Standing Inpatient Medications  calcium chloride Injectable 1000 milliGRAM(s) IV Push <User Schedule>  CRRT Treatment    <Continuous>  dexmedetomidine Infusion 0.5 MICROgram(s)/kG/Hr IV Continuous <Continuous>  hydrocortisone sodium succinate Injectable 50 milliGRAM(s) IV Push every 6 hours  immune   globulin 10% (GAMMAGARD) IVPB 35 Gram(s) IV Intermittent daily  magnesium sulfate  IVPB 1 Gram(s) IV Intermittent once  meropenem  IVPB 1000 milliGRAM(s) IV Intermittent every 8 hours  metroNIDAZOLE  IVPB      metroNIDAZOLE  IVPB 500 milliGRAM(s) IV Intermittent every 8 hours  micafungin IVPB 100 milliGRAM(s) IV Intermittent every 24 hours  norepinephrine Infusion 0.034 MICROgram(s)/kG/Min IV Continuous <Continuous>  pantoprazole  Injectable 40 milliGRAM(s) IV Push two times a day  PrismaSATE Dialysate BGK 4 / 2.5 5000 milliLiter(s) CRRT <Continuous>  PrismaSOL Filtration BGK 4 / 2.5 5000 milliLiter(s) CRRT <Continuous>  PrismaSOL Filtration BGK 4 / 2.5 5000 milliLiter(s) CRRT <Continuous>  sodium chloride 0.9%. 1000 milliLiter(s) IV Continuous <Continuous>  thiamine Injectable 200 milliGRAM(s) IV Push <User Schedule>  vancomycin  IVPB 750 milliGRAM(s) IV Intermittent every 12 hours  vasopressin Infusion 0.083 Unit(s)/Min IV Continuous <Continuous>    PRN Inpatient Medications      REVIEW OF SYSTEMS: Unable to obtain 2/2 clinical condition.    VITALS/PHYSICAL EXAM  --------------------------------------------------------------------------------  T(C): 36.1 (05-19-19 @ 11:00), Max: 36.9 (05-19-19 @ 00:00)  HR: 80 (05-19-19 @ 11:45) (77 - 114)  BP: SBP: 100-160  RR: 16 (05-19-19 @ 11:45) (14 - 27)  SpO2: 100% (05-19-19 @ 11:45) (77% - 100%)  Wt(kg): --        05-18-19 @ 07:01  -  05-19-19 @ 07:00  --------------------------------------------------------  IN: 3600.5 mL / OUT: 4195 mL / NET: -594.5 mL    05-19-19 @ 07:01  -  05-19-19 @ 11:47  --------------------------------------------------------  IN: 607 mL / OUT: 886 mL / NET: -279 mL      Physical Exam:  	Gen: critically ill.   	HEENT: +NGT, +trach  	Pulm: CTA B/L  	CV: RRR, S1S2; no rub  	Abd: +BS, soft, nondistended              : De. Minimal urine output.  	LE: Warm, 2+ edema  	Vascular access: +RIJ non-tunneled HD catheter    LABS/STUDIES  --------------------------------------------------------------------------------              9.7    21.5  >-----------<  41       [05-19-19 @ 00:59]              29.1     132  |  102  |  24  ----------------------------<  82      [05-19-19 @ 00:59]  3.5   |  18  |  0.72        Ca     7.7     [05-19-19 @ 00:59]      Mg     2.0     [05-19-19 @ 00:59]      Phos  2.8     [05-19-19 @ 00:59]    TPro  5.6  /  Alb  2.4  /  TBili  7.5  /  DBili  x   /  AST  4503  /  ALT  3309  /  AlkPhos  134  [05-19-19 @ 00:59]    PT/INR: PT 27.7 , INR 2.35       [05-19-19 @ 00:59]  PTT: 42.0       [05-19-19 @ 00:59]      Creatinine Trend:  SCr 0.72 [05-19 @ 00:59]  SCr 0.84 [05-18 @ 14:13]  SCr 1.01 [05-18 @ 05:28]  SCr 1.16 [05-18 @ 00:55]  SCr 2.27 [05-17 @ 00:45]    Urinalysis - [05-14-19 @ 19:13]      Color Yellow / Appearance Slightly Turbid / SG 1.011 / pH 5.0      Gluc Negative / Ketone Negative  / Bili Negative / Urobili Negative       Blood Moderate / Protein Trace / Leuk Est Negative / Nitrite Negative      RBC 10 / WBC 4 / Hyaline 32 / Gran  / Sq Epi  / Non Sq Epi 3 / Bacteria Negative      Lipid: chol --, , HDL --, LDL --      [05-17-19 @ 03:45]    HBsAg Nonreact      [05-18-19 @ 02:54]  HCV 0.23, Nonreact      [05-18-19 @ 02:54]    Free Light Chains: kappa 12.40, lambda 11.58, ratio = 1.07      [05-18 @ 06:43]

## 2019-05-19 NOTE — PROGRESS NOTE ADULT - SUBJECTIVE AND OBJECTIVE BOX
4;30pm-5pm    Remained critically ill on continuos ICU monitoring.     OBJECTIVE:  ICU Vital Signs Last 24 Hrs  T(C): 36.4 (19 May 2019 18:00), Max: 36.9 (19 May 2019 00:00)  T(F): 97.5 (19 May 2019 18:00), Max: 98.4 (19 May 2019 00:00)  HR: 82 (19 May 2019 19:30) (77 - 90)  BP: --  BP(mean): --  ABP: 135/50 (19 May 2019 19:30) (105/41 - 161/60)  ABP(mean): 73 (19 May 2019 19:30) (58 - 94)  RR: 21 (19 May 2019 19:30) (14 - 27)  SpO2: 99% (19 May 2019 19:30) (77% - 100%)    Mode: CPAP with PS, FiO2: 50, PEEP: 5, PS: 10, MAP: 8, PIP: 8    18 @ 07:01  -   @ 07:00  --------------------------------------------------------  IN: 3600.5 mL / OUT: 4195 mL / NET: -594.5 mL     @ 07:  -   @ 19:43  --------------------------------------------------------  IN: 1962.8 mL / OUT: 2124 mL / NET: -161.2 mL      CAPILLARY BLOOD GLUCOSE  113 (19 May 2019 18:00)      POCT Blood Glucose.: 113 mg/dL (19 May 2019 17:00)      PHYSICAL EXAM:     Daily Weight in k.9 (19 May 2019 00:00)  General: chronically debilitated on ventilator  support with multiple gtt  Neurology: lethargic nonfocal, JOHNSON x 4  Eyes: PERRLA/ EOMI, Gross vision intact + icterus   ENT/Neck: Neck supple, + trac trachea midline, No JVD, Gross hearing intact  Respiratory:  B/L fine rales + sternal dressing intact   CV: RRR, S1S2, no murmurs, rubs or gallops  Abdominal: Soft, NT, ND +BS,   Extremities: 2 + edema, + peripheral pulses  Skin: No Rashes, Hematoma, Ecchymosis  + cyanotic ear lobes, +necrotic tongue   Rectal tube, R chest tube  Tubes: T       HOSPITAL MEDICATIONS:  MEDICATIONS  (STANDING):  calcium chloride Injectable 1000 milliGRAM(s) IV Push <User Schedule>  chlorhexidine 0.12% Liquid 15 milliLiter(s) Oral Mucosa two times a day  chlorhexidine 4% Liquid 1 Application(s) Topical <User Schedule>  CRRT Treatment    <Continuous>  dexmedetomidine Infusion 0.5 MICROgram(s)/kG/Hr (9.938 mL/Hr) IV Continuous <Continuous>  dextrose 10%. 1000 milliLiter(s) (20 mL/Hr) IV Continuous <Continuous>  dextrose 50% Injectable 50 milliLiter(s) IV Push once  hydrocortisone sodium succinate Injectable 50 milliGRAM(s) IV Push every 6 hours  meropenem  IVPB 1000 milliGRAM(s) IV Intermittent every 8 hours  micafungin IVPB 100 milliGRAM(s) IV Intermittent every 24 hours  norepinephrine Infusion 0.034 MICROgram(s)/kG/Min (5 mL/Hr) IV Continuous <Continuous>  pantoprazole  Injectable 40 milliGRAM(s) IV Push two times a day  PrismaSATE Dialysate BGK 4 / 2.5 5000 milliLiter(s) (1500 mL/Hr) CRRT <Continuous>  PrismaSOL Filtration BGK 4 / 2.5 5000 milliLiter(s) (1000 mL/Hr) CRRT <Continuous>  PrismaSOL Filtration BGK 4 / 2.5 5000 milliLiter(s) (200 mL/Hr) CRRT <Continuous>  thiamine Injectable 200 milliGRAM(s) IV Push <User Schedule>  vancomycin  IVPB 750 milliGRAM(s) IV Intermittent every 12 hours  vasopressin Infusion 0.083 Unit(s)/Min (5 mL/Hr) IV Continuous <Continuous>    MEDICATIONS  (PRN):      LABS:                        9.7    21.5  )-----------( 41       ( 19 May 2019 00:59 )             29.1     05-19    132<L>  |  102  |  24<H>  ----------------------------<  82  3.5   |  18<L>  |  0.72    Ca    7.7<L>      19 May 2019 00:59  Phos  2.8       Mg     2.0         TPro  5.6<L>  /  Alb  2.4<L>  /  TBili  7.5<H>  /  DBili  x   /  AST  4503<H>  /  ALT  3309<H>  /  AlkPhos  134<H>      PT/INR - ( 19 May 2019 00:59 )   PT: 27.7 sec;   INR: 2.35 ratio         PTT - ( 19 May 2019 00:59 )  PTT:42.0 sec    Arterial Blood Gas:   @ 18:04  7.48/27/137/20/99/-2.5  ABG lactate: --  Arterial Blood Gas:   @ 15:17  7.45/31/128/21/99/-2.0  ABG lactate: --  Arterial Blood Gas:   @ 12:26  7.45/29/129/20/99/-2.9  ABG lactate: --  Arterial Blood Gas:   @ 09:54  7.46/29/133/20/99/-2.6  ABG lactate: --  Arterial Blood Gas:   @ 06:08  7.45/28/146/19/99/-3.3  ABG lactate: --  Arterial Blood Gas:   @ 03:46  7.47/26/162/19/99/-3.4  ABG lactate: --  Arterial Blood Gas:   @ 00:40  7.46/29/132/20/99/-2.6  ABG lactate: --  Arterial Blood Gas:   @ 22:00  7.45/29/130/20/99/-2.7  ABG lactate: --  Arterial Blood Gas:   @ 20:07  7.47/26/137/19/99/-3.9  ABG lactate: --  Arterial Blood Gas:   @ 17:20  7.44/29/146/19/99/-3.7  ABG lactate: --  Arterial Blood Gas:   @ 14:10  7.40/32/133/19/99/-4.3  ABG lactate: --  Arterial Blood Gas:   @ 11:19  7.41/30/94/18/97/-5.0  ABG lactate: --  Arterial Blood Gas:   @ 10:01  7.41/29/100/18/97/-5.3  ABG lactate: --  Arterial Blood Gas:   @ 05:26  7.42/28/117/18/99/-5.2  ABG lactate: --  Arterial Blood Gas:   @ 03:32  7.42/29/104/18/97/-4.7  ABG lactate: --  Arterial Blood Gas:   @ 00:49  7.40/29/96/17/98/-6.4  ABG lactate: --  Arterial Blood Gas:   @ 22:12  7.37/33/134/19/97/-5.2  ABG lactate: --  Arterial Blood Gas:   @ 19:58  7.35/33/106/18/98/-6.6  ABG lactate: --        LIVER FUNCTIONS - ( 19 May 2019 00:59 )  Alb: 2.4 g/dL / Pro: 5.6 g/dL / ALK PHOS: 134 U/L / ALT: 3309 U/L / AST: 4503 U/L / GGT: x           MICROBIOLOGY:     RADIOLOGY:  X Reviewed and interpreted by me        I have spent 30 minutes providing critical care management to this patient. 4;30pm-5pm    Remained critically ill on continuos ICU monitoring.     OBJECTIVE:  ICU Vital Signs Last 24 Hrs  T(C): 36.4 (19 May 2019 18:00), Max: 36.9 (19 May 2019 00:00)  T(F): 97.5 (19 May 2019 18:00), Max: 98.4 (19 May 2019 00:00)  HR: 82 (19 May 2019 19:30) (77 - 90)  BP: --  BP(mean): --  ABP: 135/50 (19 May 2019 19:30) (105/41 - 161/60)  ABP(mean): 73 (19 May 2019 19:30) (58 - 94)  RR: 21 (19 May 2019 19:30) (14 - 27)  SpO2: 99% (19 May 2019 19:30) (77% - 100%)    Mode: CPAP with PS, FiO2: 50, PEEP: 5, PS: 10, MAP: 8, PIP: 8    18 @ 07:01  -   @ 07:00  --------------------------------------------------------  IN: 3600.5 mL / OUT: 4195 mL / NET: -594.5 mL     @ 07:  -   @ 19:43  --------------------------------------------------------  IN: 1962.8 mL / OUT: 2124 mL / NET: -161.2 mL      CAPILLARY BLOOD GLUCOSE  113 (19 May 2019 18:00)      POCT Blood Glucose.: 113 mg/dL (19 May 2019 17:00)      PHYSICAL EXAM:     Daily Weight in k.9 (19 May 2019 00:00)  General: chronically debilitated on ventilator  support with multiple gtt  Neurology: lethargic nonfocal, JOHNSON x 4  Eyes: PERRLA/ EOMI, Gross vision intact + icterus   ENT/Neck: Neck supple, + trac trachea midline, No JVD, Gross hearing intact  Respiratory:  B/L fine rales + sternal dressing intact   CV: RRR, S1S2, no murmurs, rubs or gallops  Abdominal: Soft, NT, ND +BS,   Extremities: 2 + edema, + peripheral pulses  Skin: No Rashes, Hematoma, Ecchymosis  + cyanotic ear lobes, +necrotic tongue   Rectal tube, R chest tube  Tubes: T       HOSPITAL MEDICATIONS:  MEDICATIONS  (STANDING):  calcium chloride Injectable 1000 milliGRAM(s) IV Push <User Schedule>  chlorhexidine 0.12% Liquid 15 milliLiter(s) Oral Mucosa two times a day  chlorhexidine 4% Liquid 1 Application(s) Topical <User Schedule>  CRRT Treatment    <Continuous>  dexmedetomidine Infusion 0.5 MICROgram(s)/kG/Hr (9.938 mL/Hr) IV Continuous <Continuous>  dextrose 10%. 1000 milliLiter(s) (20 mL/Hr) IV Continuous <Continuous>  dextrose 50% Injectable 50 milliLiter(s) IV Push once  hydrocortisone sodium succinate Injectable 50 milliGRAM(s) IV Push every 6 hours  meropenem  IVPB 1000 milliGRAM(s) IV Intermittent every 8 hours  micafungin IVPB 100 milliGRAM(s) IV Intermittent every 24 hours  norepinephrine Infusion 0.034 MICROgram(s)/kG/Min (5 mL/Hr) IV Continuous <Continuous>  pantoprazole  Injectable 40 milliGRAM(s) IV Push two times a day  PrismaSATE Dialysate BGK 4 / 2.5 5000 milliLiter(s) (1500 mL/Hr) CRRT <Continuous>  PrismaSOL Filtration BGK 4 / 2.5 5000 milliLiter(s) (1000 mL/Hr) CRRT <Continuous>  PrismaSOL Filtration BGK 4 / 2.5 5000 milliLiter(s) (200 mL/Hr) CRRT <Continuous>  thiamine Injectable 200 milliGRAM(s) IV Push <User Schedule>  vancomycin  IVPB 750 milliGRAM(s) IV Intermittent every 12 hours  vasopressin Infusion 0.083 Unit(s)/Min (5 mL/Hr) IV Continuous <Continuous>    MEDICATIONS  (PRN):      LABS:                        9.7    21.5  )-----------( 41       ( 19 May 2019 00:59 )             29.1     05-19    132<L>  |  102  |  24<H>  ----------------------------<  82  3.5   |  18<L>  |  0.72    Ca    7.7<L>      19 May 2019 00:59  Phos  2.8       Mg     2.0         TPro  5.6<L>  /  Alb  2.4<L>  /  TBili  7.5<H>  /  DBili  x   /  AST  4503<H>  /  ALT  3309<H>  /  AlkPhos  134<H>      PT/INR - ( 19 May 2019 00:59 )   PT: 27.7 sec;   INR: 2.35 ratio         PTT - ( 19 May 2019 00:59 )  PTT:42.0 sec    Arterial Blood Gas:   @ 18:04  7.48/27/137/20/99/-2.5  ABG lactate: --  Arterial Blood Gas:   @ 15:17  7.45/31/128/21/99/-2.0  ABG lactate: --  Arterial Blood Gas:   @ 12:26  7.45/29/129/20/99/-2.9  ABG lactate: --  Arterial Blood Gas:   @ 09:54  7.46/29/133/20/99/-2.6  ABG lactate: --  Arterial Blood Gas:   @ 06:08  7.45/28/146/19/99/-3.3  ABG lactate: --  Arterial Blood Gas:   @ 03:46  7.47/26/162/19/99/-3.4  ABG lactate: --  Arterial Blood Gas:   @ 00:40  7.46/29/132/20/99/-2.6  ABG lactate: --  Arterial Blood Gas:   @ 22:00  7.45/29/130/20/99/-2.7  ABG lactate: --  Arterial Blood Gas:   @ 20:07  7.47/26/137/19/99/-3.9  ABG lactate: --  Arterial Blood Gas:   @ 17:20  7.44/29/146/19/99/-3.7  ABG lactate: --  Arterial Blood Gas:   @ 14:10  7.40/32/133/19/99/-4.3  ABG lactate: --  Arterial Blood Gas:   @ 11:19  7.41/30/94/18/97/-5.0  ABG lactate: --  Arterial Blood Gas:   @ 10:01  7.41/29/100/18/97/-5.3  ABG lactate: --  Arterial Blood Gas:   @ 05:26  7.42/28/117/18/99/-5.2  ABG lactate: --  Arterial Blood Gas:   @ 03:32  7.42/29/104/18/97/-4.7  ABG lactate: --  Arterial Blood Gas:   @ 00:49  7.40/29/96/17/98/-6.4  ABG lactate: --  Arterial Blood Gas:   @ 22:12  7.37/33/134/19/97/-5.2  ABG lactate: --  Arterial Blood Gas:   @ 19:58  7.35/33/106/18/98/-6.6  ABG lactate: --        LIVER FUNCTIONS - ( 19 May 2019 00:59 )  Alb: 2.4 g/dL / Pro: 5.6 g/dL / ALK PHOS: 134 U/L / ALT: 3309 U/L / AST: 4503 U/L / GGT: x           MICROBIOLOGY:     RADIOLOGY:  X Reviewed and interpreted by me

## 2019-05-19 NOTE — PROGRESS NOTE ADULT - ASSESSMENT
78 yr old male with H/O HTN, HLD, CAD, S/P Lung sx, Anxiety, Asthma, S/P CABG x3 POD 23  Post op course complicated with hypoxic respiratory failure suspected Bibasilar Pneumonia, severe Agitation requiring intubation. now S/P Trach, Lung Bx with Dx Pulmonary Amyloidosis   A fib  TARAH/ Renal failure   MRSA Pneumonia  Shock with MODS (resp cardiac renal, liver, metabolic &  hematological dysfunction)   Diarrhea with lactic acidosis suspected mesenteric ischemia    AC mode ventilation,   titrate Fio2& Peep CXR with bibasilar infiltrate  S/P R chest tube monitor outputs  trac care,   Shock diastolic heart failure, with vasodilatory dysfunction  On invasive hemodynamic monitoring   On double pressors Levo & Vaso gtt, marginal SVo2,   S/P IVIG/ STEROIDS for refractory shock  Persistent lactic acidosis despite resuscitation    Vanco for multifocal pneumonia  Meropenem & Micafungin added for worsening shock, & cover gut lefty  DC flagyl as patient with Liver dysfunction, & meropenem has anaerobic coverage.         Acute Kidney Injury/ shock kidney CVVHDF,   ASA & Statin for CAD  Enteral nutrition, avoid full up to goal nutrition as lactate has not cleared, Protonix BID  Maintain Rectal tube   Glycemic control, hypoglycemic On D 10 infusion, sec Liver shock    Shock liver & Pancreatitis with transaminitis & rising lipase avoid Acetaminophen  trend LFTs  Coagulopathic & thrombocytopenia HIT negative sed to Liver dysfunction & sepsis,  I have spent 30 minutes providing critical care management to this patient.

## 2019-05-19 NOTE — PROGRESS NOTE ADULT - ASSESSMENT
78 yr old with ARCEINO admitted for chest pain and underwent a CABG last last week   Post op has been stable but is still intubated and became febrile last 24hrs.   No evidence of wd infection, alot of sputum and possible LLL infiltrate underwent Bronch/BAL with biopsy to r/o BOOP, started on steroids    sp course of meropenem and micafungin without improvement leading us to do lung biopsy  lung biopsy is positive for amyloid which may also be in kidney and heart.   Also have some GI bleeding and extremities are mottled but viable   Overall MRSA pneumonia, ischemic colitis, lactic acidosis, shock, leucocytosis, transaminitis.     Plan;   Continue with vancomycin dose based on type of HD, level 12 today.   All other cultures negative to date.   No major role of abx in setting of ischemic colitis   on meropenem   ? utility of micafungin and flagyl, consider stopping.   Prognosis poor.   Follow up prelim cx.   GOC discussion underway.

## 2019-05-19 NOTE — PROGRESS NOTE ADULT - ASSESSMENT
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 His postop course was complicated by persistent respiratory failure requiring tracheostomy and he had a wedge biopsy showing Pulmonary Amyloidosis.    5/13-poor weaning (less than 2 hrs), CT in place  5/14-off solumedrol, CT in place 100 cc out, wean cpap/ps 5/15  5/15-TC for 12 hrs, renal and CHF f/up  5/16-4 hrs TC, over breath vent-on diprovan; mult pressors/rectal tube/c. diff neg-on meropenem/vanco  5/17-septic-HD-prbc, no weaning--ct cap-c/w multifocal PNA\  5/18-mult PRBC, no wean, CVVHD-gamma globulin  5/19-no change abx/pressors/cvvhd and cooling blanket--MS poor off sedation

## 2019-05-19 NOTE — PROGRESS NOTE ADULT - SUBJECTIVE AND OBJECTIVE BOX
CHIEF COMPLAINT:  f/up resp failure, pulm amyloidosis, asthma, osas- on vent -progressive decline continues- on vaso  Interval Events: 2 prbc, plts, CVVHD, heating blanket    REVIEW OF SYSTEMS:  Constitutional: No fevers or chills. No weight loss. No fatigue or generalized malaise.  Eyes: No itching or discharge from the eyes  ENT: No ear pain. No ear discharge. No nasal congestion. No post nasal drip. No epistaxis. No throat pain. No sore throat. No difficulty swallowing.   CV: No chest pain. No palpitations. No lightheadedness or dizziness.   Resp: No dyspnea at rest. No dyspnea on exertion. No orthopnea. No wheezing. No cough. No stridor. No sputum production. No chest pain with respiration.  GI: No nausea. No vomiting. No diarrhea.  MSK: No joint pain or pain in any extremities  Integumentary: No skin lesions. No pedal edema.  Neurological: No gross motor weakness. No sensory changes.  [ ] All other systems negative  [+ ] Unable to assess ROS because _MS poor_______    OBJECTIVE:  ICU Vital Signs Last 24 Hrs  T(C): 36 (19 May 2019 04:00), Max: 36.9 (19 May 2019 00:00)  T(F): 96.8 (19 May 2019 04:00), Max: 98.4 (19 May 2019 00:00)  HR: 81 (19 May 2019 04:36) (70 - 114)  BP: --  BP(mean): --  ABP: 125/51 (19 May 2019 04:30) (90/39 - 177/66)  ABP(mean): 73 (19 May 2019 04:30) (52 - 100)  RR: 21 (19 May 2019 04:30) (14 - 27)  SpO2: 100% (19 May 2019 04:36) (74% - 100%)    Mode: AC/ CMV (Assist Control/ Continuous Mandatory Ventilation), RR (machine): 12, TV (machine): 550, FiO2: 50, PEEP: 5, ITime: 1, MAP: 8, PIP: 21    05-17 @ 07:01  -  05-18 @ 07:00  --------------------------------------------------------  IN: 7251.4 mL / OUT: 3427 mL / NET: 3824.4 mL    05-18 @ 07:01  - 05-19 @ 04:52  --------------------------------------------------------  IN: 3212.5 mL / OUT: 3963 mL / NET: -750.5 mL      CAPILLARY BLOOD GLUCOSE  79 (18 May 2019 18:30)      POCT Blood Glucose.: 81 mg/dL (19 May 2019 02:37)      PHYSICAL EXAM:  General: unresponsive on vent  HEENT: Atraumatic, normocephalic.                 Mallampatti Grade 3                No nasal congestion.                No tonsillar or pharyngeal exudates.  Lymph Nodes: No palpable lymphadenopathy  Neck: No JVD. No carotid bruit.   Respiratory: Normal chest expansion                         Normal percussion                         Normal and equal air entry                         No wheeze, rhonchi or rales.  Cardiovascular: S1 S2 normal. No murmurs, rubs or gallops.   Abdomen: Soft, non-distended. No organomegaly. Normoactive bowel sounds.  Extremities: Warm to touch. Peripheral pulse palpable. +++ pedal edema.   Skin: No rashes or skin lesions  Neurological: poor  Psychiatry: unable    HOSPITAL MEDICATIONS:  MEDICATIONS  (STANDING):  calcium chloride Injectable 1000 milliGRAM(s) IV Push <User Schedule>  chlorhexidine 0.12% Liquid 15 milliLiter(s) Oral Mucosa two times a day  chlorhexidine 4% Liquid 1 Application(s) Topical <User Schedule>  CRRT Treatment    <Continuous>  dexmedetomidine Infusion 0.5 MICROgram(s)/kG/Hr (9.938 mL/Hr) IV Continuous <Continuous>  dextrose 10%. 1000 milliLiter(s) (50 mL/Hr) IV Continuous <Continuous>  dextrose 50% Injectable 50 milliLiter(s) IV Push once  hydrocortisone sodium succinate Injectable 50 milliGRAM(s) IV Push every 6 hours  immune   globulin 10% (GAMMAGARD) IVPB 35 Gram(s) IV Intermittent daily  meropenem  IVPB 1000 milliGRAM(s) IV Intermittent every 8 hours  micafungin IVPB 100 milliGRAM(s) IV Intermittent every 24 hours  norepinephrine Infusion 0.034 MICROgram(s)/kG/Min (5 mL/Hr) IV Continuous <Continuous>  pantoprazole  Injectable 40 milliGRAM(s) IV Push two times a day  potassium chloride  10 mEq/50 mL IVPB 10 milliEquivalent(s) IV Intermittent every 1 hour  PrismaSATE Dialysate BK 0 / 3.5 5000 milliLiter(s) (1500 mL/Hr) CRRT <Continuous>  PrismaSOL Filtration BGK 4 / 2.5 5000 milliLiter(s) (1000 mL/Hr) CRRT <Continuous>  PrismaSOL Filtration BGK 4 / 2.5 5000 milliLiter(s) (200 mL/Hr) CRRT <Continuous>  sodium chloride 0.9%. 1000 milliLiter(s) (10 mL/Hr) IV Continuous <Continuous>  thiamine Injectable 200 milliGRAM(s) IV Push <User Schedule>  vancomycin  IVPB 750 milliGRAM(s) IV Intermittent every 12 hours  vasopressin Infusion 0.083 Unit(s)/Min (5 mL/Hr) IV Continuous <Continuous>    MEDICATIONS  (PRN):      LABS:                        9.7    21.5  )-----------( 41       ( 19 May 2019 00:59 )             29.1     05-19    132<L>  |  102  |  24<H>  ----------------------------<  82  3.5   |  18<L>  |  0.72    Ca    7.7<L>      19 May 2019 00:59  Phos  2.8     05-19  Mg     2.0     05-19    TPro  5.6<L>  /  Alb  2.4<L>  /  TBili  7.5<H>  /  DBili  x   /  AST  4503<H>  /  ALT  3309<H>  /  AlkPhos  134<H>  05-19    PT/INR - ( 19 May 2019 00:59 )   PT: 27.7 sec;   INR: 2.35 ratio         PTT - ( 19 May 2019 00:59 )  PTT:42.0 sec    Arterial Blood Gas:  05-19 @ 03:46  7.47/26/162/19/99/-3.4  ABG lactate: --  Arterial Blood Gas:  05-19 @ 00:40  7.46/29/132/20/99/-2.6  ABG lactate: --  Arterial Blood Gas:  05-18 @ 22:00  7.45/29/130/20/99/-2.7  ABG lactate: --  Arterial Blood Gas:  05-18 @ 20:07  7.47/26/137/19/99/-3.9  ABG lactate: --  Arterial Blood Gas:  05-18 @ 17:20  7.44/29/146/19/99/-3.7  ABG lactate: --  Arterial Blood Gas:  05-18 @ 14:10  7.40/32/133/19/99/-4.3  ABG lactate: --  Arterial Blood Gas:  05-18 @ 11:19  7.41/30/94/18/97/-5.0  ABG lactate: --  Arterial Blood Gas:  05-18 @ 10:01  7.41/29/100/18/97/-5.3  ABG lactate: --  Arterial Blood Gas:  05-18 @ 05:26  7.42/28/117/18/99/-5.2  ABG lactate: --  Arterial Blood Gas:  05-18 @ 03:32  7.42/29/104/18/97/-4.7  ABG lactate: --  Arterial Blood Gas:  05-18 @ 00:49  7.40/29/96/17/98/-6.4  ABG lactate: --  Arterial Blood Gas:  05-17 @ 22:12  7.37/33/134/19/97/-5.2  ABG lactate: --  Arterial Blood Gas:  05-17 @ 19:58  7.35/33/106/18/98/-6.6  ABG lactate: --  Arterial Blood Gas:  05-17 @ 18:21  7.42/29/113/19/98/-4.3  ABG lactate: --  Arterial Blood Gas:  05-17 @ 14:19  7.41/34/116/21/96/-2.7  ABG lactate: --  Arterial Blood Gas:  05-17 @ 11:49  7.31/42/99/20/94/-4.8  ABG lactate: --  Arterial Blood Gas:  05-17 @ 10:08  7.25/44/78/19/92/-7.6  ABG lactate: --  Arterial Blood Gas:  05-17 @ 08:18  7.28/41/78/18/93/-7.6  ABG lactate: --  Arterial Blood Gas:  05-17 @ 06:44  7.30/30/146/14/98/-10.8  ABG lactate: --        MICROBIOLOGY:     RADIOLOGY:  [ ] Reviewed and interpreted by me    Point of Care Ultrasound Findings:    PFT:    EKG:

## 2019-05-20 DIAGNOSIS — E83.39 OTHER DISORDERS OF PHOSPHORUS METABOLISM: ICD-10-CM

## 2019-05-20 LAB
ALBUMIN SERPL ELPH-MCNC: 2 G/DL — LOW (ref 3.3–5)
ALP SERPL-CCNC: 156 U/L — HIGH (ref 40–120)
ALT FLD-CCNC: 1857 U/L — HIGH (ref 10–45)
AMYLASE P1 CFR SERPL: 753 U/L — HIGH (ref 25–125)
ANA TITR SER: NEGATIVE — SIGNIFICANT CHANGE UP
ANION GAP SERPL CALC-SCNC: 10 MMOL/L — SIGNIFICANT CHANGE UP (ref 5–17)
APTT 50/50 2HOUR INCUB: 38.6 SEC — HIGH (ref 27.5–37.4)
APTT BLD: 29.6 SEC — SIGNIFICANT CHANGE UP (ref 27.5–37.4)
APTT BLD: 38.8 SEC — HIGH (ref 27.5–36.3)
APTT BLD: 46.3 SEC — HIGH (ref 27.5–36.3)
AST SERPL-CCNC: 2096 U/L — HIGH (ref 10–40)
BASE EXCESS BLDV CALC-SCNC: -8.4 MMOL/L — LOW (ref -2–2)
BILIRUB SERPL-MCNC: 9.2 MG/DL — HIGH (ref 0.2–1.2)
BLD GP AB SCN SERPL QL: NEGATIVE — SIGNIFICANT CHANGE UP
BUN SERPL-MCNC: 21 MG/DL — SIGNIFICANT CHANGE UP (ref 7–23)
CALCIUM SERPL-MCNC: 8 MG/DL — LOW (ref 8.4–10.5)
CHLORIDE SERPL-SCNC: 103 MMOL/L — SIGNIFICANT CHANGE UP (ref 96–108)
CO2 BLDV-SCNC: 18 MMOL/L — LOW (ref 22–30)
CO2 SERPL-SCNC: 19 MMOL/L — LOW (ref 22–31)
CREAT SERPL-MCNC: 0.55 MG/DL — SIGNIFICANT CHANGE UP (ref 0.5–1.3)
CULTURE RESULTS: SIGNIFICANT CHANGE UP
CULTURE RESULTS: SIGNIFICANT CHANGE UP
FIBRINOGEN PPP-MCNC: 257 MG/DL — LOW (ref 350–510)
GAS PNL BLDA: SIGNIFICANT CHANGE UP
GAS PNL BLDV: SIGNIFICANT CHANGE UP
GLUCOSE SERPL-MCNC: 98 MG/DL — SIGNIFICANT CHANGE UP (ref 70–99)
HCO3 BLDV-SCNC: 17 MMOL/L — LOW (ref 21–29)
HCT VFR BLD CALC: 29.2 % — LOW (ref 39–50)
HCT VFR BLD CALC: 30.5 % — LOW (ref 39–50)
HGB BLD-MCNC: 10.2 G/DL — LOW (ref 13–17)
HGB BLD-MCNC: 9.6 G/DL — LOW (ref 13–17)
HOROWITZ INDEX BLDV+IHG-RTO: 50 — SIGNIFICANT CHANGE UP
INR BLD: 1.83 RATIO — HIGH (ref 0.88–1.16)
INR BLD: 2.13 RATIO — HIGH (ref 0.88–1.16)
LDH SERPL L TO P-CCNC: 1884 U/L — HIGH (ref 50–242)
LIDOCAIN IGE QN: >530 U/L — HIGH (ref 7–60)
MAGNESIUM SERPL-MCNC: 2.3 MG/DL — SIGNIFICANT CHANGE UP (ref 1.6–2.6)
MCHC RBC-ENTMCNC: 29 PG — SIGNIFICANT CHANGE UP (ref 27–34)
MCHC RBC-ENTMCNC: 29.6 PG — SIGNIFICANT CHANGE UP (ref 27–34)
MCHC RBC-ENTMCNC: 32.9 GM/DL — SIGNIFICANT CHANGE UP (ref 32–36)
MCHC RBC-ENTMCNC: 33.5 GM/DL — SIGNIFICANT CHANGE UP (ref 32–36)
MCV RBC AUTO: 88.2 FL — SIGNIFICANT CHANGE UP (ref 80–100)
MCV RBC AUTO: 88.4 FL — SIGNIFICANT CHANGE UP (ref 80–100)
PAT CTL 2H: 33.6 SEC — SIGNIFICANT CHANGE UP (ref 27.5–37.4)
PCO2 BLDV: 36 MMHG — SIGNIFICANT CHANGE UP (ref 35–50)
PH BLDV: 7.29 — LOW (ref 7.35–7.45)
PHOSPHATE SERPL-MCNC: 2.3 MG/DL — LOW (ref 2.5–4.5)
PLATELET # BLD AUTO: 21 K/UL — LOW (ref 150–400)
PLATELET # BLD AUTO: 66 K/UL — LOW (ref 150–400)
PO2 BLDV: 34 MMHG — SIGNIFICANT CHANGE UP (ref 25–45)
POTASSIUM SERPL-MCNC: 3.9 MMOL/L — SIGNIFICANT CHANGE UP (ref 3.5–5.3)
POTASSIUM SERPL-SCNC: 3.9 MMOL/L — SIGNIFICANT CHANGE UP (ref 3.5–5.3)
PREALB SERPL-MCNC: 6 MG/DL — LOW (ref 20–40)
PROT SERPL-MCNC: 5.8 G/DL — LOW (ref 6–8.3)
PROTHROM AB SERPL-ACNC: 21.5 SEC — HIGH (ref 10–12.9)
PROTHROM AB SERPL-ACNC: 24.9 SEC — HIGH (ref 10–12.9)
PT 100%: 21.5 SEC — HIGH (ref 10–12.9)
PT 50/50: 13.3 SEC — SIGNIFICANT CHANGE UP (ref 9.7–15.2)
RBC # BLD: 3.32 M/UL — LOW (ref 4.2–5.8)
RBC # BLD: 3.46 M/UL — LOW (ref 4.2–5.8)
RBC # FLD: 13.8 % — SIGNIFICANT CHANGE UP (ref 10.3–14.5)
RBC # FLD: 14.2 % — SIGNIFICANT CHANGE UP (ref 10.3–14.5)
REPTILASE TIME: 27.2 SEC — HIGH (ref 15–20.5)
RH IG SCN BLD-IMP: POSITIVE — SIGNIFICANT CHANGE UP
SAO2 % BLDV: 55 % — LOW (ref 67–88)
SODIUM SERPL-SCNC: 132 MMOL/L — LOW (ref 135–145)
SPECIMEN SOURCE: SIGNIFICANT CHANGE UP
SPECIMEN SOURCE: SIGNIFICANT CHANGE UP
T3 SERPL-MCNC: 51 NG/DL — LOW (ref 80–200)
T4 AB SER-ACNC: 3.1 UG/DL — LOW (ref 4.6–12)
THROMBIN TIME: 26.9 SEC — HIGH (ref 16–25)
TSH SERPL-MCNC: 0.8 UIU/ML — SIGNIFICANT CHANGE UP (ref 0.27–4.2)
TSH SERPL-MCNC: 0.9 UIU/ML — SIGNIFICANT CHANGE UP (ref 0.27–4.2)
VANCOMYCIN TROUGH SERPL-MCNC: 13.8 UG/ML — SIGNIFICANT CHANGE UP (ref 10–20)
VANCOMYCIN TROUGH SERPL-MCNC: 15.8 UG/ML — SIGNIFICANT CHANGE UP (ref 10–20)
WBC # BLD: 14.7 K/UL — HIGH (ref 3.8–10.5)
WBC # BLD: 16.7 K/UL — HIGH (ref 3.8–10.5)
WBC # FLD AUTO: 14.7 K/UL — HIGH (ref 3.8–10.5)
WBC # FLD AUTO: 16.7 K/UL — HIGH (ref 3.8–10.5)

## 2019-05-20 PROCEDURE — 99233 SBSQ HOSP IP/OBS HIGH 50: CPT

## 2019-05-20 PROCEDURE — 90945 DIALYSIS ONE EVALUATION: CPT | Mod: GC

## 2019-05-20 PROCEDURE — 99291 CRITICAL CARE FIRST HOUR: CPT

## 2019-05-20 PROCEDURE — 99233 SBSQ HOSP IP/OBS HIGH 50: CPT | Mod: GC

## 2019-05-20 PROCEDURE — 99232 SBSQ HOSP IP/OBS MODERATE 35: CPT

## 2019-05-20 PROCEDURE — 71045 X-RAY EXAM CHEST 1 VIEW: CPT | Mod: 26,76

## 2019-05-20 RX ORDER — SODIUM BICARBONATE 1 MEQ/ML
50 SYRINGE (ML) INTRAVENOUS ONCE
Refills: 0 | Status: COMPLETED | OUTPATIENT
Start: 2019-05-20 | End: 2019-05-20

## 2019-05-20 RX ORDER — VANCOMYCIN HCL 1 G
1000 VIAL (EA) INTRAVENOUS EVERY 12 HOURS
Refills: 0 | Status: DISCONTINUED | OUTPATIENT
Start: 2019-05-20 | End: 2019-05-22

## 2019-05-20 RX ORDER — POTASSIUM CHLORIDE 20 MEQ
10 PACKET (EA) ORAL
Refills: 0 | Status: COMPLETED | OUTPATIENT
Start: 2019-05-20 | End: 2019-05-20

## 2019-05-20 RX ADMIN — Medication 50 MILLIGRAM(S): at 17:09

## 2019-05-20 RX ADMIN — Medication 50 MILLIGRAM(S): at 23:56

## 2019-05-20 RX ADMIN — Medication 1 DROP(S): at 06:00

## 2019-05-20 RX ADMIN — CHLORHEXIDINE GLUCONATE 15 MILLILITER(S): 213 SOLUTION TOPICAL at 05:13

## 2019-05-20 RX ADMIN — Medication 50 MILLIEQUIVALENT(S): at 05:33

## 2019-05-20 RX ADMIN — Medication 50 MILLIGRAM(S): at 05:04

## 2019-05-20 RX ADMIN — Medication 1000 MILLIGRAM(S): at 21:34

## 2019-05-20 RX ADMIN — MEROPENEM 100 MILLIGRAM(S): 1 INJECTION INTRAVENOUS at 05:04

## 2019-05-20 RX ADMIN — PANTOPRAZOLE SODIUM 40 MILLIGRAM(S): 20 TABLET, DELAYED RELEASE ORAL at 17:10

## 2019-05-20 RX ADMIN — VASOPRESSIN 5 UNIT(S)/MIN: 20 INJECTION INTRAVENOUS at 02:19

## 2019-05-20 RX ADMIN — Medication 1 DROP(S): at 12:06

## 2019-05-20 RX ADMIN — MICAFUNGIN SODIUM 105 MILLIGRAM(S): 100 INJECTION, POWDER, LYOPHILIZED, FOR SOLUTION INTRAVENOUS at 23:12

## 2019-05-20 RX ADMIN — Medication 50 MILLIEQUIVALENT(S): at 10:01

## 2019-05-20 RX ADMIN — Medication 250 MILLIGRAM(S): at 06:32

## 2019-05-20 RX ADMIN — MEROPENEM 100 MILLIGRAM(S): 1 INJECTION INTRAVENOUS at 21:34

## 2019-05-20 RX ADMIN — Medication 250 MILLIGRAM(S): at 18:27

## 2019-05-20 RX ADMIN — Medication 50 MILLIGRAM(S): at 12:06

## 2019-05-20 RX ADMIN — Medication 50 MILLIEQUIVALENT(S): at 03:30

## 2019-05-20 RX ADMIN — Medication 1000 MILLIGRAM(S): at 17:09

## 2019-05-20 RX ADMIN — CHLORHEXIDINE GLUCONATE 15 MILLILITER(S): 213 SOLUTION TOPICAL at 17:09

## 2019-05-20 RX ADMIN — PANTOPRAZOLE SODIUM 40 MILLIGRAM(S): 20 TABLET, DELAYED RELEASE ORAL at 05:07

## 2019-05-20 RX ADMIN — Medication 1 DROP(S): at 23:56

## 2019-05-20 RX ADMIN — Medication 62.5 MILLIMOLE(S): at 04:30

## 2019-05-20 RX ADMIN — CHLORHEXIDINE GLUCONATE 1 APPLICATION(S): 213 SOLUTION TOPICAL at 05:05

## 2019-05-20 RX ADMIN — Medication 1000 MILLIGRAM(S): at 04:30

## 2019-05-20 RX ADMIN — Medication 200 MILLIGRAM(S): at 10:00

## 2019-05-20 RX ADMIN — Medication 1000 MILLIGRAM(S): at 10:00

## 2019-05-20 RX ADMIN — Medication 1 DROP(S): at 17:10

## 2019-05-20 RX ADMIN — MEROPENEM 100 MILLIGRAM(S): 1 INJECTION INTRAVENOUS at 13:51

## 2019-05-20 RX ADMIN — Medication 200 MILLIGRAM(S): at 21:34

## 2019-05-20 NOTE — PROGRESS NOTE ADULT - SUBJECTIVE AND OBJECTIVE BOX
[facility] DIVISION OF KIDNEY DISEASES AND HYPERTENSION -- FOLLOW UP NOTE  --------------------------------------------------------------------------------  Chief Complaint: TARAH    24 hour events/subjective: Patient seen at bedside. Remains on CRRT, circuit overnight clotted. Pt. on IV pressor support, intubated.     PAST HISTORY  --------------------------------------------------------------------------------  No significant changes to PMH, PSH, FHx, SHx, unless otherwise noted    ALLERGIES & MEDICATIONS  --------------------------------------------------------------------------------  Allergies    No Known Allergies    Intolerances      Standing Inpatient Medications  artificial  tears Solution 1 Drop(s) Both EYES every 6 hours  calcium chloride Injectable 1000 milliGRAM(s) IV Push <User Schedule>  chlorhexidine 0.12% Liquid 15 milliLiter(s) Oral Mucosa two times a day  chlorhexidine 4% Liquid 1 Application(s) Topical <User Schedule>  CRRT Treatment    <Continuous>  dextrose 10%. 1000 milliLiter(s) IV Continuous <Continuous>  dextrose 50% Injectable 50 milliLiter(s) IV Push once  hydrocortisone sodium succinate Injectable 50 milliGRAM(s) IV Push every 6 hours  meropenem  IVPB 1000 milliGRAM(s) IV Intermittent every 8 hours  micafungin IVPB 100 milliGRAM(s) IV Intermittent every 24 hours  norepinephrine Infusion 0.034 MICROgram(s)/kG/Min IV Continuous <Continuous>  pantoprazole  Injectable 40 milliGRAM(s) IV Push two times a day  PrismaSATE Dialysate BGK 4 / 2.5 5000 milliLiter(s) CRRT <Continuous>  PrismaSOL Filtration BGK 4 / 2.5 5000 milliLiter(s) CRRT <Continuous>  PrismaSOL Filtration BGK 4 / 2.5 5000 milliLiter(s) CRRT <Continuous>  thiamine Injectable 200 milliGRAM(s) IV Push <User Schedule>  vancomycin  IVPB 750 milliGRAM(s) IV Intermittent every 12 hours  vasopressin Infusion 0.083 Unit(s)/Min IV Continuous <Continuous>    PRN Inpatient Medications      REVIEW OF SYSTEMS  --------------------------------------------------------------------------------  Unable to obtain    VITALS/PHYSICAL EXAM  --------------------------------------------------------------------------------  T(C): 35.4 (05-20-19 @ 08:00), Max: 43 (05-20-19 @ 08:00)  HR: 76 (05-20-19 @ 08:45) (70 - 90)  BP: --  RR: 27 (05-20-19 @ 08:45) (14 - 38)  SpO2: 94% (05-20-19 @ 08:45) (83% - 100%)  Wt(kg): --    05-19-19 @ 07:01  -  05-20-19 @ 07:00  --------------------------------------------------------  IN: 3065.3 mL / OUT: 4575 mL / NET: -1509.7 mL    05-20-19 @ 07:01  -  05-20-19 @ 08:50  --------------------------------------------------------  IN: 356.5 mL / OUT: 0 mL / NET: 356.5 mL    Physical Exam:  	Gen: critically ill.   	HEENT: +NGT, +trach  	Pulm: CTA B/L  	CV: RRR, S1S2; no rub  	Abd: +BS, soft, nondistended              : De with no urine output.  	LE: Warm, 2+ edema  	Vascular access: +RIJ non-tunneled HD catheter      LABS/STUDIES  --------------------------------------------------------------------------------              9.6    16.7  >-----------<  21       [05-20-19 @ 00:50]              29.2     132  |  103  |  21  ----------------------------<  98      [05-20-19 @ 00:50]  3.9   |  19  |  0.55        Ca     8.0     [05-20-19 @ 00:50]      Mg     2.3     [05-20-19 @ 00:50]      Phos  2.3     [05-20-19 @ 00:50]    TPro  5.8  /  Alb  2.0  /  TBili  9.2  /  DBili  x   /  AST  2096  /  ALT  1857  /  AlkPhos  156  [05-20-19 @ 00:50]    PT/INR: PT 24.9 , INR 2.13       [05-20-19 @ 04:16]  PTT: 46.3       [05-20-19 @ 04:16]    LDH 1884      [05-20-19 @ 00:50]    Creatinine Trend:  SCr 0.55 [05-20 @ 00:50]  SCr 0.72 [05-19 @ 00:59]  SCr 0.84 [05-18 @ 14:13]  SCr 1.01 [05-18 @ 05:28]  SCr 1.16 [05-18 @ 00:55]

## 2019-05-20 NOTE — PROGRESS NOTE ADULT - PROBLEM SELECTOR PLAN 1
Pt with TARAH most likely hemodynamically mediated vs ischemic ATN in the setting of anemia from blood loss, hemodynamic instability, and renal venous congestion. SCr of 1.2 in 2016, 1.1 08/18 and 1.06 on 04/25/19. Started on HD on 05/11/19 due to BUN 190s worsening mental status, concerning for uremic encephalopathy. Labs reviewed today. Last HD 5/15/19 and switch to CRRT on 5/16/19 due to shock. Plan to continue CRRT today with net negative goal. Monitor I&O, daily weights, avoid nephrotoxics. Adjust meds based on GFR.  Avoid hypotension.

## 2019-05-20 NOTE — PROGRESS NOTE ADULT - SUBJECTIVE AND OBJECTIVE BOX
infectious diseases progress note:    Patient is a 79y old  Male who presents with a chief complaint of sob (20 May 2019 04:51)        Angina pectoris  Atherosclerosis of native coronary artery without angina pectoris           Allergies    No Known Allergies    Intolerances        ANTIBIOTICS/RELEVANT:  antimicrobials  meropenem  IVPB 1000 milliGRAM(s) IV Intermittent every 8 hours  micafungin IVPB 100 milliGRAM(s) IV Intermittent every 24 hours  vancomycin  IVPB 750 milliGRAM(s) IV Intermittent every 12 hours    immunologic:    OTHER:  artificial  tears Solution 1 Drop(s) Both EYES every 6 hours  calcium chloride Injectable 1000 milliGRAM(s) IV Push <User Schedule>  chlorhexidine 0.12% Liquid 15 milliLiter(s) Oral Mucosa two times a day  chlorhexidine 4% Liquid 1 Application(s) Topical <User Schedule>  CRRT Treatment    <Continuous>  dextrose 10%. 1000 milliLiter(s) IV Continuous <Continuous>  dextrose 50% Injectable 50 milliLiter(s) IV Push once  hydrocortisone sodium succinate Injectable 50 milliGRAM(s) IV Push every 6 hours  norepinephrine Infusion 0.034 MICROgram(s)/kG/Min IV Continuous <Continuous>  pantoprazole  Injectable 40 milliGRAM(s) IV Push two times a day  PrismaSATE Dialysate BGK 4 / 2.5 5000 milliLiter(s) CRRT <Continuous>  PrismaSOL Filtration BGK 4 / 2.5 5000 milliLiter(s) CRRT <Continuous>  PrismaSOL Filtration BGK 4 / 2.5 5000 milliLiter(s) CRRT <Continuous>  thiamine Injectable 200 milliGRAM(s) IV Push <User Schedule>  vasopressin Infusion 0.083 Unit(s)/Min IV Continuous <Continuous>      Objective:  Vital Signs Last 24 Hrs  T(C): 35.6 (20 May 2019 06:00), Max: 36.9 (19 May 2019 23:00)  T(F): 96.1 (20 May 2019 06:00), Max: 98.4 (19 May 2019 23:00)  HR: 71 (20 May 2019 07:00) (71 - 90)  BP: --  BP(mean): --  RR: 26 (20 May 2019 07:00) (14 - 38)  SpO2: 100% (20 May 2019 07:00) (83% - 100%)   --no acute distress  Eyes:RYLEY, EOMI  Ear/Nose/Throat: no oral lesion, no sinus tenderness on percussion	  Neck:no JVD, no lymphadenopathy, supple  Respiratory: CTA shagufta           LABS:                        9.6    16.7  )-----------( 21       ( 20 May 2019 00:50 )             29.2     05-20    132<L>  |  103  |  21  ----------------------------<  98  3.9   |  19<L>  |  0.55    Ca    8.0<L>      20 May 2019 00:50  Phos  2.3     05-20  Mg     2.3     05-20    TPro  5.8<L>  /  Alb  2.0<L>  /  TBili  9.2<H>  /  DBili  x   /  AST  2096<H>  /  ALT  1857<H>  /  AlkPhos  156<H>  05-20    PT/INR - ( 20 May 2019 04:16 )   PT: 24.9 sec;   INR: 2.13 ratio         PTT - ( 20 May 2019 04:16 )  PTT:46.3 sec        MICROBIOLOGY:    RECENT CULTURES:  05-17 @ 05:28 .Blood                No growth to date.    05-16 @ 03:49 .Sputum   KARAN    Numerous polymorphonuclear leukocytes seen per low power field  Few Squamous epithelial cells seen per low power field  Moderate Gram Positive Cocci in Clusters seen per oil power field  Moderate Gram Negative Rods seen per oil power field  Few Gram Negative Diplococci seen per oil power field    Methicillin resistant Staphylococcus aureus  Methicillin resistant Staphylococcus aureus     Numerous Methicillin resistant Staphylococcus aureus  Normal Respiratory Annamaria present    05-15 @ 19:26 .Blood                No growth to date.    05-15 @ 00:49 .Blood                No growth at 5 days.    05-14 @ 14:26 .Sputum trap   KARAN    Moderate polymorphonuclear leukocytes per low power field  Moderate Squamous epithelial cells per low power field  Moderate Gram Variable Rods per oil power field  Moderate Gram Positive Cocci in Clusters per oil power field    Methicillin resistant Staphylococcus aureus  Methicillin resistant Staphylococcus aureus     Numerous Methicillin resistant Staphylococcus aureus  Normal Respiratory Annamaria absent    05-13 @ 17:02 .Blood                No growth at 5 days.          RESPIRATORY CULTURES:      Clostridium difficile GDH Toxins A&amp;B, EIA:   Negative (05-14 @ 11:07)          RADIOLOGY & ADDITIONAL STUDIES:        Pager [phone]  After 5 pm/weekends or if no response :[phone]

## 2019-05-20 NOTE — PROGRESS NOTE ADULT - ASSESSMENT
80 yo M with a PMH HLD, ARCENIO (noncompliant with CPAP), GERD, diverticulitis, depression, anxiety, panic attacks, and carpal tunnel who was noting dyspnea on exertion with chest pressure and palpitations for 1 year, s/p 3-vessel CABG c/b hypoxic respiratory failure now on tracheostomy, acute on chronic renal failure, ventilator-acquired pneumonia, and acute renal failure. Hematology called for lung biopsy showing amyloidosis, and for worsening leukocytosis.    #Amyloidosis  - Based on lung biopsy, which has a specificity of %. Also with acute renal failure and heart failure with preserved ejection fraction (but with concentric hypertrophy), concerning for renal and cardiac involvement as well. CT chest shows interlobular septal thickening that can be seen in diffuse parenchymal amyloidosis (seen more with AL than AA amyloidosis)  - S/p SPEP, SIFE, UPEP, and UIFE. Results do not show monoclonal antibodies, proteinuria, or Bence-Quinn protein. He has slightly elevated free light chains but no abnormalities in the ratio. Ig levels wnl, IgM slightly low.  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 There appeared to be no renal symptoms prior to surgery (creatinine normal)  - Would want to obtain the type of amyloid prior to any treatment. Spoke with [facility] on 5/20 ([phone]), they received the specimen on 5/15 and there is a 2 week turn around time.  They will try to expedite.  Would also obtain abdominal fat pad and transthyretin technetium pyrophosphate when patient is stable.    #Leukocytosis  - Peripheral smear seems to show neutrophilic predominance with left shift including bands but no other obvious progenitor cells. Appears to be consistent with infection. Patient's hemoglobin (anemia likely 2/2 blood loss from surgery) and platelet counts have been unchanged  - downtrending, would monitor cbc with differential daily    #Coagulopathy  -elevated coags, check fibrinogen, d-dimer, mixing studies, reptilase time, dilute thrombin time 78 yo M with a PMH HLD, ARCENIO (noncompliant with CPAP), GERD, diverticulitis, depression, anxiety, panic attacks, and carpal tunnel who was noting dyspnea on exertion with chest pressure and palpitations for 1 year, s/p 3-vessel CABG c/b hypoxic respiratory failure now on tracheostomy, acute on chronic renal failure, ventilator-acquired pneumonia, and acute renal failure. Hematology called for lung biopsy showing amyloidosis, and for worsening leukocytosis.    #Amyloidosis  - Based on lung biopsy, which has a specificity of %. Also with acute renal failure and heart failure with preserved ejection fraction (but with concentric hypertrophy), concerning for renal and cardiac involvement as well. CT chest shows interlobular septal thickening that can be seen in diffuse parenchymal amyloidosis (seen more with AL than AA amyloidosis)  - S/p SPEP, SIFE, UPEP, and UIFE. Results do not show monoclonal antibodies, proteinuria, or Bence-Quinn protein. He has slightly elevated free light chains but no abnormalities in the ratio. Ig levels wnl, IgM slightly low.  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 There appeared to be no renal symptoms prior to surgery (creatinine normal)  - Would want to obtain the type of amyloid prior to any treatment. Spoke with [facility] on 5/20 ([phone]), they received the specimen on 5/15 and there is a 2 week turn around time.  They will try to expedite.  Would also obtain abdominal fat pad and transthyretin technetium pyrophosphate when patient is stable.    #Leukocytosis  - Peripheral smear seems to show neutrophilic predominance with left shift including bands but no other obvious progenitor cells. Appears to be consistent with infection. Patient's hemoglobin (anemia likely 2/2 blood loss from surgery) and platelet counts have been unchanged  - downtrending, would monitor cbc with differential daily    #Coagulopathy  -likely multifactorial, in the setting of liver dysfunction, infection  -elevated coags,   -can check fibrinogen, d-dimer, mixing studies, reptilase time, dilute thrombin time, factors V, VII, X, IX, XI        Rustam Colon MD.  Heme-Onc fellow, PGY 4  [phone]; [id number]

## 2019-05-20 NOTE — PROGRESS NOTE ADULT - ASSESSMENT
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 His postop course was complicated by persistent respiratory failure requiring tracheostomy and he had a wedge biopsy showing Pulmonary Amyloidosis.    5/13-poor weaning (less than 2 hrs), CT in place  5/14-off solumedrol, CT in place 100 cc out, wean cpap/ps 5/15  5/15-TC for 12 hrs, renal and CHF f/up  5/16-4 hrs TC, over breath vent-on diprovan; mult pressors/rectal tube/c. diff neg-on meropenem/vanco  5/17-septic-HD-prbc, no weaning--ct cap-c/w multifocal PNA\  5/18-mult PRBC, no wean, CVVHD-gamma globulin  5/19-no change abx/pressors/cvvhd and cooling blanket--MS poor off sedation  5/20-slightly better MS off sedation--no change in status-no weaning

## 2019-05-20 NOTE — CONSULT NOTE ADULT - SUBJECTIVE AND OBJECTIVE BOX
[facility]  Ethan Duran PA-C  [address] [place] [address]  [phone]      Chief Complaint:  Patient is a 79y old  Male who presents with a chief complaint of cabg (20 May 2019 07:46)      HPI: 80 yo M with a PMH HLD, ARCENIO (noncompliant with CPAP), GERD, diverticulitis, depression, anxiety, panic attacks, and carpal tunnel who was noting dyspnea on exertion with chest pressure and palpitations for 1 year. He was admitted on 19 for non-urgent right and left heart cath, found to have multi-vessel disease, and was scheduled for a 3 vessel CABG, which he had on . His post-op course was complicated by respiratory distress/hypoxic respiratory failure. He was unable to be weaned off of ventilation, and had a lot of bloody sputum. He therefore had a tracheostomy on , along with bronchoscopy/BAL with left lower lobe biopsy. The biopsy was positive for amyloid. His course was also complicated by PNA (suspected ventilator-acquired), for which he was on Vancomycin (-), Cefepime (-), Meropenem (-), and Micafungin (-5/10). In addition, he developed acute renal failure, for which he started HD on  with UF, changed to CRRT.   He had colon resection in  for diverticulitis  asked to eval for elevated lfts and diarrhea via rectal pouch     Allergies:  No Known Allergies      Medications:  artificial  tears Solution 1 Drop(s) Both EYES every 6 hours  calcium chloride Injectable 1000 milliGRAM(s) IV Push <User Schedule>  chlorhexidine 0.12% Liquid 15 milliLiter(s) Oral Mucosa two times a day  chlorhexidine 4% Liquid 1 Application(s) Topical <User Schedule>  dextrose 10%. 1000 milliLiter(s) IV Continuous <Continuous>  dextrose 50% Injectable 50 milliLiter(s) IV Push once  hydrocortisone sodium succinate Injectable 50 milliGRAM(s) IV Push every 6 hours  meropenem  IVPB 1000 milliGRAM(s) IV Intermittent every 8 hours  micafungin IVPB 100 milliGRAM(s) IV Intermittent every 24 hours  norepinephrine Infusion 0.034 MICROgram(s)/kG/Min IV Continuous <Continuous>  pantoprazole  Injectable 40 milliGRAM(s) IV Push two times a day  thiamine Injectable 200 milliGRAM(s) IV Push <User Schedule>  vancomycin  IVPB 1000 milliGRAM(s) IV Intermittent every 12 hours  vasopressin Infusion 0.083 Unit(s)/Min IV Continuous <Continuous>      PMHX/PSHX:  Diverticulitis  Nocturia  Panic attacks  Anxiety  Depression  Asthma  Sleep Apnea  History of partial colectomy  History of colon resection  History of eye surgery  Cataract extraction status  Status post lung surgery  S/P eye surgery  Bunion  Sinus Disorder  Inguinal Hernia Bilateral  Shoulder Problem  Carpal Tunnel Syndrome      Family history:  Family history of acute myocardial infarction (Sibling)  No pertinent family history in first degree relatives      Social History:     ROS:   unable to obtain        PHYSICAL EXAM:   Vital Signs:  Vital Signs Last 24 Hrs  T(C): 36.3 (20 May 2019 12:00), Max: 43 (20 May 2019 08:00)  T(F): 97.3 (20 May 2019 12:00), Max: 109.4 (20 May 2019 08:00)  HR: 84 (20 May 2019 15:15) (70 - 90)  BP: --  BP(mean): --  RR: 9 (20 May 2019 15:15) (7 - 38)  SpO2: 100% (20 May 2019 15:15) (83% - 100%)  Daily     Daily Weight in k.1 (20 May 2019 00:00)    ill appearing  jaundiced  non responsive  + trach  +ngt   frail  soft, nt        LABS:                        10.2   14.7  )-----------( 66       ( 20 May 2019 14:09 )             30.5     05-20    132<L>  |  103  |  21  ----------------------------<  98  3.9   |  19<L>  |  0.55    Ca    8.0<L>      20 May 2019 00:50  Phos  2.3     05-20  Mg     2.3     05-20    TPro  5.8<L>  /  Alb  2.0<L>  /  TBili  9.2<H>  /  DBili  x   /  AST  2096<H>  /  ALT  1857<H>  /  AlkPhos  156<H>  05-20    LIVER FUNCTIONS - ( 20 May 2019 00:50 )  Alb: 2.0 g/dL / Pro: 5.8 g/dL / ALK PHOS: 156 U/L / ALT: 1857 U/L / AST: 2096 U/L / GGT: x           PT/INR - ( 20 May 2019 04:16 )   PT: 24.9 sec;   INR: 2.13 ratio         PTT - ( 20 May 2019 04:16 )  PTT:46.3 sec    Amylase [id number]      Lipase serum>530       Ammonia--      Imaging:

## 2019-05-20 NOTE — PROGRESS NOTE ADULT - SUBJECTIVE AND OBJECTIVE BOX
INTERVAL HPI/OVERNIGHT EVENTS:  Patient S&E at bedside.  Intubated, CVVH, on pressors.    VITAL SIGNS:  T(F): 109.4 (05-20-19 @ 08:00)  HR: 79 (05-20-19 @ 10:30)  BP: --  RR: 22 (05-20-19 @ 10:30)  SpO2: 100% (05-20-19 @ 10:30)  Wt(kg): --    PHYSICAL EXAM:  Constitutional: intubated  Eyes: EOMI, sclera non-icteric  Neck: supple, no masses, no JVD  Respiratory: CTA b/l, good air entry b/l  Cardiovascular: RRR, no M/R/G  Gastrointestinal: soft, NTND, no masses palpable, + BS  Extremities: no c/c/e  Neurological: unable      MEDICATIONS  (STANDING):  artificial  tears Solution 1 Drop(s) Both EYES every 6 hours  calcium chloride Injectable 1000 milliGRAM(s) IV Push <User Schedule>  chlorhexidine 0.12% Liquid 15 milliLiter(s) Oral Mucosa two times a day  chlorhexidine 4% Liquid 1 Application(s) Topical <User Schedule>  CRRT Treatment    <Continuous>  dextrose 10%. 1000 milliLiter(s) (20 mL/Hr) IV Continuous <Continuous>  dextrose 50% Injectable 50 milliLiter(s) IV Push once  hydrocortisone sodium succinate Injectable 50 milliGRAM(s) IV Push every 6 hours  meropenem  IVPB 1000 milliGRAM(s) IV Intermittent every 8 hours  micafungin IVPB 100 milliGRAM(s) IV Intermittent every 24 hours  norepinephrine Infusion 0.034 MICROgram(s)/kG/Min (5 mL/Hr) IV Continuous <Continuous>  pantoprazole  Injectable 40 milliGRAM(s) IV Push two times a day  PrismaSATE Dialysate BGK 4 / 2.5 5000 milliLiter(s) (1500 mL/Hr) CRRT <Continuous>  PrismaSOL Filtration BGK 4 / 2.5 5000 milliLiter(s) (1000 mL/Hr) CRRT <Continuous>  PrismaSOL Filtration BGK 4 / 2.5 5000 milliLiter(s) (200 mL/Hr) CRRT <Continuous>  thiamine Injectable 200 milliGRAM(s) IV Push <User Schedule>  vancomycin  IVPB 1000 milliGRAM(s) IV Intermittent every 12 hours  vasopressin Infusion 0.083 Unit(s)/Min (5 mL/Hr) IV Continuous <Continuous>    MEDICATIONS  (PRN):      Allergies    No Known Allergies    Intolerances        LABS:                        9.6    16.7  )-----------( 21       ( 20 May 2019 00:50 )             29.2     05-20    132<L>  |  103  |  21  ----------------------------<  98  3.9   |  19<L>  |  0.55    Ca    8.0<L>      20 May 2019 00:50  Phos  2.3     05-20  Mg     2.3     05-20    TPro  5.8<L>  /  Alb  2.0<L>  /  TBili  9.2<H>  /  DBili  x   /  AST  2096<H>  /  ALT  1857<H>  /  AlkPhos  156<H>  05-20    PT/INR - ( 20 May 2019 04:16 )   PT: 24.9 sec;   INR: 2.13 ratio         PTT - ( 20 May 2019 04:16 )  PTT:46.3 sec      RADIOLOGY & ADDITIONAL TESTS:  Studies reviewed.    ASSESSMENT & PLAN:

## 2019-05-20 NOTE — PROGRESS NOTE ADULT - SUBJECTIVE AND OBJECTIVE BOX
CHRIS THAKKAR  [id number]  Patient is a 79y old  Male who presents with a chief complaint of cabg (20 May 2019 07:46)    HPI:  This is a 79 yo   male with PMH of HLD, Sleep apnea ( non compliant with CPAP), GERD, diverticulitis, depression, anxiety, and panic attack. Denies significant PMH of heart disease but reports early CAD in family; mother  of MI at age 54 and uncle ( mothers brother) at age 37.  Presents to Dr Feng with c/c of chest discomfort associated with dyspnea ( on exertion after walking 100 feet)  and palpitations for the past 1 year.  CP is described as 'pinch like", intermittent, sporadic, lasting 1-2 min, localized in the left anterior chest non radiating; CP triggered by exercise and stress. Abnormal NST ( last week) : mild to moderate abnormal study with medium sized inferior and post- lateral ischemia without infarct, EF 50%.  Holter monitor revealed: frequent PVCs, ECHO revealed AR, concentric LVH, diastolic dysfunction, and MR. Referred here today for R and LHC. Presently asymptomatic, denies chest pain, dyspnea, dizziness, palpitations, N&V, HA, LE edema.    OF NOTE: patient was started on Toprol 25mg PO daily however he never picked it up from pharmacy because he states" he did not know about it"    PCP: Marleny Ceja (2019 08:32)      Surgery/Hospital course:    Today:    REVIEW OF SYSTEMS:  Gen: No fever  EYES/ENT: No visual changes;  No vertigo or throat pain   NECK: No pain   RES:  No shortness of breath or Cough  Chest: + incisional pain  CARD: No chest pain   GI: No abdominal pain  : No dysuria  NEURO: No weakness  SKIN: No itching, rashes     Physical Exam:  Vital Signs Last 24 Hrs  T(C): 36.9 (20 May 2019 20:00), Max: 43 (20 May 2019 08:00)  T(F): 98.5 (20 May 2019 20:00), Max: 109.4 (20 May 2019 08:00)  HR: 84 (20 May 2019 23:00) (70 - 90)  BP: --  BP(mean): --  RR: 21 (20 May 2019 23:00) (7 - 38)  SpO2: 100% (20 May 2019 23:00) (83% - 100%)  Gen:  Awake, alert   CNS: non focal 	  Neck: no JVD  RES : clear , no wheezing    Chest:   + chest tubes                     CVS: Regular  rhythm. Normal S1/S2  Abd: Soft, non-distended. Bowel sounds present.  Skin: No rash.  Ext:  no edema, A Line  PSY:  ============================I/O===========================   I&O's Detail    19 May 2019 07:  -  20 May 2019 07:00  --------------------------------------------------------  IN:    dextrose 10%.: 840 mL    Enteral Tube Flush: 30 mL    IV PiggyBack: 1187.5 mL    Nepro with Carb Steady: 410 mL    norepinephrine Infusion: 37 mL    sodium chloride 0.9%: 120 mL    Solution: 349.8 mL    Solution: 15 mL    vasopressin Infusion: 76 mL  Total IN: 3065.3 mL    OUT:    Chest Tube: 13 mL    Indwelling Catheter - Urethral: 5 mL    Other: 3257 mL    Rectal Tube: 1300 mL  Total OUT: 4575 mL    Total NET: -1509.7 mL      20 May 2019 07:  -  20 May 2019 23:42  --------------------------------------------------------  IN:    dextrose 10%.: 320 mL    IV PiggyBack: 442.5 mL    Nepro with Carb Steady: 20 mL    norepinephrine Infusion: 80 mL    ns in tub fed vital1: 300 mL    Other: 25 mL    Packed Red Blood Cells: 250 mL    Platelets - Single Donor: 475 mL    vasopressin Infusion: 94 mL  Total IN: 2006.5 mL    OUT:    Other: 791 mL    Rectal Tube: 2300 mL  Total OUT: 3091 mL    Total NET: -1084.5 mL        ============================ LABS =========================                        10.2   14.7  )-----------( 66       ( 20 May 2019 14:09 )             30.5     05-20    132<L>  |  103  |  21  ----------------------------<  98  3.9   |  19<L>  |  0.55    Ca    8.0<L>      20 May 2019 00:50  Phos  2.3     05-20  Mg     2.3     -20    TPro  5.8<L>  /  Alb  2.0<L>  /  TBili  9.2<H>  /  DBili  x   /  AST  2096<H>  /  ALT  1857<H>  /  AlkPhos  156<H>  05-20    LIVER FUNCTIONS - ( 20 May 2019 00:50 )  Alb: 2.0 g/dL / Pro: 5.8 g/dL / ALK PHOS: 156 U/L / ALT: 1857 U/L / AST: 2096 U/L / GGT: x           PT/INR - ( 20 May 2019 16:12 )   PT: 21.5 sec;   INR: 1.83 ratio         PTT - ( 20 May 2019 04:16 )  PTT:46.3 sec  ABG - ( 20 May 2019 21:23 )  pH, Arterial: 7.46  pH, Blood: x     /  pCO2: 28    /  pO2: 110   / HCO3: 20    / Base Excess: -2.7  /  SaO2: 98                  ======================Micro/Rad/Cardio=================  Culture: Reviewed   CXR: Reviewed  Echo:Reviewed  ======================================================  PAST MEDICAL & SURGICAL HISTORY:  Diverticulitis  Nocturia  Panic attacks  Anxiety  Depression  Asthma: Controlled  Sleep Apnea  History of partial colectomy  History of colon resection  History of eye surgery: PPV right  Cataract extraction status: right  Status post lung surgery: ? wedge resection  S/P eye surgery: &quot;removed fluid&quot; from rigth eye  Bunion  Sinus Disorder: surgery x 2  Inguinal Hernia Bilateral  Shoulder Problem: right rotator cuff  Carpal Tunnel Syndrome: right hand    ====================ASSESMENT ==============  CNS:  RES:  CVS:  Hem:  Francisco:  GI:  Endo:  ID:  Skin  Plan:  ====================== NEUROLOGY=====================    ==================== RESPIRATORY======================  Mechanical Ventilation:  Mode: AC/ CMV (Assist Control/ Continuous Mandatory Ventilation)  RR (machine): 10  TV (machine): 500  FiO2: 50  PEEP: 5  ITime: 1  MAP: 8  PIP: 22      ====================CARDIOVASCULAR==================  norepinephrine Infusion 0.034 MICROgram(s)/kG/Min (5 mL/Hr) IV Continuous <Continuous>    ===================HEMATOLOGIC/ONC ===================    ===================== RENAL =========================  De for monitoring urine output    ==================== GASTROINTESTINAL===================  calcium chloride Injectable 1000 milliGRAM(s) IV Push <User Schedule>  dextrose 10%. 1000 milliLiter(s) (20 mL/Hr) IV Continuous <Continuous>  pantoprazole  Injectable 40 milliGRAM(s) IV Push two times a day  thiamine Injectable 200 milliGRAM(s) IV Push <User Schedule>    =======================    ENDOCRINE  =====================  dextrose 50% Injectable 50 milliLiter(s) IV Push once  hydrocortisone sodium succinate Injectable 50 milliGRAM(s) IV Push every 6 hours  vasopressin Infusion 0.083 Unit(s)/Min (5 mL/Hr) IV Continuous <Continuous>    ========================INFECTIOUS DISEASE================  meropenem  IVPB 1000 milliGRAM(s) IV Intermittent every 8 hours  micafungin IVPB 100 milliGRAM(s) IV Intermittent every 24 hours  vancomycin  IVPB 1000 milliGRAM(s) IV Intermittent every 12 hours    .crit CHRIS THAKKAR  [id number]  Patient is a 79y old  Male who presents with a chief complaint of cabg (20 May 2019 07:46)    HPI:  This is a 77 yo   male with PMH of HLD, Sleep apnea ( non compliant with CPAP), GERD, diverticulitis, depression, anxiety, and panic attack. Denies significant PMH of heart disease but reports early CAD in family; mother  of MI at age 54 and uncle ( mothers brother) at age 37.  Presents to Dr Feng with c/c of chest discomfort associated with dyspnea ( on exertion after walking 100 feet)  and palpitations for the past 1 year.  CP is described as 'pinch like", intermittent, sporadic, lasting 1-2 min, localized in the left anterior chest non radiating; CP triggered by exercise and stress. Abnormal NST ( last week) : mild to moderate abnormal study with medium sized inferior and post- lateral ischemia without infarct, EF 50%.  Holter monitor revealed: frequent PVCs, ECHO revealed AR, concentric LVH, diastolic dysfunction, and MR. Referred here today for R and LHC. Presently asymptomatic, denies chest pain, dyspnea, dizziness, palpitations, N&V, HA, LE edema.    OF NOTE: patient was started on Toprol 25mg PO daily however he never picked it up from pharmacy because he states" he did not know about it"  PCP: Marleny Ceja (2019 08:32)    Surgery/Hospital course:  2019 C3L   Rintubated   Tracheostomy and Lung biopsy: pulmonary amyloidosis    cpap today 2 hours  transfused PRBC and platelts ( HIT Negative)  drips :D10 , Levo, vaso    Physical Exam:  Vital Signs Last 24 Hrs  T(C): 36.9 (20 May 2019 20:00), Max: 43 (20 May 2019 08:00)  T(F): 98.5 (20 May 2019 20:00), Max: 109.4 (20 May 2019 08:00)  HR: 84 (20 May 2019 23:00) (70 - 90)  BP: --  BP(mean): --  RR: 21 (20 May 2019 23:00) (7 - 38)  SpO2: 100% (20 May 2019 23:00) (83% - 100%)  Gen: lethargic  CNS: open eyes  Neck: + tracheostomy  RES : clear , no wheezing    Chest:   + chest tubes                     CVS: Regular  rhythm. Normal S1/S2  Abd: Soft, non-distended. Bowel sounds present.  Skin: No rash.  Ext:  + edema, A Line    ============================I/O===========================   I&O's Detail    19 May 2019 07:  -  20 May 2019 07:00  --------------------------------------------------------  IN:    dextrose 10%.: 840 mL    Enteral Tube Flush: 30 mL    IV PiggyBack: 1187.5 mL    Nepro with Carb Steady: 410 mL    norepinephrine Infusion: 37 mL    sodium chloride 0.9%: 120 mL    Solution: 349.8 mL    Solution: 15 mL    vasopressin Infusion: 76 mL  Total IN: 3065.3 mL    OUT:    Chest Tube: 13 mL    Indwelling Catheter - Urethral: 5 mL    Other: 3257 mL    Rectal Tube: 1300 mL  Total OUT: 4575 mL    Total NET: -1509.7 mL      20 May 2019 07:  -  20 May 2019 23:42  --------------------------------------------------------  IN:    dextrose 10%.: 320 mL    IV PiggyBack: 442.5 mL    Nepro with Carb Steady: 20 mL    norepinephrine Infusion: 80 mL    ns in tub fed vital1: 300 mL    Other: 25 mL    Packed Red Blood Cells: 250 mL    Platelets - Single Donor: 475 mL    vasopressin Infusion: 94 mL  Total IN: 2006.5 mL    OUT:    Other: 791 mL    Rectal Tube: 2300 mL  Total OUT: 3091 mL    Total NET: -1084.5 mL        ============================ LABS =========================                        10.2   14.7  )-----------( 66       ( 20 May 2019 14:09 )             30.5     132<L>  |  103  |  21  ----------------------------<  98  3.9   |  19<L>  |  0.55    Ca    8.0<L>      20 May 2019 00:50  Phos  2.3     05-20  Mg     2.3       TPro  5.8<L>  /  Alb  2.0<L>  /  TBili  9.2<H>  /  DBili  x   /  AST  2096<H>  /  ALT  1857<H>  /  AlkPhos  156<H>    LIVER FUNCTIONS - ( 20 May 2019 00:50 )Alb: 2.0 g/dL / Pro: 5.8 g/dL / ALK PHOS: 156 U/L / ALT: 1857 U/L / AST: 2096 U/L / GGT: x         PT/INR - ( 20 May 2019 16:12 )   PT: 21.5 sec;   INR: 1.83 ratio  PTT - ( 20 May 2019 04:16 )  PTT:46.3 sec  ABG - ( 20 May 2019 21:23 )pH, Arterial: 7.46  pH, Blood: x     /  pCO2: 28    /  pO2: 110   / HCO3: 20    / Base Excess: -2.7  /  SaO2: 98      ======================Micro/Rad/Cardio=================  Culture: Reviewed   CXR: Reviewed  Echo:Reviewed  ======================================================  PAST MEDICAL & SURGICAL HISTORY:  Diverticulitis  Nocturia  Panic attacks  Anxiety  Depression  Asthma: Controlled  Sleep Apnea  History of partial colectomy  History of colon resection  History of eye surgery: PPV right  Cataract extraction status: right  Status post lung surgery: ? wedge resection  S/P eye surgery: &quot;removed fluid&quot; from rigth eye  Bunion  Sinus Disorder: surgery x 2  Inguinal Hernia Bilateral  Shoulder Problem: right rotator cuff  Carpal Tunnel Syndrome: right hand    ====================ASSESMENT ==============  2019 CABG  C3L  acute hypoxemic respiratory failure   Tracheostomy and Lung biopsy: pulmonary amyloidosis  Hypotension  acute renal failue  elevated transaminits   ASHD/CAD  Pulmonary amyloidoss  atrial fibrillation/flutter  anxiety  depression    Plan:  ====================== NEUROLOGY=====================  off sedation  ==================== RESPIRATORY======================  Mechanical Ventilation:  Mode: AC/ CMV (Assist Control/ Continuous Mandatory Ventilation)  RR (machine): 10  TV (machine): 500  FiO2: 50  PEEP: 5    ====================CARDIOVASCULAR==================  norepinephrine Infusion 0.034 MICROgram(s)/kG/Min (5 mL/Hr) IV Continuous <Continuous>  ===================HEMATOLOGIC/ONC ===================  transfuse 1 prbc  transfuse 1 plts  repeat Hb/hct  ===================== RENAL =========================  De for monitoring urine output    ==================== GASTROINTESTINAL===================  calcium chloride Injectable 1000 milliGRAM(s) IV Push <User Schedule>  dextrose 10%. 1000 milliLiter(s) (20 mL/Hr) IV Continuous <Continuous>  pantoprazole  Injectable 40 milliGRAM(s) IV Push two times a day  thiamine Injectable 200 milliGRAM(s) IV Push <User Schedule>    =======================    ENDOCRINE  =====================  dextrose 50% Injectable 50 milliLiter(s) IV Push once  hydrocortisone sodium succinate Injectable 50 milliGRAM(s) IV Push every 6 hours  vasopressin Infusion 0.083 Unit(s)/Min (5 mL/Hr) IV Continuous <Continuous>  ========================INFECTIOUS DISEASE================  meropenem  IVPB 1000 milliGRAM(s) IV Intermittent every 8 hours  micafungin IVPB 100 milliGRAM(s) IV Intermittent every 24 hours  vancomycin  IVPB 1000 milliGRAM(s) IV Intermittent every 12 hours    Patient requires continuous monitoring with bedside rhythm monitoring,arterial line,pulse oximetry,ventilator monitoring;intermittent blood gas analysis.  Care plan discussed with ICU care team.  patient remain critical; required more than usual post op care; I have spent 35 minutes providing non routine post op care. CHRIS THAKKAR  [id number]  Patient is a 79y old  Male who presents with a chief complaint of cabg (20 May 2019 07:46)    HPI:  This is a 77 yo   male with PMH of HLD, Sleep apnea ( non compliant with CPAP), GERD, diverticulitis, depression, anxiety, and panic attack. Denies significant PMH of heart disease but reports early CAD in family; mother  of MI at age 54 and uncle ( mothers brother) at age 37.  Presents to Dr Feng with c/c of chest discomfort associated with dyspnea ( on exertion after walking 100 feet)  and palpitations for the past 1 year.  CP is described as 'pinch like", intermittent, sporadic, lasting 1-2 min, localized in the left anterior chest non radiating; CP triggered by exercise and stress. Abnormal NST ( last week) : mild to moderate abnormal study with medium sized inferior and post- lateral ischemia without infarct, EF 50%.  Holter monitor revealed: frequent PVCs, ECHO revealed AR, concentric LVH, diastolic dysfunction, and MR. Referred here today for R and LHC. Presently asymptomatic, denies chest pain, dyspnea, dizziness, palpitations, N&V, HA, LE edema.    OF NOTE: patient was started on Toprol 25mg PO daily however he never picked it up from pharmacy because he states" he did not know about it"  PCP: Marleny Ceja (2019 08:32)    Surgery/Hospital course:  2019 C3L   Rintubated   Tracheostomy and Lung biopsy: pulmonary amyloidosis    cpap today 2 hours  transfused PRBC and platelts ( HIT Negative)  drips :D10 , Levo, vaso    Physical Exam:  Vital Signs Last 24 Hrs  T(C): 36.9 (20 May 2019 20:00), Max: 43 (20 May 2019 08:00)  T(F): 98.5 (20 May 2019 20:00), Max: 109.4 (20 May 2019 08:00)  HR: 84 (20 May 2019 23:00) (70 - 90)  BP: --  BP(mean): --  RR: 21 (20 May 2019 23:00) (7 - 38)  SpO2: 100% (20 May 2019 23:00) (83% - 100%)  Gen: lethargic  CNS: open eyes  Neck: + tracheostomy  RES : clear , no wheezing    Chest:   + chest tubes                     CVS: Regular  rhythm. Normal S1/S2  Abd: Soft, non-distended. Bowel sounds present.  Skin: No rash.  Ext:  + edema, A Line    ============================I/O===========================   I&O's Detail    19 May 2019 07:  -  20 May 2019 07:00  --------------------------------------------------------  IN:    dextrose 10%.: 840 mL    Enteral Tube Flush: 30 mL    IV PiggyBack: 1187.5 mL    Nepro with Carb Steady: 410 mL    norepinephrine Infusion: 37 mL    sodium chloride 0.9%: 120 mL    Solution: 349.8 mL    Solution: 15 mL    vasopressin Infusion: 76 mL  Total IN: 3065.3 mL    OUT:    Chest Tube: 13 mL    Indwelling Catheter - Urethral: 5 mL    Other: 3257 mL    Rectal Tube: 1300 mL  Total OUT: 4575 mL    Total NET: -1509.7 mL      20 May 2019 07:  -  20 May 2019 23:42  --------------------------------------------------------  IN:    dextrose 10%.: 320 mL    IV PiggyBack: 442.5 mL    Nepro with Carb Steady: 20 mL    norepinephrine Infusion: 80 mL    ns in tub fed vital1: 300 mL    Other: 25 mL    Packed Red Blood Cells: 250 mL    Platelets - Single Donor: 475 mL    vasopressin Infusion: 94 mL  Total IN: 2006.5 mL    OUT:    Other: 791 mL    Rectal Tube: 2300 mL  Total OUT: 3091 mL    Total NET: -1084.5 mL        ============================ LABS =========================                        10.2   14.7  )-----------( 66       ( 20 May 2019 14:09 )             30.5     132<L>  |  103  |  21  ----------------------------<  98  3.9   |  19<L>  |  0.55    Ca    8.0<L>      20 May 2019 00:50  Phos  2.3     05-20  Mg     2.3       TPro  5.8<L>  /  Alb  2.0<L>  /  TBili  9.2<H>  /  DBili  x   /  AST  2096<H>  /  ALT  1857<H>  /  AlkPhos  156<H>    LIVER FUNCTIONS - ( 20 May 2019 00:50 )Alb: 2.0 g/dL / Pro: 5.8 g/dL / ALK PHOS: 156 U/L / ALT: 1857 U/L / AST: 2096 U/L / GGT: x         PT/INR - ( 20 May 2019 16:12 )   PT: 21.5 sec;   INR: 1.83 ratio  PTT - ( 20 May 2019 04:16 )  PTT:46.3 sec  ABG - ( 20 May 2019 21:23 )pH, Arterial: 7.46  pH, Blood: x     /  pCO2: 28    /  pO2: 110   / HCO3: 20    / Base Excess: -2.7  /  SaO2: 98      ======================Micro/Rad/Cardio=================  Culture: Reviewed   CXR: Reviewed  Echo:Reviewed  ======================================================  PAST MEDICAL & SURGICAL HISTORY:  Diverticulitis  Nocturia  Panic attacks  Anxiety  Depression  Asthma: Controlled  Sleep Apnea  History of partial colectomy  History of colon resection  History of eye surgery: PPV right  Cataract extraction status: right  Status post lung surgery: ? wedge resection  S/P eye surgery: &quot;removed fluid&quot; from rigth eye  Bunion  Sinus Disorder: surgery x 2  Inguinal Hernia Bilateral  Shoulder Problem: right rotator cuff  Carpal Tunnel Syndrome: right hand    ====================ASSESMENT ==============  2019 CABG  C3L  acute hypoxemic respiratory failure   Tracheostomy and Lung biopsy: pulmonary amyloidosis  Hypotension  acute renal failue/TARAH  elevated transaminits   ASHD/CAD  Pulmonary amyloidoss  atrial fibrillation/flutter  anxiety  depression    Plan:  ====================== NEUROLOGY=====================  off sedation  ==================== RESPIRATORY======================  Mechanical Ventilation:  Mode: AC/ CMV (Assist Control/ Continuous Mandatory Ventilation)  RR (machine): 10  TV (machine): 500  FiO2: 50  PEEP: 5    ====================CARDIOVASCULAR==================  norepinephrine Infusion 0.034 MICROgram(s)/kG/Min (5 mL/Hr) IV Continuous <Continuous>  ===================HEMATOLOGIC/ONC ===================  transfuse 1 prbc  transfuse 1 plts  repeat Hb/hct  ===================== RENAL =========================  De for monitoring urine output    ==================== GASTROINTESTINAL===================  calcium chloride Injectable 1000 milliGRAM(s) IV Push <User Schedule>  dextrose 10%. 1000 milliLiter(s) (20 mL/Hr) IV Continuous <Continuous>  pantoprazole  Injectable 40 milliGRAM(s) IV Push two times a day  thiamine Injectable 200 milliGRAM(s) IV Push <User Schedule>    =======================    ENDOCRINE  =====================  dextrose 50% Injectable 50 milliLiter(s) IV Push once  hydrocortisone sodium succinate Injectable 50 milliGRAM(s) IV Push every 6 hours  vasopressin Infusion 0.083 Unit(s)/Min (5 mL/Hr) IV Continuous <Continuous>  ========================INFECTIOUS DISEASE================  meropenem  IVPB 1000 milliGRAM(s) IV Intermittent every 8 hours  micafungin IVPB 100 milliGRAM(s) IV Intermittent every 24 hours  vancomycin  IVPB 1000 milliGRAM(s) IV Intermittent every 12 hours    Patient requires continuous monitoring with bedside rhythm monitoring,arterial line,pulse oximetry,ventilator monitoring;intermittent blood gas analysis.  Care plan discussed with ICU care team.  patient remain critical; required more than usual post op care; I have spent 35 minutes providing non routine post op care.

## 2019-05-20 NOTE — PROGRESS NOTE ADULT - ASSESSMENT
77 yo   male with PMH of HLD, Sleep apnea ( non compliant with CPAP), GERD, diverticulitis, depression, anxiety, and panic attack, presented to [facility] on 04/25/19 for LHC after having  abnormal NST done as outpatient, LHC done same day revealed TVD. therefore underwent CABG on 04/26/19, hospital course complicated with fever, shock, no on pressors, abx, developed julio cesar hence nephrology consulted.

## 2019-05-20 NOTE — PROGRESS NOTE ADULT - ATTENDING COMMENTS
80 yo M with a PMH HLD, ARCENIO (noncompliant with CPAP), a/f dyspnea on exertion, angina s/p 3-vessel CABG c/b hypoxic respiratory failure now s/p tracheostomy, w/ acute on chronic renal failure, ventilator-acquired pneumonia.   Hematology called for lung biopsy showing amyloidosis, and notable leukocytosis.    - pt critically ill, concern for amyloid  - await mass spectrometry to determine subtype of amyloid (send out test done at [place])  - cont supportive care as per ICU  - pt w/o e/o of light chain predominant disease on SPEP, SIFE, UPEP, and UIFE.   - recc obtain abdominal fat pad and transthyretin technetium pyrophosphate cardiac scan   - leukocytosis appears reactive on smear evaluation  - cont to monitor daily cbc w/ diff  - treat acute infectious issues  - notable coagulopathy on labs - trial vit K 5mg daily x 5  - check fibrinogen and d-dimer, mixing study  - will follow w/ you

## 2019-05-20 NOTE — PROGRESS NOTE ADULT - ATTENDING COMMENTS
as above-CT chest c/w PNA--MRSA sputum--- ABX restarted (vanco/meropenem)5/15 plus mycafungin/flagyl-no signif improvement remains CRITICALLY ill  Resp failure--pulm amyloidosis, PNA, CHF, debility-------on vent-sat above 90%  Pulm Amyloid--no definitive rx at present           COPD-duoneb via ETT q 6  CHF-as per Gaby et al-pressors                       Heme-prbc and plts prn  Renal insuff/fluid OL--CVVHD in place  PNA-MRSA-vanco/meropenem/mycafungin/flagyl--as per ID                          GOC/prog critical/guarded  GI-ppi/TF (on hold)                                  Jann Guadarrama MD-Pulmonary   [phone]

## 2019-05-20 NOTE — CONSULT NOTE ADULT - ASSESSMENT
elevated lfts  diarrhea    c diff negative  etiology of diarrhea can be antibiotic associated, related to tube feeds, amyloid infiltration of gi tract  stool is brown no evidence of bleeding  no role for endoscopic evaluation  he has significant liver dysfunction and presumed encephalopathy therefore would not give anti diarrhea as it can worsening encephalopathy  consider xifaxin  monitor clinical course  overall prognosis very poor

## 2019-05-20 NOTE — PROGRESS NOTE ADULT - ASSESSMENT
78 yr old with ARCENIO admitted for chest pain and underwent a CABG last last week   Post op has been stable but is still intubated and became febrile last 24hrs.   No evidence of wd infection, alot of sputum and possible LLL infiltrate underwent Bronch/BAL with biopsy to r/o BOOP, started on steroids    sp course of meropenem and micafungin without improvement leading us to do lung biopsy  lung biopsy is positive for amyloid which may also be in kidney and heart.   Also have some GI bleeding and extremities are mottled but viable   Overall MRSA pneumonia, ischemic colitis, lactic acidosis, shock, leucocytosis, transaminitis.     Plan;   Continue with vancomycin dose based on type of HD, level 12 today.   All other cultures negative to date.   No major role of abx in setting of ischemic colitis   on meropenem   ? utility of micafungin and flagyl, consider stopping.   Prognosis poor.      [facility] discussion underway.

## 2019-05-20 NOTE — PROGRESS NOTE ADULT - SUBJECTIVE AND OBJECTIVE BOX
CHIEF COMPLAINT:  f/up resp failure, pulm amyloidosis, asthma, osas- on vent -more alert (eyes) -off propofol    Interval Events: cvvhd, no weaning    REVIEW OF SYSTEMS:  Constitutional: No fevers or chills. No weight loss. No fatigue or generalized malaise.  Eyes: No itching or discharge from the eyes  ENT: No ear pain. No ear discharge. No nasal congestion. No post nasal drip. No epistaxis. No throat pain. No sore throat. No difficulty swallowing.   CV: No chest pain. No palpitations. No lightheadedness or dizziness.   Resp: No dyspnea at rest. No dyspnea on exertion. No orthopnea. No wheezing. No cough. No stridor. No sputum production. No chest pain with respiration.  GI: No nausea. No vomiting. No diarrhea.  MSK: No joint pain or pain in any extremities  Integumentary: No skin lesions. No pedal edema.  Neurological: No gross motor weakness. No sensory changes.  [ ] All other systems negative  [+ ] Unable to assess ROS because poor responsiveness________    OBJECTIVE:  ICU Vital Signs Last 24 Hrs  T(C): 36.4 (20 May 2019 04:00), Max: 36.9 (19 May 2019 23:00)  T(F): 97.5 (20 May 2019 04:00), Max: 98.4 (19 May 2019 23:00)  HR: 72 (20 May 2019 04:30) (72 - 90)  BP: --  BP(mean): --  ABP: 119/47 (20 May 2019 04:30) (97/39 - 161/60)  ABP(mean): 68 (20 May 2019 04:30) (54 - 89)  RR: 24 (20 May 2019 04:30) (14 - 38)  SpO2: 100% (20 May 2019 04:30) (84% - 100%)    Mode: AC/ CMV (Assist Control/ Continuous Mandatory Ventilation), RR (machine): 12, TV (machine): 550, FiO2: 50, PEEP: 5, ITime: 1, MAP: 8, PIP: 19    05-18 @ 07:01  -  05-19 @ 07:00  --------------------------------------------------------  IN: 3600.5 mL / OUT: 4195 mL / NET: -594.5 mL    05-19 @ 07:01 - 05-20 @ 04:51  --------------------------------------------------------  IN: 2799.3 mL / OUT: 4039 mL / NET: -1239.7 mL      CAPILLARY BLOOD GLUCOSE  113 (19 May 2019 18:00)      POCT Blood Glucose.: 113 mg/dL (19 May 2019 17:00)      PHYSICAL EXAM:  General: Awake--ocular responses   HEENT: Atraumatic, normocephalic.                 Mallampatti Grade 3                No nasal congestion.                No tonsillar or pharyngeal exudates.  Lymph Nodes: No palpable lymphadenopathy  Neck: No JVD. No carotid bruit.   Respiratory: Normal chest [id number] in place                         Normal percussion                         Normal and equal air entry                         No wheeze, rhonchi or rales.  Cardiovascular: S1 S2 normal. No murmurs, rubs or gallops.   Abdomen: Soft, non-tender, non-distended. No organomegaly. Normoactive bowel sounds.  Extremities: Warm to touch. Peripheral pulse palpable. ++ pedal edema.   Skin: No rashes or skin lesions  Neurological: Motor and sensory examination-unable  Psychiatry: unable    HOSPITAL MEDICATIONS:  MEDICATIONS  (STANDING):  calcium chloride Injectable 1000 milliGRAM(s) IV Push <User Schedule>  chlorhexidine 0.12% Liquid 15 milliLiter(s) Oral Mucosa two times a day  chlorhexidine 4% Liquid 1 Application(s) Topical <User Schedule>  CRRT Treatment    <Continuous>  dexmedetomidine Infusion 0.5 MICROgram(s)/kG/Hr (9.938 mL/Hr) IV Continuous <Continuous>  dextrose 10%. 1000 milliLiter(s) (20 mL/Hr) IV Continuous <Continuous>  dextrose 50% Injectable 50 milliLiter(s) IV Push once  hydrocortisone sodium succinate Injectable 50 milliGRAM(s) IV Push every 6 hours  meropenem  IVPB 1000 milliGRAM(s) IV Intermittent every 8 hours  micafungin IVPB 100 milliGRAM(s) IV Intermittent every 24 hours  norepinephrine Infusion 0.034 MICROgram(s)/kG/Min (5 mL/Hr) IV Continuous <Continuous>  pantoprazole  Injectable 40 milliGRAM(s) IV Push two times a day  PrismaSATE Dialysate BGK 4 / 2.5 5000 milliLiter(s) (1500 mL/Hr) CRRT <Continuous>  PrismaSOL Filtration BGK 4 / 2.5 5000 milliLiter(s) (1000 mL/Hr) CRRT <Continuous>  PrismaSOL Filtration BGK 4 / 2.5 5000 milliLiter(s) (200 mL/Hr) CRRT <Continuous>  thiamine Injectable 200 milliGRAM(s) IV Push <User Schedule>  vancomycin  IVPB 750 milliGRAM(s) IV Intermittent every 12 hours  vasopressin Infusion 0.083 Unit(s)/Min (5 mL/Hr) IV Continuous <Continuous>    MEDICATIONS  (PRN):      LABS:                        9.6    16.7  )-----------( 21       ( 20 May 2019 00:50 )             29.2     05-20    132<L>  |  103  |  21  ----------------------------<  98  3.9   |  19<L>  |  0.55    Ca    8.0<L>      20 May 2019 00:50  Phos  2.3     05-20  Mg     2.3     05-20    TPro  5.8<L>  /  Alb  2.0<L>  /  TBili  9.2<H>  /  DBili  x   /  AST  2096<H>  /  ALT  1857<H>  /  AlkPhos  156<H>  05-20    PT/INR - ( 20 May 2019 04:16 )   PT: 24.9 sec;   INR: 2.13 ratio         PTT - ( 20 May 2019 04:16 )  PTT:46.3 sec    Arterial Blood Gas:  05-20 @ 02:47  7.50/23/116/18/98/-3.9  ABG lactate: --  Arterial Blood Gas:  05-20 @ 00:46  7.50/26/154/20/99/-2.0  ABG lactate: --  Arterial Blood Gas:  05-19 @ 20:12  7.48/28/153/20/99/-2.3  ABG lactate: --  Arterial Blood Gas:  05-19 @ 18:04  7.48/27/137/20/99/-2.5  ABG lactate: --  Arterial Blood Gas:  05-19 @ 15:17  7.45/31/128/21/99/-2.0  ABG lactate: --  Arterial Blood Gas:  05-19 @ 12:26  7.45/29/129/20/99/-2.9  ABG lactate: --  Arterial Blood Gas:  05-19 @ 09:54  7.46/29/133/20/99/-2.6  ABG lactate: --  Arterial Blood Gas:  05-19 @ 06:08  7.45/28/146/19/99/-3.3  ABG lactate: --  Arterial Blood Gas:  05-19 @ 03:46  7.47/26/162/19/99/-3.4  ABG lactate: --  Arterial Blood Gas:  05-19 @ 00:40  7.46/29/132/20/99/-2.6  ABG lactate: --  Arterial Blood Gas:  05-18 @ 22:00  7.45/29/130/20/99/-2.7  ABG lactate: --  Arterial Blood Gas:  05-18 @ 20:07  7.47/26/137/19/99/-3.9  ABG lactate: --  Arterial Blood Gas:  05-18 @ 17:20  7.44/29/146/19/99/-3.7  ABG lactate: --  Arterial Blood Gas:  05-18 @ 14:10  7.40/32/133/19/99/-4.3  ABG lactate: --  Arterial Blood Gas:  05-18 @ 11:19  7.41/30/94/18/97/-5.0  ABG lactate: --  Arterial Blood Gas:  05-18 @ 10:01  7.41/29/100/18/97/-5.3  ABG lactate: --  Arterial Blood Gas:  05-18 @ 05:26  7.42/28/117/18/99/-5.2  ABG lactate: --        MICROBIOLOGY:     RADIOLOGY:  [ ] Reviewed and interpreted by me    Point of Care Ultrasound Findings:    PFT:    EKG:

## 2019-05-20 NOTE — CHART NOTE - NSCHARTNOTEFT_GEN_A_CORE
Nutrition Follow Up Note  Patient seen for: Nutritional follow up     Source: RN, medical record, CTU rounds     Chart reviewed, events noted: 78 yo M with PMHx HLD, sleep apnea, GERD, diverticulitis, depression, anxiety, panic attacks with chest pain and SOB x 1 year. Admitted for CABG. S/p cardiac cath , found to have CAD, LVH and diastolic HF, S/p CABGx3 with LIMA to LAD; RSVG to OM, RCA . Hospital course c/b anemia/hypovolemia-shock, cardiovascular dysfunction, acidosis, and hyperglycemia. S/p tracheostomy on 2019 and left VATS, LLL wedge resection. Pt with PNA, MRSA ,lactic acidosis, and ischemic colitis. Remains intubated and on CVVHD.      Diet: NPO  with EN via NGT  Nepro @ 20ml/[id number].   Of was NPO from -, was initiated on Nepro @20 on     Rectal tube:   : 1.8L   : 200ml thus far    Patient reports: Pt seen, remains intubated. on trickle feeds. Plan discussed with MD, will change feeds to a more elemental formula.     Daily Weight in k.1 (-20), Weight in k.9 (-19), Weight in k.4 (-17), Weight in k.1 (05-16), Weight in k (05-15), Weight in k.6 (05-15), Weight in k.2 (05-15)  % Weight Change    Pertinent Medications: MEDICATIONS  (STANDING):  artificial  tears Solution 1 Drop(s) Both EYES every 6 hours  calcium chloride Injectable 1000 milliGRAM(s) IV Push <User Schedule>  chlorhexidine 0.12% Liquid 15 milliLiter(s) Oral Mucosa two times a day  chlorhexidine 4% Liquid 1 Application(s) Topical <User Schedule>  CRRT Treatment    <Continuous>  dextrose 10%. 1000 milliLiter(s) (20 mL/Hr) IV Continuous <Continuous>  dextrose 50% Injectable 50 milliLiter(s) IV Push once  hydrocortisone sodium succinate Injectable 50 milliGRAM(s) IV Push every 6 hours  meropenem  IVPB 1000 milliGRAM(s) IV Intermittent every 8 hours  micafungin IVPB 100 milliGRAM(s) IV Intermittent every 24 hours  norepinephrine Infusion 0.034 MICROgram(s)/kG/Min (5 mL/Hr) IV Continuous <Continuous>  pantoprazole  Injectable 40 milliGRAM(s) IV Push two times a day  PrismaSATE Dialysate BGK 4 / 2.5 5000 milliLiter(s) (1500 mL/Hr) CRRT <Continuous>  PrismaSOL Filtration BGK 4 / 2.5 5000 milliLiter(s) (1000 mL/Hr) CRRT <Continuous>  PrismaSOL Filtration BGK 4 / 2.5 5000 milliLiter(s) (200 mL/Hr) CRRT <Continuous>  thiamine Injectable 200 milliGRAM(s) IV Push <User Schedule>  vancomycin  IVPB 750 milliGRAM(s) IV Intermittent every 12 hours  vasopressin Infusion 0.083 Unit(s)/Min (5 mL/Hr) IV Continuous <Continuous>    MEDICATIONS  (PRN):    Pertinent Labs:  @ 00:50: Na 132<L>, BUN 21, Cr 0.55, BG 98, K+ 3.9, Phos 2.3<L>, Mg 2.3, Alk Phos 156<H>, ALT/SGPT 1857<H>, AST/SGOT 2096<H>, HbA1c --    Finger Sticks:  POCT Blood Glucose.: 113 mg/dL ( @ 17:00)  POCT Blood Glucose.: 114 mg/dL ( @ 16:30)  POCT Blood Glucose.: 113 mg/dL ( @ 13:31)  POCT Blood Glucose.: 103 mg/dL ( @ 12:06)  POCT Blood Glucose.: 87 mg/dL ( @ 10:57)  POCT Blood Glucose.: 89 mg/dL ( @ 09:25)      Skin per nursing documentation: intact   Edema: +2 generalized and +3 shagufta arm and scrotal edema     Estimated Needs:   [ X] no change since previous assessment  [ ] recalculated:     Previous Nutrition Diagnosis: increased nutrient needs and Knowledge deficit  Nutrition Diagnosis is: ongoing      Interventions:     Recommend  1. In the setting of shock and ? colitis. Would recommend to change EN to Vital 1.2 @ 20ml/[id number]. Would maintain trickle rate until shock resolved. Monitor GI tolerance closely.   2. Would discontinue banatrol plus  3. Trend GI tolerance, renal indices, and electrolytes     Monitoring and Evaluation:     Continue to monitor Nutritional intake, Tolerance to diet prescription, weights, labs, skin integrity    RD remains available upon request and will follow up per protocol  Sarah Siegler RD, RDN, [facility] Pager #[phone]

## 2019-05-20 NOTE — PROGRESS NOTE ADULT - ATTENDING COMMENTS
TARAH/ATN, septic shock, amyloid, lactic acidosis, hyperphosphatemia, hypocalcemia, hypokalemia.  Seen and examined on CRRT  Trach, edematous    Adjusting CRRT solutions to better balance phosphorus and potassium. For hypocalcemia, cont. intermittent doses, but can transition to continuous infusion if needed.    - CRRT with CVVHDF, goal net even to gentle negative (using Phoxillum solutions)  - From renal standpoint, can d/c De given minimal UOP (to reduce infection risk)  - Maintain MAP > 65  - Dose medications for CRRT; check vanc levels at least daily  - Remainder per fellow, will follow

## 2019-05-21 LAB
ALBUMIN SERPL ELPH-MCNC: 2.1 G/DL — LOW (ref 3.3–5)
ALP SERPL-CCNC: 177 U/L — HIGH (ref 40–120)
ALT FLD-CCNC: 937 U/L — HIGH (ref 10–45)
AMYLASE P1 CFR SERPL: 680 U/L — HIGH (ref 25–125)
ANION GAP SERPL CALC-SCNC: 11 MMOL/L — SIGNIFICANT CHANGE UP (ref 5–17)
APTT BLD: 46.3 SEC — HIGH (ref 27.5–36.3)
APTT BLD: 49.7 SEC — HIGH (ref 27.5–36.3)
AST SERPL-CCNC: 963 U/L — HIGH (ref 10–40)
BILIRUB SERPL-MCNC: 11.2 MG/DL — HIGH (ref 0.2–1.2)
BUN SERPL-MCNC: 24 MG/DL — HIGH (ref 7–23)
CALCIUM SERPL-MCNC: 7.8 MG/DL — LOW (ref 8.4–10.5)
CHLORIDE SERPL-SCNC: 104 MMOL/L — SIGNIFICANT CHANGE UP (ref 96–108)
CO2 SERPL-SCNC: 18 MMOL/L — LOW (ref 22–31)
CREAT SERPL-MCNC: 0.52 MG/DL — SIGNIFICANT CHANGE UP (ref 0.5–1.3)
FACT IX PPP CHRO-ACNC: 60 % — LOW (ref 80–165)
FACT V ACT/NOR PPP: 41 % — LOW (ref 50–150)
FACT VIII ACT/NOR PPP: 201 % — HIGH (ref 60–125)
FACT XII ACT/NOR PPP: 43 % — LOW (ref 70–145)
GAS PNL BLDA: SIGNIFICANT CHANGE UP
GLUCOSE SERPL-MCNC: 93 MG/DL — SIGNIFICANT CHANGE UP (ref 70–99)
HCT VFR BLD CALC: 25.4 % — LOW (ref 39–50)
HCT VFR BLD CALC: 26.1 % — LOW (ref 39–50)
HCT VFR BLD CALC: 28.3 % — LOW (ref 39–50)
HCT VFR BLD CALC: 31.3 % — LOW (ref 39–50)
HGB BLD-MCNC: 10.2 G/DL — LOW (ref 13–17)
HGB BLD-MCNC: 9 G/DL — LOW (ref 13–17)
HGB BLD-MCNC: 9.1 G/DL — LOW (ref 13–17)
HGB BLD-MCNC: 9.7 G/DL — LOW (ref 13–17)
INR BLD: 1.9 RATIO — HIGH (ref 0.88–1.16)
LIDOCAIN IGE QN: >530 U/L — HIGH (ref 7–60)
MAGNESIUM SERPL-MCNC: 2.2 MG/DL — SIGNIFICANT CHANGE UP (ref 1.6–2.6)
MCHC RBC-ENTMCNC: 28.7 PG — SIGNIFICANT CHANGE UP (ref 27–34)
MCHC RBC-ENTMCNC: 30.4 PG — SIGNIFICANT CHANGE UP (ref 27–34)
MCHC RBC-ENTMCNC: 30.8 PG — SIGNIFICANT CHANGE UP (ref 27–34)
MCHC RBC-ENTMCNC: 31 PG — SIGNIFICANT CHANGE UP (ref 27–34)
MCHC RBC-ENTMCNC: 32.4 GM/DL — SIGNIFICANT CHANGE UP (ref 32–36)
MCHC RBC-ENTMCNC: 34.3 GM/DL — SIGNIFICANT CHANGE UP (ref 32–36)
MCHC RBC-ENTMCNC: 34.7 GM/DL — SIGNIFICANT CHANGE UP (ref 32–36)
MCHC RBC-ENTMCNC: 35.4 GM/DL — SIGNIFICANT CHANGE UP (ref 32–36)
MCV RBC AUTO: 87 FL — SIGNIFICANT CHANGE UP (ref 80–100)
MCV RBC AUTO: 88.6 FL — SIGNIFICANT CHANGE UP (ref 80–100)
MCV RBC AUTO: 88.6 FL — SIGNIFICANT CHANGE UP (ref 80–100)
MCV RBC AUTO: 89.3 FL — SIGNIFICANT CHANGE UP (ref 80–100)
PHOSPHATE SERPL-MCNC: 3.3 MG/DL — SIGNIFICANT CHANGE UP (ref 2.5–4.5)
PLATELET # BLD AUTO: 25 K/UL — LOW (ref 150–400)
PLATELET # BLD AUTO: 35 K/UL — LOW (ref 150–400)
PLATELET # BLD AUTO: 69 K/UL — LOW (ref 150–400)
PLATELET # BLD AUTO: 69 K/UL — LOW (ref 150–400)
POTASSIUM SERPL-MCNC: 4.1 MMOL/L — SIGNIFICANT CHANGE UP (ref 3.5–5.3)
POTASSIUM SERPL-SCNC: 4.1 MMOL/L — SIGNIFICANT CHANGE UP (ref 3.5–5.3)
PROT SERPL-MCNC: 5.4 G/DL — LOW (ref 6–8.3)
PROTHROM AB SERPL-ACNC: 22.3 SEC — HIGH (ref 10–12.9)
RBC # BLD: 2.92 M/UL — LOW (ref 4.2–5.8)
RBC # BLD: 2.93 M/UL — LOW (ref 4.2–5.8)
RBC # BLD: 3.2 M/UL — LOW (ref 4.2–5.8)
RBC # BLD: 3.54 M/UL — LOW (ref 4.2–5.8)
RBC # FLD: 14 % — SIGNIFICANT CHANGE UP (ref 10.3–14.5)
RBC # FLD: 14.2 % — SIGNIFICANT CHANGE UP (ref 10.3–14.5)
RBC # FLD: 14.2 % — SIGNIFICANT CHANGE UP (ref 10.3–14.5)
RBC # FLD: 15.2 % — HIGH (ref 10.3–14.5)
SODIUM SERPL-SCNC: 133 MMOL/L — LOW (ref 135–145)
VANCOMYCIN TROUGH SERPL-MCNC: 17.3 UG/ML — SIGNIFICANT CHANGE UP (ref 10–20)
WBC # BLD: 11.1 K/UL — HIGH (ref 3.8–10.5)
WBC # BLD: 11.8 K/UL — HIGH (ref 3.8–10.5)
WBC # BLD: 12.7 K/UL — HIGH (ref 3.8–10.5)
WBC # BLD: 8.6 K/UL — SIGNIFICANT CHANGE UP (ref 3.8–10.5)
WBC # FLD AUTO: 11.1 K/UL — HIGH (ref 3.8–10.5)
WBC # FLD AUTO: 11.8 K/UL — HIGH (ref 3.8–10.5)
WBC # FLD AUTO: 12.7 K/UL — HIGH (ref 3.8–10.5)
WBC # FLD AUTO: 8.6 K/UL — SIGNIFICANT CHANGE UP (ref 3.8–10.5)

## 2019-05-21 PROCEDURE — 99232 SBSQ HOSP IP/OBS MODERATE 35: CPT

## 2019-05-21 PROCEDURE — 99291 CRITICAL CARE FIRST HOUR: CPT

## 2019-05-21 PROCEDURE — 71045 X-RAY EXAM CHEST 1 VIEW: CPT | Mod: 26

## 2019-05-21 PROCEDURE — 99233 SBSQ HOSP IP/OBS HIGH 50: CPT | Mod: GC

## 2019-05-21 PROCEDURE — 99233 SBSQ HOSP IP/OBS HIGH 50: CPT

## 2019-05-21 RX ORDER — ALBUMIN HUMAN 25 %
250 VIAL (ML) INTRAVENOUS ONCE
Refills: 0 | Status: COMPLETED | OUTPATIENT
Start: 2019-05-21 | End: 2019-05-21

## 2019-05-21 RX ORDER — HYDROMORPHONE HYDROCHLORIDE 2 MG/ML
1 INJECTION INTRAMUSCULAR; INTRAVENOUS; SUBCUTANEOUS ONCE
Refills: 0 | Status: DISCONTINUED | OUTPATIENT
Start: 2019-05-21 | End: 2019-05-22

## 2019-05-21 RX ORDER — MINERAL OIL
1 OIL (ML) MISCELLANEOUS DAILY
Refills: 0 | Status: DISCONTINUED | OUTPATIENT
Start: 2019-05-21 | End: 2019-05-26

## 2019-05-21 RX ORDER — DIPHENOXYLATE HCL/ATROPINE 2.5-.025MG
1 TABLET ORAL EVERY 6 HOURS
Refills: 0 | Status: DISCONTINUED | OUTPATIENT
Start: 2019-05-21 | End: 2019-05-22

## 2019-05-21 RX ORDER — OCTREOTIDE ACETATE 200 UG/ML
100 INJECTION, SOLUTION INTRAVENOUS; SUBCUTANEOUS EVERY 8 HOURS
Refills: 0 | Status: DISCONTINUED | OUTPATIENT
Start: 2019-05-21 | End: 2019-05-21

## 2019-05-21 RX ORDER — HEPARIN SODIUM 5000 [USP'U]/ML
500 INJECTION INTRAVENOUS; SUBCUTANEOUS
Qty: 25000 | Refills: 0 | Status: DISCONTINUED | OUTPATIENT
Start: 2019-05-21 | End: 2019-05-25

## 2019-05-21 RX ORDER — ALTEPLASE 100 MG
2 KIT INTRAVENOUS ONCE
Refills: 0 | Status: COMPLETED | OUTPATIENT
Start: 2019-05-21 | End: 2019-05-21

## 2019-05-21 RX ORDER — SODIUM CHLORIDE 9 MG/ML
10 INJECTION INTRAMUSCULAR; INTRAVENOUS; SUBCUTANEOUS
Refills: 0 | Status: DISCONTINUED | OUTPATIENT
Start: 2019-05-21 | End: 2019-05-26

## 2019-05-21 RX ADMIN — Medication 250 MILLIGRAM(S): at 17:10

## 2019-05-21 RX ADMIN — CHLORHEXIDINE GLUCONATE 1 APPLICATION(S): 213 SOLUTION TOPICAL at 05:33

## 2019-05-21 RX ADMIN — Medication 5 MICROGRAM(S)/KG/MIN: at 05:34

## 2019-05-21 RX ADMIN — Medication 125 MILLILITER(S): at 09:26

## 2019-05-21 RX ADMIN — Medication 1 DROP(S): at 11:58

## 2019-05-21 RX ADMIN — PANTOPRAZOLE SODIUM 40 MILLIGRAM(S): 20 TABLET, DELAYED RELEASE ORAL at 05:32

## 2019-05-21 RX ADMIN — Medication 1 TABLET(S): at 11:57

## 2019-05-21 RX ADMIN — Medication 1 TABLET(S): at 17:10

## 2019-05-21 RX ADMIN — VASOPRESSIN 5 UNIT(S)/MIN: 20 INJECTION INTRAVENOUS at 07:00

## 2019-05-21 RX ADMIN — Medication 200 MILLIGRAM(S): at 21:30

## 2019-05-21 RX ADMIN — Medication 1 DROP(S): at 05:33

## 2019-05-21 RX ADMIN — Medication 1 DROP(S): at 23:39

## 2019-05-21 RX ADMIN — Medication 1000 MILLIGRAM(S): at 21:30

## 2019-05-21 RX ADMIN — Medication 1 TABLET(S): at 23:40

## 2019-05-21 RX ADMIN — Medication 50 MILLIGRAM(S): at 11:57

## 2019-05-21 RX ADMIN — Medication 1000 MILLIGRAM(S): at 15:03

## 2019-05-21 RX ADMIN — Medication 5 MICROGRAM(S)/KG/MIN: at 07:00

## 2019-05-21 RX ADMIN — MEROPENEM 100 MILLIGRAM(S): 1 INJECTION INTRAVENOUS at 05:32

## 2019-05-21 RX ADMIN — Medication 125 MILLILITER(S): at 15:38

## 2019-05-21 RX ADMIN — CHLORHEXIDINE GLUCONATE 15 MILLILITER(S): 213 SOLUTION TOPICAL at 17:10

## 2019-05-21 RX ADMIN — CHLORHEXIDINE GLUCONATE 15 MILLILITER(S): 213 SOLUTION TOPICAL at 05:32

## 2019-05-21 RX ADMIN — Medication 200 MILLIGRAM(S): at 09:26

## 2019-05-21 RX ADMIN — Medication 20 MILLILITER(S): at 07:00

## 2019-05-21 RX ADMIN — OCTREOTIDE ACETATE 100 MICROGRAM(S): 200 INJECTION, SOLUTION INTRAVENOUS; SUBCUTANEOUS at 10:09

## 2019-05-21 RX ADMIN — Medication 50 MILLIGRAM(S): at 23:39

## 2019-05-21 RX ADMIN — MEROPENEM 100 MILLIGRAM(S): 1 INJECTION INTRAVENOUS at 21:30

## 2019-05-21 RX ADMIN — MEROPENEM 100 MILLIGRAM(S): 1 INJECTION INTRAVENOUS at 13:01

## 2019-05-21 RX ADMIN — Medication 1000 MILLIGRAM(S): at 03:32

## 2019-05-21 RX ADMIN — Medication 50 MILLIGRAM(S): at 05:31

## 2019-05-21 RX ADMIN — Medication 1000 MILLIGRAM(S): at 09:27

## 2019-05-21 RX ADMIN — Medication 250 MILLIGRAM(S): at 06:29

## 2019-05-21 RX ADMIN — Medication 125 MILLILITER(S): at 05:59

## 2019-05-21 RX ADMIN — Medication 20 MILLILITER(S): at 05:35

## 2019-05-21 RX ADMIN — Medication 50 MILLIGRAM(S): at 17:10

## 2019-05-21 RX ADMIN — HEPARIN SODIUM 5 UNIT(S)/HR: 5000 INJECTION INTRAVENOUS; SUBCUTANEOUS at 14:39

## 2019-05-21 RX ADMIN — Medication 1 DROP(S): at 17:11

## 2019-05-21 RX ADMIN — PANTOPRAZOLE SODIUM 40 MILLIGRAM(S): 20 TABLET, DELAYED RELEASE ORAL at 17:10

## 2019-05-21 RX ADMIN — VASOPRESSIN 5 UNIT(S)/MIN: 20 INJECTION INTRAVENOUS at 05:35

## 2019-05-21 NOTE — PROVIDER CONTACT NOTE (CRITICAL VALUE NOTIFICATION) - BACKGROUND
patient s/p CABGX3 4/26, hospital course due to resp complications requiring tracheostomy, TARAH requiring CVVHD.

## 2019-05-21 NOTE — PROGRESS NOTE ADULT - PROBLEM SELECTOR PLAN 1
Pt with TARAH most likely hemodynamically mediated vs ischemic ATN in the setting of anemia from blood loss, hemodynamic instability, and renal venous congestion. SCr of 1.2 in 2016, 1.1 08/18 and 1.06 on 04/25/19. Started on HD on 05/11/19 due to BUN 190s worsening mental status, concerning for uremic encephalopathy. Labs reviewed today. Last HD 5/15/19 and switch to CRRT on 5/16/19 due to shock. Plan to continue CRRT today with net negative goal. Pt. needs HD catheter exchange due to poor blood flows and clotting. Monitor I&O, daily weights, avoid nephrotoxics. Adjust meds based on GFR.  Avoid hypotension.

## 2019-05-21 NOTE — PROGRESS NOTE ADULT - SUBJECTIVE AND OBJECTIVE BOX
CHRIS THAKKAR  MRN#:  [id number]    The patient is a 78y Male with PMH of HLD, Sleep apnea ( non compliant with CPAP), GERD, diverticulitis, depression, anxiety, and panic attacks, as well as strong family history of CAD, admitted with unstable angina, found to have coronary artery disease, LVH and diastolic heart failure, now recovering s/p C3L 4/26, post op course complicated by bleeding and later hypoxic respiratory failure, s/p tracheostomy who was seen, evaluated, & examined with the CTICU staff on rounds, overnight and during the early morning hours with a multidisciplinary care plan formulated & implemented.  All available clinical, laboratory, radiographic, pharmacologic, and electrocardiographic data were reviewed & analyzed.      The patient was in the CTICU in critical condition secondary to persistent cardiopulmonary dysfunction, hemodynamically significant anemia, acute renal insufficiency, vasogenic shock, persistent cardiovascular dysfunction, and ischemic hepatitis.     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 Further work up in process to determine subtype.     Invasive hemodynamic monitoring with a central venous catheter & an A-line were required for the continuous central venous and MAP/BP monitoring to ensure adequate cardiovascular support and to evaluate for & help prevent decompensation while receiving an IV Vasopressin drip and an IV Norepinephrine drip secondary to hemodynamically significant anemia/acute postoperative blood loss anemia, vasogenic shock, acute on chronic diastolic heart failure, ischemic hepatitis, possible ischemic colitis, lactic acidosis and acute renal failure.  CVVH held due to concern for intravascular volume depletion.    Patient has had fever, leukocytosis, thick pulmonary secretions with a right lung infiltrate on chest x ray consistent with pneumonia. He continues on treatment with Meropenem, Vancomycin, and Micafungin.    Glucose control to help maintain metabolic stability and prevent infection required monitoring of glucose intermittently and treatment with a lispro insulin sliding scale.    Tolerating enteral feedings at goal rate.    Patient required acute postoperative critical care management and I provided 35 minutes of non-continuous care to the patient.  Discussed at length with the CTICU staff and helped coordinate care.

## 2019-05-21 NOTE — PROVIDER CONTACT NOTE (OTHER) - RECOMMENDATIONS
notified NP Soosan of assessment along with vasopressor requirements, pain in left lateral chest, and elevated kidney function potassium and phos on labs over night. NP aware of stable urine output.
notified NP- possible reevaluation for tylenol pending LFTs
ALMA ROSA Adam and Dr. Grover at bedside.
D50 Amp
PA recommended to place alleyvn on site and reassess.
as per provider
tube feeds off

## 2019-05-21 NOTE — PROCEDURE NOTE - NSSITEPREP_SKIN_A_CORE
chlorhexidine
Adherence to aseptic technique: hand hygiene prior to donning barriers (gown, gloves), don cap and mask, sterile drape over patient/chlorhexidine

## 2019-05-21 NOTE — PROGRESS NOTE ADULT - ATTENDING COMMENTS
TARAH/ATN, septic shock, amyloid, lactic acidosis, hyperphosphatemia, hypocalcemia, hypokalemia.  CRRT circuit clotting, catheter changed  Trach, edematous    Adjusting CRRT solutions to better balance phosphorus and potassium. For hypocalcemia, cont. intermittent doses, but can transition to continuous infusion if needed.    - CRRT with CVVHDF, goal net even to gentle negative (using Phoxillum solutions)  - Maintain MAP > 65  - Dose medications for CRRT; check vanc levels at least daily  - Critically ill  - Remainder per fellow, d/w team, will follow

## 2019-05-21 NOTE — PROCEDURE NOTE - PROCEDURE DATE TIME, MLM
11-May-2019 00:00
11-May-2019 01:00
11-May-2019 07:00
05-May-2019 04:00
05-May-2019 07:00
11-May-2019 06:07
17-May-2019 00:30
17-May-2019 01:00
17-May-2019 04:00
21-May-2019 10:30
28-Apr-2019 10:00
28-Apr-2019 11:58
29-Apr-2019 01:30

## 2019-05-21 NOTE — PROVIDER CONTACT NOTE (OTHER) - SITUATION
Patient s/p CABGX3 ON 4/26, hospital course complicated by respiratory issues requiring tracheostomy, TARAH requiring CVVHDF.

## 2019-05-21 NOTE — PROCEDURE NOTE - NSTIMEOUT_GEN_A_CORE
Patient's first and last name, , procedure, and correct site confirmed prior to the start of procedure.

## 2019-05-21 NOTE — PROCEDURE NOTE - NSICDXPROCEDURE_GEN_ALL_CORE_FT
PROCEDURES:  Insertion, arterial line, percutaneous 28-Apr-2019 18:31:31 Right Radial Mimi Farrell
PROCEDURES:  Insertion of temporary dialysis catheter 21-May-2019 11:02:33  Thee Jose

## 2019-05-21 NOTE — PROVIDER CONTACT NOTE (OTHER) - ASSESSMENT
pt is hemodynamically stable. HR 93, /55, MAP 74, SPO2 99, CVP 7. pt on full support via ventilator
elevated temperature. elevated LFTs
toes dusky, mottled feet, cold to touch.
1cmx 0.75cm discoloration, no ulcerations, or open areas noted.
CVVHD filter clotted twice since 10am this morning. ALMA ROSA Jose made aware, renal aware and  aware. Both lumen flushed easily with normal saline, blood return noted.
Pt previously sedated, responding to pain, moving arms and legs, nonpurposeful movements. Pt hypotensive on pressure support.
hr 106 /42 map 60 RR16 SPO2 100 Arterial Gas sugar 39.
patient sedated on propofol, HR 97, /23, RR 28bpm on full vent support 10/500/5 50%. levo, vaso, cardizem, bumex, heparin.     brown thick secretions from mouth.
performing full pt turn to clean and assess skin. found pt lying on nebulizer/aerosol device. removed object. ALMA ROSA Barba was notified and asked to assess site. notified MARK Love.

## 2019-05-21 NOTE — PROVIDER CONTACT NOTE (OTHER) - BACKGROUND
4/26 C3L
Patient with TARAH requiring CVVHDF.
Pt admitted for C3L on 4/26. PMh ARCENIO, depression, anxiety
patient s/p c3l
patient s/p c3l complicated by respiratory distress trached on 5/4
pt had C3L on 4/26. trached on 5/4. remains on vaso, levo, cardizem, bumex, and heparin gtts. pt receiving intermittent HD.
pt on heparin at 1000 units

## 2019-05-21 NOTE — PROGRESS NOTE ADULT - ATTENDING COMMENTS
lfts improving  can do trial of lomotil and increase as needed  monitor stool output  monitor for worsening encephalopathy

## 2019-05-21 NOTE — PROCEDURE NOTE - NSPOSTPRCRAD_GEN_A_CORE
post-procedure radiography performed
central line located in the superior vena cava/no pneumothorax
central line located in the superior vena cava/no pneumothorax/post-procedure radiography performed
central line located in the superior vena cava/no pneumothorax/post-procedure radiography performed
chest tube in correct position
post-procedure radiography performed
post-procedure radiography performed/central line located in the superior vena cava/no pneumothorax
no pneumothorax/central line located in the superior vena cava/post-procedure radiography performed

## 2019-05-21 NOTE — PROCEDURE NOTE - NSCVLACTUALSITE_VASC_A_CORE
left/subclavian vein
left/internal jugular
left/subclavian vein
left/subclavian vein
right/internal jugular
right/subclavian vein
right/internal jugular

## 2019-05-21 NOTE — PROCEDURE NOTE - NSCOMPLICATION_GEN_A_CORE
no complications

## 2019-05-21 NOTE — PROCEDURE NOTE - NSINDICATIONS_GEN_A_CORE
critical illness
critical patient/arterial puncture to obtain ABG's/monitoring purposes
pleural effusion
arterial puncture to obtain ABG's/blood sampling
critical patient/arterial puncture to obtain ABG's/monitoring purposes
critical patient/arterial puncture to obtain ABG's/monitoring purposes/blood sampling
dialysis/CRRT
dialysis/CRRT
emergency venous access
emergency venous access/hemodynamic monitoring
hemodynamic monitoring/venous access
dialysis/CRRT

## 2019-05-21 NOTE — PROGRESS NOTE ADULT - ASSESSMENT
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 His postop course was complicated by persistent respiratory failure requiring tracheostomy and he had a wedge biopsy showing Pulmonary Amyloidosis.    5/13-poor weaning (less than 2 hrs), CT in place  5/14-off solumedrol, CT in place 100 cc out, wean cpap/ps 5/15  5/15-TC for 12 hrs, renal and CHF f/up  5/16-4 hrs TC, over breath vent-on diprovan; mult pressors/rectal tube/c. diff neg-on meropenem/vanco  5/17-septic-HD-prbc, no weaning--ct cap-c/w multifocal PNA\  5/18-mult PRBC, no wean, CVVHD-gamma globulin  5/19-no change abx/pressors/cvvhd and cooling blanket--MS poor off sedation  5/20-slightly better MS off sedation--no change in status-no weaning; 5/21-no weaning/sedated/CVVHD; Jesus starr

## 2019-05-21 NOTE — PROGRESS NOTE ADULT - SUBJECTIVE AND OBJECTIVE BOX
CHIEF COMPLAINT:  f/up resp failure, pulm amyloidosis, asthma, osas- on vent --sedate-double pressors now      Interval Events: CVVHD, dual pressors    REVIEW OF SYSTEMS:  Constitutional: No fevers or chills. No weight loss. No fatigue or generalized malaise.  Eyes: No itching or discharge from the eyes  ENT: No ear pain. No ear discharge. No nasal congestion. No post nasal drip. No epistaxis. No throat pain. No sore throat. No difficulty swallowing.   CV: No chest pain. No palpitations. No lightheadedness or dizziness.   Resp: No dyspnea at rest. No dyspnea on exertion. No orthopnea. No wheezing. No cough. No stridor. No sputum production. No chest pain with respiration.  GI: No nausea. No vomiting. No diarrhea.  MSK: No joint pain or pain in any extremities  Integumentary: No skin lesions. No pedal edema.  Neurological: No gross motor weakness. No sensory changes.  [ ] All other systems negative  + ] Unable to assess ROS because sedated________    OBJECTIVE:  ICU Vital Signs Last 24 Hrs  T(C): 36 (21 May 2019 04:00), Max: 43 (20 May 2019 08:00)  T(F): 96.8 (21 May 2019 04:00), Max: 109.4 (20 May 2019 08:00)  HR: 79 (21 May 2019 04:16) (70 - 90)  BP: --  BP(mean): --  ABP: 124/45 (21 May 2019 04:15) (99/41 - 165/53)  ABP(mean): 66 (21 May 2019 04:15) (57 - 87)  RR: 23 (21 May 2019 04:15) (7 - 29)  SpO2: 99% (21 May 2019 04:16) (83% - 100%)    Mode: AC/ CMV (Assist Control/ Continuous Mandatory Ventilation), RR (machine): 10, TV (machine): 500, FiO2: 50, PEEP: 5, ITime: 1, MAP: 8, PIP: 20    05-19 @ 07:01  -  05-20 @ 07:00  --------------------------------------------------------  IN: 3065.3 mL / OUT: 4575 mL / NET: -1509.7 mL    05-20 @ 07:01 - 05-21 @ 04:46  --------------------------------------------------------  IN: 2479.5 mL / OUT: 3526 mL / NET: -1046.5 mL      CAPILLARY BLOOD GLUCOSE  113 (19 May 2019 18:00)      POCT Blood Glucose.: 113 mg/dL (19 May 2019 17:00)      PHYSICAL EXAM:  General: vented/sedated  HEENT: Atraumatic, normocephalic.                 Mallampatti Grade 3                No nasal congestion.                No tonsillar or pharyngeal exudates.  Lymph Nodes: No palpable lymphadenopathy  Neck: No JVD. No carotid bruit.   Respiratory: Normal chest expansion-CT out                         Normal percussion                         Normal and equal air entry                         No wheeze, rhonchi or rales.  Cardiovascular: S1 S2 normal. No murmurs, rubs or gallops.   Abdomen: Soft, +-distended. No organomegaly. reduced bowel sounds.  Extremities: Warm to touch. Peripheral pulse palpable. ++diffuse and pedal edema.   Skin: No rashes or skin lesions  Neurological: unable  Psychiatry: unable    HOSPITAL MEDICATIONS:  MEDICATIONS  (STANDING):  artificial  tears Solution 1 Drop(s) Both EYES every 6 hours  calcium chloride Injectable 1000 milliGRAM(s) IV Push <User Schedule>  chlorhexidine 0.12% Liquid 15 milliLiter(s) Oral Mucosa two times a day  chlorhexidine 4% Liquid 1 Application(s) Topical <User Schedule>  dextrose 10%. 1000 milliLiter(s) (20 mL/Hr) IV Continuous <Continuous>  dextrose 50% Injectable 50 milliLiter(s) IV Push once  hydrocortisone sodium succinate Injectable 50 milliGRAM(s) IV Push every 6 hours  meropenem  IVPB 1000 milliGRAM(s) IV Intermittent every 8 hours  micafungin IVPB 100 milliGRAM(s) IV Intermittent every 24 hours  norepinephrine Infusion 0.034 MICROgram(s)/kG/Min (5 mL/Hr) IV Continuous <Continuous>  pantoprazole  Injectable 40 milliGRAM(s) IV Push two times a day  thiamine Injectable 200 milliGRAM(s) IV Push <User Schedule>  vancomycin  IVPB 1000 milliGRAM(s) IV Intermittent every 12 hours  vasopressin Infusion 0.083 Unit(s)/Min (5 mL/Hr) IV Continuous <Continuous>    MEDICATIONS  (PRN):      LABS:                        10.2   11.8  )-----------( 25       ( 21 May 2019 02:14 )             31.3     05-21    133<L>  |  104  |  24<H>  ----------------------------<  93  4.1   |  18<L>  |  0.52    Ca    7.8<L>      21 May 2019 02:14  Phos  3.3     05-21  Mg     2.2     05-21    TPro  5.4<L>  /  Alb  2.1<L>  /  TBili  11.2<H>  /  DBili  x   /  AST  963<H>  /  ALT  937<H>  /  AlkPhos  177<H>  05-21    PT/INR - ( 21 May 2019 02:14 )   PT: 22.3 sec;   INR: 1.90 ratio         PTT - ( 21 May 2019 02:14 )  PTT:46.3 sec    Arterial Blood Gas:  05-21 @ 01:53  7.48/26/136/20/99/-2.6  ABG lactate: --  Arterial Blood Gas:  05-20 @ 21:23  7.46/28/110/20/98/-2.7  ABG lactate: --  Arterial Blood Gas:  05-20 @ 18:08  7.46/29/79/20/96/-2.5  ABG lactate: --  Arterial Blood Gas:  05-20 @ 15:48  7.47/28/121/20/98/-2.3  ABG lactate: --  Arterial Blood Gas:  05-20 @ 11:53  7.47/27/126/19/99/-3.2  ABG lactate: --  Arterial Blood Gas:  05-20 @ 09:34  7.44/23/132/16/99/-7.2  ABG lactate: --  Arterial Blood Gas:  05-20 @ 04:51  7.44/27/170/18/99/-4.9  ABG lactate: --  Arterial Blood Gas:  05-20 @ 02:47  7.50/23/116/18/98/-3.9  ABG lactate: --  Arterial Blood Gas:  05-20 @ 00:46  7.50/26/154/20/99/-2.0  ABG lactate: --  Arterial Blood Gas:  05-19 @ 20:12  7.48/28/153/20/99/-2.3  ABG lactate: --  Arterial Blood Gas:  05-19 @ 18:04  7.48/27/137/20/99/-2.5  ABG lactate: --  Arterial Blood Gas:  05-19 @ 15:17  7.45/31/128/21/99/-2.0  ABG lactate: --  Arterial Blood Gas:  05-19 @ 12:26  7.45/29/129/20/99/-2.9  ABG lactate: --  Arterial Blood Gas:  05-19 @ 09:54  7.46/29/133/20/99/-2.6  ABG lactate: --  Arterial Blood Gas:  05-19 @ 06:08  7.45/28/146/19/99/-3.3  ABG lactate: --    Venous Blood Gas:  05-20 @ 06:37  7.29/36/34/17/55  VBG Lactate: --      MICROBIOLOGY:     RADIOLOGY:  [ ] Reviewed and interpreted by me    Point of Care Ultrasound Findings:    PFT:    EKG:

## 2019-05-21 NOTE — PROGRESS NOTE ADULT - ASSESSMENT
77 yo   male with PMH of HLD, Sleep apnea ( non compliant with CPAP), GERD, diverticulitis, depression, anxiety, and panic attack, presented to [facility] 04/25/19 for LHC after having  abnormal NST done as outpatient, LHC done same day revealed TVD. therefore underwent CABG on 04/26/19, hospital course complicated with septic shock and anuric TARAH.

## 2019-05-21 NOTE — PROGRESS NOTE ADULT - ATTENDING COMMENTS
as above-CT chest c/w PNA--MRSA sputum--- ABX restarted (vanco/meropenem)5/15 plus mycafungin/flagyl-no signif improvement remains CRITICALLY ill  Resp failure--pulm amyloidosis, PNA, CHF, debility-------on vent-sat above 90%  Pulm Amyloid--no definitive rx at present           COPD-duoneb via ETT q 6  CHF-as per Gaby et al-pressors                         Heme- official consult performed-await studies ([place]),abdominal fat pad and transthyretin technetium pyrophosphate cardiac scan   Renal insuff/fluid OL--CVVHD in place  PNA-MRSA-vanco/meropenem/mycafungin/flagyl--as per ID                          GOC/prog critical/guarded  GI-ppi/TF (on hold)                                  Jann Guadarrama MD-Pulmonary   [phone]

## 2019-05-21 NOTE — PROVIDER CONTACT NOTE (CRITICAL VALUE NOTIFICATION) - ASSESSMENT
Patient remains obtunded. WBC 11.8 trended down. Patient without temperature. No s/s of infection. Previous Lactate elevated. CVP 6.

## 2019-05-21 NOTE — PROVIDER CONTACT NOTE (OTHER) - REASON
CVVHDF filter clotted
Low Blood Sugar
Pt with respiratpry distress
brown, thick secretions from mouth
dark red/purple tip of tongue
elevated temperature
found pt lying on nebulizer/aerosol device, leaving a wound
mottled feet with dusky toes
PTT 64.8

## 2019-05-21 NOTE — PROCEDURE NOTE - NSINFORMCONSENT_GEN_A_CORE
This was an emergent procedure.

## 2019-05-21 NOTE — PROGRESS NOTE ADULT - SUBJECTIVE AND OBJECTIVE BOX
INTERVAL HPI/OVERNIGHT EVENTS:    pt seen and examined. Still with loose stool.   tolerating feeds    MEDICATIONS  (STANDING):  artificial  tears Solution 1 Drop(s) Both EYES every 6 hours  calcium chloride Injectable 1000 milliGRAM(s) IV Push <User Schedule>  chlorhexidine 0.12% Liquid 15 milliLiter(s) Oral Mucosa two times a day  chlorhexidine 4% Liquid 1 Application(s) Topical <User Schedule>  dextrose 10%. 1000 milliLiter(s) (20 mL/Hr) IV Continuous <Continuous>  dextrose 50% Injectable 50 milliLiter(s) IV Push once  diphenoxylate/atropine 1 Tablet(s) Oral every 6 hours  heparin  Infusion 500 Unit(s)/Hr (5 mL/Hr) IV Continuous <Continuous>  hydrocortisone sodium succinate Injectable 50 milliGRAM(s) IV Push every 6 hours  meropenem  IVPB 1000 milliGRAM(s) IV Intermittent every 8 hours  micafungin IVPB 100 milliGRAM(s) IV Intermittent every 24 hours  norepinephrine Infusion 0.034 MICROgram(s)/kG/Min (5 mL/Hr) IV Continuous <Continuous>  pantoprazole  Injectable 40 milliGRAM(s) IV Push two times a day  thiamine Injectable 200 milliGRAM(s) IV Push <User Schedule>  vancomycin  IVPB 1000 milliGRAM(s) IV Intermittent every 12 hours  vasopressin Infusion 0.083 Unit(s)/Min (5 mL/Hr) IV Continuous <Continuous>    MEDICATIONS  (PRN):  sodium chloride 0.9% lock flush 10 milliLiter(s) IV Push every 1 hour PRN Pre/post blood products, medications, blood draw, and to maintain line patency      Allergies    No Known Allergies    Intolerances        Review of Systems: unable to obtain    Vital Signs Last 24 Hrs  T(C): 37.1 (21 May 2019 12:00), Max: 37.1 (21 May 2019 12:00)  T(F): 98.8 (21 May 2019 12:00), Max: 98.8 (21 May 2019 12:00)  HR: 97 (21 May 2019 15:45) (75 - 100)  BP: 120/59 (21 May 2019 15:45) (99/54 - 126/68)  BP(mean): 81 (21 May 2019 15:45) (70 - 90)  RR: 26 (21 May 2019 15:45) (16 - 32)  SpO2: 100% (21 May 2019 15:45) (88% - 100%)    PHYSICAL EXAM:    ill appearing  jaundiced  non responsive  + trach  +ngt   frail  soft, nt    LABS:                        9.1    11.1  )-----------( 35       ( 21 May 2019 15:44 )             26.1     05-21    133<L>  |  104  |  24<H>  ----------------------------<  93  4.1   |  18<L>  |  0.52    Ca    7.8<L>      21 May 2019 02:14  Phos  3.3     05-21  Mg     2.2     05-21    TPro  5.4<L>  /  Alb  2.1<L>  /  TBili  11.2<H>  /  DBili  x   /  AST  963<H>  /  ALT  937<H>  /  AlkPhos  177<H>  05-21    PT/INR - ( 21 May 2019 02:14 )   PT: 22.3 sec;   INR: 1.90 ratio         PTT - ( 21 May 2019 02:14 )  PTT:46.3 sec      RADIOLOGY & ADDITIONAL TESTS:

## 2019-05-21 NOTE — PROVIDER CONTACT NOTE (OTHER) - NAME OF MD/NP/PA/DO NOTIFIED:
ALMA ROSA Jose
ALMA ROSA arrington
ALMA ROSA castellanos
Gonzalo Santo
MARK Love
MARK Love, ALMA ROSA Barba
MARK Reilly
Mimi ST
MARK Cavanaugh

## 2019-05-21 NOTE — PROCEDURE NOTE - NSCVLATTEMPTSITEVASC_A_CORE
subclavian vein/left
internal jugular/right
left/internal jugular
left/subclavian vein
right/subclavian vein
subclavian vein/right
internal jugular/right

## 2019-05-21 NOTE — PROGRESS NOTE ADULT - PROBLEM SELECTOR PLAN 3
echocardiography with some LV hypertrophy but reportedly not typical of the starry-armida pattern seen in amyloid  - would suggest technetium pyrophosphate scan once patient more stable which is sensitive and specific for transthyretin cardiac amyloid-Heme f/up 5/20  - could also consider cardiac biopsy as per cards  - Further serologic workup for Amyloid - including Immunoglobulins and serum light chains (to evaluate for AL amyloid) -no monoclonal gammopathy

## 2019-05-21 NOTE — PROCEDURE NOTE - NSPROCDETAILS_GEN_ALL_CORE
sterile technique, catheter placed/lumen(s) aspirated and flushed/sterile dressing applied/guidewire recovered
guidewire recovered/lumen(s) aspirated and flushed/sterile dressing applied/sterile technique, catheter placed
guidewire recovered/lumen(s) aspirated and flushed/sterile dressing applied/sterile technique, catheter placed
lumen(s) aspirated and flushed/ultrasound guidance/guidewire recovered/sterile technique, catheter placed
sutured in place/positive blood return obtained via catheter/all materials/supplies accounted for at end of procedure/connected to a pressurized flush line/hemostasis with direct pressure, dressing applied/Seldinger technique/location identified, draped/prepped, sterile technique used, needle inserted/introduced
Seldinger technique
Seldinger technique/all materials/supplies accounted for at end of procedure/sutured in place/hemostasis with direct pressure, dressing applied/positive blood return obtained via catheter/connected to a pressurized flush line/location identified, draped/prepped, sterile technique used, needle inserted/introduced
lumen(s) aspirated and flushed/guidewire recovered/sterile technique, catheter placed/sterile dressing applied/ultrasound guidance
positive blood return obtained via catheter/all materials/supplies accounted for at end of procedure/ultrasound guidance/sutured in place/location identified, draped/prepped, sterile technique used, needle inserted/introduced/connected to a pressurized flush line/hemostasis with direct pressure, dressing applied
positive blood return obtained via catheter/connected to a pressurized flush line/all materials/supplies accounted for at end of procedure/location identified, draped/prepped, sterile technique used, needle inserted/introduced/sutured in place/hemostasis with direct pressure, dressing applied/Seldinger technique
sterile technique, catheter placed/guidewire recovered/lumen(s) aspirated and flushed/sterile dressing applied
sterile dressing applied/sterile technique, catheter placed/ultrasound guidance/guidewire recovered/lumen(s) aspirated and flushed

## 2019-05-21 NOTE — PROVIDER CONTACT NOTE (OTHER) - DATE AND TIME:
07-May-2019 08:00
08-May-2019 08:00
13-May-2019 12:00
16-May-2019 08:30
16-May-2019 17:30
17-May-2019 18:27
21-May-2019 14:00
28-Apr-2019 11:06
16-May-2019 06:05

## 2019-05-21 NOTE — PROCEDURE NOTE - NSTOLERANCE_GEN_A_CORE
Patient tolerated procedure well.

## 2019-05-21 NOTE — PROGRESS NOTE ADULT - ASSESSMENT
78 yr old with ARCENIO admitted for chest pain and underwent a CABG last last week   Post op has been stable but is still intubated and became febrile last 24hrs.   No evidence of wd infection, alot of sputum and possible LLL infiltrate underwent Bronch/BAL with biopsy to r/o BOOP, started on steroids    sp course of meropenem and micafungin without improvement leading us to do lung biopsy  lung biopsy is positive for amyloid which may also be in kidney and heart.   Also have some GI bleeding and extremities are mottled but viable   Overall MRSA pneumonia, ischemic colitis, lactic acidosis, shock, leucocytosis, transaminitis.     Plan;   Continue with vancomycin dose based on type of HD, level 12 today.   All other cultures negative to date.   No major role of abx in setting of ischemic colitis   on meropenem   ? utility of micafungin  consider stopping.   Prognosis poor.   Follow up prelim cx  ct noted  splenic infarct  unclear if this is related to amyloid or embolic .

## 2019-05-21 NOTE — PROCEDURE NOTE - NSPOSTCAREGUIDE_GEN_A_CORE
Care for catheter as per unit/ICU protocols
Care for catheter as per unit/ICU protocols
Verbal/written post procedure instructions were given to patient/caregiver/Care for catheter as per unit/ICU protocols
Care for catheter as per unit/ICU protocols
Care for catheter as per unit/ICU protocols/Verbal/written post procedure instructions were given to patient/caregiver
Care for catheter as per unit/ICU protocols
Keep the cast/splint/dressing clean and dry/Care for catheter as per unit/ICU protocols/Verbal/written post procedure instructions were given to patient/caregiver
Verbal/written post procedure instructions were given to patient/caregiver/Care for catheter as per unit/ICU protocols
Verbal/written post procedure instructions were given to patient/caregiver/Care for catheter as per unit/ICU protocols
Verbal/written post procedure instructions were given to patient/caregiver/Care for catheter as per unit/ICU protocols/Keep the cast/splint/dressing clean and dry
Care for catheter as per unit/ICU protocols
Keep the cast/splint/dressing clean and dry/Verbal/written post procedure instructions were given to patient/caregiver/Care for catheter as per unit/ICU protocols

## 2019-05-21 NOTE — PROVIDER CONTACT NOTE (OTHER) - ACTION/TREATMENT ORDERED:
no action taken at this time as per NP Afshan. will continue to monitor
NP Soosan aware. NP ordered to come down on vasopressors if possible. NP will also contact renal for consult.
notified NP, no tylenol at this moment d/t LFTs. icepacks applied. will continue to monitor hourly.
will continue to monitor.
Gonzalo Santo at bedside. 2 amps of d50 ordered. Given. d10 IVSS gtt started at 30cc/hr. Repeat Finger stick in 15 min. Will continue to monitor.
New HD catheter placed (Left IJ) after first filter clotted. Filter clotted second time. ALMA ROSA Jose spoke with Renal. New CVVHDF machine obtain. New filter primed and CVVHDF restarted
Pt intubated and bronched by MD Grover.
tube feeds off.
will reassess site and place wound consult for further evaluation.

## 2019-05-21 NOTE — PROGRESS NOTE ADULT - SUBJECTIVE AND OBJECTIVE BOX
[facility] DIVISION OF KIDNEY DISEASES AND HYPERTENSION -- FOLLOW UP NOTE  --------------------------------------------------------------------------------  Chief Complaint:TARAH    24 hour events/subjective: Patient seen at bedside. Remains on CRRT, circuit overnight clotted multiple times, low blood flow. Pt. on IV pressor support, intubated.     PAST HISTORY  --------------------------------------------------------------------------------  No significant changes to PMH, PSH, FHx, SHx, unless otherwise noted    ALLERGIES & MEDICATIONS  --------------------------------------------------------------------------------  Allergies    No Known Allergies    Intolerances      Standing Inpatient Medications  artificial  tears Solution 1 Drop(s) Both EYES every 6 hours  calcium chloride Injectable 1000 milliGRAM(s) IV Push <User Schedule>  chlorhexidine 0.12% Liquid 15 milliLiter(s) Oral Mucosa two times a day  chlorhexidine 4% Liquid 1 Application(s) Topical <User Schedule>  dextrose 10%. 1000 milliLiter(s) IV Continuous <Continuous>  dextrose 50% Injectable 50 milliLiter(s) IV Push once  hydrocortisone sodium succinate Injectable 50 milliGRAM(s) IV Push every 6 hours  meropenem  IVPB 1000 milliGRAM(s) IV Intermittent every 8 hours  micafungin IVPB 100 milliGRAM(s) IV Intermittent every 24 hours  norepinephrine Infusion 0.034 MICROgram(s)/kG/Min IV Continuous <Continuous>  octreotide  Injectable 100 MICROGram(s) SubCutaneous every 8 hours  pantoprazole  Injectable 40 milliGRAM(s) IV Push two times a day  thiamine Injectable 200 milliGRAM(s) IV Push <User Schedule>  vancomycin  IVPB 1000 milliGRAM(s) IV Intermittent every 12 hours  vasopressin Infusion 0.083 Unit(s)/Min IV Continuous <Continuous>    PRN Inpatient Medications      REVIEW OF SYSTEMS  --------------------------------------------------------------------------------  Unable to obtain    VITALS/PHYSICAL EXAM  --------------------------------------------------------------------------------  T(C): 35 (05-21-19 @ 08:20), Max: 36.9 (05-20-19 @ 16:00)  HR: 77 (05-21-19 @ 08:38) (73 - 90)  BP: --  RR: 23 (05-21-19 @ 08:30) (7 - 32)  SpO2: 95% (05-21-19 @ 08:38) (88% - 100%)  Wt(kg): --    05-20-19 @ 07:01  -  05-21-19 @ 07:00  --------------------------------------------------------  IN: 2879.5 mL / OUT: 3687 mL / NET: -807.5 mL    05-21-19 @ 07:01  -  05-21-19 @ 08:54  --------------------------------------------------------  IN: 600 mL / OUT: 94 mL / NET: 506 mL    Physical Exam:  	Gen: critically ill.   	HEENT: +NGT, +trach  	Pulm: CTA B/L  	CV: RRR, S1S2; no rub  	Abd: +BS, soft, nondistended              : De with no urine output.  	LE: Warm, 2+ edema  	Vascular access: +RIJ non-tunneled HD catheter    LABS/STUDIES  --------------------------------------------------------------------------------              10.2   11.8  >-----------<  25       [05-21-19 @ 02:14]              31.3     133  |  104  |  24  ----------------------------<  93      [05-21-19 @ 02:14]  4.1   |  18  |  0.52        Ca     7.8     [05-21-19 @ 02:14]      Mg     2.2     [05-21-19 @ 02:14]      Phos  3.3     [05-21-19 @ 02:14]    TPro  5.4  /  Alb  2.1  /  TBili  11.2  /  DBili  x   /  AST  963  /  ALT  937  /  AlkPhos  177  [05-21-19 @ 02:14]    PT/INR: PT 22.3 , INR 1.90       [05-21-19 @ 02:14]  PTT: 46.3       [05-21-19 @ 02:14]    LDH 1884      [05-20-19 @ 00:50]    Creatinine Trend:  SCr 0.52 [05-21 @ 02:14]  SCr 0.55 [05-20 @ 00:50]  SCr 0.72 [05-19 @ 00:59]  SCr 0.84 [05-18 @ 14:13]  SCr 1.01 [05-18 @ 05:28]

## 2019-05-22 LAB
ALBUMIN SERPL ELPH-MCNC: 2.4 G/DL — LOW (ref 3.3–5)
ALP SERPL-CCNC: 147 U/L — HIGH (ref 40–120)
ALT FLD-CCNC: 392 U/L — HIGH (ref 10–45)
ANION GAP SERPL CALC-SCNC: 12 MMOL/L — SIGNIFICANT CHANGE UP (ref 5–17)
APTT BLD: 59.9 SEC — HIGH (ref 27.5–36.3)
AST SERPL-CCNC: 469 U/L — HIGH (ref 10–40)
BILIRUB SERPL-MCNC: 13.4 MG/DL — HIGH (ref 0.2–1.2)
BUN SERPL-MCNC: 41 MG/DL — HIGH (ref 7–23)
CALCIUM SERPL-MCNC: 8.1 MG/DL — LOW (ref 8.4–10.5)
CHLORIDE SERPL-SCNC: 107 MMOL/L — SIGNIFICANT CHANGE UP (ref 96–108)
CO2 SERPL-SCNC: 17 MMOL/L — LOW (ref 22–31)
CREAT SERPL-MCNC: 0.75 MG/DL — SIGNIFICANT CHANGE UP (ref 0.5–1.3)
CULTURE RESULTS: SIGNIFICANT CHANGE UP
FACT X ACT/NOR PPP: 39 % — LOW (ref 70–170)
GAS PNL BLDA: SIGNIFICANT CHANGE UP
GLUCOSE SERPL-MCNC: 145 MG/DL — HIGH (ref 70–99)
HCT VFR BLD CALC: 27.9 % — LOW (ref 39–50)
HCT VFR BLD CALC: 29.3 % — LOW (ref 39–50)
HCV AB S/CO SERPL IA: 0.2 S/CO — SIGNIFICANT CHANGE UP (ref 0–0.99)
HCV AB SERPL-IMP: SIGNIFICANT CHANGE UP
HGB BLD-MCNC: 10.2 G/DL — LOW (ref 13–17)
HGB BLD-MCNC: 9.7 G/DL — LOW (ref 13–17)
MAGNESIUM SERPL-MCNC: 2.3 MG/DL — SIGNIFICANT CHANGE UP (ref 1.6–2.6)
MCHC RBC-ENTMCNC: 30.6 PG — SIGNIFICANT CHANGE UP (ref 27–34)
MCHC RBC-ENTMCNC: 30.7 PG — SIGNIFICANT CHANGE UP (ref 27–34)
MCHC RBC-ENTMCNC: 34.6 GM/DL — SIGNIFICANT CHANGE UP (ref 32–36)
MCHC RBC-ENTMCNC: 34.9 GM/DL — SIGNIFICANT CHANGE UP (ref 32–36)
MCV RBC AUTO: 88 FL — SIGNIFICANT CHANGE UP (ref 80–100)
MCV RBC AUTO: 88.4 FL — SIGNIFICANT CHANGE UP (ref 80–100)
PHOSPHATE SERPL-MCNC: 3.7 MG/DL — SIGNIFICANT CHANGE UP (ref 2.5–4.5)
PLATELET # BLD AUTO: 21 K/UL — LOW (ref 150–400)
PLATELET # BLD AUTO: 43 K/UL — LOW (ref 150–400)
POTASSIUM SERPL-MCNC: 4.2 MMOL/L — SIGNIFICANT CHANGE UP (ref 3.5–5.3)
POTASSIUM SERPL-SCNC: 4.2 MMOL/L — SIGNIFICANT CHANGE UP (ref 3.5–5.3)
PROT SERPL-MCNC: 4.9 G/DL — LOW (ref 6–8.3)
RBC # BLD: 3.16 M/UL — LOW (ref 4.2–5.8)
RBC # BLD: 3.33 M/UL — LOW (ref 4.2–5.8)
RBC # FLD: 15 % — HIGH (ref 10.3–14.5)
RBC # FLD: 15.3 % — HIGH (ref 10.3–14.5)
SODIUM SERPL-SCNC: 136 MMOL/L — SIGNIFICANT CHANGE UP (ref 135–145)
SPECIMEN SOURCE: SIGNIFICANT CHANGE UP
VANCOMYCIN TROUGH SERPL-MCNC: 26.8 UG/ML — CRITICAL HIGH (ref 10–20)
WBC # BLD: 5 K/UL — SIGNIFICANT CHANGE UP (ref 3.8–10.5)
WBC # BLD: 8 K/UL — SIGNIFICANT CHANGE UP (ref 3.8–10.5)
WBC # FLD AUTO: 5 K/UL — SIGNIFICANT CHANGE UP (ref 3.8–10.5)
WBC # FLD AUTO: 8 K/UL — SIGNIFICANT CHANGE UP (ref 3.8–10.5)

## 2019-05-22 PROCEDURE — 71045 X-RAY EXAM CHEST 1 VIEW: CPT | Mod: 26

## 2019-05-22 PROCEDURE — 36556 INSERT NON-TUNNEL CV CATH: CPT | Mod: 78,LT

## 2019-05-22 PROCEDURE — 99291 CRITICAL CARE FIRST HOUR: CPT

## 2019-05-22 PROCEDURE — 99232 SBSQ HOSP IP/OBS MODERATE 35: CPT

## 2019-05-22 PROCEDURE — 99232 SBSQ HOSP IP/OBS MODERATE 35: CPT | Mod: GC

## 2019-05-22 RX ORDER — HYDROMORPHONE HYDROCHLORIDE 2 MG/ML
1 INJECTION INTRAMUSCULAR; INTRAVENOUS; SUBCUTANEOUS ONCE
Refills: 0 | Status: DISCONTINUED | OUTPATIENT
Start: 2019-05-22 | End: 2019-05-22

## 2019-05-22 RX ORDER — HYDROCORTISONE 20 MG
25 TABLET ORAL EVERY 6 HOURS
Refills: 0 | Status: DISCONTINUED | OUTPATIENT
Start: 2019-05-22 | End: 2019-05-23

## 2019-05-22 RX ORDER — ALBUMIN HUMAN 25 %
100 VIAL (ML) INTRAVENOUS EVERY 8 HOURS
Refills: 0 | Status: DISCONTINUED | OUTPATIENT
Start: 2019-05-22 | End: 2019-05-24

## 2019-05-22 RX ORDER — MIDODRINE HYDROCHLORIDE 2.5 MG/1
10 TABLET ORAL EVERY 8 HOURS
Refills: 0 | Status: DISCONTINUED | OUTPATIENT
Start: 2019-05-22 | End: 2019-05-26

## 2019-05-22 RX ORDER — DIPHENOXYLATE HCL/ATROPINE 2.5-.025MG
2 TABLET ORAL
Refills: 0 | Status: DISCONTINUED | OUTPATIENT
Start: 2019-05-22 | End: 2019-05-25

## 2019-05-22 RX ORDER — MEROPENEM 1 G/30ML
500 INJECTION INTRAVENOUS EVERY 24 HOURS
Refills: 0 | Status: DISCONTINUED | OUTPATIENT
Start: 2019-05-23 | End: 2019-05-23

## 2019-05-22 RX ADMIN — Medication 1 APPLICATION(S): at 13:05

## 2019-05-22 RX ADMIN — Medication 1000 MILLIGRAM(S): at 21:22

## 2019-05-22 RX ADMIN — HYDROMORPHONE HYDROCHLORIDE 1 MILLIGRAM(S): 2 INJECTION INTRAMUSCULAR; INTRAVENOUS; SUBCUTANEOUS at 00:15

## 2019-05-22 RX ADMIN — Medication 1000 MILLIGRAM(S): at 09:09

## 2019-05-22 RX ADMIN — Medication 200 MILLIGRAM(S): at 09:09

## 2019-05-22 RX ADMIN — Medication 2 TABLET(S): at 17:11

## 2019-05-22 RX ADMIN — MICAFUNGIN SODIUM 105 MILLIGRAM(S): 100 INJECTION, POWDER, LYOPHILIZED, FOR SOLUTION INTRAVENOUS at 00:01

## 2019-05-22 RX ADMIN — Medication 25 MILLIGRAM(S): at 17:09

## 2019-05-22 RX ADMIN — Medication 1000 MILLIGRAM(S): at 05:00

## 2019-05-22 RX ADMIN — MEROPENEM 100 MILLIGRAM(S): 1 INJECTION INTRAVENOUS at 06:13

## 2019-05-22 RX ADMIN — CHLORHEXIDINE GLUCONATE 1 APPLICATION(S): 213 SOLUTION TOPICAL at 06:58

## 2019-05-22 RX ADMIN — Medication 25 MILLIGRAM(S): at 13:05

## 2019-05-22 RX ADMIN — MEROPENEM 100 MILLIGRAM(S): 1 INJECTION INTRAVENOUS at 13:03

## 2019-05-22 RX ADMIN — Medication 5 MICROGRAM(S)/KG/MIN: at 09:19

## 2019-05-22 RX ADMIN — Medication 1 TABLET(S): at 06:10

## 2019-05-22 RX ADMIN — HYDROMORPHONE HYDROCHLORIDE 1 MILLIGRAM(S): 2 INJECTION INTRAMUSCULAR; INTRAVENOUS; SUBCUTANEOUS at 21:15

## 2019-05-22 RX ADMIN — MIDODRINE HYDROCHLORIDE 10 MILLIGRAM(S): 2.5 TABLET ORAL at 13:04

## 2019-05-22 RX ADMIN — HYDROMORPHONE HYDROCHLORIDE 1 MILLIGRAM(S): 2 INJECTION INTRAMUSCULAR; INTRAVENOUS; SUBCUTANEOUS at 00:00

## 2019-05-22 RX ADMIN — CHLORHEXIDINE GLUCONATE 15 MILLILITER(S): 213 SOLUTION TOPICAL at 06:11

## 2019-05-22 RX ADMIN — MIDODRINE HYDROCHLORIDE 10 MILLIGRAM(S): 2.5 TABLET ORAL at 09:09

## 2019-05-22 RX ADMIN — VASOPRESSIN 5 UNIT(S)/MIN: 20 INJECTION INTRAVENOUS at 09:19

## 2019-05-22 RX ADMIN — Medication 1 DROP(S): at 06:13

## 2019-05-22 RX ADMIN — PANTOPRAZOLE SODIUM 40 MILLIGRAM(S): 20 TABLET, DELAYED RELEASE ORAL at 06:11

## 2019-05-22 RX ADMIN — Medication 50 MILLILITER(S): at 17:20

## 2019-05-22 RX ADMIN — Medication 50 MILLIGRAM(S): at 06:11

## 2019-05-22 RX ADMIN — HYDROMORPHONE HYDROCHLORIDE 1 MILLIGRAM(S): 2 INJECTION INTRAMUSCULAR; INTRAVENOUS; SUBCUTANEOUS at 21:30

## 2019-05-22 RX ADMIN — Medication 1 DROP(S): at 17:23

## 2019-05-22 RX ADMIN — Medication 50 MILLILITER(S): at 21:33

## 2019-05-22 RX ADMIN — Medication 1 APPLICATION(S): at 00:01

## 2019-05-22 RX ADMIN — Medication 1000 MILLIGRAM(S): at 16:33

## 2019-05-22 RX ADMIN — Medication 20 MILLILITER(S): at 09:19

## 2019-05-22 RX ADMIN — Medication 2 TABLET(S): at 13:04

## 2019-05-22 RX ADMIN — Medication 200 MILLIGRAM(S): at 21:32

## 2019-05-22 RX ADMIN — Medication 1 DROP(S): at 13:04

## 2019-05-22 RX ADMIN — PANTOPRAZOLE SODIUM 40 MILLIGRAM(S): 20 TABLET, DELAYED RELEASE ORAL at 17:09

## 2019-05-22 RX ADMIN — CHLORHEXIDINE GLUCONATE 15 MILLILITER(S): 213 SOLUTION TOPICAL at 17:23

## 2019-05-22 RX ADMIN — MIDODRINE HYDROCHLORIDE 10 MILLIGRAM(S): 2.5 TABLET ORAL at 21:32

## 2019-05-22 NOTE — PROGRESS NOTE ADULT - ATTENDING COMMENTS
TARAH/ATN, septic shock, amyloid, lactic acidosis, hyperphosphatemia, hypocalcemia, hypokalemia.  CRRT circuit clotting  Trach, edematous    - Trial of intermittent hemodialysis given ongoing circuit clotting- Maintain MAP > 65  - Dose medications for intermittent hemodialysis  - Critically ill  - Remainder per fellow, d/w team, will follow    Hemodialysis Treatment.:     Schedule: Once, Modality: Hemodialysis, Access: Internal Jugular Central Venous Catheter    Dialyzer: Revaclear 300, Time: 180 Min    Blood Flow: 300 mL/Min , Dialysate Flow: 500 mL/Min, Dialysate Temp: 35, Tubinmm (Adult)    Target Fluid Removal: 1 Liters    Dialysate Electrolytes (mEq/L):  Calcium 2.5, Bicarbonate 35    Potassium Protocol  -->For serum potassium LESS THAN or EQUAL TO 3.4, notify MD/PA/NP       For serum potassium 3.5 - 4, use 3K dialysate bath       For serum potassium 4.1 - 5.9, use 2K dialysate bath       For serum potassium GREATER THAN or EQUAL TO 6, notify MD/PA/NP    Sodium Modeling (mEq/L): Initial 145, Final 140, Last 30 Min, Gradient Linear    Additional Instructions: Keep BP >100 (19 @ 09:03)

## 2019-05-22 NOTE — PROGRESS NOTE ADULT - SUBJECTIVE AND OBJECTIVE BOX
CHRIS THAKKAR  MRN-[id number]  Patient is a 79y old  Male who presents with a chief complaint of mosd (22 May 2019 08:20)    HPI:  This is a 77 yo   male with PMH of HLD, Sleep apnea ( non compliant with CPAP), GERD, diverticulitis, depression, anxiety, and panic attack. Denies significant PMH of heart disease but reports early CAD in family; mother  of MI at age 54 and uncle ( mothers brother) at age 37.  Presents to Dr Feng with c/c of chest discomfort associated with dyspnea ( on exertion after walking 100 feet)  and palpitations for the past 1 year.  CP is described as 'pinch like", intermittent, sporadic, lasting 1-2 min, localized in the left anterior chest non radiating; CP triggered by exercise and stress. Abnormal NST ( last week) : mild to moderate abnormal study with medium sized inferior and post- lateral ischemia without infarct, EF 50%.  Holter monitor revealed: frequent PVCs, ECHO revealed AR, concentric LVH, diastolic dysfunction, and MR. Referred here today for R and LHC. Presently asymptomatic, denies chest pain, dyspnea, dizziness, palpitations, N&V, HA, LE edema.    OF NOTE: patient was started on Toprol 25mg PO daily however he never picked it up from pharmacy because he states" he did not know about it"    PCP: Marleny Ceja (2019 08:32)      Surgery/Hospital course:    Today:    REVIEW OF SYSTEMS:  Gen: No fever  EYES/ENT: No visual changes;  No vertigo or throat pain   NECK: No pain   RES:  No shortness of breath or Cough  Chest: + incisional pain  CARD: No chest pain   GI: No abdominal pain  : No dysuria  NEURO: No weakness  SKIN: No itching, rashes     Physical Exam:  Vital Signs Last 24 Hrs  T(C): 36.6 (22 May 2019 23:00), Max: 36.9 (22 May 2019 03:00)  T(F): 97.9 (22 May 2019 23:00), Max: 98.5 (22 May 2019 03:00)  HR: 111 (22 May 2019 22:45) (77 - 114)  BP: 98/59 (22 May 2019 21:00) (92/51 - 140/74)  BP(mean): 70 (22 May 2019 21:00) (66 - 100)  RR: 12 (22 May 2019 22:45) (0 - 22)  SpO2: 100% (22 May 2019 22:45) (85% - 100%)  Gen:  Awake, alert   CNS: non focal 	  Neck: no JVD  RES : clear , no wheezing    Chest:   + chest tubes                     CVS: Regular  rhythm. Normal S1/S2  Abd: Soft, non-distended. Bowel sounds present.  Skin: No rash.  Ext:  no edema, A Line  PSY:  ============================I/O===========================   I&O's Detail    21 May 2019 07:01  -  22 May 2019 07:00  --------------------------------------------------------  IN:    Albumin 5%  - 250 mL: 500 mL    dextrose 10%.: 480 mL    Enteral Tube Flush: 320 mL    heparin Infusion: 100 mL    IV PiggyBack: 750 mL    norepinephrine Infusion: 204 mL    ns in tub fed vital1: 480 mL    Packed Red Blood Cells: 700 mL    Platelets - Single Donor: 600 mL    vasopressin Infusion: 142 mL  Total IN: 4276 mL    OUT:    Other: 171 mL    Rectal Tube: 3200 mL  Total OUT: 3371 mL    Total NET: 905 mL      22 May 2019 07:01  -  22 May 2019 23:43  --------------------------------------------------------  IN:    Albumin 25%: 100 mL    Albumin 5% - 50 mL: 100 mL    dextrose 10%.: 300 mL    Enteral Tube Flush: 180 mL    heparin Infusion: 36 mL    IV PiggyBack: 50 mL    Miscellaneous Tube Feedin mL    norepinephrine Infusion: 46.4 mL    ns in tub fed vital1: 580 mL    Platelets - Single Donor: 600 mL    vasopressin Infusion: 90 mL  Total IN: 2632.4 mL    OUT:    Other: 700 mL    Rectal Tube: 2100 mL  Total OUT: 2800 mL    Total NET: -167.6 mL        ============================ LABS =========================                        9.7    5.0   )-----------( 21       ( 22 May 2019 14:57 )             27.9         136  |  107  |  41<H>  ----------------------------<  145<H>  4.2   |  17<L>  |  0.75    Ca    8.1<L>      22 May 2019 00:45  Phos  3.7       Mg     2.3         TPro  4.9<L>  /  Alb  2.4<L>  /  TBili  13.4<H>  /  DBili  x   /  AST  469<H>  /  ALT  392<H>  /  AlkPhos  147<H>      LIVER FUNCTIONS - ( 22 May 2019 00:45 )  Alb: 2.4 g/dL / Pro: 4.9 g/dL / ALK PHOS: 147 U/L / ALT: 392 U/L / AST: 469 U/L / GGT: x           PT/INR - ( 21 May 2019 02:14 )   PT: 22.3 sec;   INR: 1.90 ratio         PTT - ( 22 May 2019 03:30 )  PTT:59.9 sec  ABG - ( 22 May 2019 20:18 )  pH, Arterial: 7.46  pH, Blood: x     /  pCO2: 32    /  pO2: 213   / HCO3: 22    / Base Excess: -.9   /  SaO2: 100                 ======================Micro/Rad/Cardio=================  Culture: Reviewed   CXR: Reviewed  Echo:Reviewed  ======================================================  PAST MEDICAL & SURGICAL HISTORY:  Diverticulitis  Nocturia  Panic attacks  Anxiety  Depression  Asthma: Controlled  Sleep Apnea  History of partial colectomy  History of colon resection  History of eye surgery: PPV right  Cataract extraction status: right  Status post lung surgery: ? wedge resection  S/P eye surgery: &quot;removed fluid&quot; from rigth eye  Bunion  Sinus Disorder: surgery x 2  Inguinal Hernia Bilateral  Shoulder Problem: right rotator cuff  Carpal Tunnel Syndrome: right hand    ====================ASSESMENT ==============  CNS:  RES:  CVS:  Hem:  Francisco:  GI:  Endo:  ID:  Skin  Plan:  ====================== NEUROLOGY=====================    ==================== RESPIRATORY======================  Mechanical Ventilation:  Mode: AC/ CMV (Assist Control/ Continuous Mandatory Ventilation)  RR (machine): 10  TV (machine): 500  FiO2: 50  PEEP: 6  ITime: 1  MAP: 7  PIP: 11      ====================CARDIOVASCULAR==================  midodrine 10 milliGRAM(s) Oral every 8 hours  norepinephrine Infusion 0.034 MICROgram(s)/kG/Min (5 mL/Hr) IV Continuous <Continuous>    ===================HEMATOLOGIC/ONC ===================  heparin  Infusion 500 Unit(s)/Hr (5 mL/Hr) IV Continuous <Continuous>    ===================== RENAL =========================  De for monitoring urine output    ==================== GASTROINTESTINAL===================  albumin human 25% IVPB 100 milliLiter(s) IV Intermittent every 8 hours  calcium chloride Injectable 1000 milliGRAM(s) IV Push <User Schedule>  dextrose 10%. 1000 milliLiter(s) (20 mL/Hr) IV Continuous <Continuous>  diphenoxylate/atropine 2 Tablet(s) Oral four times a day  mineral oil for Topical Use 1 Application(s) Topical daily  pantoprazole  Injectable 40 milliGRAM(s) IV Push two times a day  sodium chloride 0.9% lock flush 10 milliLiter(s) IV Push every 1 hour PRN Pre/post blood products, medications, blood draw, and to maintain line patency  thiamine Injectable 200 milliGRAM(s) IV Push <User Schedule>    =======================    ENDOCRINE  =====================  dextrose 50% Injectable 50 milliLiter(s) IV Push once  hydrocortisone sodium succinate Injectable 25 milliGRAM(s) IV Push every 6 hours  vasopressin Infusion 0.083 Unit(s)/Min (5 mL/Hr) IV Continuous <Continuous>    ========================INFECTIOUS DISEASE================    .crit CHRIS THAKKAR  MRN-[id number]  Patient is a 79y old  Male who presents with a chief complaint of mosd (22 May 2019 08:20)  HPI:  This is a 79 yo   male with PMH of HLD, Sleep apnea ( non compliant with CPAP), GERD, diverticulitis, depression, anxiety, and panic attack. Denies significant PMH of heart disease but reports early CAD in family; mother  of MI at age 54 and uncle ( mothers brother) at age 37.  Presents to Dr Feng with c/c of chest discomfort associated with dyspnea ( on exertion after walking 100 feet)  and palpitations for the past 1 year.  CP is described as 'pinch like", intermittent, sporadic, lasting 1-2 min, localized in the left anterior chest non radiating; CP triggered by exercise and stress. Abnormal NST ( last week) : mild to moderate abnormal study with medium sized inferior and post- lateral ischemia without infarct, EF 50%.  Holter monitor revealed: frequent PVCs, ECHO revealed AR, concentric LVH, diastolic dysfunction, and MR. Referred here today for R and LHC. Presently asymptomatic, denies chest pain, dyspnea, dizziness, palpitations, N&V, HA, LE edema.    OF NOTE: patient was started on Toprol 25mg PO daily however he never picked it up from pharmacy because he states" he did not know about it"  PCP: Marleny Ceja (2019 08:32)    urgery/Hospital course:  2019 C3L   Rintubated   Tracheostomy and Lung biopsy: pulmonary amyloidosis    trached , full vent support,   iv pressors Norepi/vasopressin  anticoagulation heparin,  dialyzed today , fluid removal   Diarrhea/Rectal tube      Physical Exam:  Vital Signs Last 24 Hrs  T(C): 36.6 (22 May 2019 23:00), Max: 36.9 (22 May 2019 03:00)  T(F): 97.9 (22 May 2019 23:00), Max: 98.5 (22 May 2019 03:00)  HR: 111 (22 May 2019 22:45) (77 - 114)  BP: 98/59 (22 May 2019 21:00) (92/51 - 140/74)  BP(mean): 70 (22 May 2019 21:00) (66 - 100)  RR: 12 (22 May 2019 22:45) (0 - 22)  SpO2: 100% (22 May 2019 22:45) (85% - 100%)  Gen: trached, vent support  CNS: open eyes  Neck: + tracheostomy  RES : clear , no wheezing                    CVS: Regular  rhythm. Normal S1/S2  Abd: Soft, non-distended. Bowel sounds present.  Skin: breakdown   Ext:  + edema, A Line  ============================I/O===========================   I&O's Detail    21 May 2019 07:  -  22 May 2019 07:00  --------------------------------------------------------  IN:    Albumin 5%  - 250 mL: 500 mL    dextrose 10%.: 480 mL    Enteral Tube Flush: 320 mL    heparin Infusion: 100 mL    IV PiggyBack: 750 mL    norepinephrine Infusion: 204 mL    ns in tub fed vital1: 480 mL    Packed Red Blood Cells: 700 mL    Platelets - Single Donor: 600 mL    vasopressin Infusion: 142 mL  Total IN: 4276 mL    OUT:    Other: 171 mL    Rectal Tube: 3200 mL  Total OUT: 3371 mL    Total NET: 905 mL      22 May 2019 07:  -  22 May 2019 23:43  --------------------------------------------------------  IN:    Albumin 25%: 100 mL    Albumin 5% - 50 mL: 100 mL    dextrose 10%.: 300 mL    Enteral Tube Flush: 180 mL    heparin Infusion: 36 mL    IV PiggyBack: 50 mL    Miscellaneous Tube Feedin mL    norepinephrine Infusion: 46.4 mL    ns in tub fed vital1: 580 mL    Platelets - Single Donor: 600 mL    vasopressin Infusion: 90 mL  Total IN: 2632.4 mL    OUT:    Other: 700 mL    Rectal Tube: 2100 mL  Total OUT: 2800 mL    Total NET: -167.6 mL        ============================ LABS =========================                        9.7    5.0   )-----------(        ( 22 May 2019 14:57 )             27.9     05    136  |  107  |  41<H>  ----------------------------<  145<H>  4.2   |  17<L>  |  0.75    Ca    8.1<L>      22 May 2019 00:45  Phos  3.7       Mg     2.3      TPro  4.9<L>  /  Alb  2.4<L>  /  TBili  13.4<H>  /  DBili  x   /  AST  469<H>  /  ALT  392<H>  /  AlkPhos  147<H>    LIVER FUNCTIONS - ( 22 May 2019 00:45 )Alb: 2.4 g/dL / Pro: 4.9 g/dL / ALK PHOS: 147 U/L / ALT: 392 U/L / AST: 469 U/L / GGT: x         PT/INR - ( 21 May 2019 02:14 )   PT: 22.3 sec;   INR: 1.90 ratio  PTT - ( 22 May 2019 03:30 )  PTT:59.9 sec  ABG - ( 22 May 2019 20:18 )pH, Arterial: 7.46  pH, Blood: x     /  pCO2: 32    /  pO2: 213   / HCO3: 22    / Base Excess: -.9   /  SaO2: 100         ======================Micro/Rad/Cardio=================  Culture: Reviewed   CXR: Reviewed  Echo:Reviewed  ======================================================  PAST MEDICAL & SURGICAL HISTORY:  Diverticulitis  Nocturia  Panic attacks  Anxiety  Depression  Asthma: Controlled  Sleep Apnea  History of partial colectomy  History of colon resection  History of eye surgery: PPV right  Cataract extraction status: right  Status post lung surgery: ? wedge resection  S/P eye surgery: &quot;removed fluid&quot; from rigth eye  Bunion  Sinus Disorder: surgery x 2  Inguinal Hernia Bilateral  Shoulder Problem: right rotator cuff  Carpal Tunnel Syndrome: right hand    ===========ASSESMENT ============  2019 CABG  C3L  acute hypoxemic respiratory failure   Tracheostomy and Lung biopsy: pulmonary amyloidosis  Pneumonia MRSA  Hypotension  acute renal failue/TARAH  elevated transaminits   ASHD/CAD  Pulmonary amyloidoss  atrial fibrillation/flutter  anxiety  depression    Plan:  ====================== NEUROLOGY=====================  no sedation .   ==================== RESPIRATORY======================  Mechanical Ventilation:  Mode: AC/ CMV (Assist Control/ Continuous Mandatory Ventilation)  RR (machine): 10  TV (machine): 500  FiO2: 50  PEEP: 6    ====================CARDIOVASCULAR==================  midodrine 10 milliGRAM(s) Oral every 8 hours  norepinephrine Infusion 0.034 MICROgram(s)/kG/Min (5 mL/Hr) IV Continuous <Continuous>  vasopressin Infusion 0.083 Unit(s)/Min (5 mL/Hr) IV Continuous <Continuous>  ===================HEMATOLOGIC/ONC ===================  heparin  Infusion 500 Unit(s)/Hr (5 mL/Hr) IV Continuous <Continuous>    ===================== RENAL =========================  Hemodialysis today , fluid removal  ==================== GASTROINTESTINAL===================  albumin human 25% IVPB 100 milliLiter(s) IV Intermittent every 8 hours  calcium chloride Injectable 1000 milliGRAM(s) IV Push <User Schedule>  dextrose 10%. 1000 milliLiter(s) (20 mL/Hr) IV Continuous <Continuous>  diphenoxylate/atropine 2 Tablet(s) Oral four times a day  mineral oil for Topical Use 1 Application(s) Topical daily  pantoprazole  Injectable 40 milliGRAM(s) IV Push two times a day  sodium chloride 0.9% lock flush 10 milliLiter(s) IV Push every 1 hour PRN Pre/post blood products, medications, blood draw, and to maintain line patency  thiamine Injectable 200 milliGRAM(s) IV Push <User Schedule>    =======================    ENDOCRINE  =====================  dextrose 50% Injectable 50 milliLiter(s) IV Push once  hydrocortisone sodium succinate Injectable 25 milliGRAM(s) IV Push every 6 hours  ========================INFECTIOUS DISEASE================  iv vanco and meropenem    Patient requires continuous monitoring with bedside rhythm monitoring,arterial line,pulse oximetry,ventilator monitoring;intermittent blood gas analysis.  Care plan discussed with ICU care team.  i had long discussion with pt son, update him on pt coondition.  patient remain critical; required more than usual post op care; I have spent 35 minutes providing non routine post op care.

## 2019-05-22 NOTE — PROGRESS NOTE ADULT - ATTENDING COMMENTS
as above-CT chest c/w PNA--MRSA sputum--- ABX restarted (vanco/meropenem)5/15 plus mycafungin-no signif improvement remains CRITICALLY ill  Resp failure--pulm amyloidosis, PNA, CHF, debility-------on vent-sat above 90% (further declline)  Pulm Amyloid--no definitive rx at present           COPD-duoneb via ETT q 6  CHF-as per Gaby et al-pressors                         Heme- official consult performed-await studies ([facility]),abdominal fat pad and transthyretin technetium pyrophosphate cardiac scan   Renal insuff/fluid OL--CVVHD in place  PNA-MRSA-vanco/meropenem/mycafungin/flagyl--as per ID                          GOC/prog critical/guarded  GI-ppi/TF (on hold) -likely ischemic colitis                                 Jann Guadarrama MD-Pulmonary   [phone]

## 2019-05-22 NOTE — PROGRESS NOTE ADULT - ASSESSMENT
79 yo   male with PMH of HLD, Sleep apnea ( non compliant with CPAP), GERD, diverticulitis, depression, anxiety, and panic attack, presented to [facility] on 04/25/19 for LHC after having  abnormal NST done as outpatient, LHC done same day revealed TVD. therefore underwent CABG on 04/26/19, hospital course complicated with septic shock and anuric TARAH.

## 2019-05-22 NOTE — PROGRESS NOTE ADULT - SUBJECTIVE AND OBJECTIVE BOX
infectious diseases progress note:    Patient is a 79y old  Male who presents with a chief complaint of sob (22 May 2019 04:50)        Angina pectoris  Atherosclerosis of native coronary artery without angina pectoris             Allergies    No Known Allergies    Intolerances        ANTIBIOTICS/RELEVANT:  antimicrobials  meropenem  IVPB 1000 milliGRAM(s) IV Intermittent every 8 hours  micafungin IVPB 100 milliGRAM(s) IV Intermittent every 24 hours    immunologic:    OTHER:  artificial  tears Solution 1 Drop(s) Both EYES every 6 hours  calcium chloride Injectable 1000 milliGRAM(s) IV Push <User Schedule>  chlorhexidine 0.12% Liquid 15 milliLiter(s) Oral Mucosa two times a day  chlorhexidine 4% Liquid 1 Application(s) Topical <User Schedule>  dextrose 10%. 1000 milliLiter(s) IV Continuous <Continuous>  dextrose 50% Injectable 50 milliLiter(s) IV Push once  diphenoxylate/atropine 2 Tablet(s) Oral four times a day  heparin  Infusion 500 Unit(s)/Hr IV Continuous <Continuous>  hydrocortisone sodium succinate Injectable 50 milliGRAM(s) IV Push every 6 hours  mineral oil for Topical Use 1 Application(s) Topical daily  norepinephrine Infusion 0.034 MICROgram(s)/kG/Min IV Continuous <Continuous>  pantoprazole  Injectable 40 milliGRAM(s) IV Push two times a day  sodium chloride 0.9% lock flush 10 milliLiter(s) IV Push every 1 hour PRN  thiamine Injectable 200 milliGRAM(s) IV Push <User Schedule>  vasopressin Infusion 0.083 Unit(s)/Min IV Continuous <Continuous>      Objective:  Vital Signs Last 24 Hrs  T(C): 36.1 (22 May 2019 07:00), Max: 37.6 (21 May 2019 17:15)  T(F): 97 (22 May 2019 07:00), Max: 99.6 (21 May 2019 17:15)  HR: 78 (22 May 2019 07:30) (77 - 100)  BP: 126/69 (22 May 2019 07:30) (92/53 - 148/64)  BP(mean): 92 (22 May 2019 07:30) (66 - 98)  RR: 16 (22 May 2019 07:30) (7 - 29)  SpO2: 100% (22 May 2019 07:30) (85% - 100%)    PHYSICAL EXAM:   -no acute distress  Eyes:RYLEY, EOMI  Ear/Nose/Throat: no oral lesion, no sinus tenderness on percussion	  Neck:no JVD, no lymphadenopathy, supple  Respiratory: CTA shagufta  Cardiovascular: S1S2 RRR, no murmurs  Gastrointestinal:soft, (+) BS, no HSM  Extremities:no e/e/c        LABS:                        10.2   8.0   )-----------( 43       ( 22 May 2019 00:45 )             29.3     05-22    136  |  107  |  41<H>  ----------------------------<  145<H>  4.2   |  17<L>  |  0.75    Ca    8.1<L>      22 May 2019 00:45  Phos  3.7     05-22  Mg     2.3     05-22    TPro  4.9<L>  /  Alb  2.4<L>  /  TBili  13.4<H>  /  DBili  x   /  AST  469<H>  /  ALT  392<H>  /  AlkPhos  147<H>  05-22    PT/INR - ( 21 May 2019 02:14 )   PT: 22.3 sec;   INR: 1.90 ratio         PTT - ( 22 May 2019 03:30 )  PTT:59.9 sec        MICROBIOLOGY:    RECENT CULTURES:  05-17 @ 05:28 .Blood                No growth at 5 days.    05-16 @ 03:49 .Sputum   KARAN    Numerous polymorphonuclear leukocytes seen per low power field  Few Squamous epithelial cells seen per low power field  Moderate Gram Positive Cocci in Clusters seen per oil power field  Moderate Gram Negative Rods seen per oil power field  Few Gram Negative Diplococci seen per oil power field    Methicillin resistant Staphylococcus aureus  Methicillin resistant Staphylococcus aureus     Numerous Methicillin resistant Staphylococcus aureus  Normal Respiratory Annamaria present    05-15 @ 19:26 .Blood                No growth at 5 days.          RESPIRATORY CULTURES:              RADIOLOGY & ADDITIONAL STUDIES:        Pager [phone]  After 5 pm/weekends or if no response :[phone]

## 2019-05-22 NOTE — PROGRESS NOTE ADULT - SUBJECTIVE AND OBJECTIVE BOX
[facility] DIVISION OF KIDNEY DISEASES AND HYPERTENSION -- FOLLOW UP NOTE  --------------------------------------------------------------------------------  Chief Complaint: TARAH    24 hour events/subjective: Patient seen at bedside. CRRT discontinue yesterday, circuit clotted and unable to start. Pt. on IV pressor support, intubated.     PAST HISTORY  --------------------------------------------------------------------------------  No significant changes to PMH, PSH, FHx, SHx, unless otherwise noted    ALLERGIES & MEDICATIONS  --------------------------------------------------------------------------------  Allergies    No Known Allergies    Intolerances      Standing Inpatient Medications  artificial  tears Solution 1 Drop(s) Both EYES every 6 hours  calcium chloride Injectable 1000 milliGRAM(s) IV Push <User Schedule>  chlorhexidine 0.12% Liquid 15 milliLiter(s) Oral Mucosa two times a day  chlorhexidine 4% Liquid 1 Application(s) Topical <User Schedule>  dextrose 10%. 1000 milliLiter(s) IV Continuous <Continuous>  dextrose 50% Injectable 50 milliLiter(s) IV Push once  diphenoxylate/atropine 2 Tablet(s) Oral four times a day  heparin  Infusion 500 Unit(s)/Hr IV Continuous <Continuous>  hydrocortisone sodium succinate Injectable 50 milliGRAM(s) IV Push every 6 hours  meropenem  IVPB 1000 milliGRAM(s) IV Intermittent every 8 hours  micafungin IVPB 100 milliGRAM(s) IV Intermittent every 24 hours  mineral oil for Topical Use 1 Application(s) Topical daily  norepinephrine Infusion 0.034 MICROgram(s)/kG/Min IV Continuous <Continuous>  pantoprazole  Injectable 40 milliGRAM(s) IV Push two times a day  thiamine Injectable 200 milliGRAM(s) IV Push <User Schedule>  vasopressin Infusion 0.083 Unit(s)/Min IV Continuous <Continuous>    PRN Inpatient Medications  sodium chloride 0.9% lock flush 10 milliLiter(s) IV Push every 1 hour PRN      REVIEW OF SYSTEMS  --------------------------------------------------------------------------------  Unable to obtain    VITALS/PHYSICAL EXAM  --------------------------------------------------------------------------------  T(C): 36.1 (05-22-19 @ 07:00), Max: 37.6 (05-21-19 @ 17:15)  HR: 78 (05-22-19 @ 07:30) (77 - 100)  BP: 126/69 (05-22-19 @ 07:30) (92/53 - 148/64)  RR: 16 (05-22-19 @ 07:30) (7 - 29)  SpO2: 100% (05-22-19 @ 07:30) (85% - 100%)  Wt(kg): --    05-21-19 @ 07:01  -  05-22-19 @ 07:00  --------------------------------------------------------  IN: 4276 mL / OUT: 3371 mL / NET: 905 mL    Physical Exam:  	Gen: critically ill.   	HEENT: +NGT, +trach  	Pulm: CTA B/L  	CV: RRR, S1S2; no rub  	Abd: +BS, soft, nondistended              : anuric   	LE: Warm, 2+ edema  	Vascular access: +LIJ non-tunneled HD catheter      LABS/STUDIES  --------------------------------------------------------------------------------              10.2   8.0   >-----------<  43       [05-22-19 @ 00:45]              29.3     136  |  107  |  41  ----------------------------<  145      [05-22-19 @ 00:45]  4.2   |  17  |  0.75        Ca     8.1     [05-22-19 @ 00:45]      Mg     2.3     [05-22-19 @ 00:45]      Phos  3.7     [05-22-19 @ 00:45]    TPro  4.9  /  Alb  2.4  /  TBili  13.4  /  DBili  x   /  AST  469  /  ALT  392  /  AlkPhos  147  [05-22-19 @ 00:45]    PT/INR: PT 22.3 , INR 1.90       [05-21-19 @ 02:14]  PTT: 59.9       [05-22-19 @ 03:30]      Creatinine Trend:  SCr 0.75 [05-22 @ 00:45]  SCr 0.52 [05-21 @ 02:14]  SCr 0.55 [05-20 @ 00:50]  SCr 0.72 [05-19 @ 00:59]  SCr 0.84 [05-18 @ 14:13]

## 2019-05-22 NOTE — PROGRESS NOTE ADULT - SUBJECTIVE AND OBJECTIVE BOX
CHIEF COMPLAINT:  f/up resp failure, pulm amyloidosis, asthma, osas- on vent --sedate-double pressors continue--off HD until machine addressed      Interval Events: boosted fio2 for hypoxemia    REVIEW OF SYSTEMS:  Constitutional: No fevers or chills. No weight loss. No fatigue or generalized malaise.  Eyes: No itching or discharge from the eyes  ENT: No ear pain. No ear discharge. No nasal congestion. No post nasal drip. No epistaxis. No throat pain. No sore throat. No difficulty swallowing.   CV: No chest pain. No palpitations. No lightheadedness or dizziness.   Resp: No dyspnea at rest. No dyspnea on exertion. No orthopnea. No wheezing. No cough. No stridor. No sputum production. No chest pain with respiration.  GI: No nausea. No vomiting. No diarrhea.  MSK: No joint pain or pain in any extremities  Integumentary: No skin lesions. No pedal edema.  Neurological: No gross motor weakness. No sensory changes.  [ ] All other systems negative  [+ ] Unable to assess ROS because _sedated_______    OBJECTIVE:  ICU Vital Signs Last 24 Hrs  T(C): 37.1 (21 May 2019 19:00), Max: 37.6 (21 May 2019 17:15)  T(F): 98.8 (21 May 2019 19:00), Max: 99.6 (21 May 2019 17:15)  HR: 81 (22 May 2019 04:00) (75 - 100)  BP: 130/61 (22 May 2019 04:00) (92/53 - 148/64)  BP(mean): 88 (22 May 2019 04:00) (66 - 95)  ABP: 130/47 (22 May 2019 04:00) (103/40 - 170/57)  ABP(mean): 68 (22 May 2019 04:00) (53 - 90)  RR: 14 (22 May 2019 04:00) (14 - 32)  SpO2: 100% (22 May 2019 04:00) (85% - 100%)    Mode: AC/ CMV (Assist Control/ Continuous Mandatory Ventilation), RR (machine): 10, TV (machine): 500, FiO2: 50, PEEP: 6, ITime: 1, MAP: 8, PIP: 13    05-20 @ 07:01  -  05-21 @ 07:00  --------------------------------------------------------  IN: 2879.5 mL / OUT: 3687 mL / NET: -807.5 mL    05-21 @ 07:01  - 05-22 @ 04:50  --------------------------------------------------------  IN: 4049 mL / OUT: 2971 mL / NET: 1078 mL      CAPILLARY BLOOD GLUCOSE          PHYSICAL EXAM:  General: on vent crit. ill  HEENT: Atraumatic, normocephalic.                 Mallampatti Grade 3                No nasal congestion.                No tonsillar or pharyngeal exudates.  Lymph Nodes: No palpable lymphadenopathy  Neck: No JVD. No carotid bruit.   Respiratory: Normal chest expansion                         Normal percussion                         Normal and equal air entry                         No wheeze, rhonchi or rales.  Cardiovascular: S1 S2 normal. No murmurs, rubs or gallops.   Abdomen:  non-distended. No organomegaly. inactive bowel sounds.  Extremities: Warm to touch. Peripheral pulse palpable. + total body/ pedal edema.   Skin: No rashes or skin lesions  Neurological: unable  Psychiatry: unable    HOSPITAL MEDICATIONS:  MEDICATIONS  (STANDING):  artificial  tears Solution 1 Drop(s) Both EYES every 6 hours  calcium chloride Injectable 1000 milliGRAM(s) IV Push <User Schedule>  chlorhexidine 0.12% Liquid 15 milliLiter(s) Oral Mucosa two times a day  chlorhexidine 4% Liquid 1 Application(s) Topical <User Schedule>  dextrose 10%. 1000 milliLiter(s) (20 mL/Hr) IV Continuous <Continuous>  dextrose 50% Injectable 50 milliLiter(s) IV Push once  diphenoxylate/atropine 1 Tablet(s) Oral every 6 hours  heparin  Infusion 500 Unit(s)/Hr (5 mL/Hr) IV Continuous <Continuous>  hydrocortisone sodium succinate Injectable 50 milliGRAM(s) IV Push every 6 hours  meropenem  IVPB 1000 milliGRAM(s) IV Intermittent every 8 hours  micafungin IVPB 100 milliGRAM(s) IV Intermittent every 24 hours  mineral oil for Topical Use 1 Application(s) Topical daily  norepinephrine Infusion 0.034 MICROgram(s)/kG/Min (5 mL/Hr) IV Continuous <Continuous>  pantoprazole  Injectable 40 milliGRAM(s) IV Push two times a day  thiamine Injectable 200 milliGRAM(s) IV Push <User Schedule>  vancomycin  IVPB 1000 milliGRAM(s) IV Intermittent every 12 hours  vasopressin Infusion 0.083 Unit(s)/Min (5 mL/Hr) IV Continuous <Continuous>    MEDICATIONS  (PRN):  sodium chloride 0.9% lock flush 10 milliLiter(s) IV Push every 1 hour PRN Pre/post blood products, medications, blood draw, and to maintain line patency      LABS:                        10.2   8.0   )-----------( 43       ( 22 May 2019 00:45 )             29.3     05-22    136  |  107  |  41<H>  ----------------------------<  145<H>  4.2   |  17<L>  |  0.75    Ca    8.1<L>      22 May 2019 00:45  Phos  3.7     05-22  Mg     2.3     05-22    TPro  4.9<L>  /  Alb  2.4<L>  /  TBili  13.4<H>  /  DBili  x   /  AST  469<H>  /  ALT  392<H>  /  AlkPhos  147<H>  05-22    PT/INR - ( 21 May 2019 02:14 )   PT: 22.3 sec;   INR: 1.90 ratio         PTT - ( 22 May 2019 03:30 )  PTT:59.9 sec    Arterial Blood Gas:  05-22 @ 01:27  7.35/36/168/19/99/-5.2  ABG lactate: --  Arterial Blood Gas:  05-22 @ 00:34  7.36/35/65/19/92/-5.0  ABG lactate: --  Arterial Blood Gas:  05-21 @ 21:12  7.43/29/116/19/99/-4.5  ABG lactate: --  Arterial Blood Gas:  05-21 @ 19:02  7.43/28/105/18/98/-5.1  ABG lactate: --  Arterial Blood Gas:  05-21 @ 15:35  7.44/27/116/18/98/-5.0  ABG lactate: --  Arterial Blood Gas:  05-21 @ 11:22  7.43/29/113/19/98/-4.2  ABG lactate: --  Arterial Blood Gas:  05-21 @ 09:06  7.44/28/244/18/99/-4.4  ABG lactate: --  Arterial Blood Gas:  05-21 @ 06:47  7.44/26/156/18/99/-5.0  ABG lactate: --  Arterial Blood Gas:  05-21 @ 05:30  7.42/26/135/17/99/-5.9  ABG lactate: --  Arterial Blood Gas:  05-21 @ 01:53  7.48/26/136/20/99/-2.6  ABG lactate: --  Arterial Blood Gas:  05-20 @ 21:23  7.46/28/110/20/98/-2.7  ABG lactate: --  Arterial Blood Gas:  05-20 @ 18:08  7.46/29/79/20/96/-2.5  ABG lactate: --  Arterial Blood Gas:  05-20 @ 15:48  7.47/28/121/20/98/-2.3  ABG lactate: --  Arterial Blood Gas:  05-20 @ 11:53  7.47/27/126/19/99/-3.2  ABG lactate: --  Arterial Blood Gas:  05-20 @ 09:34  7.44/23/132/16/99/-7.2  ABG lactate: --  Arterial Blood Gas:  05-20 @ 04:51  7.44/27/170/18/99/-4.9  ABG lactate: --    Venous Blood Gas:  05-20 @ 06:37  7.29/36/34/17/55  VBG Lactate: --      MICROBIOLOGY:     RADIOLOGY:  [ ] Reviewed and interpreted by me    Point of Care Ultrasound Findings:    PFT:    EKG:

## 2019-05-22 NOTE — PROGRESS NOTE ADULT - PROBLEM SELECTOR PLAN 1
Pt with TARAH most likely hemodynamically mediated vs ischemic ATN in the setting of anemia from blood loss, hemodynamic instability, and renal venous congestion. SCr of 1.2 in 2016, 1.1 08/18 and 1.06 on 04/25/19. Started on HD on 05/11/19 due to BUN 190s worsening mental status, concerning for uremic encephalopathy. Labs reviewed today. Last HD 5/15/19 and switch to CRRT on 5/16/19 due to shock and discontinue on 5/21/19. Plan to hold CRRT today. Monitor I&O, daily weights, avoid nephrotoxics. Adjust meds based on GFR.  Avoid hypotension. Pt with TARAH most likely hemodynamically mediated vs ischemic ATN in the setting of anemia from blood loss, hemodynamic instability, and renal venous congestion. SCr of 1.2 in 2016, 1.1 08/18 and 1.06 on 04/25/19. Started on HD on 05/11/19 due to BUN 190s worsening mental status, concerning for uremic encephalopathy. Labs reviewed today. Last HD 5/15/19 and switch to CRRT on 5/16/19 due to shock and discontinue on 5/21/19. Plan to attempt IHD today. Monitor I&O, daily weights, avoid nephrotoxics. Adjust meds based on GFR.  Avoid hypotension.

## 2019-05-22 NOTE — PROGRESS NOTE ADULT - ASSESSMENT
elevated lfts  diarrhea    c diff negative  etiology of diarrhea can be antibiotic associated, related to tube feeds, amyloid infiltration of gi tract  stool is brown no evidence of bleeding  no role for endoscopic evaluation  lfts improving  can do trial of lomotil and increase as needed  monitor stool output  monitor for worsening encephalopathy   consider xifaxin     Advanced care planning was discussed with patient and family.  Advanced care planning forms were reviewed and discussed.  Risks, benefits and alternatives of gastroenterologic procedures were discussed in detail and all questions were answered.    30 minutes spent.

## 2019-05-22 NOTE — PROGRESS NOTE ADULT - ASSESSMENT
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 His postop course was complicated by persistent respiratory failure requiring tracheostomy and he had a wedge biopsy showing Pulmonary Amyloidosis.    5/13-poor weaning (less than 2 hrs), CT in place  5/14-off solumedrol, CT in place 100 cc out, wean cpap/ps 5/15  5/15-TC for 12 hrs, renal and CHF f/up  5/16-4 hrs TC, over breath vent-on diprovan; mult pressors/rectal tube/c. diff neg-on meropenem/vanco  5/17-septic-HD-prbc, no weaning--ct cap-c/w multifocal PNA\  5/18-mult PRBC, no wean, CVVHD-gamma globulin  5/19-no change abx/pressors/cvvhd and cooling blanket--MS poor off sedation  5/20-slightly better MS off sedation--no change in status-no weaning; 5/21-no weaning/sedated/CVVHD; Heme evaln  5/22-further resp. decline-boosted fio2-60%-temporarily off cvvhd-reevaluation today

## 2019-05-22 NOTE — PROGRESS NOTE ADULT - ASSESSMENT
78 yr old with ARCENIO admitted for chest pain and underwent a CABG last last week   Post op has been stable but is still intubated and became febrile last 24hrs.   No evidence of wd infection, alot of sputum and possible LLL infiltrate underwent Bronch/BAL with biopsy to r/o BOOP, started on steroids    sp course of meropenem and micafungin without improvement leading us to do lung biopsy  lung biopsy is positive for amyloid which may also be in kidney and heart.   Also have some GI bleeding and extremities are mottled but viable   Overall MRSA pneumonia, ischemic colitis, lactic acidosis, shock, leucocytosis, transaminitis.     Plan;   Continue with vancomycin dose based on type of HD, level 12 today.   All other cultures negative to date.   No major role of abx in setting of ischemic colitis   on meropenem   ? utility of micafungin  consider stopping.   Prognosis poor.   ct noted  splenic infarct  unclear if this is related to amyloid or embolic .   vanco stopped secondary to high level  would dose by level for the next 48 hrs

## 2019-05-22 NOTE — PROGRESS NOTE ADULT - SUBJECTIVE AND OBJECTIVE BOX
INTERVAL HPI/OVERNIGHT EVENTS:    pt seen and examined. Still with loose stool.   tolerating feeds    MEDICATIONS  (STANDING):  artificial  tears Solution 1 Drop(s) Both EYES every 6 hours  calcium chloride Injectable 1000 milliGRAM(s) IV Push <User Schedule>  chlorhexidine 0.12% Liquid 15 milliLiter(s) Oral Mucosa two times a day  chlorhexidine 4% Liquid 1 Application(s) Topical <User Schedule>  dextrose 10%. 1000 milliLiter(s) (20 mL/Hr) IV Continuous <Continuous>  dextrose 50% Injectable 50 milliLiter(s) IV Push once  diphenoxylate/atropine 2 Tablet(s) Oral four times a day  heparin  Infusion 500 Unit(s)/Hr (5 mL/Hr) IV Continuous <Continuous>  hydrocortisone sodium succinate Injectable 25 milliGRAM(s) IV Push every 6 hours  meropenem  IVPB 1000 milliGRAM(s) IV Intermittent every 8 hours  midodrine 10 milliGRAM(s) Oral every 8 hours  mineral oil for Topical Use 1 Application(s) Topical daily  norepinephrine Infusion 0.034 MICROgram(s)/kG/Min (5 mL/Hr) IV Continuous <Continuous>  pantoprazole  Injectable 40 milliGRAM(s) IV Push two times a day  thiamine Injectable 200 milliGRAM(s) IV Push <User Schedule>  vasopressin Infusion 0.083 Unit(s)/Min (5 mL/Hr) IV Continuous <Continuous>    MEDICATIONS  (PRN):  sodium chloride 0.9% lock flush 10 milliLiter(s) IV Push every 1 hour PRN Pre/post blood products, medications, blood draw, and to maintain line patency      Allergies    No Known Allergies    Intolerances        Review of Systems: unable to obtain      Vital Signs Last 24 Hrs  T(C): 36.1 (22 May 2019 07:00), Max: 37.6 (21 May 2019 17:15)  T(F): 97 (22 May 2019 07:00), Max: 99.6 (21 May 2019 17:15)  HR: 82 (22 May 2019 12:11) (77 - 100)  BP: 119/61 (22 May 2019 12:00) (92/51 - 148/64)  BP(mean): 83 (22 May 2019 12:00) (66 - 100)  RR: 16 (22 May 2019 12:00) (0 - 29)  SpO2: 98% (22 May 2019 12:11) (85% - 100%)    PHYSICAL EXAM:    ill appearing  jaundiced  non responsive  + trach  +ngt   frail  soft, nt    LABS:                        10.2   8.0   )-----------( 43       ( 22 May 2019 00:45 )             29.3     05-22    136  |  107  |  41<H>  ----------------------------<  145<H>  4.2   |  17<L>  |  0.75    Ca    8.1<L>      22 May 2019 00:45  Phos  3.7     05-22  Mg     2.3     05-22    TPro  4.9<L>  /  Alb  2.4<L>  /  TBili  13.4<H>  /  DBili  x   /  AST  469<H>  /  ALT  392<H>  /  AlkPhos  147<H>  05-22    PT/INR - ( 21 May 2019 02:14 )   PT: 22.3 sec;   INR: 1.90 ratio         PTT - ( 22 May 2019 03:30 )  PTT:59.9 sec      RADIOLOGY & ADDITIONAL TESTS:

## 2019-05-23 LAB
ALBUMIN SERPL ELPH-MCNC: 2.9 G/DL — LOW (ref 3.3–5)
ALP SERPL-CCNC: 131 U/L — HIGH (ref 40–120)
ALT FLD-CCNC: 142 U/L — HIGH (ref 10–45)
AMMONIA BLD-MCNC: 39 UMOL/L — SIGNIFICANT CHANGE UP (ref 11–55)
ANION GAP SERPL CALC-SCNC: 12 MMOL/L — SIGNIFICANT CHANGE UP (ref 5–17)
APTT BLD: 48.1 SEC — HIGH (ref 27.5–36.3)
APTT BLD: 56.2 SEC — HIGH (ref 27.5–36.3)
APTT BLD: 60.3 SEC — HIGH (ref 27.5–36.3)
AST SERPL-CCNC: 263 U/L — HIGH (ref 10–40)
BILIRUB SERPL-MCNC: 15.2 MG/DL — HIGH (ref 0.2–1.2)
BUN SERPL-MCNC: 43 MG/DL — HIGH (ref 7–23)
CALCIUM SERPL-MCNC: 8.5 MG/DL — SIGNIFICANT CHANGE UP (ref 8.4–10.5)
CHLORIDE SERPL-SCNC: 106 MMOL/L — SIGNIFICANT CHANGE UP (ref 96–108)
CO2 SERPL-SCNC: 19 MMOL/L — LOW (ref 22–31)
CORTICOSTEROID BINDING GLOBULIN RESULT: 1.1 MG/DL — LOW
CORTIS F/TOTAL MFR SERPL: 77 % — SIGNIFICANT CHANGE UP
CORTIS SERPL-MCNC: 33 UG/DL — HIGH
CORTISOL, FREE RESULT: 26 UG/DL — HIGH
CREAT SERPL-MCNC: 0.73 MG/DL — SIGNIFICANT CHANGE UP (ref 0.5–1.3)
FACT XIII ACT/NOR PPP CHRO: 57 % — SIGNIFICANT CHANGE UP (ref 51–163)
GAS PNL BLDA: SIGNIFICANT CHANGE UP
GLUCOSE BLDC GLUCOMTR-MCNC: 100 MG/DL — HIGH (ref 70–99)
GLUCOSE SERPL-MCNC: 118 MG/DL — HIGH (ref 70–99)
HCT VFR BLD CALC: 24.7 % — LOW (ref 39–50)
HGB BLD-MCNC: 8.6 G/DL — LOW (ref 13–17)
INR BLD: 1.48 RATIO — HIGH (ref 0.88–1.16)
MAGNESIUM SERPL-MCNC: 2.3 MG/DL — SIGNIFICANT CHANGE UP (ref 1.6–2.6)
MCHC RBC-ENTMCNC: 30.8 PG — SIGNIFICANT CHANGE UP (ref 27–34)
MCHC RBC-ENTMCNC: 34.8 GM/DL — SIGNIFICANT CHANGE UP (ref 32–36)
MCV RBC AUTO: 88.5 FL — SIGNIFICANT CHANGE UP (ref 80–100)
PHOSPHATE SERPL-MCNC: 3.4 MG/DL — SIGNIFICANT CHANGE UP (ref 2.5–4.5)
PLATELET # BLD AUTO: 82 K/UL — LOW (ref 150–400)
POTASSIUM SERPL-MCNC: 3.6 MMOL/L — SIGNIFICANT CHANGE UP (ref 3.5–5.3)
POTASSIUM SERPL-SCNC: 3.6 MMOL/L — SIGNIFICANT CHANGE UP (ref 3.5–5.3)
PROT SERPL-MCNC: 5.1 G/DL — LOW (ref 6–8.3)
PROTHROM AB SERPL-ACNC: 17.2 SEC — HIGH (ref 10–12.9)
RBC # BLD: 2.78 M/UL — LOW (ref 4.2–5.8)
RBC # FLD: 15.4 % — HIGH (ref 10.3–14.5)
SODIUM SERPL-SCNC: 137 MMOL/L — SIGNIFICANT CHANGE UP (ref 135–145)
VANCOMYCIN TROUGH SERPL-MCNC: 21.4 UG/ML — HIGH (ref 10–20)
VANCOMYCIN TROUGH SERPL-MCNC: 21.7 UG/ML — HIGH (ref 10–20)
WBC # BLD: 4 K/UL — SIGNIFICANT CHANGE UP (ref 3.8–10.5)
WBC # FLD AUTO: 4 K/UL — SIGNIFICANT CHANGE UP (ref 3.8–10.5)

## 2019-05-23 PROCEDURE — 99291 CRITICAL CARE FIRST HOUR: CPT

## 2019-05-23 PROCEDURE — 99232 SBSQ HOSP IP/OBS MODERATE 35: CPT | Mod: GC

## 2019-05-23 PROCEDURE — 71045 X-RAY EXAM CHEST 1 VIEW: CPT | Mod: 26

## 2019-05-23 PROCEDURE — 99232 SBSQ HOSP IP/OBS MODERATE 35: CPT

## 2019-05-23 PROCEDURE — 74018 RADEX ABDOMEN 1 VIEW: CPT | Mod: 26

## 2019-05-23 RX ORDER — OCTREOTIDE ACETATE 200 UG/ML
100 INJECTION, SOLUTION INTRAVENOUS; SUBCUTANEOUS EVERY 8 HOURS
Refills: 0 | Status: DISCONTINUED | OUTPATIENT
Start: 2019-05-23 | End: 2019-05-26

## 2019-05-23 RX ORDER — HYDROCORTISONE 20 MG
25 TABLET ORAL EVERY 12 HOURS
Refills: 0 | Status: COMPLETED | OUTPATIENT
Start: 2019-05-23 | End: 2019-05-24

## 2019-05-23 RX ORDER — MORPHINE 10 MG/ML
6 SOLUTION ORAL
Refills: 0 | Status: DISCONTINUED | OUTPATIENT
Start: 2019-05-23 | End: 2019-05-25

## 2019-05-23 RX ORDER — ALBUMIN HUMAN 25 %
100 VIAL (ML) INTRAVENOUS EVERY 8 HOURS
Refills: 0 | Status: DISCONTINUED | OUTPATIENT
Start: 2019-05-23 | End: 2019-05-23

## 2019-05-23 RX ORDER — ELECTROLYTE SOLUTION,INJ
1 VIAL (ML) INTRAVENOUS
Refills: 0 | Status: DISCONTINUED | OUTPATIENT
Start: 2019-05-23 | End: 2019-05-24

## 2019-05-23 RX ORDER — INSULIN LISPRO 100/ML
VIAL (ML) SUBCUTANEOUS EVERY 6 HOURS
Refills: 0 | Status: DISCONTINUED | OUTPATIENT
Start: 2019-05-23 | End: 2019-05-26

## 2019-05-23 RX ADMIN — MIDODRINE HYDROCHLORIDE 10 MILLIGRAM(S): 2.5 TABLET ORAL at 05:00

## 2019-05-23 RX ADMIN — Medication 25 MILLIGRAM(S): at 05:01

## 2019-05-23 RX ADMIN — PANTOPRAZOLE SODIUM 40 MILLIGRAM(S): 20 TABLET, DELAYED RELEASE ORAL at 05:01

## 2019-05-23 RX ADMIN — MIDODRINE HYDROCHLORIDE 10 MILLIGRAM(S): 2.5 TABLET ORAL at 13:52

## 2019-05-23 RX ADMIN — Medication 5 MICROGRAM(S)/KG/MIN: at 12:18

## 2019-05-23 RX ADMIN — Medication 1000 MILLIGRAM(S): at 21:03

## 2019-05-23 RX ADMIN — MORPHINE 6 MILLIGRAM(S): 10 SOLUTION ORAL at 12:07

## 2019-05-23 RX ADMIN — MORPHINE 6 MILLIGRAM(S): 10 SOLUTION ORAL at 23:53

## 2019-05-23 RX ADMIN — HEPARIN SODIUM 6.5 UNIT(S)/HR: 5000 INJECTION INTRAVENOUS; SUBCUTANEOUS at 12:19

## 2019-05-23 RX ADMIN — CHLORHEXIDINE GLUCONATE 1 APPLICATION(S): 213 SOLUTION TOPICAL at 01:14

## 2019-05-23 RX ADMIN — Medication 200 MILLIGRAM(S): at 09:04

## 2019-05-23 RX ADMIN — Medication 2 TABLET(S): at 05:00

## 2019-05-23 RX ADMIN — Medication 2 TABLET(S): at 12:09

## 2019-05-23 RX ADMIN — OCTREOTIDE ACETATE 100 MICROGRAM(S): 200 INJECTION, SOLUTION INTRAVENOUS; SUBCUTANEOUS at 09:08

## 2019-05-23 RX ADMIN — VASOPRESSIN 5 UNIT(S)/MIN: 20 INJECTION INTRAVENOUS at 12:18

## 2019-05-23 RX ADMIN — Medication 25 MILLIGRAM(S): at 00:55

## 2019-05-23 RX ADMIN — CHLORHEXIDINE GLUCONATE 15 MILLILITER(S): 213 SOLUTION TOPICAL at 05:01

## 2019-05-23 RX ADMIN — Medication 1 DROP(S): at 05:01

## 2019-05-23 RX ADMIN — Medication 2 TABLET(S): at 23:53

## 2019-05-23 RX ADMIN — Medication 1 DROP(S): at 18:19

## 2019-05-23 RX ADMIN — OCTREOTIDE ACETATE 100 MICROGRAM(S): 200 INJECTION, SOLUTION INTRAVENOUS; SUBCUTANEOUS at 21:03

## 2019-05-23 RX ADMIN — Medication 1 APPLICATION(S): at 12:14

## 2019-05-23 RX ADMIN — Medication 2 TABLET(S): at 00:54

## 2019-05-23 RX ADMIN — Medication 2 TABLET(S): at 17:26

## 2019-05-23 RX ADMIN — Medication 1 DROP(S): at 00:55

## 2019-05-23 RX ADMIN — Medication 1000 MILLIGRAM(S): at 09:05

## 2019-05-23 RX ADMIN — Medication 25 MILLIGRAM(S): at 12:11

## 2019-05-23 RX ADMIN — VASOPRESSIN 5 UNIT(S)/MIN: 20 INJECTION INTRAVENOUS at 21:03

## 2019-05-23 RX ADMIN — Medication 50 MILLILITER(S): at 14:05

## 2019-05-23 RX ADMIN — Medication 1000 MILLIGRAM(S): at 04:31

## 2019-05-23 RX ADMIN — MIDODRINE HYDROCHLORIDE 10 MILLIGRAM(S): 2.5 TABLET ORAL at 21:03

## 2019-05-23 RX ADMIN — Medication 25 MILLIGRAM(S): at 17:27

## 2019-05-23 RX ADMIN — Medication 20 MILLILITER(S): at 12:19

## 2019-05-23 RX ADMIN — Medication 1 DROP(S): at 12:13

## 2019-05-23 RX ADMIN — Medication 50 MILLILITER(S): at 06:31

## 2019-05-23 RX ADMIN — Medication 1000 MILLIGRAM(S): at 15:49

## 2019-05-23 RX ADMIN — Medication 200 MILLIGRAM(S): at 21:03

## 2019-05-23 RX ADMIN — MORPHINE 6 MILLIGRAM(S): 10 SOLUTION ORAL at 17:27

## 2019-05-23 RX ADMIN — HEPARIN SODIUM 6.5 UNIT(S)/HR: 5000 INJECTION INTRAVENOUS; SUBCUTANEOUS at 21:04

## 2019-05-23 RX ADMIN — Medication 1 EACH: at 18:11

## 2019-05-23 RX ADMIN — CHLORHEXIDINE GLUCONATE 15 MILLILITER(S): 213 SOLUTION TOPICAL at 17:27

## 2019-05-23 RX ADMIN — PANTOPRAZOLE SODIUM 40 MILLIGRAM(S): 20 TABLET, DELAYED RELEASE ORAL at 17:27

## 2019-05-23 RX ADMIN — OCTREOTIDE ACETATE 100 MICROGRAM(S): 200 INJECTION, SOLUTION INTRAVENOUS; SUBCUTANEOUS at 13:52

## 2019-05-23 RX ADMIN — Medication 50 MILLILITER(S): at 23:53

## 2019-05-23 RX ADMIN — Medication 5 MICROGRAM(S)/KG/MIN: at 21:04

## 2019-05-23 NOTE — CONSULT NOTE ADULT - ASSESSMENT
78Mh/o  4/26/2019 CABG  C3L, acute hypoxemic respiratory failure, 5/4 Tracheostomy and Lung biopsy: pulmonary amyloidosis, Pneumonia MRSA, Hypotension, acute renal failue/TARAH, elevated transaminitis ASHD/CAD, Pulmonary amyloidoss, atrial fibrillation/flutter, anxiety, depression  Plan:  [  ] Initiate TPN formula:  Carbohydrates:______grams, Amino Acid:______grams, Lipids:_______ grams    1. Dedicated Central line must be placed for TPN, no TPN will be ordered until line is placed  2. Strict Intake and Output.  3. Weights three times a week  4. Monitor BMP, Mg+, Ionized Ca++, Phosphorus daily  5.  Check Triglycerides daily for first 3 days of TPN, then monthly   6. Pre-albumin weekly.  7. Fingersticks to monitor glucose every 6 hours until stable then may be decreased to twice a day, coverage with ISS - to be ordered by primary team      TPN team, pager [phone] 78M h/o  4/26/2019 CABG  C3L, acute hypoxemic respiratory failure, 5/4 Tracheostomy and Lung biopsy: pulmonary amyloidosis, Pneumonia MRSA, Hypotension, acute renal failue/TARAH, elevated transaminitis ASHD/CAD, Pulmonary amyloidoss, atrial fibrillation/flutter, anxiety, depression  Plan:  [x ] Initiate TPN formula:  Nutritional goals: 30g lipids, 120g AA, 350g CHO  1. Dedicated Central line must be placed for TPN, no TPN will be ordered until line is placed  2. Strict Intake and Output.  3. Weights three times a week  4. Monitor BMP, Mg+, Ionized Ca++, Phosphorus daily  5.  Check Triglycerides daily for first 3 days of TPN, then monthly   6. Pre-albumin weekly.  7. Fingersticks to monitor glucose every 6 hours until stable then may be decreased to twice a day, coverage with ISS - to be ordered by primary team      TPN team, pager [phone]

## 2019-05-23 NOTE — CONSULT NOTE ADULT - SUBJECTIVE AND OBJECTIVE BOX
NUTRITION SUPPORT / TPN CONSULT NOTE    HPI:  This is a 77 yo   male with PMH of HLD, Sleep apnea ( non compliant with CPAP), GERD, diverticulitis, depression, anxiety, and panic attack. Denies significant PMH of heart disease but reports early CAD in family; mother  of MI at age 54 and uncle ( mothers brother) at age 37.  Presents to Dr Feng with c/c of chest discomfort associated with dyspnea ( on exertion after walking 100 feet)  and palpitations for the past 1 year.  CP is described as 'pinch like", intermittent, sporadic, lasting 1-2 min, localized in the left anterior chest non radiating; CP triggered by exercise and stress. Abnormal NST ( last week) : mild to moderate abnormal study with medium sized inferior and post- lateral ischemia without infarct, EF 50%.  Holter monitor revealed: frequent PVCs, ECHO revealed AR, concentric LVH, diastolic dysfunction, and  Referred here today for R and LHC. Presently asymptomatic, denies chest pain, dyspnea, dizziness, palpitations, N&V, HA, LE edema.    OF NOTE: patient was started on Toprol 25mg PO daily however he never picked it up from pharmacy because he states" he did not know about it"    PCP: Marleny Ceja (2019 08:32)      24 hour/overnight events:      MEDICATIONS  (STANDING):  albumin human 25% IVPB 100 milliLiter(s) IV Intermittent every 8 hours  artificial  tears Solution 1 Drop(s) Both EYES every 6 hours  calcium chloride Injectable 1000 milliGRAM(s) IV Push <User Schedule>  chlorhexidine 0.12% Liquid 15 milliLiter(s) Oral Mucosa two times a day  chlorhexidine 4% Liquid 1 Application(s) Topical <User Schedule>  dextrose 10%. 1000 milliLiter(s) (20 mL/Hr) IV Continuous <Continuous>  dextrose 50% Injectable 50 milliLiter(s) IV Push once  diphenoxylate/atropine 2 Tablet(s) Oral four times a day  heparin  Infusion 500 Unit(s)/Hr (6.5 mL/Hr) IV Continuous <Continuous>  hydrocortisone sodium succinate Injectable 25 milliGRAM(s) IV Push every 6 hours  insulin lispro (HumaLOG) corrective regimen sliding scale   SubCutaneous every 6 hours  meropenem  IVPB 500 milliGRAM(s) IV Intermittent every 24 hours  midodrine 10 milliGRAM(s) Oral every 8 hours  mineral oil for Topical Use 1 Application(s) Topical daily  norepinephrine Infusion 0.034 MICROgram(s)/kG/Min (5 mL/Hr) IV Continuous <Continuous>  octreotide  Injectable 100 MICROGram(s) SubCutaneous every 8 hours  opium Tincture 6 milliGRAM(s) Oral four times a day  pantoprazole  Injectable 40 milliGRAM(s) IV Push two times a day  thiamine Injectable 200 milliGRAM(s) IV Push <User Schedule>  vasopressin Infusion 0.083 Unit(s)/Min (5 mL/Hr) IV Continuous <Continuous>    MEDICATIONS  (PRN):  sodium chloride 0.9% lock flush 10 milliLiter(s) IV Push every 1 hour PRN Pre/post blood products, medications, blood draw, and to maintain line patency      PAST MEDICAL & SURGICAL HISTORY:  Diverticulitis  Nocturia  Panic attacks  Anxiety  Depression  Asthma: Controlled  Sleep Apnea  History of partial colectomy  History of colon resection  History of eye surgery: PPV right  Cataract extraction status: right  Status post lung surgery: ? wedge resection  S/P eye surgery: &quot;removed fluid&quot; from rigth eye  Bunion  Sinus Disorder: surgery x 2  Inguinal Hernia Bilateral  Shoulder Problem: right rotator cuff  Carpal Tunnel Syndrome: right hand      FAMILY HISTORY:  Family history of acute myocardial infarction (Sibling): mom at 54   mothers brother at 37      ALLERGIES  No Known Allergies      REVIEW OF SYSTEMS  --------------------------------------------------------------------------------    Skin: No rashes  Head/Eyes/Ears/Mouth: No headache; Normal hearing; Normal vision w/o blurriness; No sinus pain/discomfort, sore throat  Respiratory: No dyspnea, cough, wheezing, hemoptysis  CV: No chest pain, PND, orthopnea  GI: No abdominal pain, diarrhea, constipation, nausea, vomiting, melena, hematochezia  : No increased frequency, dysuria, hematuria, nocturia  MSK: No joint pain/swelling; no back pain; no edema  Neuro: No dizziness/lightheadedness, weakness, seizures, numbness, tingling  Heme: No easy bruising or bleeding  Endo: No heat/cold intolerance  Psych: No significant nervousness, anxiety, stress, depression      VITALS  T(C): 36.6 (19 @ 23:00), Max: 36.7 (19 @ 15:00)  HR: 96 (19 @ 10:00) (77 - 114)  BP: 98/59 (19 @ 21:00) (92/51 - 133/68)  RR: 16 (19 @ 10:00) (7 - 26)  SpO2: 99% (19 @ 10:00) (98% - 100%)  I&O's Detail    22 May 2019 07:01  -  23 May 2019 07:00  --------------------------------------------------------  IN:    Albumin 25%: 200 mL    Albumin 5% - 50 mL: 100 mL    dextrose 10%.: 480 mL    Enteral Tube Flush: 180 mL    heparin Infusion: 92.5 mL    IV PiggyBack: 50 mL    Miscellaneous Tube Feedin mL    norepinephrine Infusion: 109 mL    ns in tub fed vital1: 1180 mL    Platelets - Single Donor: 600 mL    vasopressin Infusion: 144 mL  Total IN: 3685.5 mL    OUT:    Indwelling Catheter - Urethral: 10 mL    Other: 700 mL    Rectal Tube: 2100 mL  Total OUT: 2810 mL    Total NET: 875.5 mL        Mode: AC/ CMV (Assist Control/ Continuous Mandatory Ventilation)  RR (machine): 10  TV (machine): 500  FiO2: 50  PEEP: 6  ITime: 1  MAP: 8  PIP: 15      LABS:                        8.6    4.0   )-----------( 82       ( 23 May 2019 01:57 )             24.7         137  |  106  |  43<H>  ----------------------------<  118<H>  3.6   |  19<L>  |  0.73    Ca    8.5      23 May 2019 01:57  Phos  3.4       Mg     2.3         TPro  5.1<L>  /  Alb  2.9<L>  /  TBili  15.2<H>  /  DBili  x   /  AST  263<H>  /  ALT  142<H>  /  AlkPhos  131<H>      Ionized Calcium Levels:     CAPILLARY BLOOD GLUCOSE      CAPILLARY BLOOD GLUCOSE        PT/INR - ( 23 May 2019 01:57 )   PT: 17.2 sec;   INR: 1.48 ratio         PTT - ( 23 May 2019 09:24 )  PTT:56.2 sec  ABG - ( 23 May 2019 08:33 )  pH, Arterial: 7.36  pH, Blood: x     /  pCO2: 35    /  pO2: 174   / HCO3: 19    / Base Excess: -5.1  /  SaO2: 100               Triglycerides, Serum: 221 mg/dL (19 @ 03:45)      PHYSICAL EXAM  --------------------------------------------------------------------------------    Physical Exam:  	Gen: NAD, well-appearing  	HEENT: PERRL, supple neck, clear oropharynx  	Pulm: CTA B/L  	CV: RRR, S1S2; no rub  	Back: No spinal or CVA tenderness; no sacral edema  	Abd: +BS, soft, nontender/nondistended          UE: Warm, FROM, no clubbing, intact strength; no edema; no asterixis  	LE: Warm, FROM, no clubbing, intact strength; no edema  	Neuro: No focal deficits, intact gait  	Psych: Normal affect and mood  	Skin: Warm, without rashes  	    Current Diet: Diet, NPO with Tube Feed:   Tube Feeding Modality: Nasogastric  Vital AF (VITALAFRTH)  Total Volume for 24 Hours (mL): 1680  Continuous  Starting Tube Feed Rate mL per Hour: 30  Increase Tube Feed Rate by (mL): 10     Every 4 hours  Until Goal Tube Feed Rate (mL per Hour): 70  Tube Feed Duration (in Hours): 24  Tube Feed Start Time: 09:00 (19 @ 09:52)    Appetite: [  ]Poor [  ]Adequate [  ]Good  Caloric intake:  [   ]  Adequate   [   ] Inadequate NUTRITION SUPPORT / TPN CONSULT NOTE    HPI:  This is a 77 yo   male with PMH of HLD, Sleep apnea ( non compliant with CPAP), GERD, diverticulitis, depression, anxiety, and panic attack. Denies significant PMH of heart disease but reports early CAD in family; mother  of MI at age 54 and uncle ( mothers brother) at age 37.  Presents to Dr Feng with c/c of chest discomfort associated with dyspnea ( on exertion after walking 100 feet)  and palpitations for the past 1 year.  CP is described as 'pinch like", intermittent, sporadic, lasting 1-2 min, localized in the left anterior chest non radiating; CP triggered by exercise and stress. Abnormal NST ( last week) : mild to moderate abnormal study with medium sized inferior and post- lateral ischemia without infarct, EF 50%.  Holter monitor revealed: frequent PVCs, ECHO revealed AR, concentric LVH, diastolic dysfunction, and  Referred here today for R and LHC.    Pt is S/P CABG x3 POD 27, post op course complicated with hypoxic respiratory failure suspected Bibasilar Pneumonia, severe Agitation requiring intubation. 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    Total Parenteral Nutrition Consult requested given high rectal tube output while on Tube feeds at goal, concern pt is not absorbing and therofre requiring additional nutrtional support.        MEDICATIONS  (STANDING):  albumin human 25% IVPB 100 milliLiter(s) IV Intermittent every 8 hours  artificial  tears Solution 1 Drop(s) Both EYES every 6 hours  calcium chloride Injectable 1000 milliGRAM(s) IV Push <User Schedule>  chlorhexidine 0.12% Liquid 15 milliLiter(s) Oral Mucosa two times a day  chlorhexidine 4% Liquid 1 Application(s) Topical <User Schedule>  dextrose 10%. 1000 milliLiter(s) (20 mL/Hr) IV Continuous <Continuous>  dextrose 50% Injectable 50 milliLiter(s) IV Push once  diphenoxylate/atropine 2 Tablet(s) Oral four times a day  heparin  Infusion 500 Unit(s)/Hr (6.5 mL/Hr) IV Continuous <Continuous>  hydrocortisone sodium succinate Injectable 25 milliGRAM(s) IV Push every 6 hours  insulin lispro (HumaLOG) corrective regimen sliding scale   SubCutaneous every 6 hours  meropenem  IVPB 500 milliGRAM(s) IV Intermittent every 24 hours  midodrine 10 milliGRAM(s) Oral every 8 hours  mineral oil for Topical Use 1 Application(s) Topical daily  norepinephrine Infusion 0.034 MICROgram(s)/kG/Min (5 mL/Hr) IV Continuous <Continuous>  octreotide  Injectable 100 MICROGram(s) SubCutaneous every 8 hours  opium Tincture 6 milliGRAM(s) Oral four times a day  pantoprazole  Injectable 40 milliGRAM(s) IV Push two times a day  thiamine Injectable 200 milliGRAM(s) IV Push <User Schedule>  vasopressin Infusion 0.083 Unit(s)/Min (5 mL/Hr) IV Continuous <Continuous>    MEDICATIONS  (PRN):  sodium chloride 0.9% lock flush 10 milliLiter(s) IV Push every 1 hour PRN Pre/post blood products, medications, blood draw, and to maintain line patency      PAST MEDICAL & SURGICAL HISTORY:  Diverticulitis  Nocturia  Panic attacks  Anxiety  Depression  Asthma: Controlled  Sleep Apnea  History of partial colectomy  History of colon resection  History of eye surgery: PPV right  Cataract extraction status: right  Status post lung surgery: ? wedge resection  S/P eye surgery: &quot;removed fluid&quot; from rigth eye  Bunion  Sinus Disorder: surgery x 2  Inguinal Hernia Bilateral  Shoulder Problem: right rotator cuff  Carpal Tunnel Syndrome: right hand      FAMILY HISTORY:  Family history of acute myocardial infarction (Sibling): mom at 54   mothers brother at 37      ALLERGIES  No Known Allergies      REVIEW OF SYSTEMS  --------------------------------------------------------------------------------    Skin: No rashes  Head/Eyes/Ears/Mouth: No headache; Normal hearing; Normal vision w/o blurriness; No sinus pain/discomfort, sore throat  Respiratory: No dyspnea, cough, wheezing, hemoptysis  CV: No chest pain, PND, orthopnea  GI: No abdominal pain, diarrhea, constipation, nausea, vomiting, melena, hematochezia  : No increased frequency, dysuria, hematuria, nocturia  MSK: No joint pain/swelling; no back pain; no edema  Neuro: No dizziness/lightheadedness, weakness, seizures, numbness, tingling  Heme: No easy bruising or bleeding  Endo: No heat/cold intolerance  Psych: No significant nervousness, anxiety, stress, depression      VITALS  T(C): 36.6 (19 @ 23:00), Max: 36.7 (19 @ 15:00)  HR: 96 (19 @ 10:00) (77 - 114)  BP: 98/59 (19 @ 21:00) (92/51 - 133/68)  RR: 16 (19 @ 10:00) (7 - 26)  SpO2: 99% (19 @ 10:00) (98% - 100%)  I&O's Detail    22 May 2019 07:01  -  23 May 2019 07:00  --------------------------------------------------------  IN:    Albumin 25%: 200 mL    Albumin 5% - 50 mL: 100 mL    dextrose 10%.: 480 mL    Enteral Tube Flush: 180 mL    heparin Infusion: 92.5 mL    IV PiggyBack: 50 mL    Miscellaneous Tube Feedin mL    norepinephrine Infusion: 109 mL    ns in tub fed vital1: 1180 mL    Platelets - Single Donor: 600 mL    vasopressin Infusion: 144 mL  Total IN: 3685.5 mL    OUT:    Indwelling Catheter - Urethral: 10 mL    Other: 700 mL    Rectal Tube: 2100 mL  Total OUT: 2810 mL    Total NET: 875.5 mL        Mode: AC/ CMV (Assist Control/ Continuous Mandatory Ventilation)  RR (machine): 10  TV (machine): 500  FiO2: 50  PEEP: 6  ITime: 1  MAP: 8  PIP: 15      LABS:                        8.6    4.0   )-----------( 82       ( 23 May 2019 01:57 )             24.7     -    137  |  106  |  43<H>  ----------------------------<  118<H>  3.6   |  19<L>  |  0.73    Ca    8.5      23 May 2019 01:57  Phos  3.4       Mg     2.3         TPro  5.1<L>  /  Alb  2.9<L>  /  TBili  15.2<H>  /  DBili  x   /  AST  263<H>  /  ALT  142<H>  /  AlkPhos  131<H>      Ionized Calcium Levels:     CAPILLARY BLOOD GLUCOSE      CAPILLARY BLOOD GLUCOSE        PT/INR - ( 23 May 2019 01:57 )   PT: 17.2 sec;   INR: 1.48 ratio         PTT - ( 23 May 2019 09:24 )  PTT:56.2 sec  ABG - ( 23 May 2019 08:33 )  pH, Arterial: 7.36  pH, Blood: x     /  pCO2: 35    /  pO2: 174   / HCO3: 19    / Base Excess: -5.1  /  SaO2: 100               Triglycerides, Serum: 221 mg/dL (19 @ 03:45)      PHYSICAL EXAM  --------------------------------------------------------------------------------                Gen: NAD,laying in bed, sedated  	HEENT: intubated, NJT in place              Chest: non labored breathing  	Abd: firmly distended, rectal tube in place          UE: Warm, FROM, no clubbing, intact strength; no edema; no asterixis  	LE: Warm, FROM, no clubbing, intact strength; no edema  	Neuro: No focal deficits, intact gait  	Psych: Normal affect and mood  	Skin: Warm, without rashes  	    Current Diet: Diet, NPO with Tube Feed:   Tube Feeding Modality: Nasogastric  Vital AF (VITALAFRTH)  Total Volume for 24 Hours (mL): 1680  Continuous  Starting Tube Feed Rate mL per Hour: 30  Increase Tube Feed Rate by (mL): 10     Every 4 hours  Until Goal Tube Feed Rate (mL per Hour): 70  Tube Feed Duration (in Hours): 24  Tube Feed Start Time: 09:00 (19 @ 09:52)

## 2019-05-23 NOTE — PROGRESS NOTE ADULT - ASSESSMENT
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 His postop course was complicated by persistent respiratory failure requiring tracheostomy and he had a wedge biopsy showing Pulmonary Amyloidosis.    5/13-poor weaning (less than 2 hrs), CT in place  5/14-off solumedrol, CT in place 100 cc out, wean cpap/ps 5/15  5/15-TC for 12 hrs, renal and CHF f/up  5/16-4 hrs TC, over breath vent-on diprovan; mult pressors/rectal tube/c. diff neg-on meropenem/vanco  5/17-septic-HD-prbc, no weaning--ct cap-c/w multifocal PNA\  5/18-mult PRBC, no wean, CVVHD-gamma globulin  5/19-no change abx/pressors/cvvhd and cooling blanket--MS poor off sedation  5/20-slightly better MS off sedation--no change in status-no weaning; 5/21-no weaning/sedated/CVVHD; Heme evaln  5/22-further resp. decline-boosted fio2-60%-temporarily off cvvhd-reevaluation today  5/23-had HD, reduced fio2 to 50%

## 2019-05-23 NOTE — PROGRESS NOTE ADULT - SUBJECTIVE AND OBJECTIVE BOX
INTERVAL HPI/OVERNIGHT EVENTS:    no new gi events brown stool in rectal tube  tolerating ngt feeds    MEDICATIONS  (STANDING):  albumin human 25% IVPB 100 milliLiter(s) IV Intermittent every 8 hours  artificial  tears Solution 1 Drop(s) Both EYES every 6 hours  calcium chloride Injectable 1000 milliGRAM(s) IV Push <User Schedule>  chlorhexidine 0.12% Liquid 15 milliLiter(s) Oral Mucosa two times a day  chlorhexidine 4% Liquid 1 Application(s) Topical <User Schedule>  dextrose 10%. 1000 milliLiter(s) (20 mL/Hr) IV Continuous <Continuous>  dextrose 50% Injectable 50 milliLiter(s) IV Push once  diphenoxylate/atropine 2 Tablet(s) Oral four times a day  heparin  Infusion 500 Unit(s)/Hr (6.5 mL/Hr) IV Continuous <Continuous>  hydrocortisone sodium succinate Injectable 25 milliGRAM(s) IV Push every 6 hours  insulin lispro (HumaLOG) corrective regimen sliding scale   SubCutaneous every 6 hours  meropenem  IVPB 500 milliGRAM(s) IV Intermittent every 24 hours  midodrine 10 milliGRAM(s) Oral every 8 hours  mineral oil for Topical Use 1 Application(s) Topical daily  norepinephrine Infusion 0.034 MICROgram(s)/kG/Min (5 mL/Hr) IV Continuous <Continuous>  octreotide  Injectable 100 MICROGram(s) SubCutaneous every 8 hours  opium Tincture 6 milliGRAM(s) Oral four times a day  pantoprazole  Injectable 40 milliGRAM(s) IV Push two times a day  Parenteral Nutrition - Adult 1 Each (42 mL/Hr) TPN Continuous <Continuous>  thiamine Injectable 200 milliGRAM(s) IV Push <User Schedule>  vasopressin Infusion 0.083 Unit(s)/Min (5 mL/Hr) IV Continuous <Continuous>    MEDICATIONS  (PRN):  sodium chloride 0.9% lock flush 10 milliLiter(s) IV Push every 1 hour PRN Pre/post blood products, medications, blood draw, and to maintain line patency      Allergies    No Known Allergies    Intolerances        Review of Systems: *pt minimally verbal to nonverbal, unable to obtain ROS         Vital Signs Last 24 Hrs  T(C): 37 (23 May 2019 11:00), Max: 37 (23 May 2019 07:00)  T(F): 98.6 (23 May 2019 11:00), Max: 98.6 (23 May 2019 07:00)  HR: 109 (23 May 2019 11:00) (77 - 114)  BP: 98/59 (22 May 2019 21:00) (93/57 - 129/65)  BP(mean): 70 (22 May 2019 21:00) (67 - 91)  RR: 14 (23 May 2019 11:00) (7 - 26)  SpO2: 100% (23 May 2019 11:00) (98% - 100%)    PHYSICAL EXAM:    Constitutional: NAD  HEENT: EOMI, throat clear  Neck: No LAD, supple  Respiratory: CTA and P  Cardiovascular: S1 and S2, RRR, no M  Gastrointestinal: BS+, soft, NT/ND, neg HSM, +ngt +rectal tube brown stool  Extremities: No peripheral edema, neg clubbing, cyanosis  Vascular: 2+ peripheral pulses  Neurological: A/O x0  Psychiatric: lethargic  Skin: No rashes      LABS:                        8.6    4.0   )-----------( 82       ( 23 May 2019 01:57 )             24.7     05-23    137  |  106  |  43<H>  ----------------------------<  118<H>  3.6   |  19<L>  |  0.73    Ca    8.5      23 May 2019 01:57  Phos  3.4     05-23  Mg     2.3     05-23    TPro  5.1<L>  /  Alb  2.9<L>  /  TBili  15.2<H>  /  DBili  x   /  AST  263<H>  /  ALT  142<H>  /  AlkPhos  131<H>  05-23    PT/INR - ( 23 May 2019 01:57 )   PT: 17.2 sec;   INR: 1.48 ratio         PTT - ( 23 May 2019 09:24 )  PTT:56.2 sec      RADIOLOGY & ADDITIONAL TESTS: INTERVAL HPI/OVERNIGHT EVENTS:    intubated/on pressors   no new gi events brown stool in rectal tube  tolerating ngt feeds    MEDICATIONS  (STANDING):  albumin human 25% IVPB 100 milliLiter(s) IV Intermittent every 8 hours  artificial  tears Solution 1 Drop(s) Both EYES every 6 hours  calcium chloride Injectable 1000 milliGRAM(s) IV Push <User Schedule>  chlorhexidine 0.12% Liquid 15 milliLiter(s) Oral Mucosa two times a day  chlorhexidine 4% Liquid 1 Application(s) Topical <User Schedule>  dextrose 10%. 1000 milliLiter(s) (20 mL/Hr) IV Continuous <Continuous>  dextrose 50% Injectable 50 milliLiter(s) IV Push once  diphenoxylate/atropine 2 Tablet(s) Oral four times a day  heparin  Infusion 500 Unit(s)/Hr (6.5 mL/Hr) IV Continuous <Continuous>  hydrocortisone sodium succinate Injectable 25 milliGRAM(s) IV Push every 6 hours  insulin lispro (HumaLOG) corrective regimen sliding scale   SubCutaneous every 6 hours  meropenem  IVPB 500 milliGRAM(s) IV Intermittent every 24 hours  midodrine 10 milliGRAM(s) Oral every 8 hours  mineral oil for Topical Use 1 Application(s) Topical daily  norepinephrine Infusion 0.034 MICROgram(s)/kG/Min (5 mL/Hr) IV Continuous <Continuous>  octreotide  Injectable 100 MICROGram(s) SubCutaneous every 8 hours  opium Tincture 6 milliGRAM(s) Oral four times a day  pantoprazole  Injectable 40 milliGRAM(s) IV Push two times a day  Parenteral Nutrition - Adult 1 Each (42 mL/Hr) TPN Continuous <Continuous>  thiamine Injectable 200 milliGRAM(s) IV Push <User Schedule>  vasopressin Infusion 0.083 Unit(s)/Min (5 mL/Hr) IV Continuous <Continuous>    MEDICATIONS  (PRN):  sodium chloride 0.9% lock flush 10 milliLiter(s) IV Push every 1 hour PRN Pre/post blood products, medications, blood draw, and to maintain line patency      Allergies    No Known Allergies    Intolerances        Review of Systems: *pt minimally verbal to nonverbal, unable to obtain ROS         Vital Signs Last 24 Hrs  T(C): 37 (23 May 2019 11:00), Max: 37 (23 May 2019 07:00)  T(F): 98.6 (23 May 2019 11:00), Max: 98.6 (23 May 2019 07:00)  HR: 109 (23 May 2019 11:00) (77 - 114)  BP: 98/59 (22 May 2019 21:00) (93/57 - 129/65)  BP(mean): 70 (22 May 2019 21:00) (67 - 91)  RR: 14 (23 May 2019 11:00) (7 - 26)  SpO2: 100% (23 May 2019 11:00) (98% - 100%)    PHYSICAL EXAM:    Constitutional: NAD  HEENT: EOMI, throat clear  Neck: No LAD, supple  Respiratory: CTA and P  Cardiovascular: S1 and S2, RRR, no M  Gastrointestinal: BS+, soft, NT/ND, neg HSM, +ngt +rectal tube brown stool  Extremities: No peripheral edema, neg clubbing, cyanosis  Vascular: 2+ peripheral pulses  Neurological: A/O x0  Psychiatric: lethargic  Skin: No rashes      LABS:                        8.6    4.0   )-----------( 82       ( 23 May 2019 01:57 )             24.7     05-23    137  |  106  |  43<H>  ----------------------------<  118<H>  3.6   |  19<L>  |  0.73    Ca    8.5      23 May 2019 01:57  Phos  3.4     05-23  Mg     2.3     05-23    TPro  5.1<L>  /  Alb  2.9<L>  /  TBili  15.2<H>  /  DBili  x   /  AST  263<H>  /  ALT  142<H>  /  AlkPhos  131<H>  05-23    PT/INR - ( 23 May 2019 01:57 )   PT: 17.2 sec;   INR: 1.48 ratio         PTT - ( 23 May 2019 09:24 )  PTT:56.2 sec      RADIOLOGY & ADDITIONAL TESTS:

## 2019-05-23 NOTE — PROGRESS NOTE ADULT - ATTENDING COMMENTS
as above-CT chest c/w PNA--MRSA sputum--- ABX restarted (vanco/meropenem)5/15 plus mycafungin-no signif improvement remains CRITICALLY ill  Resp failure--pulm amyloidosis, PNA, CHF, debility-------on vent-sat above 90% (further declline)  Pulm Amyloid--no definitive rx at present           COPD-duoneb via ETT q 6  CHF-as per Gaby et al-pressors                         Heme- official consult performed-await studies ([place]),abdominal fat pad and transthyretin technetium pyrophosphate cardiac scan   Renal insuff/fluid OL-HD  PNA-MRSA-vanco/meropenem/mycafungin--as per ID                          GOC/prog critical/guarded  GI-ppi/TF (on hold) -likely ischemic colitis                                 Jann Guadarrama MD-Pulmonary   [phone]

## 2019-05-23 NOTE — PROGRESS NOTE ADULT - ASSESSMENT
78 yr old with ARCENIO admitted for chest pain and underwent a CABG last last week   Post op has been stable but is still intubated and became febrile last 24hrs.   No evidence of wd infection, alot of sputum and possible LLL infiltrate underwent Bronch/BAL with biopsy to r/o BOOP, started on steroids    sp course of meropenem and micafungin without improvement leading us to do lung biopsy  lung biopsy is positive for amyloid which may also be in kidney and heart.   Also have some GI bleeding and extremities are mottled but viable   Overall MRSA pneumonia, ischemic colitis, lactic acidosis, shock, leucocytosis, transaminitis.     Plan;   Continue with vancomycin dose based on type of HD, level 12 today.   All other cultures negative to date.   No major role of abx in setting of ischemic colitis   on meropenem   ? utility of micafungin  consider stopping.   Prognosis poor.   ct noted  splenic infarct  unclear if this is related to amyloid or embolic .   vanco stopped secondary to high level  would dose by level for the next 48 hrs   can dc meropenem  no real indication

## 2019-05-23 NOTE — PROGRESS NOTE ADULT - ASSESSMENT
elevated lfts  diarrhea    c diff negative  etiology of diarrhea can be antibiotic associated, related to tube feeds, amyloid infiltration of gi tract  stool is brown no evidence of bleeding  no role for endoscopic evaluation  lfts improving  dose lomotil and increase as needed  on opium tincture  monitor stool output  monitor for worsening encephalopathy   consider xifaxin     Advanced care planning was discussed with patient and family.  Advanced care planning forms were reviewed and discussed.  Risks, benefits and alternatives of gastroenterologic procedures were discussed in detail and all questions were answered.    30 minutes spent.

## 2019-05-23 NOTE — PROGRESS NOTE ADULT - SUBJECTIVE AND OBJECTIVE BOX
infectious diseases progress note:    Patient is a 79y old  Male who presents with a chief complaint of sob (23 May 2019 04:43)        Angina pectoris  Atherosclerosis of native coronary artery without angina pectoris             Allergies    No Known Allergies    Intolerances        ANTIBIOTICS/RELEVANT:  antimicrobials  meropenem  IVPB 500 milliGRAM(s) IV Intermittent every 24 hours    immunologic:    OTHER:  albumin human 25% IVPB 100 milliLiter(s) IV Intermittent every 8 hours  artificial  tears Solution 1 Drop(s) Both EYES every 6 hours  calcium chloride Injectable 1000 milliGRAM(s) IV Push <User Schedule>  chlorhexidine 0.12% Liquid 15 milliLiter(s) Oral Mucosa two times a day  chlorhexidine 4% Liquid 1 Application(s) Topical <User Schedule>  dextrose 10%. 1000 milliLiter(s) IV Continuous <Continuous>  dextrose 50% Injectable 50 milliLiter(s) IV Push once  diphenoxylate/atropine 2 Tablet(s) Oral four times a day  heparin  Infusion 500 Unit(s)/Hr IV Continuous <Continuous>  hydrocortisone sodium succinate Injectable 25 milliGRAM(s) IV Push every 6 hours  insulin lispro (HumaLOG) corrective regimen sliding scale   SubCutaneous every 6 hours  midodrine 10 milliGRAM(s) Oral every 8 hours  mineral oil for Topical Use 1 Application(s) Topical daily  norepinephrine Infusion 0.034 MICROgram(s)/kG/Min IV Continuous <Continuous>  octreotide  Injectable 100 MICROGram(s) SubCutaneous every 8 hours  opium Tincture 6 milliGRAM(s) Oral four times a day  pantoprazole  Injectable 40 milliGRAM(s) IV Push two times a day  Parenteral Nutrition - Adult 1 Each TPN Continuous <Continuous>  sodium chloride 0.9% lock flush 10 milliLiter(s) IV Push every 1 hour PRN  thiamine Injectable 200 milliGRAM(s) IV Push <User Schedule>  vasopressin Infusion 0.083 Unit(s)/Min IV Continuous <Continuous>      Objective:  Vital Signs Last 24 Hrs  T(C): 37 (23 May 2019 11:00), Max: 37 (23 May 2019 07:00)  T(F): 98.6 (23 May 2019 11:00), Max: 98.6 (23 May 2019 07:00)  HR: 109 (23 May 2019 11:00) (77 - 114)  BP: 98/59 (22 May 2019 21:00) (93/57 - 129/65)  BP(mean): 70 (22 May 2019 21:00) (67 - 91)  RR: 14 (23 May 2019 11:00) (7 - 26)  SpO2: 100% (23 May 2019 11:00) (98% - 100%)    P          LABS:                        8.6    4.0   )-----------( 82       ( 23 May 2019 01:57 )             24.7     05-23    137  |  106  |  43<H>  ----------------------------<  118<H>  3.6   |  19<L>  |  0.73    Ca    8.5      23 May 2019 01:57  Phos  3.4     05-23  Mg     2.3     05-23    TPro  5.1<L>  /  Alb  2.9<L>  /  TBili  15.2<H>  /  DBili  x   /  AST  263<H>  /  ALT  142<H>  /  AlkPhos  131<H>  05-23    PT/INR - ( 23 May 2019 01:57 )   PT: 17.2 sec;   INR: 1.48 ratio         PTT - ( 23 May 2019 09:24 )  PTT:56.2 sec        MICROBIOLOGY:    RECENT CULTURES:  05-21 @ 13:34 .Blood                No growth to date.    05-17 @ 05:28 .Blood                No growth at 5 days.          RESPIRATORY CULTURES:              RADIOLOGY & ADDITIONAL STUDIES:        Pager [phone]  After 5 pm/weekends or if no response :[phone]          I       V            --  --

## 2019-05-23 NOTE — PROGRESS NOTE ADULT - SUBJECTIVE AND OBJECTIVE BOX
CHIEF COMPLAINT:  f/up resp failure, pulm amyloidosis, asthma, osas- on vent --sedate-double pressors continue-had HD-no signif changes    Interval Events: HD    REVIEW OF SYSTEMS:  Constitutional: No fevers or chills. No weight loss. No fatigue or generalized malaise.  Eyes: No itching or discharge from the eyes  ENT: No ear pain. No ear discharge. No nasal congestion. No post nasal drip. No epistaxis. No throat pain. No sore throat. No difficulty swallowing.   CV: No chest pain. No palpitations. No lightheadedness or dizziness.   Resp: No dyspnea at rest. No dyspnea on exertion. No orthopnea. No wheezing. No cough. No stridor. No sputum production. No chest pain with respiration.  GI: No nausea. No vomiting. No diarrhea.  MSK: No joint pain or pain in any extremities  Integumentary: No skin lesions. No pedal edema.  Neurological: No gross motor weakness. No sensory changes.  [ ] All other systems negative  [+ ] Unable to assess ROS because ___sedate_____    OBJECTIVE:  ICU Vital Signs Last 24 Hrs  T(C): 36.6 (22 May 2019 23:00), Max: 36.7 (22 May 2019 15:00)  T(F): 97.9 (22 May 2019 23:00), Max: 98.1 (22 May 2019 15:00)  HR: 91 (23 May 2019 04:00) (77 - 114)  BP: 98/59 (22 May 2019 21:00) (92/51 - 140/74)  BP(mean): 70 (22 May 2019 21:00) (66 - 100)  ABP: 124/56 (23 May 2019 04:00) (96/35 - 143/56)  ABP(mean): 75 (23 May 2019 04:00) (52 - 83)  RR: 13 (23 May 2019 04:00) (0 - 26)  SpO2: 100% (23 May 2019 04:00) (95% - 100%)    Mode: AC/ CMV (Assist Control/ Continuous Mandatory Ventilation), RR (machine): 10, TV (machine): 500, FiO2: 50, PEEP: 6, ITime: 1, MAP: 7, PIP: 16    05-21 @ 07:01  -  05-22 @ 07:00  --------------------------------------------------------  IN: 4276 mL / OUT: 3371 mL / NET: 905 mL    05-22 @ 07:01  -  05-23 @ 04:43  --------------------------------------------------------  IN: 3033.2 mL / OUT: 2800 mL / NET: 233.2 mL      CAPILLARY BLOOD GLUCOSE          PHYSICAL EXAM:  General: on vent -sedate-edematous  HEENT: Atraumatic, normocephalic.                 Mallampatti Grade 3                No nasal congestion.                No tonsillar or pharyngeal exudates.  Lymph Nodes: No palpable lymphadenopathy  Neck: No JVD. No carotid bruit.   Respiratory: Normal chest expansion-reduced BS bases                         Normal percussion                         Normal and equal air entry                         No wheeze, rhonchi or rales.  Cardiovascular: S1 S2 normal. No murmurs, rubs or gallops.   Abdomen: Soft, non-distended. No organomegaly. Normoactive bowel sounds.  Extremities: Warm to touch. Peripheral pulse palpable. + total body/ pedal edema.   Skin: No rashes or skin lesions +Scrotal edema  Neurological: unable  Psychiatry: unable    HOSPITAL MEDICATIONS:  MEDICATIONS  (STANDING):  albumin human 25% IVPB 100 milliLiter(s) IV Intermittent every 8 hours  artificial  tears Solution 1 Drop(s) Both EYES every 6 hours  calcium chloride Injectable 1000 milliGRAM(s) IV Push <User Schedule>  chlorhexidine 0.12% Liquid 15 milliLiter(s) Oral Mucosa two times a day  chlorhexidine 4% Liquid 1 Application(s) Topical <User Schedule>  dextrose 10%. 1000 milliLiter(s) (20 mL/Hr) IV Continuous <Continuous>  dextrose 50% Injectable 50 milliLiter(s) IV Push once  diphenoxylate/atropine 2 Tablet(s) Oral four times a day  heparin  Infusion 500 Unit(s)/Hr (6.5 mL/Hr) IV Continuous <Continuous>  hydrocortisone sodium succinate Injectable 25 milliGRAM(s) IV Push every 6 hours  meropenem  IVPB 500 milliGRAM(s) IV Intermittent every 24 hours  midodrine 10 milliGRAM(s) Oral every 8 hours  mineral oil for Topical Use 1 Application(s) Topical daily  norepinephrine Infusion 0.034 MICROgram(s)/kG/Min (5 mL/Hr) IV Continuous <Continuous>  pantoprazole  Injectable 40 milliGRAM(s) IV Push two times a day  thiamine Injectable 200 milliGRAM(s) IV Push <User Schedule>  vasopressin Infusion 0.083 Unit(s)/Min (5 mL/Hr) IV Continuous <Continuous>    MEDICATIONS  (PRN):  sodium chloride 0.9% lock flush 10 milliLiter(s) IV Push every 1 hour PRN Pre/post blood products, medications, blood draw, and to maintain line patency      LABS:                        8.6    4.0   )-----------( 82       ( 23 May 2019 01:57 )             24.7     05-23    137  |  106  |  43<H>  ----------------------------<  118<H>  3.6   |  19<L>  |  0.73    Ca    8.5      23 May 2019 01:57  Phos  3.4     05-23  Mg     2.3     05-23    TPro  5.1<L>  /  Alb  2.9<L>  /  TBili  15.2<H>  /  DBili  x   /  AST  263<H>  /  ALT  142<H>  /  AlkPhos  131<H>  05-23    PT/INR - ( 23 May 2019 01:57 )   PT: 17.2 sec;   INR: 1.48 ratio         PTT - ( 23 May 2019 01:57 )  PTT:48.1 sec    Arterial Blood Gas:  05-23 @ 01:13  7.37/36/177/20/100/-3.9  ABG lactate: --  Arterial Blood Gas:  05-22 @ 20:18  7.46/32/213/22/100/-.9  ABG lactate: --  Arterial Blood Gas:  05-22 @ 16:38  7.36/31/142/17/99/-6.8  ABG lactate: --  Arterial Blood Gas:  05-22 @ 11:33  7.34/35/120/18/98/-6.1  ABG lactate: --  Arterial Blood Gas:  05-22 @ 04:43  7.36/34/201/19/99/-5.5  ABG lactate: --  Arterial Blood Gas:  05-22 @ 01:27  7.35/36/168/19/99/-5.2  ABG lactate: --  Arterial Blood Gas:  05-22 @ 00:34  7.36/35/65/19/92/-5.0  ABG lactate: --  Arterial Blood Gas:  05-21 @ 21:12  7.43/29/116/19/99/-4.5  ABG lactate: --  Arterial Blood Gas:  05-21 @ 19:02  7.43/28/105/18/98/-5.1  ABG lactate: --  Arterial Blood Gas:  05-21 @ 15:35  7.44/27/116/18/98/-5.0  ABG lactate: --  Arterial Blood Gas:  05-21 @ 11:22  7.43/29/113/19/98/-4.2  ABG lactate: --  Arterial Blood Gas:  05-21 @ 09:06  7.44/28/244/18/99/-4.4  ABG lactate: --  Arterial Blood Gas:  05-21 @ 06:47  7.44/26/156/18/99/-5.0  ABG lactate: --  Arterial Blood Gas:  05-21 @ 05:30  7.42/26/135/17/99/-5.9  ABG lactate: --        MICROBIOLOGY:     RADIOLOGY:  [ ] Reviewed and interpreted by me    Point of Care Ultrasound Findings:    PFT:    EKG:

## 2019-05-23 NOTE — PROGRESS NOTE ADULT - ATTENDING COMMENTS
TARAH/ATN, septic shock, amyloid, lactic acidosis, hyperphosphatemia, hypocalcemia, hypokalemia.  CRRT circuit clotting  Trach, edematous    - Trial of intermittent hemodialysis given ongoing circuit clotting  - Maintain MAP > 65  - Dose medications for intermittent hemodialysis  - Remainder per fellow, will follow    Hemodialysis Treatment.:     Schedule: Once, Modality: Hemodialysis, Access: Internal Jugular Central Venous Catheter    Dialyzer: Revaclear 300, Time: 180 Min    Blood Flow: 300 mL/Min , Dialysate Flow: 500 mL/Min, Dialysate Temp: 35, Tubinmm (Adult)    Target Fluid Removal: 1-1.5 Liters    Dialysate Electrolytes (mEq/L):  Calcium 2.5, Bicarbonate 35    Potassium Protocol  -->For serum potassium LESS THAN or EQUAL TO 3.4, notify MD/PA/NP       For serum potassium 3.5 - 4, use 3K dialysate bath       For serum potassium 4.1 - 5.9, use 2K dialysate bath       For serum potassium GREATER THAN or EQUAL TO 6, notify MD/PA/NP    Sodium Modeling (mEq/L): Initial 145, Final 140, Last 30 Min, Gradient Linear    Additional Instructions: Keep BP >100 (19 @ 08:23)

## 2019-05-23 NOTE — CHART NOTE - NSCHARTNOTEFT_GEN_A_CORE
Nutrition Follow Up Note  Patient seen for: Nutritional follow up     Source: RN, PA, Medical record, CTU team     Chart reviewed, events noted: 80 yo M with PMHx HLD, sleep apnea, GERD, diverticulitis, depression, anxiety, panic attacks with chest pain and SOB x 1 year. Admitted for CABG. S/p cardiac cath , found to have CAD, LVH and diastolic HF, S/p CABGx3 with LIMA to LAD; RSVG to OM, RCA . Hospital course c/b anemia/hypovolemia-shock, cardiovascular dysfunction, acidosis, and hyperglycemia. S/p tracheostomy on 2019 and left VATS, LLL wedge resection. Pt with PNA, MRSA ,lactic acidosis, and ischemic colitis. Remains intubated. CVVHD continues to clot, transitioned to IHD, plan for TPN consult.     Diet : Vital 1.2 @ 70ml/[id number]. Provides: 2016kcals and 126gm protein (25.3kcals/kg and 1/58gm pro/kg based on dosing Wt: 79.5kg)   EN Provision:     Patient reports:     PO intake :     Source for PO intake:     Enteral /Parenteral Nutrition:       Daily Weight in k.1 (05-22), Weight in k.8 (05-22), Weight in k.5 (05-22), Weight in k.9 (05-21), Weight in k.1 (05-20), Weight in k.9 (05-19), Weight in k.4 (05-17)  % Weight Change    Pertinent Medications: MEDICATIONS  (STANDING):  albumin human 25% IVPB 100 milliLiter(s) IV Intermittent every 8 hours  artificial  tears Solution 1 Drop(s) Both EYES every 6 hours  calcium chloride Injectable 1000 milliGRAM(s) IV Push <User Schedule>  chlorhexidine 0.12% Liquid 15 milliLiter(s) Oral Mucosa two times a day  chlorhexidine 4% Liquid 1 Application(s) Topical <User Schedule>  dextrose 10%. 1000 milliLiter(s) (20 mL/Hr) IV Continuous <Continuous>  dextrose 50% Injectable 50 milliLiter(s) IV Push once  diphenoxylate/atropine 2 Tablet(s) Oral four times a day  heparin  Infusion 500 Unit(s)/Hr (6.5 mL/Hr) IV Continuous <Continuous>  hydrocortisone sodium succinate Injectable 25 milliGRAM(s) IV Push every 6 hours  insulin lispro (HumaLOG) corrective regimen sliding scale   SubCutaneous every 6 hours  meropenem  IVPB 500 milliGRAM(s) IV Intermittent every 24 hours  midodrine 10 milliGRAM(s) Oral every 8 hours  mineral oil for Topical Use 1 Application(s) Topical daily  norepinephrine Infusion 0.034 MICROgram(s)/kG/Min (5 mL/Hr) IV Continuous <Continuous>  octreotide  Injectable 100 MICROGram(s) SubCutaneous every 8 hours  opium Tincture 6 milliGRAM(s) Oral four times a day  pantoprazole  Injectable 40 milliGRAM(s) IV Push two times a day  thiamine Injectable 200 milliGRAM(s) IV Push <User Schedule>  vasopressin Infusion 0.083 Unit(s)/Min (5 mL/Hr) IV Continuous <Continuous>    MEDICATIONS  (PRN):  sodium chloride 0.9% lock flush 10 milliLiter(s) IV Push every 1 hour PRN Pre/post blood products, medications, blood draw, and to maintain line patency    Pertinent Labs:  @ 01:57: Na 137, BUN 43<H>, Cr 0.73, <H>, K+ 3.6, Phos 3.4, Mg 2.3, Alk Phos 131<H>, ALT/SGPT 142<H>, AST/SGOT 263<H>, HbA1c --    Finger Sticks:      Skin per nursing documentation:   Edema:    Estimated Needs:   [ ] no change since previous assessment  [ ] recalculated:     Previous Nutrition Diagnosis:   Nutrition Diagnosis is:    New Nutrition Diagnosis:  Related to:    As evidenced by:      Interventions:     Recommend  1)    Monitoring and Evaluation:     Continue to monitor Nutritional intake, Tolerance to diet prescription, weights, labs, skin integrity    RD remains available upon request and will follow up per protocol Nutrition Follow Up Note  Patient seen for: Nutritional follow up     Source: RN, PA, Medical record, CTU team     Chart reviewed, events noted: 80 yo M with PMHx HLD, sleep apnea, GERD, diverticulitis, depression, anxiety, panic attacks with chest pain and SOB x 1 year. Admitted for CABG. S/p cardiac cath , found to have CAD, LVH and diastolic HF, S/p CABGx3 with LIMA to LAD; RSVG to OM, RCA . Hospital course c/b anemia/hypovolemia-shock, cardiovascular dysfunction, acidosis, and hyperglycemia. S/p tracheostomy on 2019 and left VATS, LLL wedge resection. Pt with PNA, MRSA ,lactic acidosis, and ischemic colitis. Remains intubated. CVVHD continues to clot, transitioned to IHD, plan for TPN consult.     Diet : Vital 1.2 @ 70ml/[id number]. Provides: 2016kcals and 126gm protein (25.3kcals/kg and 1/58gm pro/kg based on dosing Wt: 79.5kg)   EN Provision:   NPO -: Started on Vital 1.2   : 480ml  : 920ml  : currently infusing at 70ml    Rectal tube out put  : 1.8L  : 2.5L  : 2.7L  : 3/1L  : Per RN, stool out put is still high however leaking out around rectal tube and unable to be measured.     Patient reports: Pt remains on pressors. Pt is being followed by GI for [facility] rectal tube out put. Pt was on Banatrol plus and lomotil, started today on octreotide and opium tincture.  Pt seen for Nutrition support team, plan to started TPN @ half does. Per chart, prognosis remains poor.     Daily Weight in k.1 (-), Weight in k.8 (-), Weight in k.5 (), Weight in k.9 (-), Weight in k.1 (-), Weight in k.9 (-), Weight in k.4 (-)  % Weight Change    Pertinent Medications: MEDICATIONS  (STANDING):  albumin human 25% IVPB 100 milliLiter(s) IV Intermittent every 8 hours  artificial  tears Solution 1 Drop(s) Both EYES every 6 hours  calcium chloride Injectable 1000 milliGRAM(s) IV Push <User Schedule>  chlorhexidine 0.12% Liquid 15 milliLiter(s) Oral Mucosa two times a day  chlorhexidine 4% Liquid 1 Application(s) Topical <User Schedule>  dextrose 10%. 1000 milliLiter(s) (20 mL/Hr) IV Continuous <Continuous>  dextrose 50% Injectable 50 milliLiter(s) IV Push once  diphenoxylate/atropine 2 Tablet(s) Oral four times a day  heparin  Infusion 500 Unit(s)/Hr (6.5 mL/Hr) IV Continuous <Continuous>  hydrocortisone sodium succinate Injectable 25 milliGRAM(s) IV Push every 6 hours  insulin lispro (HumaLOG) corrective regimen sliding scale   SubCutaneous every 6 hours  meropenem  IVPB 500 milliGRAM(s) IV Intermittent every 24 hours  midodrine 10 milliGRAM(s) Oral every 8 hours  mineral oil for Topical Use 1 Application(s) Topical daily  norepinephrine Infusion 0.034 MICROgram(s)/kG/Min (5 mL/Hr) IV Continuous <Continuous>  octreotide  Injectable 100 MICROGram(s) SubCutaneous every 8 hours  opium Tincture 6 milliGRAM(s) Oral four times a day  pantoprazole  Injectable 40 milliGRAM(s) IV Push two times a day  thiamine Injectable 200 milliGRAM(s) IV Push <User Schedule>  vasopressin Infusion 0.083 Unit(s)/Min (5 mL/Hr) IV Continuous <Continuous>    MEDICATIONS  (PRN):  sodium chloride 0.9% lock flush 10 milliLiter(s) IV Push every 1 hour PRN Pre/post blood products, medications, blood draw, and to maintain line patency    Pertinent Labs:  @ 01:57: Na 137, BUN 43<H>, Cr 0.73, <H>, K+ 3.6, Phos 3.4, Mg 2.3, Alk Phos 131<H>, ALT/SGPT 142<H>, AST/SGOT 263<H>, HbA1c --      Skin per nursing left scapula wound  Edema: +1 generalized edema, +2 shagufta arm, hand, foot, leg, and scrotal edema.     Estimated Needs:   [ X] no change since previous assessment  [ ] recalculated:     Previous Nutrition Diagnosis:  1. Increased nutrient needs; continues. 2. Food and nutrition related knowledge deficit; no longer pertinent.     New Nutrition Diagnosis: Acute moderate malnutrition  Related to: altered GI function    As evidenced by:  <75% of estimate nutrient needs x1 week, Edema likely masking Wt loss, Malabsorption of EN     Interventions:     Recommend  1. defer TPN to TPN team. Although noted plan to start half dose  2. Would discontinue EN via NGT  3. Defer Antidiarrheals to team  4. Trend GI tolerance and rectal oot put   5. Trend BG levels, renal indices, LFT's and electrolytes     Monitoring and Evaluation:     Continue to monitor Nutritional intake, Tolerance to diet prescription, weights, labs, skin integrity    RD remains available upon request and will follow up per protocol  Sarah Siegler RD, RDN, [facility] Pager #[phone] Nutrition Follow Up Note  Patient seen for: Nutritional follow up     Source: RN, PA, Medical record, CTU team     Chart reviewed, events noted: 78 yo M with PMHx HLD, sleep apnea, GERD, diverticulitis, depression, anxiety, panic attacks with chest pain and SOB x 1 year. Admitted for CABG. S/p cardiac cath , found to have CAD, LVH and diastolic HF, S/p CABGx3 with LIMA to LAD; RSVG to OM, RCA . Hospital course c/b anemia/hypovolemia-shock, cardiovascular dysfunction, acidosis, and hyperglycemia. S/p tracheostomy on 2019 and left VATS, LLL wedge resection. Pt with PNA, MRSA ,lactic acidosis, and ischemic colitis. Remains intubated. CVVHD continues to clot, transitioned to IHD, plan for TPN consult.     Diet : Vital 1.2 @ 70ml/[id number]. Provides: 2016kcals and 126gm protein (25.3kcals/kg and 1.58gm pro/kg based on dosing Wt: 79.5kg)   EN Provision:   NPO -: Started on Vital 1.2   : 480ml  : 920ml  : currently infusing at 70ml    Rectal tube out put  : 1.8L  : 2.5L  : 2.7L  : 3/1L  : Per RN, stool out put is still high however leaking out around rectal tube and unable to be measured.     Patient reports: Pt remains on pressors. Pt is being followed by GI for [facility] rectal tube out put. Pt was on Banatrol plus and lomotil, started today on octreotide and opium tincture.  Pt seen for Nutrition support team, plan to started TPN @ half does. Per chart, prognosis remains poor.     Daily Weight in k.1 (-), Weight in k.8 (-), Weight in k.5 (), Weight in k.9 (-), Weight in k.1 (-), Weight in k.9 (-), Weight in k.4 (-)  % Weight Change    Pertinent Medications: MEDICATIONS  (STANDING):  albumin human 25% IVPB 100 milliLiter(s) IV Intermittent every 8 hours  artificial  tears Solution 1 Drop(s) Both EYES every 6 hours  calcium chloride Injectable 1000 milliGRAM(s) IV Push <User Schedule>  chlorhexidine 0.12% Liquid 15 milliLiter(s) Oral Mucosa two times a day  chlorhexidine 4% Liquid 1 Application(s) Topical <User Schedule>  dextrose 10%. 1000 milliLiter(s) (20 mL/Hr) IV Continuous <Continuous>  dextrose 50% Injectable 50 milliLiter(s) IV Push once  diphenoxylate/atropine 2 Tablet(s) Oral four times a day  heparin  Infusion 500 Unit(s)/Hr (6.5 mL/Hr) IV Continuous <Continuous>  hydrocortisone sodium succinate Injectable 25 milliGRAM(s) IV Push every 6 hours  insulin lispro (HumaLOG) corrective regimen sliding scale   SubCutaneous every 6 hours  meropenem  IVPB 500 milliGRAM(s) IV Intermittent every 24 hours  midodrine 10 milliGRAM(s) Oral every 8 hours  mineral oil for Topical Use 1 Application(s) Topical daily  norepinephrine Infusion 0.034 MICROgram(s)/kG/Min (5 mL/Hr) IV Continuous <Continuous>  octreotide  Injectable 100 MICROGram(s) SubCutaneous every 8 hours  opium Tincture 6 milliGRAM(s) Oral four times a day  pantoprazole  Injectable 40 milliGRAM(s) IV Push two times a day  thiamine Injectable 200 milliGRAM(s) IV Push <User Schedule>  vasopressin Infusion 0.083 Unit(s)/Min (5 mL/Hr) IV Continuous <Continuous>    MEDICATIONS  (PRN):  sodium chloride 0.9% lock flush 10 milliLiter(s) IV Push every 1 hour PRN Pre/post blood products, medications, blood draw, and to maintain line patency    Pertinent Labs:  @ 01:57: Na 137, BUN 43<H>, Cr 0.73, <H>, K+ 3.6, Phos 3.4, Mg 2.3, Alk Phos 131<H>, ALT/SGPT 142<H>, AST/SGOT 263<H>, HbA1c --      Skin per nursing left scapula wound  Edema: +1 generalized edema, +2 shagufta arm, hand, foot, leg, and scrotal edema.     Estimated Needs:   [ X] no change since previous assessment  [ ] recalculated:     Previous Nutrition Diagnosis:  1. Increased nutrient needs; continues. 2. Food and nutrition related knowledge deficit; no longer pertinent.     New Nutrition Diagnosis: Acute moderate malnutrition  Related to: altered GI function    As evidenced by:  <75% of estimate nutrient needs x1 week, Edema likely masking Wt loss, Malabsorption of EN     Interventions:     Recommend  1. Defer TPN to TPN team. Although noted plan to start half dose  2. Per discussion with Dr. Milner, plan to continue full dosed EN via NGT; vital 1.2 @ 70ml/[id number] despite rectal tube out put. Will reassess EN tolerance.   3. Defer Antidiarrheals to team  4. Trend GI tolerance and rectal out put   5. Trend BG levels, renal indices, LFT's and electrolytes     Monitoring and Evaluation:     Continue to monitor Nutritional intake, Tolerance to diet prescription, weights, labs, skin integrity    RD remains available upon request and will follow up per protocol  Sarah Siegler RD, RDN, [facility] Pager #[phone]

## 2019-05-23 NOTE — CONSULT NOTE ADULT - ATTENDING COMMENTS
Agree with above.  Critically ill, multiorgan system failure including hepatic, renal, respiratory (amyloid!?).  Severe malnutrition  1.  Protein calorie malnutrition, severe--poor absorption.  Agree with trial TPN at 1/2 strength, no lipid to start.  Need to adjust for AA loss with CRRT modality and modest glucose loss.  Orders reviewed  2.  hypertriglyceridemia--need repeat.  May only sustain modest supplementation as vitamin rather than major caloric source.

## 2019-05-23 NOTE — PROGRESS NOTE ADULT - ASSESSMENT
79 yo   male with PMH of HLD, Sleep apnea ( non compliant with CPAP), GERD, diverticulitis, depression, anxiety, and panic attack, presented to [facility] on 04/25/19 for LHC after having  abnormal NST done as outpatient, LHC done same day revealed TVD. therefore underwent CABG on 04/26/19, hospital course complicated with septic shock and anuric TARAH.

## 2019-05-23 NOTE — PROGRESS NOTE ADULT - PROBLEM SELECTOR PLAN 1
Pt with TARAH most likely hemodynamically mediated vs ischemic ATN in the setting of anemia from blood loss, hemodynamic instability, and renal venous congestion. SCr of 1.2 in 2016, 1.1 08/18 and 1.06 on 04/25/19. Started on HD on 05/11/19 due to BUN 190s worsening mental status, concerning for uremic encephalopathy. Labs reviewed today. Switch to CRRT on 5/16/19 due to shock and discontinue on 5/21/19.  Pt. re-started HD yesterday with 700 cc UF. Plan to attempt IHD again today after discussion with CTU team. Monitor I&O, daily weights, avoid nephrotoxics. Adjust meds based on GFR.  Avoid hypotension.

## 2019-05-23 NOTE — CHART NOTE - NSCHARTNOTEFT_GEN_A_CORE
Upon Nutritional Assessment by the Registered Dietitian your patient was determined to meet criteria / has evidence of the following diagnosis/diagnoses:          [ ]  Mild Protein Calorie Malnutrition        [ X]  Moderate Protein Calorie Malnutrition        [ ] Severe Protein Calorie Malnutrition        [ ] Unspecified Protein Calorie Malnutrition        [ ] Underweight / BMI <19        [ ] Morbid Obesity / BMI > 40      Findings as based on:  [X ] Comprehensive nutrition assessment   [ ] Nutrition Focused Physical Exam  [X ] Other:   <75% of estimate nutrient needs x1 week, Edema likely masking Wt loss,    Nutrition Plan/Recommendations:      1. defer TPN to TPN team. Although noted plan to start half dose  2. Would discontinue EN via NGT  3. Defer Antidiarrheals to team  4. Trend GI tolerance and rectal oot put   5. Trend BG levels, renal indices, LFT's and electrolytes     PROVIDER Section:     By signing this assessment you are acknowledging and agree with the diagnosis/diagnoses assigned by the Registered Dietitian    Comments:

## 2019-05-23 NOTE — PROGRESS NOTE ADULT - SUBJECTIVE AND OBJECTIVE BOX
[facility] DIVISION OF KIDNEY DISEASES AND HYPERTENSION -- FOLLOW UP NOTE  --------------------------------------------------------------------------------  Chief Complaint: TARAH    24 hour events/subjective: Patient seen at bedside. Underwent HD yesterday tolerated 700 cc UF. Pt. on IV pressor support, intubated. Plan for HD today.    PAST HISTORY  --------------------------------------------------------------------------------  No significant changes to PMH, PSH, FHx, SHx, unless otherwise noted    ALLERGIES & MEDICATIONS  --------------------------------------------------------------------------------  Allergies    No Known Allergies    Intolerances      Standing Inpatient Medications  albumin human 25% IVPB 100 milliLiter(s) IV Intermittent every 8 hours  artificial  tears Solution 1 Drop(s) Both EYES every 6 hours  calcium chloride Injectable 1000 milliGRAM(s) IV Push <User Schedule>  chlorhexidine 0.12% Liquid 15 milliLiter(s) Oral Mucosa two times a day  chlorhexidine 4% Liquid 1 Application(s) Topical <User Schedule>  dextrose 10%. 1000 milliLiter(s) IV Continuous <Continuous>  dextrose 50% Injectable 50 milliLiter(s) IV Push once  diphenoxylate/atropine 2 Tablet(s) Oral four times a day  heparin  Infusion 500 Unit(s)/Hr IV Continuous <Continuous>  hydrocortisone sodium succinate Injectable 25 milliGRAM(s) IV Push every 6 hours  insulin lispro (HumaLOG) corrective regimen sliding scale   SubCutaneous every 6 hours  meropenem  IVPB 500 milliGRAM(s) IV Intermittent every 24 hours  midodrine 10 milliGRAM(s) Oral every 8 hours  mineral oil for Topical Use 1 Application(s) Topical daily  norepinephrine Infusion 0.034 MICROgram(s)/kG/Min IV Continuous <Continuous>  octreotide  Injectable 100 MICROGram(s) SubCutaneous every 8 hours  opium Tincture 6 milliGRAM(s) Oral four times a day  pantoprazole  Injectable 40 milliGRAM(s) IV Push two times a day  thiamine Injectable 200 milliGRAM(s) IV Push <User Schedule>  vasopressin Infusion 0.083 Unit(s)/Min IV Continuous <Continuous>    PRN Inpatient Medications  sodium chloride 0.9% lock flush 10 milliLiter(s) IV Push every 1 hour PRN      REVIEW OF SYSTEMS  --------------------------------------------------------------------------------  Unable to obtain    VITALS/PHYSICAL EXAM  --------------------------------------------------------------------------------  T(C): 36.6 (05-22-19 @ 23:00), Max: 36.7 (05-22-19 @ 15:00)  HR: 108 (05-23-19 @ 08:06) (77 - 114)  BP: 98/59 (05-22-19 @ 21:00) (92/51 - 133/68)  RR: 13 (05-23-19 @ 06:00) (7 - 26)  SpO2: 100% (05-23-19 @ 08:06) (98% - 100%)  Wt(kg): --        05-22-19 @ 07:01  -  05-23-19 @ 07:00  --------------------------------------------------------  IN: 3685.5 mL / OUT: 2810 mL / NET: 875.5 mL        Physical Exam:  	Gen: critically ill.   	HEENT: +NGT, +trach  	Pulm: CTA B/L  	CV: RRR, S1S2; no rub  	Abd: +BS, soft, nondistended              : anuric   	LE: Warm, 2+ edema  	Vascular access: +LIJ non-tunneled HD catheter    LABS/STUDIES  --------------------------------------------------------------------------------              8.6    4.0   >-----------<  82       [05-23-19 @ 01:57]              24.7     137  |  106  |  43  ----------------------------<  118      [05-23-19 @ 01:57]  3.6   |  19  |  0.73        Ca     8.5     [05-23-19 @ 01:57]      Mg     2.3     [05-23-19 @ 01:57]      Phos  3.4     [05-23-19 @ 01:57]    TPro  5.1  /  Alb  2.9  /  TBili  15.2  /  DBili  x   /  AST  263  /  ALT  142  /  AlkPhos  131  [05-23-19 @ 01:57]    PT/INR: PT 17.2 , INR 1.48       [05-23-19 @ 01:57]  PTT: 48.1       [05-23-19 @ 01:57]      Creatinine Trend:  SCr 0.73 [05-23 @ 01:57]  SCr 0.75 [05-22 @ 00:45]  SCr 0.52 [05-21 @ 02:14]  SCr 0.55 [05-20 @ 00:50]  SCr 0.72 [05-19 @ 00:59]    Urinalysis - [05-14-19 @ 19:13]      Color Yellow / Appearance Slightly Turbid / SG 1.011 / pH 5.0      Gluc Negative / Ketone Negative  / Bili Negative / Urobili Negative       Blood Moderate / Protein Trace / Leuk Est Negative / Nitrite Negative      RBC 10 / WBC 4 / Hyaline 32 / Gran  / Sq Epi  / Non Sq Epi 3 / Bacteria Negative

## 2019-05-24 LAB
ALBUMIN SERPL ELPH-MCNC: 3.2 G/DL — LOW (ref 3.3–5)
ALP SERPL-CCNC: 131 U/L — HIGH (ref 40–120)
ALT FLD-CCNC: 60 U/L — HIGH (ref 10–45)
AMYLASE P1 CFR SERPL: 287 U/L — HIGH (ref 25–125)
ANION GAP SERPL CALC-SCNC: 15 MMOL/L — SIGNIFICANT CHANGE UP (ref 5–17)
APTT 50/50 2HOUR INCUB: 32.3 SEC — SIGNIFICANT CHANGE UP (ref 27.5–37.4)
APTT BLD: 29.2 SEC — SIGNIFICANT CHANGE UP (ref 27.5–37.4)
APTT BLD: 45.1 SEC — HIGH (ref 27.5–36.3)
APTT BLD: 45.1 SEC — HIGH (ref 27.5–36.3)
APTT BLD: 60 SEC — HIGH (ref 27.5–36.3)
APTT BLD: 68.5 SEC — HIGH (ref 27.5–36.3)
AST SERPL-CCNC: 170 U/L — HIGH (ref 10–40)
BASE EXCESS BLDV CALC-SCNC: -5.7 MMOL/L — LOW (ref -2–2)
BILIRUB SERPL-MCNC: 15.8 MG/DL — HIGH (ref 0.2–1.2)
BLD GP AB SCN SERPL QL: NEGATIVE — SIGNIFICANT CHANGE UP
BUN SERPL-MCNC: 48 MG/DL — HIGH (ref 7–23)
CALCIUM SERPL-MCNC: 8.7 MG/DL — SIGNIFICANT CHANGE UP (ref 8.4–10.5)
CHLORIDE SERPL-SCNC: 103 MMOL/L — SIGNIFICANT CHANGE UP (ref 96–108)
CO2 BLDV-SCNC: 21 MMOL/L — LOW (ref 22–30)
CO2 SERPL-SCNC: 19 MMOL/L — LOW (ref 22–31)
CREAT SERPL-MCNC: 0.74 MG/DL — SIGNIFICANT CHANGE UP (ref 0.5–1.3)
D DIMER BLD IA.RAPID-MCNC: 6822 NG/ML DDU — HIGH
FIBRINOGEN PPP-MCNC: 265 MG/DL — LOW (ref 320–500)
FIBRINOGEN PPP-MCNC: 285 MG/DL — LOW (ref 350–510)
GAS PNL BLDA: SIGNIFICANT CHANGE UP
GLUCOSE SERPL-MCNC: 172 MG/DL — HIGH (ref 70–99)
HAPTOGLOB SERPL-MCNC: 28 MG/DL — LOW (ref 34–200)
HCO3 BLDV-SCNC: 20 MMOL/L — LOW (ref 21–29)
HCT VFR BLD CALC: 25.8 % — LOW (ref 39–50)
HCT VFR BLD CALC: 28.1 % — LOW (ref 39–50)
HGB BLD-MCNC: 8.9 G/DL — LOW (ref 13–17)
HGB BLD-MCNC: 9.7 G/DL — LOW (ref 13–17)
HOROWITZ INDEX BLDV+IHG-RTO: 50 — SIGNIFICANT CHANGE UP
INR BLD: 1.38 RATIO — HIGH (ref 0.88–1.16)
INR BLD: 1.6 RATIO — HIGH (ref 0.88–1.16)
LDH SERPL L TO P-CCNC: 570 U/L — HIGH (ref 50–242)
LDH SERPL L TO P-CCNC: 596 U/L — HIGH (ref 50–242)
LIDOCAIN IGE QN: 188 U/L — HIGH (ref 7–60)
MAGNESIUM SERPL-MCNC: 2.3 MG/DL — SIGNIFICANT CHANGE UP (ref 1.6–2.6)
MCHC RBC-ENTMCNC: 30.5 PG — SIGNIFICANT CHANGE UP (ref 27–34)
MCHC RBC-ENTMCNC: 30.7 PG — SIGNIFICANT CHANGE UP (ref 27–34)
MCHC RBC-ENTMCNC: 34.3 GM/DL — SIGNIFICANT CHANGE UP (ref 32–36)
MCHC RBC-ENTMCNC: 34.6 GM/DL — SIGNIFICANT CHANGE UP (ref 32–36)
MCV RBC AUTO: 88.8 FL — SIGNIFICANT CHANGE UP (ref 80–100)
MCV RBC AUTO: 88.8 FL — SIGNIFICANT CHANGE UP (ref 80–100)
PAT CTL 2H: 35.7 SEC — SIGNIFICANT CHANGE UP (ref 27.5–37.4)
PCO2 BLDV: 43 MMHG — SIGNIFICANT CHANGE UP (ref 35–50)
PH BLDV: 7.29 — LOW (ref 7.35–7.45)
PHOSPHATE SERPL-MCNC: 3.5 MG/DL — SIGNIFICANT CHANGE UP (ref 2.5–4.5)
PLATELET # BLD AUTO: 21 K/UL — LOW (ref 150–400)
PLATELET # BLD AUTO: 99 K/UL — LOW (ref 150–400)
PO2 BLDV: 42 MMHG — SIGNIFICANT CHANGE UP (ref 25–45)
POTASSIUM SERPL-MCNC: 3.5 MMOL/L — SIGNIFICANT CHANGE UP (ref 3.5–5.3)
POTASSIUM SERPL-SCNC: 3.5 MMOL/L — SIGNIFICANT CHANGE UP (ref 3.5–5.3)
PREALB SERPL-MCNC: 6 MG/DL — LOW (ref 20–40)
PROT SERPL-MCNC: 5.3 G/DL — LOW (ref 6–8.3)
PROTHROM AB SERPL-ACNC: 16 SEC — HIGH (ref 10–12.9)
PROTHROM AB SERPL-ACNC: 18.5 SEC — HIGH (ref 10–12.9)
PT 100%: 16 SEC — HIGH (ref 10–12.9)
PT 50/50: 12.7 SEC — SIGNIFICANT CHANGE UP (ref 9.7–15.2)
RBC # BLD: 2.91 M/UL — LOW (ref 4.2–5.8)
RBC # BLD: 3.12 M/UL — LOW (ref 4.2–5.8)
RBC # BLD: 3.16 M/UL — LOW (ref 4.2–5.8)
RBC # FLD: 14.7 % — HIGH (ref 10.3–14.5)
RBC # FLD: 14.8 % — HIGH (ref 10.3–14.5)
REPTILASE TIME: 27.9 SEC — HIGH (ref 15–20.5)
RETICS #: 30.7 K/UL — SIGNIFICANT CHANGE UP (ref 25–125)
RETICS/RBC NFR: 1 % — SIGNIFICANT CHANGE UP (ref 0.5–2.5)
RH IG SCN BLD-IMP: POSITIVE — SIGNIFICANT CHANGE UP
SAO2 % BLDV: 71 % — SIGNIFICANT CHANGE UP (ref 67–88)
SODIUM SERPL-SCNC: 137 MMOL/L — SIGNIFICANT CHANGE UP (ref 135–145)
T3 SERPL-MCNC: 43 NG/DL — LOW (ref 80–200)
T4 AB SER-ACNC: 4.7 UG/DL — SIGNIFICANT CHANGE UP (ref 4.6–12)
T4 FREE SERPL-MCNC: 1.1 NG/DL — SIGNIFICANT CHANGE UP (ref 0.9–1.8)
THROMBIN TIME: 34.8 SEC — HIGH (ref 16–25)
TSH SERPL-MCNC: 0.11 UIU/ML — LOW (ref 0.27–4.2)
VANCOMYCIN TROUGH SERPL-MCNC: 17.3 UG/ML — SIGNIFICANT CHANGE UP (ref 10–20)
WBC # BLD: 3.8 K/UL — SIGNIFICANT CHANGE UP (ref 3.8–10.5)
WBC # BLD: 3.8 K/UL — SIGNIFICANT CHANGE UP (ref 3.8–10.5)
WBC # FLD AUTO: 3.8 K/UL — SIGNIFICANT CHANGE UP (ref 3.8–10.5)
WBC # FLD AUTO: 3.8 K/UL — SIGNIFICANT CHANGE UP (ref 3.8–10.5)

## 2019-05-24 PROCEDURE — 99233 SBSQ HOSP IP/OBS HIGH 50: CPT

## 2019-05-24 PROCEDURE — 99233 SBSQ HOSP IP/OBS HIGH 50: CPT | Mod: GC

## 2019-05-24 PROCEDURE — 71045 X-RAY EXAM CHEST 1 VIEW: CPT | Mod: 26

## 2019-05-24 PROCEDURE — 99232 SBSQ HOSP IP/OBS MODERATE 35: CPT

## 2019-05-24 PROCEDURE — 99291 CRITICAL CARE FIRST HOUR: CPT

## 2019-05-24 RX ORDER — HEPARIN SODIUM 5000 [USP'U]/ML
650 INJECTION INTRAVENOUS; SUBCUTANEOUS
Qty: 10000 | Refills: 0 | Status: DISCONTINUED | OUTPATIENT
Start: 2019-05-24 | End: 2019-05-24

## 2019-05-24 RX ORDER — FENTANYL CITRATE 50 UG/ML
12.5 INJECTION INTRAVENOUS ONCE
Refills: 0 | Status: DISCONTINUED | OUTPATIENT
Start: 2019-05-24 | End: 2019-05-24

## 2019-05-24 RX ORDER — VANCOMYCIN HCL 1 G
500 VIAL (EA) INTRAVENOUS ONCE
Refills: 0 | Status: COMPLETED | OUTPATIENT
Start: 2019-05-24 | End: 2019-05-24

## 2019-05-24 RX ORDER — ELECTROLYTE SOLUTION,INJ
1 VIAL (ML) INTRAVENOUS
Refills: 0 | Status: DISCONTINUED | OUTPATIENT
Start: 2019-05-24 | End: 2019-05-24

## 2019-05-24 RX ORDER — HYDROMORPHONE HYDROCHLORIDE 2 MG/ML
0.5 INJECTION INTRAMUSCULAR; INTRAVENOUS; SUBCUTANEOUS ONCE
Refills: 0 | Status: DISCONTINUED | OUTPATIENT
Start: 2019-05-24 | End: 2019-05-24

## 2019-05-24 RX ADMIN — Medication 1 EACH: at 07:41

## 2019-05-24 RX ADMIN — Medication 2 TABLET(S): at 17:17

## 2019-05-24 RX ADMIN — MORPHINE 6 MILLIGRAM(S): 10 SOLUTION ORAL at 23:59

## 2019-05-24 RX ADMIN — Medication 25 MILLIGRAM(S): at 05:21

## 2019-05-24 RX ADMIN — HEPARIN SODIUM 1.3 UNIT(S)/HR: 5000 INJECTION INTRAVENOUS; SUBCUTANEOUS at 16:31

## 2019-05-24 RX ADMIN — Medication 5 MICROGRAM(S)/KG/MIN: at 07:41

## 2019-05-24 RX ADMIN — Medication 1000 MILLIGRAM(S): at 21:14

## 2019-05-24 RX ADMIN — Medication 1 EACH: at 19:00

## 2019-05-24 RX ADMIN — Medication 1 APPLICATION(S): at 11:46

## 2019-05-24 RX ADMIN — Medication 200 MILLIGRAM(S): at 21:13

## 2019-05-24 RX ADMIN — OCTREOTIDE ACETATE 100 MICROGRAM(S): 200 INJECTION, SOLUTION INTRAVENOUS; SUBCUTANEOUS at 05:22

## 2019-05-24 RX ADMIN — Medication 1: at 00:24

## 2019-05-24 RX ADMIN — Medication 50 MILLILITER(S): at 05:22

## 2019-05-24 RX ADMIN — Medication 1 DROP(S): at 17:27

## 2019-05-24 RX ADMIN — Medication 2 TABLET(S): at 05:22

## 2019-05-24 RX ADMIN — HEPARIN SODIUM 6.5 UNIT(S)/HR: 5000 INJECTION INTRAVENOUS; SUBCUTANEOUS at 07:41

## 2019-05-24 RX ADMIN — Medication 100 MILLIGRAM(S): at 06:08

## 2019-05-24 RX ADMIN — Medication 1000 MILLIGRAM(S): at 04:38

## 2019-05-24 RX ADMIN — CHLORHEXIDINE GLUCONATE 1 APPLICATION(S): 213 SOLUTION TOPICAL at 05:23

## 2019-05-24 RX ADMIN — HEPARIN SODIUM 7 UNIT(S)/HR: 5000 INJECTION INTRAVENOUS; SUBCUTANEOUS at 19:00

## 2019-05-24 RX ADMIN — Medication 5 MICROGRAM(S)/KG/MIN: at 19:00

## 2019-05-24 RX ADMIN — HYDROMORPHONE HYDROCHLORIDE 0.5 MILLIGRAM(S): 2 INJECTION INTRAMUSCULAR; INTRAVENOUS; SUBCUTANEOUS at 22:05

## 2019-05-24 RX ADMIN — Medication 200 MILLIGRAM(S): at 11:38

## 2019-05-24 RX ADMIN — Medication 1 EACH: at 17:25

## 2019-05-24 RX ADMIN — Medication 2 TABLET(S): at 11:39

## 2019-05-24 RX ADMIN — Medication 1 DROP(S): at 00:08

## 2019-05-24 RX ADMIN — FENTANYL CITRATE 12.5 MICROGRAM(S): 50 INJECTION INTRAVENOUS at 18:43

## 2019-05-24 RX ADMIN — Medication 1 DROP(S): at 11:45

## 2019-05-24 RX ADMIN — MORPHINE 6 MILLIGRAM(S): 10 SOLUTION ORAL at 05:22

## 2019-05-24 RX ADMIN — CHLORHEXIDINE GLUCONATE 15 MILLILITER(S): 213 SOLUTION TOPICAL at 05:21

## 2019-05-24 RX ADMIN — Medication 1 DROP(S): at 05:47

## 2019-05-24 RX ADMIN — VASOPRESSIN 5 UNIT(S)/MIN: 20 INJECTION INTRAVENOUS at 07:40

## 2019-05-24 RX ADMIN — HEPARIN SODIUM 7 UNIT(S)/HR: 5000 INJECTION INTRAVENOUS; SUBCUTANEOUS at 16:30

## 2019-05-24 RX ADMIN — PANTOPRAZOLE SODIUM 40 MILLIGRAM(S): 20 TABLET, DELAYED RELEASE ORAL at 17:17

## 2019-05-24 RX ADMIN — Medication 1 DROP(S): at 23:58

## 2019-05-24 RX ADMIN — HYDROMORPHONE HYDROCHLORIDE 0.5 MILLIGRAM(S): 2 INJECTION INTRAMUSCULAR; INTRAVENOUS; SUBCUTANEOUS at 21:50

## 2019-05-24 RX ADMIN — Medication 1000 MILLIGRAM(S): at 11:38

## 2019-05-24 RX ADMIN — FENTANYL CITRATE 12.5 MICROGRAM(S): 50 INJECTION INTRAVENOUS at 19:20

## 2019-05-24 RX ADMIN — MIDODRINE HYDROCHLORIDE 10 MILLIGRAM(S): 2.5 TABLET ORAL at 21:14

## 2019-05-24 RX ADMIN — OCTREOTIDE ACETATE 100 MICROGRAM(S): 200 INJECTION, SOLUTION INTRAVENOUS; SUBCUTANEOUS at 21:14

## 2019-05-24 RX ADMIN — PANTOPRAZOLE SODIUM 40 MILLIGRAM(S): 20 TABLET, DELAYED RELEASE ORAL at 05:22

## 2019-05-24 RX ADMIN — MIDODRINE HYDROCHLORIDE 10 MILLIGRAM(S): 2.5 TABLET ORAL at 15:03

## 2019-05-24 RX ADMIN — Medication 2 TABLET(S): at 23:59

## 2019-05-24 RX ADMIN — CHLORHEXIDINE GLUCONATE 15 MILLILITER(S): 213 SOLUTION TOPICAL at 17:17

## 2019-05-24 RX ADMIN — MORPHINE 6 MILLIGRAM(S): 10 SOLUTION ORAL at 11:38

## 2019-05-24 RX ADMIN — VASOPRESSIN 5 UNIT(S)/MIN: 20 INJECTION INTRAVENOUS at 19:00

## 2019-05-24 RX ADMIN — HEPARIN SODIUM 1.3 UNIT(S)/HR: 5000 INJECTION INTRAVENOUS; SUBCUTANEOUS at 19:00

## 2019-05-24 RX ADMIN — HEPARIN SODIUM 7 UNIT(S)/HR: 5000 INJECTION INTRAVENOUS; SUBCUTANEOUS at 17:58

## 2019-05-24 RX ADMIN — MORPHINE 6 MILLIGRAM(S): 10 SOLUTION ORAL at 17:18

## 2019-05-24 RX ADMIN — OCTREOTIDE ACETATE 100 MICROGRAM(S): 200 INJECTION, SOLUTION INTRAVENOUS; SUBCUTANEOUS at 15:03

## 2019-05-24 RX ADMIN — MIDODRINE HYDROCHLORIDE 10 MILLIGRAM(S): 2.5 TABLET ORAL at 05:22

## 2019-05-24 RX ADMIN — Medication 1000 MILLIGRAM(S): at 15:03

## 2019-05-24 NOTE — PROGRESS NOTE ADULT - PROBLEM SELECTOR PLAN 9
off abx-culture as needed
off abx-culture as needed
PNA---abx restarted-meropenem/vanco 5/15; added mycafungin-follow lactate--as per ID
abx restarted-meropenem/vanco 5/15
PNA---abx restarted-meropenem/vanco 5/15; added mycafungin, zyvox
PNA---abx restarted-meropenem/vanco 5/15; added mycafungin--follow lactate
PNA---abx restarted-meropenem/vanco 5/15; added mycafungin--follow lactate
PNA---abx restarted-meropenem/vanco 5/15; added mycafungin/flagyl--follow lactate
PNA---abx restarted-vanco 5/15-as per ID
off abx-culture as needed

## 2019-05-24 NOTE — PROGRESS NOTE ADULT - ASSESSMENT
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 His postop course was complicated by persistent respiratory failure requiring tracheostomy and he had a wedge biopsy showing Pulmonary Amyloidosis.    5/13-poor weaning (less than 2 hrs), CT in place  5/14-off solumedrol, CT in place 100 cc out, wean cpap/ps 5/15  5/15-TC for 12 hrs, renal and CHF f/up  5/16-4 hrs TC, over breath vent-on diprovan; mult pressors/rectal tube/c. diff neg-on meropenem/vanco  5/17-septic-HD-prbc, no weaning--ct cap-c/w multifocal PNA\  5/18-mult PRBC, no wean, CVVHD-gamma globulin  5/19-no change abx/pressors/cvvhd and cooling blanket--MS poor off sedation  5/20-slightly better MS off sedation--no change in status-no weaning; 5/21-no weaning/sedated/CVVHD; Heme evaln  5/22-further resp. decline-boosted fio2-60%-temporarily off cvvhd-reevaluation today  5/23-had HD, reduced fio2 to 50%  5/24-HD performed-remains critically ill

## 2019-05-24 NOTE — PROGRESS NOTE ADULT - SUBJECTIVE AND OBJECTIVE BOX
INTERVAL HPI/OVERNIGHT EVENTS:    intubated/lethargic  rectal tube with increased loose stools brown   chaparro feeds     MEDICATIONS  (STANDING):  artificial  tears Solution 1 Drop(s) Both EYES every 6 hours  calcium chloride Injectable 1000 milliGRAM(s) IV Push <User Schedule>  chlorhexidine 0.12% Liquid 15 milliLiter(s) Oral Mucosa two times a day  chlorhexidine 4% Liquid 1 Application(s) Topical <User Schedule>  dextrose 50% Injectable 50 milliLiter(s) IV Push once  diphenoxylate/atropine 2 Tablet(s) Oral four times a day  heparin  Infusion 500 Unit(s)/Hr (6.5 mL/Hr) IV Continuous <Continuous>  insulin lispro (HumaLOG) corrective regimen sliding scale   SubCutaneous every 6 hours  midodrine 10 milliGRAM(s) Oral every 8 hours  mineral oil for Topical Use 1 Application(s) Topical daily  norepinephrine Infusion 0.034 MICROgram(s)/kG/Min (5 mL/Hr) IV Continuous <Continuous>  octreotide  Injectable 100 MICROGram(s) SubCutaneous every 8 hours  opium Tincture 6 milliGRAM(s) Oral four times a day  pantoprazole  Injectable 40 milliGRAM(s) IV Push two times a day  Parenteral Nutrition - Adult 1 Each (42 mL/Hr) TPN Continuous <Continuous>  thiamine Injectable 200 milliGRAM(s) IV Push <User Schedule>  vasopressin Infusion 0.083 Unit(s)/Min (5 mL/Hr) IV Continuous <Continuous>    MEDICATIONS  (PRN):  sodium chloride 0.9% lock flush 10 milliLiter(s) IV Push every 1 hour PRN Pre/post blood products, medications, blood draw, and to maintain line patency      Allergies    No Known Allergies    Intolerances        Review of Systems: *pt minimally verbal to nonverbal, unable to obtain ROS         Vital Signs Last 24 Hrs  T(C): 37.2 (24 May 2019 08:00), Max: 37.3 (23 May 2019 17:50)  T(F): 99 (24 May 2019 08:00), Max: 99.1 (23 May 2019 17:50)  HR: 85 (24 May 2019 10:15) (78 - 125)  BP: --  BP(mean): --  RR: 8 (24 May 2019 10:15) (1 - 27)  SpO2: 100% (24 May 2019 10:15) (92% - 100%)    PHYSICAL EXAM:    Constitutional: NAD  HEENT: EOMI, throat clear  Neck: No LAD, supple+intubated  Respiratory: CTA and P  Cardiovascular: S1 and S2, RRR, no M  Gastrointestinal: BS+, soft, NT/ND, neg HSM, +ngt +rectal tube  Extremities: No peripheral edema, neg clubbing, cyanosis  Vascular: 2+ peripheral pulses  Neurological: A/O x 0  Psychiatric: lethargic intubated  Skin: No rashes      LABS:                        8.9    3.8   )-----------( 99       ( 24 May 2019 08:48 )             25.8     05-24    137  |  103  |  48<H>  ----------------------------<  172<H>  3.5   |  19<L>  |  0.74    Ca    8.7      24 May 2019 01:54  Phos  3.5     05-24  Mg     2.3     05-24    TPro  5.3<L>  /  Alb  3.2<L>  /  TBili  15.8<H>  /  DBili  x   /  AST  170<H>  /  ALT  60<H>  /  AlkPhos  131<H>  05-24    PT/INR - ( 24 May 2019 01:54 )   PT: 18.5 sec;   INR: 1.60 ratio         PTT - ( 24 May 2019 01:54 )  PTT:60.0 sec      RADIOLOGY & ADDITIONAL TESTS:

## 2019-05-24 NOTE — PROGRESS NOTE ADULT - ATTENDING COMMENTS
as above-CT chest c/w PNA--MRSA sputum--- ABX restarted (vanco-remains)-no signif improvement remains CRITICALLY ill  Resp failure--pulm amyloidosis, PNA, CHF, debility-------on vent-sat above 90% (further declline)  Pulm Amyloid--no definitive rx at present           COPD-duoneb via ETT q 6  CHF-as per Gaby et al-pressors                         Heme- official consult performed-await studies ([facility]),abdominal fat pad and transthyretin technetium pyrophosphate cardiac scan   Renal insuff/fluid OL-HD  PNA-MRSA-vanco-as per ID                          GOC/prog critical/guarded  GI-ppi/TF (on hold) -likely ischemic colitis                                 Jann Guadarrama MD-Pulmonary   [phone]

## 2019-05-24 NOTE — PROGRESS NOTE ADULT - SUBJECTIVE AND OBJECTIVE BOX
CHIEF COMPLAINT:  f/up resp failure, pulm amyloidosis, asthma, osas- on vent --sedated-multiple pressors--HD (no signif changes)    Interval Events: HD    REVIEW OF SYSTEMS:  Constitutional: No fevers or chills. No weight loss. No fatigue or generalized malaise.  Eyes: No itching or discharge from the eyes  ENT: No ear pain. No ear discharge. No nasal congestion. No post nasal drip. No epistaxis. No throat pain. No sore throat. No difficulty swallowing.   CV: No chest pain. No palpitations. No lightheadedness or dizziness.   Resp: No dyspnea at rest. No dyspnea on exertion. No orthopnea. No wheezing. No cough. No stridor. No sputum production. No chest pain with respiration.  GI: No nausea. No vomiting. No diarrhea.  MSK: No joint pain or pain in any extremities  Integumentary: No skin lesions. No pedal edema.  Neurological: No gross motor weakness. No sensory changes.  [ ] All other systems negative  [+ ] Unable to assess ROS because MS p oor________    OBJECTIVE:  ICU Vital Signs Last 24 Hrs  T(C): 37.3 (24 May 2019 03:00), Max: 37.3 (23 May 2019 17:50)  T(F): 99.1 (24 May 2019 03:00), Max: 99.1 (23 May 2019 17:50)  HR: 87 (24 May 2019 04:30) (78 - 125)  BP: --  BP(mean): --  ABP: 132/52 (24 May 2019 04:30) (101/45 - 151/69)  ABP(mean): 72 (24 May 2019 04:30) (60 - 94)  RR: 14 (24 May 2019 04:30) (1 - 27)  SpO2: 100% (24 May 2019 04:30) (92% - 100%)    Mode: AC/ CMV (Assist Control/ Continuous Mandatory Ventilation), RR (machine): 10, TV (machine): 500, FiO2: 50, PEEP: 6, ITime: 1, MAP: 8, PIP: 12    05-22 @ 07:01  -  05-23 @ 07:00  --------------------------------------------------------  IN: 3685.5 mL / OUT: 2810 mL / NET: 875.5 mL    05-23 @ 07:01  - 05-24 @ 04:40  --------------------------------------------------------  IN: 4036 mL / OUT: 3210 mL / NET: 826 mL      CAPILLARY BLOOD GLUCOSE  151 (24 May 2019 00:00)      POCT Blood Glucose.: 100 mg/dL (23 May 2019 12:31)      PHYSICAL EXAM:  General: por MS-sedate on vent   HEENT: Atraumatic, normocephalic.                 Mallampatti Grade 3                No nasal congestion.                No tonsillar or pharyngeal exudates.  Lymph Nodes: No palpable lymphadenopathy  Neck: No JVD. No carotid bruit.   Respiratory: Normal chest expansion-reduced BS bases                         Normal percussion                         Normal and equal air entry                         No wheeze, rhonchi or rales.  Cardiovascular: S1 S2 normal. No murmurs, rubs or gallops.   Abdomen: +tender, mildly-distended. No organomegaly. reduced bowel sounds.  Extremities: Warm to touch. Peripheral pulse palpable.  + TB and pedal edema.   Skin: No rashes or skin lesions  Neurological: Motor and sensory examination-unable  Psychiatry: unable    HOSPITAL MEDICATIONS:  MEDICATIONS  (STANDING):  albumin human 25% IVPB 100 milliLiter(s) IV Intermittent every 8 hours  artificial  tears Solution 1 Drop(s) Both EYES every 6 hours  calcium chloride Injectable 1000 milliGRAM(s) IV Push <User Schedule>  chlorhexidine 0.12% Liquid 15 milliLiter(s) Oral Mucosa two times a day  chlorhexidine 4% Liquid 1 Application(s) Topical <User Schedule>  dextrose 10%. 1000 milliLiter(s) (20 mL/Hr) IV Continuous <Continuous>  dextrose 50% Injectable 50 milliLiter(s) IV Push once  diphenoxylate/atropine 2 Tablet(s) Oral four times a day  heparin  Infusion 500 Unit(s)/Hr (6.5 mL/Hr) IV Continuous <Continuous>  hydrocortisone sodium succinate Injectable 25 milliGRAM(s) IV Push every 12 hours  insulin lispro (HumaLOG) corrective regimen sliding scale   SubCutaneous every 6 hours  midodrine 10 milliGRAM(s) Oral every 8 hours  mineral oil for Topical Use 1 Application(s) Topical daily  norepinephrine Infusion 0.034 MICROgram(s)/kG/Min (5 mL/Hr) IV Continuous <Continuous>  octreotide  Injectable 100 MICROGram(s) SubCutaneous every 8 hours  opium Tincture 6 milliGRAM(s) Oral four times a day  pantoprazole  Injectable 40 milliGRAM(s) IV Push two times a day  Parenteral Nutrition - Adult 1 Each (42 mL/Hr) TPN Continuous <Continuous>  thiamine Injectable 200 milliGRAM(s) IV Push <User Schedule>  vancomycin  IVPB 500 milliGRAM(s) IV Intermittent once  vasopressin Infusion 0.083 Unit(s)/Min (5 mL/Hr) IV Continuous <Continuous>    MEDICATIONS  (PRN):  sodium chloride 0.9% lock flush 10 milliLiter(s) IV Push every 1 hour PRN Pre/post blood products, medications, blood draw, and to maintain line patency      LABS:                        9.7    3.8   )-----------( 21       ( 24 May 2019 01:54 )             28.1     05-24    137  |  103  |  48<H>  ----------------------------<  172<H>  3.5   |  19<L>  |  0.74    Ca    8.7      24 May 2019 01:54  Phos  3.5     05-24  Mg     2.3     05-24    TPro  5.3<L>  /  Alb  3.2<L>  /  TBili  15.8<H>  /  DBili  x   /  AST  170<H>  /  ALT  60<H>  /  AlkPhos  131<H>  05-24    PT/INR - ( 24 May 2019 01:54 )   PT: 18.5 sec;   INR: 1.60 ratio         PTT - ( 24 May 2019 01:54 )  PTT:60.0 sec    Arterial Blood Gas:  05-24 @ 01:20  7.34/38/174/20/99/-4.7  ABG lactate: --  Arterial Blood Gas:  05-24 @ 00:07  7.38/34/168/20/99/-3.9  ABG lactate: --  Arterial Blood Gas:  05-23 @ 17:41  7.32/34/140/17/99/-7.5  ABG lactate: --  Arterial Blood Gas:  05-23 @ 08:33  7.36/35/174/19/100/-5.1  ABG lactate: --  Arterial Blood Gas:  05-23 @ 01:13  7.37/36/177/20/100/-3.9  ABG lactate: --  Arterial Blood Gas:  05-22 @ 20:18  7.46/32/213/22/100/-.9  ABG lactate: --  Arterial Blood Gas:  05-22 @ 16:38  7.36/31/142/17/99/-6.8  ABG lactate: --  Arterial Blood Gas:  05-22 @ 11:33  7.34/35/120/18/98/-6.1  ABG lactate: --  Arterial Blood Gas:  05-22 @ 04:43  7.36/34/201/19/99/-5.5  ABG lactate: --    Venous Blood Gas:  05-24 @ 01:20  7.29/43/42/20/71  VBG Lactate: --      MICROBIOLOGY:     RADIOLOGY:  [ ] Reviewed and interpreted by me    Point of Care Ultrasound Findings:    PFT:    EKG:

## 2019-05-24 NOTE — PROGRESS NOTE ADULT - ATTENDING COMMENTS
pt has pancytopenia in setting of multiorgan system failure  no e/o of TTP or clinically significant DIC  s/p plt and prbc transfusion with appropriate response  if no bleeding recc maintain plt ct 20K, Hb 8  if active bleeding or plans for invasive procedure (non-NSGY) keep plt ct above 50k   cont supportive ICU care

## 2019-05-24 NOTE — PROGRESS NOTE ADULT - ASSESSMENT
elevated lfts  diarrhea    c diff negative  etiology of diarrhea can be antibiotic associated, related to tube feeds, amyloid infiltration of gi tract  stool is brown no evidence of bleeding  no role for endoscopic evaluation  lfts improving  cont lomotil, opium tincture and octreotide  consider adding imodium and xifaxan  cont to monitor stool output  shock liver; monitor for worsening encephalopathy        Advanced care planning was discussed with patient and family.  Advanced care planning forms were reviewed and discussed.  Risks, benefits and alternatives of gastroenterologic procedures were discussed in detail and all questions were answered.    30 minutes spent.

## 2019-05-24 NOTE — PROGRESS NOTE ADULT - PROBLEM SELECTOR PLAN 7
duoneb via [facility] q 6
duoneb via [facility] q 6
duoneb via [facility] q 6
duoneb via [facility] q 6
duoneb via [facility] q 6
duoneb via [facility] q 6
duoneb via [facility] q 6
duoneb via [facility] q 6
duoneb via [facility] q 6
duoneb via [facility] q 6
duoneb via [facility] q 6
duoneb via [facility] q 6

## 2019-05-24 NOTE — PROGRESS NOTE ADULT - PROBLEM SELECTOR PLAN 6
no rx-vented

## 2019-05-24 NOTE — PROGRESS NOTE ADULT - ATTENDING COMMENTS
TPN initiated  1.  Protein calorie malnutrition, severe--at goal for today.  Unclear value of concurrent regular EN approach.  Trend parameters.  Limit trace elements in TPN given severe liver failure.  Limit volume to lessen UF via RRT

## 2019-05-24 NOTE — PROGRESS NOTE ADULT - PROBLEM SELECTOR PROBLEM 3
Amyloidosis, unspecified type
Amyloidosis, unspecified type
Hypervolemia
Hypervolemia
Amyloidosis, unspecified type
Amyloidosis, unspecified type
Acute hypoxemic respiratory failure
Acute hypoxemic respiratory failure
Amyloidosis, unspecified type
Hypernatremia
Hypervolemia
Amyloidosis, unspecified type
Hypervolemia

## 2019-05-24 NOTE — PROGRESS NOTE ADULT - PROBLEM SELECTOR PROBLEM 6
Obstructive sleep apnea

## 2019-05-24 NOTE — PROGRESS NOTE ADULT - SUBJECTIVE AND OBJECTIVE BOX
CRITICAL CARE ATTENDING - CTICU    MEDICATIONS  (STANDING):  artificial  tears Solution 1 Drop(s) Both EYES every 6 hours  calcium chloride Injectable 1000 milliGRAM(s) IV Push <User Schedule>  chlorhexidine 0.12% Liquid 15 milliLiter(s) Oral Mucosa two times a day  chlorhexidine 4% Liquid 1 Application(s) Topical <User Schedule>  dextrose 50% Injectable 50 milliLiter(s) IV Push once  diphenoxylate/atropine 2 Tablet(s) Oral four times a day  heparin  Infusion 500 Unit(s)/Hr (6.5 mL/Hr) IV Continuous <Continuous>  insulin lispro (HumaLOG) corrective regimen sliding scale   SubCutaneous every 6 hours  midodrine 10 milliGRAM(s) Oral every 8 hours  mineral oil for Topical Use 1 Application(s) Topical daily  norepinephrine Infusion 0.034 MICROgram(s)/kG/Min (5 mL/Hr) IV Continuous <Continuous>  octreotide  Injectable 100 MICROGram(s) SubCutaneous every 8 hours  opium Tincture 6 milliGRAM(s) Oral four times a day  pantoprazole  Injectable 40 milliGRAM(s) IV Push two times a day  Parenteral Nutrition - Adult 1 Each (42 mL/Hr) TPN Continuous <Continuous>  Parenteral Nutrition - Adult 1 Each (83 mL/Hr) TPN Continuous <Continuous>  thiamine Injectable 200 milliGRAM(s) IV Push <User Schedule>  vasopressin Infusion 0.083 Unit(s)/Min (5 mL/Hr) IV Continuous <Continuous>                                    8.9    3.8   )-----------( 99       ( 24 May 2019 08:48 )             25.8       05-    137  |  103  |  48<H>  ----------------------------<  172<H>  3.5   |  19<L>  |  0.74    Ca    8.7      24 May 2019 01:54  Phos  3.5     05-24  Mg     2.3     -24    TPro  5.3<L>  /  Alb  3.2<L>  /  TBili  15.8<H>  /  DBili  x   /  AST  170<H>  /  ALT  60<H>  /  AlkPhos  131<H>  05-24      PT/INR - ( 24 May 2019 01:54 )   PT: 18.5 sec;   INR: 1.60 ratio         PTT - ( 24 May 2019 01:54 )  PTT:60.0 sec    Mode: AC/ CMV (Assist Control/ Continuous Mandatory Ventilation)  RR (machine): 10  TV (machine): 500  FiO2: 50  PEEP: 6  ITime: 1  MAP: 8  PIP: 23      Daily     Daily Weight in k.8 (24 May 2019 00:00)       @ 07:  -   @ 07:00  --------------------------------------------------------  IN: 5187 mL / OUT: 4010 mL / NET: 1177 mL     @ 07:  -   @ 12:21  --------------------------------------------------------  IN: 207.5 mL / OUT: 0 mL / NET: 207.5 mL        Critically Ill patient  : [ ] preoperative ,   [ x] post operative    Requires :  [x ] Arterial Line   [x ] Central Line  [ ] PA catheter  [ ] IABP  [ ] ECMO  [ ] LVAD  [x ] Ventilator  [ ] pacemaker [ ] Impella.    Bedside evaluation , monitoring , treatment of hemodynamics , fluids , IVP/ IVCD meds.        Diagnosis:     POD / -  CABG X 3 L    Post op respiratory failure - pulmonary amyloidosis    ARDS - resolving    respiratory failure - Tracheostomy     Requires chest PT, pulmonary toilet, ambu bagging, suctioning to maintain SaO2,  patent airway and treat atelectasis.     Ventilator Management:  [x ]AC-rest    [ x]CPAP-PS Wean    [ ]Trach Collar     [ ]Extubate    [ ] T-Piece  [ ]peep>5     Difficult weaning process - multiple organ system involvement in critically ill patient     MRSA pneumonia - septic shock    Hemodynamic lability,instability. Requires IVCD [ x] vasopressors [ x] inotropes  [ ] vasodilator  [ ]IVSS fluid  to maintain MAP, perfusion, C.I.      Renal Failure - Acute Kidney Injury     [ x] Hemodialysis [x ] Hemofiltration:    [x ] negative fluid balance [ ] even fluid balance [ ] positive fluid balance, in a hemodynamically unstable patient.     Tolerates NG / NJ feeds at [ ] goal rate    [x ] trophic rate    [ ]       rate     TPN    Thrombocytopenia     ? DIC ? r/o TTP /     CHF- acute [ x]   chronic [x ]    systolic [ ]   diatolic [ x]          - Echo- EF -   lvh          [ ] RV dysfunction          - Cxr-cardiomegally, edema          - Clinical-  [x ]inotropes   [x ]pressors   [ ]diuresis   [ ]IABP   [ ]ECMO   [ ]LVAD   [ x]Respiratory Failure                        -                     Discussed with CT surgeon, Physician's Assistant - Nurse Practitioner- Critical care medicine team.   Dicussed at  AM / PM rounds.   Chart, labs , films reviewed.    Total Time: 30 min

## 2019-05-24 NOTE — PROGRESS NOTE ADULT - SUBJECTIVE AND OBJECTIVE BOX
infectious diseases progress note:    Patient is a 79y old  Male who presents with a chief complaint of sob (24 May 2019 04:39)        Angina pectoris  Atherosclerosis of native coronary artery without angina pectoris             Allergies    No Known Allergies    Intolerances        ANTIBIOTICS/RELEVANT:  antimicrobials    immunologic:    OTHER:  artificial  tears Solution 1 Drop(s) Both EYES every 6 hours  calcium chloride Injectable 1000 milliGRAM(s) IV Push <User Schedule>  chlorhexidine 0.12% Liquid 15 milliLiter(s) Oral Mucosa two times a day  chlorhexidine 4% Liquid 1 Application(s) Topical <User Schedule>  dextrose 50% Injectable 50 milliLiter(s) IV Push once  diphenoxylate/atropine 2 Tablet(s) Oral four times a day  heparin  Infusion 500 Unit(s)/Hr IV Continuous <Continuous>  insulin lispro (HumaLOG) corrective regimen sliding scale   SubCutaneous every 6 hours  midodrine 10 milliGRAM(s) Oral every 8 hours  mineral oil for Topical Use 1 Application(s) Topical daily  norepinephrine Infusion 0.034 MICROgram(s)/kG/Min IV Continuous <Continuous>  octreotide  Injectable 100 MICROGram(s) SubCutaneous every 8 hours  opium Tincture 6 milliGRAM(s) Oral four times a day  pantoprazole  Injectable 40 milliGRAM(s) IV Push two times a day  Parenteral Nutrition - Adult 1 Each TPN Continuous <Continuous>  sodium chloride 0.9% lock flush 10 milliLiter(s) IV Push every 1 hour PRN  thiamine Injectable 200 milliGRAM(s) IV Push <User Schedule>  vasopressin Infusion 0.083 Unit(s)/Min IV Continuous <Continuous>      Objective:  Vital Signs Last 24 Hrs  T(C): 37.2 (24 May 2019 08:00), Max: 37.3 (23 May 2019 17:50)  T(F): 99 (24 May 2019 08:00), Max: 99.1 (23 May 2019 17:50)  HR: 90 (24 May 2019 08:00) (78 - 125)  BP: --  BP(mean): --  RR: 7 (24 May 2019 08:00) (1 - 27)  SpO2: 100% (24 May 2019 08:00) (92% - 100%)    PHYSICAL EXAM:     Eyes:RYLEY, EOMI  Ear/Nose/Throat: no oral lesion, no sinus tenderness on percussion	  Neck:no JVD, no lymphadenopathy, supple  Respiratory: CTA shagufta  Cardiovascular: S1S2 RRR, no murmurs  Gastrointestinal:soft, (+) BS, no HSM distended   Extremities:no e/e/c        LABS:                        9.7    3.8   )-----------( 21       ( 24 May 2019 01:54 )             28.1     05-24    137  |  103  |  48<H>  ----------------------------<  172<H>  3.5   |  19<L>  |  0.74    Ca    8.7      24 May 2019 01:54  Phos  3.5     05-24  Mg     2.3     05-24    TPro  5.3<L>  /  Alb  3.2<L>  /  TBili  15.8<H>  /  DBili  x   /  AST  170<H>  /  ALT  60<H>  /  AlkPhos  131<H>  05-24    PT/INR - ( 24 May 2019 01:54 )   PT: 18.5 sec;   INR: 1.60 ratio         PTT - ( 24 May 2019 01:54 )  PTT:60.0 sec        MICROBIOLOGY:    RECENT CULTURES:  05-22 @ 14:33 .Blood                No growth to date.    05-21 @ 13:34 .Blood                No growth to date.          RESPIRATORY CULTURES:              RADIOLOGY & ADDITIONAL STUDIES:        Pager [phone]  After 5 pm/weekends or if no response :[phone]

## 2019-05-24 NOTE — PROGRESS NOTE ADULT - SUBJECTIVE AND OBJECTIVE BOX
INTERVAL EVENTS:  No acute events overnight. 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    REVIEW OF SYSTEMS:  CONSTITUTIONAL: No weakness, fevers or chills  EYES/ENT: No visual changes, no throat pain   RESPIRATORY: No cough, wheezing, hemoptysis; No shortness of breath  CARDIOVASCULAR: No chest pain or palpitations  GASTROINTESTINAL: No abdominal pain, nausea, vomiting, or hematemesis; No diarrhea or constipation. No melena or hematochezia.  GENITOURINARY: No dysuria, frequency or hematuria  MUSCULOSKELETAL: No joint pain.  NEUROLOGICAL: No dizziness, numbness, or weakness  SKIN: No itching, burning, rashes, or lesions   All other review of systems is negative unless indicated above.    Vital Signs Last 24 Hrs  T(C): 37.2 (24 May 2019 16:00), Max: 37.3 (24 May 2019 03:00)  T(F): 99 (24 May 2019 16:00), Max: 99.1 (24 May 2019 03:00)  HR: 65 (24 May 2019 20:20) (61 - 112)  BP: --  BP(mean): --  RR: 10 (24 May 2019 20:15) (1 - 20)  SpO2: 100% (24 May 2019 20:20) (88% - 100%)      PHYSICAL EXAM:   GENERAL: no acute distress  HEENT: EOMI, neck supple  RESPIRATORY: LCTAB/L, no rhonchi, rales, or wheezing  CARDIOVASCULAR: RRR, no murmurs, gallops, rubs  ABDOMINAL: soft, non-tender, non-distended, positive bowel sounds   EXTREMITIES: no clubbing, cyanosis, or edema  NEUROLOGICAL: alert and oriented x 3, non-focal  SKIN: no rashes or lesions   MUSCULOSKELETAL: no gross joint deformity                          8.9    3.8   )-----------( 99       ( 24 May 2019 08:48 )             25.8     05-24    137  |  103  |  48<H>  ----------------------------<  172<H>  3.5   |  19<L>  |  0.74    Ca    8.7      24 May 2019 01:54  Phos  3.5     05-24  Mg     2.3     05-24    TPro  5.3<L>  /  Alb  3.2<L>  /  TBili  15.8<H>  /  DBili  x   /  AST  170<H>  /  ALT  60<H>  /  AlkPhos  131<H>  05-24    PT/INR - ( 24 May 2019 19:49 )   PT: 16.0 sec;   INR: 1.38 ratio         PTT - ( 24 May 2019 19:44 )  PTT:68.5 sec    MEDICATIONS  (STANDING):  artificial  tears Solution 1 Drop(s) Both EYES every 6 hours  calcium chloride Injectable 1000 milliGRAM(s) IV Push <User Schedule>  chlorhexidine 0.12% Liquid 15 milliLiter(s) Oral Mucosa two times a day  chlorhexidine 4% Liquid 1 Application(s) Topical <User Schedule>  dextrose 50% Injectable 50 milliLiter(s) IV Push once  diphenoxylate/atropine 2 Tablet(s) Oral four times a day  heparin  Infusion 500 Unit(s)/Hr (6.5 mL/Hr) IV Continuous <Continuous>  heparin  Infusion Syringe 650 Unit(s)/Hr (1.3 mL/Hr) Dialysis <Continuous>  insulin lispro (HumaLOG) corrective regimen sliding scale   SubCutaneous every 6 hours  midodrine 10 milliGRAM(s) Oral every 8 hours  mineral oil for Topical Use 1 Application(s) Topical daily  norepinephrine Infusion 0.034 MICROgram(s)/kG/Min (5 mL/Hr) IV Continuous <Continuous>  octreotide  Injectable 100 MICROGram(s) SubCutaneous every 8 hours  opium Tincture 6 milliGRAM(s) Oral four times a day  pantoprazole  Injectable 40 milliGRAM(s) IV Push two times a day  Parenteral Nutrition - Adult 1 Each (42 mL/Hr) TPN Continuous <Continuous>  Parenteral Nutrition - Adult 1 Each (83 mL/Hr) TPN Continuous <Continuous>  thiamine Injectable 200 milliGRAM(s) IV Push <User Schedule>  vasopressin Infusion 0.083 Unit(s)/Min (5 mL/Hr) IV Continuous <Continuous>

## 2019-05-24 NOTE — PROGRESS NOTE ADULT - ATTENDING COMMENTS
TARAH/ATN, septic shock, amyloid, lactic acidosis, hyperphosphatemia, hypocalcemia, hypokalemia.  CRRT circuit clotting; not tolerating intermittent hemodialysis all that well  Trach, edematous    Trial of intermittent renal replacement with CRRT machine, about 10 hours per day (less time on circuit, lower risk for clotting) and placing therapeutic heparin through CRRT circuit    - Maintain MAP > 65  - Adjust medications given intermittent renal replacement of about 10 hours per day (discuss with pharmacy)  - Critically ill  - Remainder per fellow, d/w ICU team, will follow

## 2019-05-24 NOTE — PROGRESS NOTE ADULT - SUBJECTIVE AND OBJECTIVE BOX
5pm-5;30pm     Remained critically ill on continuos ICU monitoring.      OBJECTIVE:  ICU Vital Signs Last 24 Hrs  T(C): 37.1 (23 May 2019 23:00), Max: 37.3 (23 May 2019 17:50)  T(F): 98.8 (23 May 2019 23:00), Max: 99.1 (23 May 2019 17:50)  HR: 86 (23 May 2019 23:30) (84 - 125)  BP: --  BP(mean): --  ABP: 115/47 (23 May 2019 23:30) (101/45 - 151/69)  ABP(mean): 63 (23 May 2019 23:30) (60 - 94)  RR: 10 (23 May 2019 23:30) (1 - 27)  SpO2: 100% (23 May 2019 23:30) (92% - 100%)    Mode: AC/ CMV (Assist Control/ Continuous Mandatory Ventilation), RR (machine): 10, TV (machine): 500, FiO2: 50, PEEP: 6, ITime: 1, MAP: 8, PIP: 13     @ 07: @ 07:00  --------------------------------------------------------  IN: 3685.5 mL / OUT: 2810 mL / NET: 875.5 mL     @ 07:01  24 @ 00:12  --------------------------------------------------------  IN: 3088 mL / OUT: 2210 mL / NET: 878 mL      CAPILLARY BLOOD GLUCOSE      POCT Blood Glucose.: 100 mg/dL (23 May 2019 12:31)      PHYSICAL EXAM:    Daily Weight in k.3 (23 May 2019 20:24)  General: chronically debilitated   Neurology: Grimaces to painful commands, , does not f/u any commands  Eyes: PERRLA/ EOMI,  ENT/Neck: Neck supple, + trac,  trachea midline, No JVD, Gross hearing intact  Respiratory:  B/L fine  rales + sternal dressing   CV: A fib  no murmurs, rubs or gallops  Abdominal: Soft, NT, ND +BS,   Extremities: 2 ++ edema, + peripheral pulses  Skin: B/L Ear lobes cyanotic, tongue necrotic     Tubes: NGT       HOSPITAL MEDICATIONS:  MEDICATIONS  (STANDING):  albumin human 25% IVPB 100 milliLiter(s) IV Intermittent every 8 hours  artificial  tears Solution 1 Drop(s) Both EYES every 6 hours  calcium chloride Injectable 1000 milliGRAM(s) IV Push <User Schedule>  chlorhexidine 0.12% Liquid 15 milliLiter(s) Oral Mucosa two times a day  chlorhexidine 4% Liquid 1 Application(s) Topical <User Schedule>  dextrose 10%. 1000 milliLiter(s) (20 mL/Hr) IV Continuous <Continuous>  dextrose 50% Injectable 50 milliLiter(s) IV Push once  diphenoxylate/atropine 2 Tablet(s) Oral four times a day  heparin  Infusion 500 Unit(s)/Hr (6.5 mL/Hr) IV Continuous <Continuous>  hydrocortisone sodium succinate Injectable 25 milliGRAM(s) IV Push every 12 hours  insulin lispro (HumaLOG) corrective regimen sliding scale   SubCutaneous every 6 hours  midodrine 10 milliGRAM(s) Oral every 8 hours  mineral oil for Topical Use 1 Application(s) Topical daily  norepinephrine Infusion 0.034 MICROgram(s)/kG/Min (5 mL/Hr) IV Continuous <Continuous>  octreotide  Injectable 100 MICROGram(s) SubCutaneous every 8 hours  opium Tincture 6 milliGRAM(s) Oral four times a day  pantoprazole  Injectable 40 milliGRAM(s) IV Push two times a day  Parenteral Nutrition - Adult 1 Each (42 mL/Hr) TPN Continuous <Continuous>  thiamine Injectable 200 milliGRAM(s) IV Push <User Schedule>  vasopressin Infusion 0.083 Unit(s)/Min (5 mL/Hr) IV Continuous <Continuous>    MEDICATIONS  (PRN):  sodium chloride 0.9% lock flush 10 milliLiter(s) IV Push every 1 hour PRN Pre/post blood products, medications, blood draw, and to maintain line patency      LABS:                        8.6    4.0   )-----------( 82       ( 23 May 2019 01:57 )             24.7         137  |  106  |  43<H>  ----------------------------<  118<H>  3.6   |  19<L>  |  0.73    Ca    8.5      23 May 2019 01:57  Phos  3.4       Mg     2.3         TPro  5.1<L>  /  Alb  2.9<L>  /  TBili  15.2<H>  /  DBili  x   /  AST  263<H>  /  ALT  142<H>  /  AlkPhos  131<H>      PT/INR - ( 23 May 2019 01:57 )   PT: 17.2 sec;   INR: 1.48 ratio         PTT - ( 23 May 2019 15:44 )  PTT:60.3 sec    Arterial Blood Gas:   @ 00:07  7.38/34/168/20/99/-3.9  ABG lactate: --  Arterial Blood Gas:   @ 17:41  7.32/34/140/17/99/-7.5  ABG lactate: --  Arterial Blood Gas:   @ 08:33  7.36/35/174/19/100/-5.1  ABG lactate: --  Arterial Blood Gas:   @ 01:13  7.37/36/177/20/100/-3.9  ABG lactate: --  Arterial Blood Gas:   @ 20:18  7.46/32/213/22/100/-.9  ABG lactate: --  Arterial Blood Gas:   @ 16:38  7.36/31/142/17/99/-6.8  ABG lactate: --  Arterial Blood Gas:   @ 11:33  7.34/35/120/18/98/-6.1  ABG lactate: --  Arterial Blood Gas:   @ 04:43  7.36/34/201/19/99/-5.5  ABG lactate: --  Arterial Blood Gas:   @ 01:27  7.35/36/168/19/99/-5.2  ABG lactate: --  Arterial Blood Gas:   @ 00:34  7.36/35/65/19/92/-5.0  ABG lactate: --        LIVER FUNCTIONS - ( 23 May 2019 01:57 )  Alb: 2.9 g/dL / Pro: 5.1 g/dL / ALK PHOS: 131 U/L / ALT: 142 U/L / AST: 263 U/L / GGT: x           MICROBIOLOGY:     RADIOLOGY:  X Reviewed and interpreted by me        I have spent 30 minutes providing critical care management to this patient. 5pm-5;30pm     Remained critically ill on continuos ICU monitoring.      OBJECTIVE:  ICU Vital Signs Last 24 Hrs  T(C): 37.1 (23 May 2019 23:00), Max: 37.3 (23 May 2019 17:50)  T(F): 98.8 (23 May 2019 23:00), Max: 99.1 (23 May 2019 17:50)  HR: 86 (23 May 2019 23:30) (84 - 125)  BP: --  BP(mean): --  ABP: 115/47 (23 May 2019 23:30) (101/45 - 151/69)  ABP(mean): 63 (23 May 2019 23:30) (60 - 94)  RR: 10 (23 May 2019 23:30) (1 - 27)  SpO2: 100% (23 May 2019 23:30) (92% - 100%)    Mode: AC/ CMV (Assist Control/ Continuous Mandatory Ventilation), RR (machine): 10, TV (machine): 500, FiO2: 50, PEEP: 6, ITime: 1, MAP: 8, PIP: 13     @ 07: @ 07:00  --------------------------------------------------------  IN: 3685.5 mL / OUT: 2810 mL / NET: 875.5 mL     @ 07:01  24 @ 00:12  --------------------------------------------------------  IN: 3088 mL / OUT: 2210 mL / NET: 878 mL      CAPILLARY BLOOD GLUCOSE      POCT Blood Glucose.: 100 mg/dL (23 May 2019 12:31)      PHYSICAL EXAM:    Daily Weight in k.3 (23 May 2019 20:24)  General: chronically debilitated   Neurology: Grimaces to painful commands, , does not f/u any commands  Eyes: PERRLA/ EOMI,  ENT/Neck: Neck supple, + trac,  trachea midline, No JVD, Gross hearing intact  Respiratory:  B/L fine  rales + sternal dressing   CV: A fib  no murmurs, rubs or gallops  Abdominal: Soft, NT, ND +BS,   Extremities: 2 ++ edema, + peripheral pulses  Skin: B/L Ear lobes cyanotic, tongue necrotic     Tubes: NGT       HOSPITAL MEDICATIONS:  MEDICATIONS  (STANDING):  albumin human 25% IVPB 100 milliLiter(s) IV Intermittent every 8 hours  artificial  tears Solution 1 Drop(s) Both EYES every 6 hours  calcium chloride Injectable 1000 milliGRAM(s) IV Push <User Schedule>  chlorhexidine 0.12% Liquid 15 milliLiter(s) Oral Mucosa two times a day  chlorhexidine 4% Liquid 1 Application(s) Topical <User Schedule>  dextrose 10%. 1000 milliLiter(s) (20 mL/Hr) IV Continuous <Continuous>  dextrose 50% Injectable 50 milliLiter(s) IV Push once  diphenoxylate/atropine 2 Tablet(s) Oral four times a day  heparin  Infusion 500 Unit(s)/Hr (6.5 mL/Hr) IV Continuous <Continuous>  hydrocortisone sodium succinate Injectable 25 milliGRAM(s) IV Push every 12 hours  insulin lispro (HumaLOG) corrective regimen sliding scale   SubCutaneous every 6 hours  midodrine 10 milliGRAM(s) Oral every 8 hours  mineral oil for Topical Use 1 Application(s) Topical daily  norepinephrine Infusion 0.034 MICROgram(s)/kG/Min (5 mL/Hr) IV Continuous <Continuous>  octreotide  Injectable 100 MICROGram(s) SubCutaneous every 8 hours  opium Tincture 6 milliGRAM(s) Oral four times a day  pantoprazole  Injectable 40 milliGRAM(s) IV Push two times a day  Parenteral Nutrition - Adult 1 Each (42 mL/Hr) TPN Continuous <Continuous>  thiamine Injectable 200 milliGRAM(s) IV Push <User Schedule>  vasopressin Infusion 0.083 Unit(s)/Min (5 mL/Hr) IV Continuous <Continuous>    MEDICATIONS  (PRN):  sodium chloride 0.9% lock flush 10 milliLiter(s) IV Push every 1 hour PRN Pre/post blood products, medications, blood draw, and to maintain line patency      LABS:                        8.6    4.0   )-----------( 82       ( 23 May 2019 01:57 )             24.7         137  |  106  |  43<H>  ----------------------------<  118<H>  3.6   |  19<L>  |  0.73    Ca    8.5      23 May 2019 01:57  Phos  3.4       Mg     2.3         TPro  5.1<L>  /  Alb  2.9<L>  /  TBili  15.2<H>  /  DBili  x   /  AST  263<H>  /  ALT  142<H>  /  AlkPhos  131<H>      PT/INR - ( 23 May 2019 01:57 )   PT: 17.2 sec;   INR: 1.48 ratio         PTT - ( 23 May 2019 15:44 )  PTT:60.3 sec    Arterial Blood Gas:   @ 00:07  7.38/34/168/20/99/-3.9  ABG lactate: --  Arterial Blood Gas:   @ 17:41  7.32/34/140/17/99/-7.5  ABG lactate: --  Arterial Blood Gas:   @ 08:33  7.36/35/174/19/100/-5.1  ABG lactate: --  Arterial Blood Gas:   @ 01:13  7.37/36/177/20/100/-3.9  ABG lactate: --  Arterial Blood Gas:   @ 20:18  7.46/32/213/22/100/-.9  ABG lactate: --  Arterial Blood Gas:   @ 16:38  7.36/31/142/17/99/-6.8  ABG lactate: --  Arterial Blood Gas:   @ 11:33  7.34/35/120/18/98/-6.1  ABG lactate: --  Arterial Blood Gas:   @ 04:43  7.36/34/201/19/99/-5.5  ABG lactate: --  Arterial Blood Gas:   @ 01:27  7.35/36/168/19/99/-5.2  ABG lactate: --  Arterial Blood Gas:   @ 00:34  7.36/35/65/19/92/-5.0  ABG lactate: --        LIVER FUNCTIONS - ( 23 May 2019 01:57 )  Alb: 2.9 g/dL / Pro: 5.1 g/dL / ALK PHOS: 131 U/L / ALT: 142 U/L / AST: 263 U/L / GGT: x           MICROBIOLOGY:     RADIOLOGY:  X Reviewed and interpreted by me

## 2019-05-24 NOTE — PROGRESS NOTE ADULT - ASSESSMENT
78 yr old with ARCENIO admitted for chest pain and underwent a CABG last last week   Post op has been stable but is still intubated and became febrile last 24hrs.   No evidence of wd infection, alot of sputum and possible LLL infiltrate underwent Bronch/BAL with biopsy to r/o BOOP, started on steroids    sp course of meropenem and micafungin without improvement leading us to do lung biopsy  lung biopsy is positive for amyloid which may also be in kidney and heart.   Also have some GI bleeding and extremities are mottled but viable   Overall MRSA pneumonia, ischemic colitis, lactic acidosis, shock, leucocytosis, transaminitis.     Plan;   Continue with vancomycin dose based on type of HD, level 12 today.   All other cultures negative to date.   No major role of abx in setting of ischemic colitis    off meropenem and micofungin  ( no documented need)   Prognosis poor.   ct noted  splenic infarct  unclear if this is related to amyloid or embolic .   vanco stopped secondary to high level  would dose by level for the next 48 hrs   can complete vanco this weekend for mrsa pna   reculture prn   discussed with [facility].

## 2019-05-24 NOTE — PROGRESS NOTE ADULT - PROBLEM SELECTOR PLAN 5
- Likely multifactorial due to hypoperfusion and renal venous hypertension due to acutely volume overloaded state. Elevated BUN may be in part related to steroids.  - Was treated with HD for clearance 5/11.
in the setting of CRRT. Resolved on phoxillum bath. Monitor serum phosphorus.
TF
TF off for bowel rest
- Likely multifactorial due to hypoperfusion and renal venous hypertension due to acutely volume overloaded state. Elevated BUN may be in part related to steroids.
- Likely multifactorial due to hypoperfusion and renal venous hypertension due to acutely volume overloaded state. Elevated BUN may be in part related to steroids.  - Was treated with HD for clearance 5/11.
TF
TF off for bowel rest
in the setting of CRRT. Pt. change to phoxillum bath. Monitor serum phosphorus.
in the setting of CRRT. Resolved on phoxillum bath. Monitor serum phosphorus.

## 2019-05-24 NOTE — PROGRESS NOTE ADULT - PROBLEM SELECTOR PLAN 4
- Optimize volume status as above.
Currently on dialysate 4K. Continue to monitor potassium.
as per Mariola kenney al
as per Mariola et al  renal f/up -HD prn
- Likely multifactorial due to hypoperfusion and renal venous hypertension due to acutely volume overloaded state.   - Plan for ultrafiltration as above.
- Likely multifactorial due to hypoperfusion and renal venous hypertension due to acutely volume overloaded state. Elevated BUN may be in part related to steroids.  - Was treated with HD for clearance yesterday.
- Optimize volume status as above.
- Optimize volume status as above.
Adjusted dialysate to 4K. Continue to monitor potassium.
Currently on dialysate 4K. Continue to monitor potassium.
Currently on dialysate 4K. Continue to monitor potassium.
If potassium continues to be an issue and requires multiple repletion, will adjust to 4K baths.
Keep pt net negative.   - Monitor BMP and UO
Keep pt net negative.   - Monitor BMP and UO
as per Mariola et al  renal f/up -HD prn
as per Mariola kenney al
as per Mraiola et al  renal f/up -HD prn
Reviewed chart and case with heart failure.  The patient has had a net positive daily fluid balance over the last one week.  CVP is rising.  JVD is notable on examination and cxr seems worse.  Therefore the patient may be hypervolemic rather than hypovolemic -- renal venous congestion may be playing a role in this patient's TARAH.  Therefore agree that we should keep the patient net negative today to see if this improves his renal function.  Will monitor.

## 2019-05-24 NOTE — PROGRESS NOTE ADULT - ASSESSMENT
78 yr old male with H/O HTN, HLD, CAD, S/P Lung sx, Anxiety, Asthma, S/P CABG x3 POD 27  Post op course complicated with hypoxic respiratory failure suspected Bibasilar Pneumonia, severe Agitation requiring intubation. now S/P Trach, Lung Bx with Dx Pulmonary Amyloidosis   A fib  TARAH/ Renal failure   MRSA Pneumonia  Shock with MODS (resp cardiac renal, liver, metabolic &  hematological dysfunction)   Diarrhea with lactic acidosis suspected mesenteric ischemia  In Toxic metabolic encephalopathy  AC mode ventilation,   titrate Fio2& Peep CXR with bibasilar infiltrate    trac care,   Shock diastolic heart failure, with vasodilatory dysfunction  On invasive hemodynamic monitoring   On double pressors Levo & Vaso gtt, marginal SVo2,   S/P IVIG/ STEROIDS for refractory shock  Persistent lactic acidosis despite resuscitation    Vanco for multifocal pneumonia          Acute Kidney Injury/ shock kidney CVVHDF with multiple issues, switched to hemodialysis   ASA & Statin for CAD  DC Enteral nutrition, as lactate has not cleared, high rectal tube output   Protonix BID  Maintain Rectal tube   Glycemic control,     Shock liver with transaminitis trend LFTs  Coagulopathic & thrombocytopenia HIT negative sed to Liver dysfunction & sepsis,  I have spent 30 minutes providing critical care management to this patient.

## 2019-05-24 NOTE — PROGRESS NOTE ADULT - PROBLEM SELECTOR PROBLEM 5
Acute renal failure
Hypophosphatemia
Oropharyngeal dysphagia
Acute renal failure
Acute renal failure
Hypophosphatemia
Hypophosphatemia
Oropharyngeal dysphagia

## 2019-05-24 NOTE — PROGRESS NOTE ADULT - ASSESSMENT
78 yo M with a PMH HLD, ARCENIO (noncompliant with CPAP), GERD, diverticulitis, depression, anxiety, panic attacks, and carpal tunnel who was noting dyspnea on exertion with chest pressure and palpitations for 1 year, s/p 3-vessel CABG c/b hypoxic respiratory failure now on tracheostomy, acute on chronic renal failure, ventilator-acquired pneumonia, and acute renal failure. Hematology called for lung biopsy showing amyloidosis, and for worsening thrombocytopenia.    # Thrombocytopenia  -likely multifactorial, in the setting of multiorgan failure, infection, CVVHD which can cause coags abnormalities along with thrombocytopenia  -fibrinogen 265  -pt responding to platelet transfusion  -can transfuse if plt <20 in ICU setting, PRBC if  Hgb <7  -will review peripheral smear    #Amyloidosis  - Based on lung biopsy, which has a specificity of %. Also with acute renal failure and heart failure with preserved ejection fraction (but with concentric hypertrophy), concerning for renal and cardiac involvement as well. CT chest shows interlobular septal thickening that can be seen in diffuse parenchymal amyloidosis (seen more with AL than AA amyloidosis)  - S/p SPEP, SIFE, UPEP, and UIFE. Results do not show monoclonal antibodies, proteinuria, or Bence-Quinn protein. He has slightly elevated free light chains but no abnormalities in the ratio. Ig levels wnl, IgM slightly low.  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  Heme-Onc fellow, PGY 4  [phone]; [id number]

## 2019-05-24 NOTE — PROGRESS NOTE ADULT - SUBJECTIVE AND OBJECTIVE BOX
[facility] DIVISION OF KIDNEY DISEASES AND HYPERTENSION -- FOLLOW UP NOTE  --------------------------------------------------------------------------------  Chief Complaint: TARAH    24 hour events/subjective: Patient seen at bedside. Underwent HD yesterday tolerated 1 L UF however discontinue due to tachycardia. Pt. on IV pressor support, intubated.     PAST HISTORY  --------------------------------------------------------------------------------  No significant changes to PMH, PSH, FHx, SHx, unless otherwise noted    ALLERGIES & MEDICATIONS  --------------------------------------------------------------------------------  Allergies    No Known Allergies    Intolerances      Standing Inpatient Medications  artificial  tears Solution 1 Drop(s) Both EYES every 6 hours  calcium chloride Injectable 1000 milliGRAM(s) IV Push <User Schedule>  chlorhexidine 0.12% Liquid 15 milliLiter(s) Oral Mucosa two times a day  chlorhexidine 4% Liquid 1 Application(s) Topical <User Schedule>  dextrose 50% Injectable 50 milliLiter(s) IV Push once  diphenoxylate/atropine 2 Tablet(s) Oral four times a day  heparin  Infusion 500 Unit(s)/Hr IV Continuous <Continuous>  insulin lispro (HumaLOG) corrective regimen sliding scale   SubCutaneous every 6 hours  midodrine 10 milliGRAM(s) Oral every 8 hours  mineral oil for Topical Use 1 Application(s) Topical daily  norepinephrine Infusion 0.034 MICROgram(s)/kG/Min IV Continuous <Continuous>  octreotide  Injectable 100 MICROGram(s) SubCutaneous every 8 hours  opium Tincture 6 milliGRAM(s) Oral four times a day  pantoprazole  Injectable 40 milliGRAM(s) IV Push two times a day  Parenteral Nutrition - Adult 1 Each TPN Continuous <Continuous>  thiamine Injectable 200 milliGRAM(s) IV Push <User Schedule>  vasopressin Infusion 0.083 Unit(s)/Min IV Continuous <Continuous>    PRN Inpatient Medications  sodium chloride 0.9% lock flush 10 milliLiter(s) IV Push every 1 hour PRN      REVIEW OF SYSTEMS  --------------------------------------------------------------------------------  Unable to obtain    VITALS/PHYSICAL EXAM  --------------------------------------------------------------------------------  T(C): 37.2 (05-24-19 @ 08:00), Max: 37.3 (05-23-19 @ 17:50)  HR: 84 (05-24-19 @ 09:00) (78 - 125)  BP: --  RR: 10 (05-24-19 @ 09:00) (1 - 27)  SpO2: 100% (05-24-19 @ 09:00) (92% - 100%)  Wt(kg): --    05-23-19 @ 07:01  -  05-24-19 @ 07:00  --------------------------------------------------------  IN: 5187 mL / OUT: 4010 mL / NET: 1177 mL    05-24-19 @ 07:01  -  05-24-19 @ 09:32  --------------------------------------------------------  IN: 125 mL / OUT: 0 mL / NET: 125 mL    Physical Exam:  	Gen: critically ill.   	HEENT: +NGT, +trach  	Pulm: coarse breath sounds B/L  	CV: RRR, S1S2; no rub  	Abd: +BS, soft, nondistended              : anuric   	LE: Warm, 2+ edema  	Vascular access: +LIJ non-tunneled HD catheter      LABS/STUDIES  --------------------------------------------------------------------------------              8.9    3.8   >-----------<  99       [05-24-19 @ 08:48]              25.8     137  |  103  |  48  ----------------------------<  172      [05-24-19 @ 01:54]  3.5   |  19  |  0.74        Ca     8.7     [05-24-19 @ 01:54]      Mg     2.3     [05-24-19 @ 01:54]      Phos  3.5     [05-24-19 @ 01:54]    TPro  5.3  /  Alb  3.2  /  TBili  15.8  /  DBili  x   /  AST  170  /  ALT  60  /  AlkPhos  131  [05-24-19 @ 01:54]    PT/INR: PT 18.5 , INR 1.60       [05-24-19 @ 01:54]  PTT: 60.0       [05-24-19 @ 01:54]          [05-24-19 @ 05:53]    Creatinine Trend:  SCr 0.74 [05-24 @ 01:54]  SCr 0.73 [05-23 @ 01:57]  SCr 0.75 [05-22 @ 00:45]  SCr 0.52 [05-21 @ 02:14]  SCr 0.55 [05-20 @ 00:50]

## 2019-05-24 NOTE — PROGRESS NOTE ADULT - SUBJECTIVE AND OBJECTIVE BOX
[facility] NUTRITION SUPPORT / TPN -- FOLLOW UP NOTE  --------------------------------------------------------------------------------  Chief Complaint:    24 hour events/subjective: Patient seen this AM. On the mechanical ventilator via trach. Awake, not following commands. On levo and vaso. Rectal tube in place with high output.     Diet:  Diet, NPO with Tube Feed:   Tube Feeding Modality: Nasogastric  Vital AF (VITALAFRTH)  Total Volume for 24 Hours (mL): 480  Continuous  Starting Tube Feed Rate mL per Hour: 20  Until Goal Tube Feed Rate (mL per Hour): 20  Tube Feed Duration (in Hours): 24  Tube Feed Start Time: 09:00 (05-24-19 @ 08:30)      PAST HISTORY  --------------------------------------------------------------------------------  No significant changes to PMH, PSH, FHx, SHx, unless otherwise noted    ALLERGIES & MEDICATIONS  --------------------------------------------------------------------------------  Allergies    No Known Allergies    Intolerances      Standing Inpatient Medications  artificial  tears Solution 1 Drop(s) Both EYES every 6 hours  calcium chloride Injectable 1000 milliGRAM(s) IV Push <User Schedule>  chlorhexidine 0.12% Liquid 15 milliLiter(s) Oral Mucosa two times a day  chlorhexidine 4% Liquid 1 Application(s) Topical <User Schedule>  dextrose 50% Injectable 50 milliLiter(s) IV Push once  diphenoxylate/atropine 2 Tablet(s) Oral four times a day  heparin  Infusion 500 Unit(s)/Hr IV Continuous <Continuous>  insulin lispro (HumaLOG) corrective regimen sliding scale   SubCutaneous every 6 hours  midodrine 10 milliGRAM(s) Oral every 8 hours  mineral oil for Topical Use 1 Application(s) Topical daily  norepinephrine Infusion 0.034 MICROgram(s)/kG/Min IV Continuous <Continuous>  octreotide  Injectable 100 MICROGram(s) SubCutaneous every 8 hours  opium Tincture 6 milliGRAM(s) Oral four times a day  pantoprazole  Injectable 40 milliGRAM(s) IV Push two times a day  Parenteral Nutrition - Adult 1 Each TPN Continuous <Continuous>  Parenteral Nutrition - Adult 1 Each TPN Continuous <Continuous>  thiamine Injectable 200 milliGRAM(s) IV Push <User Schedule>  vasopressin Infusion 0.083 Unit(s)/Min IV Continuous <Continuous>    PRN Inpatient Medications  sodium chloride 0.9% lock flush 10 milliLiter(s) IV Push every 1 hour PRN      VITALS/PHYSICAL EXAM  --------------------------------------------------------------------------------  T(C): 37.2 (05-24-19 @ 12:00), Max: 37.3 (05-23-19 @ 17:50)  HR: 87 (05-24-19 @ 12:34) (78 - 125)  BP: --  RR: 8 (05-24-19 @ 12:30) (1 - 25)  SpO2: 100% (05-24-19 @ 12:34) (92% - 100%)  Wt(kg): --        05-23-19 @ 07:01  -  05-24-19 @ 07:00  --------------------------------------------------------  IN: 5187 mL / OUT: 4010 mL / NET: 1177 mL    05-24-19 @ 07:01  -  05-24-19 @ 12:36  --------------------------------------------------------  IN: 422.5 mL / OUT: 0 mL / NET: 422.5 mL      Physical Exam:  Gen: NAD, jaundice  HEENT: PERRL, supple neck, ecchymotic tongue   Pulm: non labored breathing, no ventilator   CV: RRR, S1S2; no rub  Back: No spinal or CVA tenderness; no sacral edema  Abd: +BS, soft, nontender/nondistended, rectal tube in place        UE: Warm, FROM, no clubbing, intact strength; no edema; no asterixis       LE: Warm, FROM, no clubbing, intact strength; no edema  Neuro: No focal deficits, intact gait  Psych: Normal affect and mood  Skin: Warm, without rashes	        LABS/STUDIES  --------------------------------------------------------------------------------              8.9    3.8   >-----------<  99       [05-24-19 @ 08:48]              25.8     137  |  103  |  48  ----------------------------<  172      [05-24-19 @ 01:54]  3.5   |  19  |  0.74        Ca     8.7     [05-24-19 @ 01:54]      Mg     2.3     [05-24-19 @ 01:54]      Phos  3.5     [05-24-19 @ 01:54]    TPro  5.3  /  Alb  3.2  /  TBili  15.8  /  DBili  x   /  AST  170  /  ALT  60  /  AlkPhos  131  [05-24-19 @ 01:54]    PT/INR: PT 18.5 , INR 1.60       [05-24-19 @ 01:54]  PTT: 60.0       [05-24-19 @ 01:54]          [05-24-19 @ 05:53]    Ca ionizedBlood Gas Arterial - Calcium, Ionized: 1.22 mmoL/L (05-24-19 @ 11:36)  Blood Gas Arterial - Calcium, Ionized: 1.24 mmoL/L (05-24-19 @ 05:48)    Creatinine Trend:  POC glucoseGlucose, Serum: 172 mg/dL (05-24-19 @ 01:54)  CAPILLARY BLOOD GLUCOSE  147 (24 May 2019 06:00)  151 (24 May 2019 00:00)        PrealbuminPrealbumin, Serum: 6 mg/dL (05-24-19 @ 08:33)  Prealbumin, Serum: 6 mg/dL (05-20-19 @ 08:34)    Triglycerides:

## 2019-05-24 NOTE — PROGRESS NOTE ADULT - ASSESSMENT
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 TPN initiated for protein calorie malnutrition     Plan:  1. Carb and AA increased to full dose, no lipids ordered. Volume increased to 2000 ml.   2. Strict Intake and Output.  3. Weights three times a week  4. Monitor BMP, Mg+, Ionized Ca++, Phosphorus daily  5.  Check Triglycerides daily for first 3 days of TPN, then monthly   6. Pre-albumin weekly.  7. Fingersticks to monitor glucose every 6 hours until stable then may be decreased to twice a day, coverage with ISS - to be ordered by primary team  8. Discussed with CTU provider to order for triglyceride level and finger stick every 6 hours.       TPN team, pager [phone]

## 2019-05-24 NOTE — PROGRESS NOTE ADULT - PROBLEM SELECTOR PROBLEM 4
Acute diastolic heart failure
Acute hypoxemic respiratory failure
Acute hypoxemic respiratory failure
Acute renal failure
Acute renal failure
Hypervolemia
Hypervolemia
Hypokalemia
Acute hypoxemic respiratory failure
Hypokalemia
Hypervolemia

## 2019-05-24 NOTE — PROGRESS NOTE ADULT - PROBLEM SELECTOR PROBLEM 7
Mild intermittent asthma, unspecified whether complicated

## 2019-05-24 NOTE — PROGRESS NOTE ADULT - PROBLEM SELECTOR PLAN 8
PPI  Heparin drip  PT as able

## 2019-05-24 NOTE — PROGRESS NOTE ADULT - ASSESSMENT
79 yo   male with PMH of HLD, Sleep apnea ( non compliant with CPAP), GERD, diverticulitis, depression, anxiety, and panic attack, presented to [facility] on 04/25/19 for LHC after having  abnormal NST done as outpatient, LHC done same day revealed TVD. therefore underwent CABG on 04/26/19, hospital course complicated with septic shock and anuric TARAH.

## 2019-05-24 NOTE — CHART NOTE - NSCHARTNOTEFT_GEN_A_CORE
Nutrition Follow Up Note  Patient seen for: Malnutrition follow up     Source: RN, medical record, CTU rounds     Chart reviewed, events noted: 80 yo M with PMHx HLD, sleep apnea, GERD, diverticulitis, depression, anxiety, panic attacks with chest pain and SOB x 1 year. Admitted for CABG. S/p cardiac cath , found to have CAD, LVH and diastolic HF, S/p CABGx3 with LIMA to LAD; RSVG to OM, RCA . Hospital course c/b anemia/hypovolemia-shock, cardiovascular dysfunction, acidosis, and hyperglycemia. S/p tracheostomy on 2019 and left VATS, LLL wedge resection. Pt with PNA, MRSA ,lactic acidosis, and ischemic colitis. Remains intubated. CVVHD continues to clot, transitioned to IHD. 1/2 dosed TPN started.     Diet : Vital 1.2 @ 20ml/[id number]. +TPN   TPN infusing at 42ml/hr (60Gm amino acids, 175Gm dextrose, 0ml 20%lipids) to provide  835cal    Patient reports: Pt seen. Remains intubated. Being followed by Nutrition support team as TPN has been initiated at 1/2 dose without lipids. Per MD, EN was cut from 70ml to 20ml/hr to continue to promote GI integrity. Rectal tube remains high. He continues to receive banatrol, Opium tincture, Octreotide, and lomotil.     Rectal tube:   : 1.8L thus fat today.   : Not documented. Per RN, was unable to measure output as it was leaking out around the rectal tube.     Daily Weight in k.8 (), Weight in k.3 (), Weight in k.3 (), Weight in k.1 (), Weight in k.8 (), Weight in k.5 (-), Weight in k.9 ()  % Weight Change    Pertinent Medications: MEDICATIONS  (STANDING):  artificial  tears Solution 1 Drop(s) Both EYES every 6 hours  calcium chloride Injectable 1000 milliGRAM(s) IV Push <User Schedule>  chlorhexidine 0.12% Liquid 15 milliLiter(s) Oral Mucosa two times a day  chlorhexidine 4% Liquid 1 Application(s) Topical <User Schedule>  dextrose 50% Injectable 50 milliLiter(s) IV Push once  diphenoxylate/atropine 2 Tablet(s) Oral four times a day  heparin  Infusion 500 Unit(s)/Hr (6.5 mL/Hr) IV Continuous <Continuous>  insulin lispro (HumaLOG) corrective regimen sliding scale   SubCutaneous every 6 hours  midodrine 10 milliGRAM(s) Oral every 8 hours  mineral oil for Topical Use 1 Application(s) Topical daily  norepinephrine Infusion 0.034 MICROgram(s)/kG/Min (5 mL/Hr) IV Continuous <Continuous>  octreotide  Injectable 100 MICROGram(s) SubCutaneous every 8 hours  opium Tincture 6 milliGRAM(s) Oral four times a day  pantoprazole  Injectable 40 milliGRAM(s) IV Push two times a day  Parenteral Nutrition - Adult 1 Each (42 mL/Hr) TPN Continuous <Continuous>  thiamine Injectable 200 milliGRAM(s) IV Push <User Schedule>  vasopressin Infusion 0.083 Unit(s)/Min (5 mL/Hr) IV Continuous <Continuous>    MEDICATIONS  (PRN):  sodium chloride 0.9% lock flush 10 milliLiter(s) IV Push every 1 hour PRN Pre/post blood products, medications, blood draw, and to maintain line patency    Pertinent Labs:  @ 01:54: Na 137, BUN 48<H>, Cr 0.74, <H>, K+ 3.5, Phos 3.5, Mg 2.3, Alk Phos 131<H>, ALT/SGPT 60<H>, AST/SGOT 170<H>, HbA1c Prealbumin noted to be 9-low     Finger Sticks:  POCT Blood Glucose.: 100 mg/dL ( @ 12:31      Skin per nursing documentation: intact  Edema: +2 shagufta eg, hand, arm and foot edema     Estimated Needs:   [X ] no change since previous assessment  [ ] recalculated:     Previous Nutrition Diagnosis: increased nutrient need and acute moderate malnutrition   Nutrition Diagnosis is: on going, being addressed with TPN     Interventions:     Recommend  1. Defer TPN to nutrition support team  2. Continue Vital 1.2 @ 20ml/[id number] to promote GI integrity    3. Defer use of banatrol, Opium tincture, Octreotide, and lomotil to CTU team and GI  4. Trend BG levels, renal indices, LFT's and electrolytes     Monitoring and Evaluation:     Continue to monitor Nutritional intake, Tolerance to diet prescription, weights, labs, skin integrity    RD remains available upon request and will follow up per protocol  Sarah Siegler RD, RDN, [facility] Pager #[phone] Nutrition Follow Up Note  Patient seen for: Malnutrition follow up     Source: RN, medical record, CTU rounds     Chart reviewed, events noted: 80 yo M with PMHx HLD, sleep apnea, GERD, diverticulitis, depression, anxiety, panic attacks with chest pain and SOB x 1 year. Admitted for CABG. S/p cardiac cath , found to have CAD, LVH and diastolic HF, S/p CABGx3 with LIMA to LAD; RSVG to OM, RCA . Hospital course c/b anemia/hypovolemia-shock, cardiovascular dysfunction, acidosis, and hyperglycemia. S/p tracheostomy on 2019 and left VATS, LLL wedge resection. Pt with PNA, MRSA ,lactic acidosis, and ischemic colitis. Remains intubated. CVVHD continues to clot, transitioned to IHD. 1/2 dosed TPN started.     Diet : Vital 1.2 @ 20ml/[id number]. +TPN   TPN infusing at 42ml/hr (60Gm amino acids, 175Gm dextrose, 0ml 20%lipids) to provide  835cal    Patient reports: Pt seen. Remains intubated. Being followed by Nutrition support team as TPN has been initiated at 1/2 dose without lipids. Per MD, EN was cut from 70ml to 20ml/hr to continue to promote GI integrity. Rectal tube remains high. He continues to receive banatrol, Opium tincture, Octreotide, and lomotil.     Rectal tube:   : 1.8L thus fat today.   : Not documented. Per RN, was unable to measure output as it was leaking out around the rectal tube.     Daily Weight in k.8 (), Weight in k.3 (), Weight in k.3 (), Weight in k.1 (), Weight in k.8 (), Weight in k.5 (-), Weight in k.9 ()  % Weight Change    Pertinent Medications: MEDICATIONS  (STANDING):  artificial  tears Solution 1 Drop(s) Both EYES every 6 hours  calcium chloride Injectable 1000 milliGRAM(s) IV Push <User Schedule>  chlorhexidine 0.12% Liquid 15 milliLiter(s) Oral Mucosa two times a day  chlorhexidine 4% Liquid 1 Application(s) Topical <User Schedule>  dextrose 50% Injectable 50 milliLiter(s) IV Push once  diphenoxylate/atropine 2 Tablet(s) Oral four times a day  heparin  Infusion 500 Unit(s)/Hr (6.5 mL/Hr) IV Continuous <Continuous>  insulin lispro (HumaLOG) corrective regimen sliding scale   SubCutaneous every 6 hours  midodrine 10 milliGRAM(s) Oral every 8 hours  mineral oil for Topical Use 1 Application(s) Topical daily  norepinephrine Infusion 0.034 MICROgram(s)/kG/Min (5 mL/Hr) IV Continuous <Continuous>  octreotide  Injectable 100 MICROGram(s) SubCutaneous every 8 hours  opium Tincture 6 milliGRAM(s) Oral four times a day  pantoprazole  Injectable 40 milliGRAM(s) IV Push two times a day  Parenteral Nutrition - Adult 1 Each (42 mL/Hr) TPN Continuous <Continuous>  thiamine Injectable 200 milliGRAM(s) IV Push <User Schedule>  vasopressin Infusion 0.083 Unit(s)/Min (5 mL/Hr) IV Continuous <Continuous>    MEDICATIONS  (PRN):  sodium chloride 0.9% lock flush 10 milliLiter(s) IV Push every 1 hour PRN Pre/post blood products, medications, blood draw, and to maintain line patency    Pertinent Labs:  @ 01:54: Na 137, BUN 48<H>, Cr 0.74, <H>, K+ 3.5, Phos 3.5, Mg 2.3, Alk Phos 131<H>, ALT/SGPT 60<H>, AST/SGOT 170<H>, HbA1c Prealbumin noted to be 6-low     Finger Sticks:  POCT Blood Glucose.: 100 mg/dL ( @ 12:31      Skin per nursing documentation: intact  Edema: +2 shagufta eg, hand, arm and foot edema     Estimated Needs:   [X ] no change since previous assessment  [ ] recalculated:     Previous Nutrition Diagnosis: increased nutrient need and acute moderate malnutrition   Nutrition Diagnosis is: on going, being addressed with TPN     Interventions:     Recommend  1. Defer TPN to nutrition support team  2. Continue Vital 1.2 @ 20ml/[id number] to promote GI integrity    3. Defer use of banatrol, Opium tincture, Octreotide, and lomotil to CTU team and GI  4. Trend BG levels, renal indices, LFT's and electrolytes     Monitoring and Evaluation:     Continue to monitor Nutritional intake, Tolerance to diet prescription, weights, labs, skin integrity    RD remains available upon request and will follow up per protocol  Sarah Siegler RD, RDN, [facility] Pager #[phone]

## 2019-05-24 NOTE — PROGRESS NOTE ADULT - PROBLEM SELECTOR PLAN 1
Pt with TARAH most likely hemodynamically mediated vs ischemic ATN in the setting of anemia from blood loss, hemodynamic instability, and renal venous congestion. SCr of 1.2 in 2016, 1.1 08/18 and 1.06 on 04/25/19. Started on HD on 05/11/19 due to BUN 190s worsening mental status, concerning for uremic encephalopathy. Labs reviewed today. Switch to CRRT on 5/16/19 due to shock and discontinue on 5/21/19.  Pt. underwent HD yesterday with 1 L UF however discontinue due to tachycardia. Would recommend attempt CRRT again today. Monitor I&O, daily weights, avoid nephrotoxics. Adjust meds based on GFR.  Avoid hypotension.

## 2019-05-25 LAB
ALBUMIN SERPL ELPH-MCNC: 2.9 G/DL — LOW (ref 3.3–5)
ALBUMIN SERPL ELPH-MCNC: 3.1 G/DL — LOW (ref 3.3–5)
ALP SERPL-CCNC: 138 U/L — HIGH (ref 40–120)
ALP SERPL-CCNC: 142 U/L — HIGH (ref 40–120)
ALT FLD-CCNC: 38 U/L — SIGNIFICANT CHANGE UP (ref 10–45)
ALT FLD-CCNC: 38 U/L — SIGNIFICANT CHANGE UP (ref 10–45)
ANION GAP SERPL CALC-SCNC: 14 MMOL/L — SIGNIFICANT CHANGE UP (ref 5–17)
ANION GAP SERPL CALC-SCNC: 15 MMOL/L — SIGNIFICANT CHANGE UP (ref 5–17)
APTT BLD: 100.6 SEC — HIGH (ref 27.5–36.3)
APTT BLD: 110.2 SEC — HIGH (ref 27.5–36.3)
APTT BLD: 111 SEC — HIGH (ref 27.5–36.3)
APTT BLD: 57.2 SEC — HIGH (ref 27.5–36.3)
APTT BLD: 64.5 SEC — HIGH (ref 27.5–36.3)
AST SERPL-CCNC: 122 U/L — HIGH (ref 10–40)
AST SERPL-CCNC: 122 U/L — HIGH (ref 10–40)
BILIRUB SERPL-MCNC: 18 MG/DL — HIGH (ref 0.2–1.2)
BILIRUB SERPL-MCNC: 18.5 MG/DL — HIGH (ref 0.2–1.2)
BUN SERPL-MCNC: 40 MG/DL — HIGH (ref 7–23)
BUN SERPL-MCNC: 43 MG/DL — HIGH (ref 7–23)
CA-I BLD-SCNC: 1.18 MMOL/L — SIGNIFICANT CHANGE UP (ref 1.12–1.3)
CALCIUM SERPL-MCNC: 8.2 MG/DL — LOW (ref 8.4–10.5)
CALCIUM SERPL-MCNC: 8.7 MG/DL — SIGNIFICANT CHANGE UP (ref 8.4–10.5)
CHLORIDE SERPL-SCNC: 103 MMOL/L — SIGNIFICANT CHANGE UP (ref 96–108)
CHLORIDE SERPL-SCNC: 103 MMOL/L — SIGNIFICANT CHANGE UP (ref 96–108)
CO2 SERPL-SCNC: 19 MMOL/L — LOW (ref 22–31)
CO2 SERPL-SCNC: 19 MMOL/L — LOW (ref 22–31)
CREAT SERPL-MCNC: 0.48 MG/DL — LOW (ref 0.5–1.3)
CREAT SERPL-MCNC: 0.54 MG/DL — SIGNIFICANT CHANGE UP (ref 0.5–1.3)
GAS PNL BLDA: SIGNIFICANT CHANGE UP
GLUCOSE BLDC GLUCOMTR-MCNC: 152 MG/DL — HIGH (ref 70–99)
GLUCOSE SERPL-MCNC: 169 MG/DL — HIGH (ref 70–99)
GLUCOSE SERPL-MCNC: 185 MG/DL — HIGH (ref 70–99)
HCT VFR BLD CALC: 31.6 % — LOW (ref 39–50)
HGB BLD-MCNC: 10 G/DL — LOW (ref 13–17)
INR BLD: 1.39 RATIO — HIGH (ref 0.88–1.16)
INR BLD: 1.61 RATIO — HIGH (ref 0.88–1.16)
LDH SERPL L TO P-CCNC: 624 U/L — HIGH (ref 50–242)
LIDOCAIN IGE QN: 193 U/L — HIGH (ref 7–60)
MAGNESIUM SERPL-MCNC: 2.5 MG/DL — SIGNIFICANT CHANGE UP (ref 1.6–2.6)
MAGNESIUM SERPL-MCNC: 2.5 MG/DL — SIGNIFICANT CHANGE UP (ref 1.6–2.6)
MCHC RBC-ENTMCNC: 28.7 PG — SIGNIFICANT CHANGE UP (ref 27–34)
MCHC RBC-ENTMCNC: 31.8 GM/DL — LOW (ref 32–36)
MCV RBC AUTO: 90.1 FL — SIGNIFICANT CHANGE UP (ref 80–100)
PHOSPHATE SERPL-MCNC: 3.9 MG/DL — SIGNIFICANT CHANGE UP (ref 2.5–4.5)
PHOSPHATE SERPL-MCNC: 4 MG/DL — SIGNIFICANT CHANGE UP (ref 2.5–4.5)
PLATELET # BLD AUTO: 55 K/UL — LOW (ref 150–400)
POTASSIUM SERPL-MCNC: 3.7 MMOL/L — SIGNIFICANT CHANGE UP (ref 3.5–5.3)
POTASSIUM SERPL-MCNC: 3.8 MMOL/L — SIGNIFICANT CHANGE UP (ref 3.5–5.3)
POTASSIUM SERPL-SCNC: 3.7 MMOL/L — SIGNIFICANT CHANGE UP (ref 3.5–5.3)
POTASSIUM SERPL-SCNC: 3.8 MMOL/L — SIGNIFICANT CHANGE UP (ref 3.5–5.3)
PROT SERPL-MCNC: 5.3 G/DL — LOW (ref 6–8.3)
PROT SERPL-MCNC: 5.4 G/DL — LOW (ref 6–8.3)
PROTHROM AB SERPL-ACNC: 16.2 SEC — HIGH (ref 10–12.9)
PROTHROM AB SERPL-ACNC: 18.6 SEC — HIGH (ref 10–12.9)
RBC # BLD: 3.5 M/UL — LOW (ref 4.2–5.8)
RBC # FLD: 15.1 % — HIGH (ref 10.3–14.5)
SODIUM SERPL-SCNC: 136 MMOL/L — SIGNIFICANT CHANGE UP (ref 135–145)
SODIUM SERPL-SCNC: 137 MMOL/L — SIGNIFICANT CHANGE UP (ref 135–145)
TRIGL SERPL-MCNC: 76 MG/DL — SIGNIFICANT CHANGE UP (ref 10–149)
VANCOMYCIN TROUGH SERPL-MCNC: 15.1 UG/ML — SIGNIFICANT CHANGE UP (ref 10–20)
WBC # BLD: 6.1 K/UL — SIGNIFICANT CHANGE UP (ref 3.8–10.5)
WBC # FLD AUTO: 6.1 K/UL — SIGNIFICANT CHANGE UP (ref 3.8–10.5)

## 2019-05-25 PROCEDURE — 99291 CRITICAL CARE FIRST HOUR: CPT

## 2019-05-25 PROCEDURE — 93010 ELECTROCARDIOGRAM REPORT: CPT

## 2019-05-25 PROCEDURE — 71045 X-RAY EXAM CHEST 1 VIEW: CPT | Mod: 26

## 2019-05-25 PROCEDURE — 99233 SBSQ HOSP IP/OBS HIGH 50: CPT | Mod: GC

## 2019-05-25 PROCEDURE — 99233 SBSQ HOSP IP/OBS HIGH 50: CPT

## 2019-05-25 RX ORDER — ELECTROLYTE SOLUTION,INJ
1 VIAL (ML) INTRAVENOUS
Refills: 0 | Status: DISCONTINUED | OUTPATIENT
Start: 2019-05-25 | End: 2019-05-25

## 2019-05-25 RX ORDER — HEPARIN SODIUM 5000 [USP'U]/ML
200 INJECTION INTRAVENOUS; SUBCUTANEOUS
Qty: 25000 | Refills: 0 | Status: DISCONTINUED | OUTPATIENT
Start: 2019-05-25 | End: 2019-05-26

## 2019-05-25 RX ORDER — MORPHINE 10 MG/ML
5 SOLUTION ORAL
Refills: 0 | Status: DISCONTINUED | OUTPATIENT
Start: 2019-05-25 | End: 2019-05-26

## 2019-05-25 RX ORDER — VANCOMYCIN HCL 1 G
1000 VIAL (EA) INTRAVENOUS ONCE
Refills: 0 | Status: COMPLETED | OUTPATIENT
Start: 2019-05-25 | End: 2019-05-25

## 2019-05-25 RX ORDER — HEPARIN SODIUM 5000 [USP'U]/ML
650 INJECTION INTRAVENOUS; SUBCUTANEOUS
Qty: 10000 | Refills: 0 | Status: DISCONTINUED | OUTPATIENT
Start: 2019-05-25 | End: 2019-05-25

## 2019-05-25 RX ORDER — HYDROMORPHONE HYDROCHLORIDE 2 MG/ML
0.5 INJECTION INTRAMUSCULAR; INTRAVENOUS; SUBCUTANEOUS ONCE
Refills: 0 | Status: DISCONTINUED | OUTPATIENT
Start: 2019-05-25 | End: 2019-05-25

## 2019-05-25 RX ORDER — POTASSIUM CHLORIDE 20 MEQ
10 PACKET (EA) ORAL
Refills: 0 | Status: COMPLETED | OUTPATIENT
Start: 2019-05-25 | End: 2019-05-25

## 2019-05-25 RX ORDER — SODIUM BICARBONATE 1 MEQ/ML
50 SYRINGE (ML) INTRAVENOUS ONCE
Refills: 0 | Status: COMPLETED | OUTPATIENT
Start: 2019-05-25 | End: 2019-05-25

## 2019-05-25 RX ORDER — I.V. FAT EMULSION 20 G/100ML
8.3 EMULSION INTRAVENOUS
Qty: 20 | Refills: 0 | Status: DISCONTINUED | OUTPATIENT
Start: 2019-05-25 | End: 2019-05-25

## 2019-05-25 RX ORDER — DOBUTAMINE HCL 250MG/20ML
1.25 VIAL (ML) INTRAVENOUS
Qty: 500 | Refills: 0 | Status: DISCONTINUED | OUTPATIENT
Start: 2019-05-25 | End: 2019-05-25

## 2019-05-25 RX ORDER — CALCIUM CHLORIDE
1000 POWDER (GRAM) MISCELLANEOUS
Refills: 0 | Status: DISCONTINUED | OUTPATIENT
Start: 2019-05-25 | End: 2019-05-26

## 2019-05-25 RX ORDER — I.V. FAT EMULSION 20 G/100ML
8.3 EMULSION INTRAVENOUS
Qty: 20 | Refills: 0 | Status: DISCONTINUED | OUTPATIENT
Start: 2019-05-25 | End: 2019-05-26

## 2019-05-25 RX ADMIN — HYDROMORPHONE HYDROCHLORIDE 0.5 MILLIGRAM(S): 2 INJECTION INTRAMUSCULAR; INTRAVENOUS; SUBCUTANEOUS at 12:45

## 2019-05-25 RX ADMIN — Medication 1000 MILLIGRAM(S): at 21:02

## 2019-05-25 RX ADMIN — MIDODRINE HYDROCHLORIDE 10 MILLIGRAM(S): 2.5 TABLET ORAL at 13:23

## 2019-05-25 RX ADMIN — OCTREOTIDE ACETATE 100 MICROGRAM(S): 200 INJECTION, SOLUTION INTRAVENOUS; SUBCUTANEOUS at 05:10

## 2019-05-25 RX ADMIN — Medication 200 MILLIGRAM(S): at 21:02

## 2019-05-25 RX ADMIN — Medication 2 TABLET(S): at 05:10

## 2019-05-25 RX ADMIN — MORPHINE 6 MILLIGRAM(S): 10 SOLUTION ORAL at 05:10

## 2019-05-25 RX ADMIN — Medication 1 DROP(S): at 11:03

## 2019-05-25 RX ADMIN — HYDROMORPHONE HYDROCHLORIDE 0.5 MILLIGRAM(S): 2 INJECTION INTRAMUSCULAR; INTRAVENOUS; SUBCUTANEOUS at 12:30

## 2019-05-25 RX ADMIN — CHLORHEXIDINE GLUCONATE 1 APPLICATION(S): 213 SOLUTION TOPICAL at 05:12

## 2019-05-25 RX ADMIN — Medication 1: at 05:15

## 2019-05-25 RX ADMIN — Medication 2.98 MICROGRAM(S)/KG/MIN: at 06:00

## 2019-05-25 RX ADMIN — Medication 50 MILLIEQUIVALENT(S): at 09:59

## 2019-05-25 RX ADMIN — Medication 1 EACH: at 19:00

## 2019-05-25 RX ADMIN — Medication 5 MICROGRAM(S)/KG/MIN: at 19:00

## 2019-05-25 RX ADMIN — Medication 2 TABLET(S): at 11:02

## 2019-05-25 RX ADMIN — MORPHINE 6 MILLIGRAM(S): 10 SOLUTION ORAL at 11:02

## 2019-05-25 RX ADMIN — Medication 2 TABLET(S): at 17:19

## 2019-05-25 RX ADMIN — Medication 1000 MILLIGRAM(S): at 11:01

## 2019-05-25 RX ADMIN — I.V. FAT EMULSION 8.3 ML/HR: 20 EMULSION INTRAVENOUS at 17:57

## 2019-05-25 RX ADMIN — Medication 50 MILLIEQUIVALENT(S): at 06:15

## 2019-05-25 RX ADMIN — VASOPRESSIN 5 UNIT(S)/MIN: 20 INJECTION INTRAVENOUS at 19:00

## 2019-05-25 RX ADMIN — PANTOPRAZOLE SODIUM 40 MILLIGRAM(S): 20 TABLET, DELAYED RELEASE ORAL at 17:20

## 2019-05-25 RX ADMIN — MORPHINE 6 MILLIGRAM(S): 10 SOLUTION ORAL at 17:19

## 2019-05-25 RX ADMIN — OCTREOTIDE ACETATE 100 MICROGRAM(S): 200 INJECTION, SOLUTION INTRAVENOUS; SUBCUTANEOUS at 21:36

## 2019-05-25 RX ADMIN — Medication 1 DROP(S): at 17:23

## 2019-05-25 RX ADMIN — Medication 1 EACH: at 17:56

## 2019-05-25 RX ADMIN — MIDODRINE HYDROCHLORIDE 10 MILLIGRAM(S): 2.5 TABLET ORAL at 21:02

## 2019-05-25 RX ADMIN — CHLORHEXIDINE GLUCONATE 15 MILLILITER(S): 213 SOLUTION TOPICAL at 17:20

## 2019-05-25 RX ADMIN — Medication 1: at 11:02

## 2019-05-25 RX ADMIN — Medication 1: at 00:01

## 2019-05-25 RX ADMIN — PANTOPRAZOLE SODIUM 40 MILLIGRAM(S): 20 TABLET, DELAYED RELEASE ORAL at 05:10

## 2019-05-25 RX ADMIN — Medication 200 MILLIGRAM(S): at 11:02

## 2019-05-25 RX ADMIN — CHLORHEXIDINE GLUCONATE 15 MILLILITER(S): 213 SOLUTION TOPICAL at 05:11

## 2019-05-25 RX ADMIN — Medication 250 MILLIGRAM(S): at 05:45

## 2019-05-25 RX ADMIN — OCTREOTIDE ACETATE 100 MICROGRAM(S): 200 INJECTION, SOLUTION INTRAVENOUS; SUBCUTANEOUS at 13:23

## 2019-05-25 RX ADMIN — MIDODRINE HYDROCHLORIDE 10 MILLIGRAM(S): 2.5 TABLET ORAL at 05:09

## 2019-05-25 RX ADMIN — I.V. FAT EMULSION 8.3 ML/HR: 20 EMULSION INTRAVENOUS at 19:00

## 2019-05-25 RX ADMIN — Medication 1 DROP(S): at 05:11

## 2019-05-25 RX ADMIN — Medication 1 APPLICATION(S): at 11:04

## 2019-05-25 RX ADMIN — Medication 1: at 17:30

## 2019-05-25 RX ADMIN — Medication 50 MILLIEQUIVALENT(S): at 05:45

## 2019-05-25 NOTE — PROGRESS NOTE ADULT - PROBLEM SELECTOR PLAN 1
Pt with ATRAH most likely hemodynamically mediated vs ischemic ATN in the setting of anemia from blood loss, hemodynamic instability, and renal venous congestion. SCr of 1.2 in 2016, 1.1 08/18 and 1.06 on 04/25/19. Started on HD on 05/11/19 due to BUN 190s worsening mental status, concerning for uremic encephalopathy. Labs reviewed today. Switched to CRRT on 5/16/19 due to shock and discontinued on 5/21/19. Restarted on CRRT on 05/24.. Monitor I&O, daily weights, avoid nephrotoxics. Adjust meds based on GFR.  Avoid hypotension. Pt with TARAH most likely hemodynamically mediated vs ischemic ATN in the setting of anemia from blood loss, hemodynamic instability, and renal venous congestion. SCr of 1.2 in 2016, 1.1 08/18 and 1.06 on 04/25/19. Started on HD on 05/11/19 due to BUN 190s worsening mental status, concerning for uremic encephalopathy. Restarted on CRRT on 05/24. Labs reviewed today. Continue CRRT with phoxillum solutions. Monitor I&O, daily weights, avoid nephrotoxics.

## 2019-05-25 NOTE — PROGRESS NOTE ADULT - SUBJECTIVE AND OBJECTIVE BOX
[facility] NUTRITION SUPPORT / TPN -- PA/ Attending FOLLOW UP NOTE      24 hour events/subjective:    Diet:  Diet, NPO with Tube Feed:   Tube Feeding Modality: Nasogastric  Vital AF (VITALAFRTH)  Total Volume for 24 Hours (mL): 480  Continuous  Starting Tube Feed Rate mL per Hour: 20  Until Goal Tube Feed Rate (mL per Hour): 20  Tube Feed Duration (in Hours): 24  Tube Feed Start Time: 09:00 (05-24-19 @ 08:30)          ALLERGIES & MEDICATIONS  --------------------------------------------------------------------------------  Allergies    No Known Allergies    Intolerances      Standing Inpatient Medications  artificial  tears Solution 1 Drop(s) Both EYES every 6 hours  calcium chloride Injectable 1000 milliGRAM(s) IV Push <User Schedule>  chlorhexidine 0.12% Liquid 15 milliLiter(s) Oral Mucosa two times a day  chlorhexidine 4% Liquid 1 Application(s) Topical <User Schedule>  dextrose 50% Injectable 50 milliLiter(s) IV Push once  diphenoxylate/atropine 2 Tablet(s) Oral four times a day  DOBUTamine Infusion 1.25 MICROgram(s)/kG/Min IV Continuous <Continuous>  heparin  Infusion 500 Unit(s)/Hr IV Continuous <Continuous>  heparin  Infusion Syringe 650 Unit(s)/Hr Dialysis <Continuous>  insulin lispro (HumaLOG) corrective regimen sliding scale   SubCutaneous every 6 hours  midodrine 10 milliGRAM(s) Oral every 8 hours  mineral oil for Topical Use 1 Application(s) Topical daily  norepinephrine Infusion 0.034 MICROgram(s)/kG/Min IV Continuous <Continuous>  octreotide  Injectable 100 MICROGram(s) SubCutaneous every 8 hours  opium Tincture 6 milliGRAM(s) Oral four times a day  pantoprazole  Injectable 40 milliGRAM(s) IV Push two times a day  Parenteral Nutrition - Adult 1 Each TPN Continuous <Continuous>  sodium bicarbonate  Injectable 50 milliEquivalent(s) IV Push once  thiamine Injectable 200 milliGRAM(s) IV Push <User Schedule>  vasopressin Infusion 0.083 Unit(s)/Min IV Continuous <Continuous>    PRN Inpatient Medications  sodium chloride 0.9% lock flush 10 milliLiter(s) IV Push every 1 hour PRN    LABS/STUDIES  --------------------------------------------------------------------------------              10.0   6.1   >-----------<  55       [05-25-19 @ 00:46]              31.6     136  |  103  |  40  ----------------------------<  185      [05-25-19 @ 03:39]  3.7   |  19  |  0.48        Ca     8.2     [05-25-19 @ 03:39]      iCa    1.18     [05-25 @ 03:41]      Mg     2.5     [05-25-19 @ 03:39]      Phos  3.9     [05-25-19 @ 03:39]    TPro  5.3  /  Alb  2.9  /  TBili  18.0  /  DBili  x   /  AST  122  /  ALT  38  /  AlkPhos  138  [05-25-19 @ 03:39]    PT/INR: PT 16.2 , INR 1.39       [05-25-19 @ 00:46]  PTT: 110.2      [05-25-19 @ 06:43]          [05-25-19 @ 00:46]    Ca ionizedBlood Gas Arterial - Calcium, Ionized: 1.17 mmoL/L (05-25-19 @ 08:59)  Blood Gas Arterial - Calcium, Ionized: 1.16 mmoL/L (05-25-19 @ 05:07)       Glucose, Serum: 185 mg/dL (05-25-19 @ 03:39)  Glucose, Serum: 169 mg/dL (05-25-19 @ 00:46)  CAPILLARY BLOOD GLUCOSE  180 (25 May 2019 06:00)  162 (25 May 2019 00:00)        VITALS/PHYSICAL EXAM  --------------------------------------------------------------------------------  T(C): 36.1 (05-24-19 @ 23:00), Max: 37.2 (05-24-19 @ 12:00)  HR: 52 (05-25-19 @ 09:00) (50 - 98)  BP: --  RR: 11 (05-25-19 @ 09:00) (5 - 18)  SpO2: 97% (05-25-19 @ 09:00) (88% - 100%)  Wt(kg): --        05-24-19 @ 07:01  -  05-25-19 @ 07:00  --------------------------------------------------------  IN: 3627.5 mL / OUT: 3139 mL / NET: 488.5 mL    05-25-19 @ 07:01  -  05-25-19 @ 09:47  --------------------------------------------------------  IN: 260 mL / OUT: 236 mL / NET: 24 mL [facility] NUTRITION SUPPORT / TPN -- PA/ Attending FOLLOW UP NOTE      24 hour events/subjective: tolerating TF at 20cc/hr.  Rectal tube output 350cc overnight.    Diet:  Diet, NPO with Tube Feed:   Tube Feeding Modality: Nasogastric  Vital AF (VITALAFRTH)  Total Volume for 24 Hours (mL): 480  Continuous  Starting Tube Feed Rate mL per Hour: 20  Until Goal Tube Feed Rate (mL per Hour): 20  Tube Feed Duration (in Hours): 24  Tube Feed Start Time: 09:00 (05-24-19 @ 08:30)          ALLERGIES & MEDICATIONS  --------------------------------------------------------------------------------  Allergies    No Known Allergies    Intolerances      Standing Inpatient Medications  artificial  tears Solution 1 Drop(s) Both EYES every 6 hours  calcium chloride Injectable 1000 milliGRAM(s) IV Push <User Schedule>  chlorhexidine 0.12% Liquid 15 milliLiter(s) Oral Mucosa two times a day  chlorhexidine 4% Liquid 1 Application(s) Topical <User Schedule>  dextrose 50% Injectable 50 milliLiter(s) IV Push once  diphenoxylate/atropine 2 Tablet(s) Oral four times a day  DOBUTamine Infusion 1.25 MICROgram(s)/kG/Min IV Continuous <Continuous>  heparin  Infusion 500 Unit(s)/Hr IV Continuous <Continuous>  heparin  Infusion Syringe 650 Unit(s)/Hr Dialysis <Continuous>  insulin lispro (HumaLOG) corrective regimen sliding scale   SubCutaneous every 6 hours  midodrine 10 milliGRAM(s) Oral every 8 hours  mineral oil for Topical Use 1 Application(s) Topical daily  norepinephrine Infusion 0.034 MICROgram(s)/kG/Min IV Continuous <Continuous>  octreotide  Injectable 100 MICROGram(s) SubCutaneous every 8 hours  opium Tincture 6 milliGRAM(s) Oral four times a day  pantoprazole  Injectable 40 milliGRAM(s) IV Push two times a day  Parenteral Nutrition - Adult 1 Each TPN Continuous <Continuous>  sodium bicarbonate  Injectable 50 milliEquivalent(s) IV Push once  thiamine Injectable 200 milliGRAM(s) IV Push <User Schedule>  vasopressin Infusion 0.083 Unit(s)/Min IV Continuous <Continuous>    PRN Inpatient Medications  sodium chloride 0.9% lock flush 10 milliLiter(s) IV Push every 1 hour PRN    LABS/STUDIES  --------------------------------------------------------------------------------              10.0   6.1   >-----------<  55       [05-25-19 @ 00:46]              31.6     136  |  103  |  40  ----------------------------<  185      [05-25-19 @ 03:39]  3.7   |  19  |  0.48        Ca     8.2     [05-25-19 @ 03:39]      iCa    1.18     [05-25 @ 03:41]      Mg     2.5     [05-25-19 @ 03:39]      Phos  3.9     [05-25-19 @ 03:39]    TPro  5.3  /  Alb  2.9  /  TBili  18.0  /  DBili  x   /  AST  122  /  ALT  38  /  AlkPhos  138  [05-25-19 @ 03:39]    PT/INR: PT 16.2 , INR 1.39       [05-25-19 @ 00:46]  PTT: 110.2      [05-25-19 @ 06:43]          [05-25-19 @ 00:46]    Ca ionizedBlood Gas Arterial - Calcium, Ionized: 1.17 mmoL/L (05-25-19 @ 08:59)  Blood Gas Arterial - Calcium, Ionized: 1.16 mmoL/L (05-25-19 @ 05:07)       Glucose, Serum: 185 mg/dL (05-25-19 @ 03:39)  Glucose, Serum: 169 mg/dL (05-25-19 @ 00:46)  CAPILLARY BLOOD GLUCOSE  180 (25 May 2019 06:00)  162 (25 May 2019 00:00)        VITALS/PHYSICAL EXAM  --------------------------------------------------------------------------------  T(C): 36.1 (05-24-19 @ 23:00), Max: 37.2 (05-24-19 @ 12:00)  HR: 52 (05-25-19 @ 09:00) (50 - 98)  BP: --  RR: 11 (05-25-19 @ 09:00) (5 - 18)  SpO2: 97% (05-25-19 @ 09:00) (88% - 100%)  Wt(kg): --        05-24-19 @ 07:01  -  05-25-19 @ 07:00  --------------------------------------------------------  IN: 3627.5 mL / OUT: 3139 mL / NET: 488.5 mL    05-25-19 @ 07:01  -  05-25-19 @ 09:47  --------------------------------------------------------  IN: 260 mL / OUT: 236 mL / NET: 24 mL    Physical Exam:    Gen: NAD, jaundice, family at bedside.  HEENT: ISAÍAS ALLEN tube in place  Chest: non labored breathing  Abd:  firmly distended, rectal tube in place        UE: Warm, FROM, no clubbing, intact strength; no edema; no asterixis       LE: Warm, FROM, no clubbing, intact strength; no edema

## 2019-05-25 NOTE — PROGRESS NOTE ADULT - SUBJECTIVE AND OBJECTIVE BOX
[facility] DIVISION OF KIDNEY DISEASES AND HYPERTENSION -- FOLLOW UP NOTE  --------------------------------------------------------------------------------  Chief Complaint: TARAH    24 hour events/subjective:  Pt restarted on CRRT         PAST HISTORY  --------------------------------------------------------------------------------  No significant changes to PMH, PSH, FHx, SHx, unless otherwise noted    ALLERGIES & MEDICATIONS  --------------------------------------------------------------------------------  Allergies    No Known Allergies    Intolerances      Standing Inpatient Medications  artificial  tears Solution 1 Drop(s) Both EYES every 6 hours  calcium chloride Injectable 1000 milliGRAM(s) IV Push <User Schedule>  chlorhexidine 0.12% Liquid 15 milliLiter(s) Oral Mucosa two times a day  chlorhexidine 4% Liquid 1 Application(s) Topical <User Schedule>  dextrose 50% Injectable 50 milliLiter(s) IV Push once  diphenoxylate/atropine 2 Tablet(s) Oral four times a day  DOBUTamine Infusion 1.25 MICROgram(s)/kG/Min IV Continuous <Continuous>  heparin  Infusion 500 Unit(s)/Hr IV Continuous <Continuous>  heparin  Infusion Syringe 650 Unit(s)/Hr Dialysis <Continuous>  insulin lispro (HumaLOG) corrective regimen sliding scale   SubCutaneous every 6 hours  midodrine 10 milliGRAM(s) Oral every 8 hours  mineral oil for Topical Use 1 Application(s) Topical daily  norepinephrine Infusion 0.034 MICROgram(s)/kG/Min IV Continuous <Continuous>  octreotide  Injectable 100 MICROGram(s) SubCutaneous every 8 hours  opium Tincture 6 milliGRAM(s) Oral four times a day  pantoprazole  Injectable 40 milliGRAM(s) IV Push two times a day  Parenteral Nutrition - Adult 1 Each TPN Continuous <Continuous>  thiamine Injectable 200 milliGRAM(s) IV Push <User Schedule>  vasopressin Infusion 0.083 Unit(s)/Min IV Continuous <Continuous>    PRN Inpatient Medications  sodium chloride 0.9% lock flush 10 milliLiter(s) IV Push every 1 hour PRN      REVIEW OF SYSTEMS  --------------------------------------------------------------------------------  Gen: No weight changes, fatigue, fevers/chills, weakness  Skin: No rashes  Head/Eyes/Ears/Mouth: No headache; Normal hearing; Normal vision w/o blurriness; No sinus pain/discomfort, sore throat  Respiratory: No dyspnea, cough, wheezing, hemoptysis  CV: No chest pain, PND, orthopnea  GI: No abdominal pain, diarrhea, constipation, nausea, vomiting, melena, hematochezia  : No increased frequency, dysuria, hematuria, nocturia  MSK: No joint pain/swelling; no back pain; no edema  Neuro: No dizziness/lightheadedness, weakness, seizures, numbness, tingling  Heme: No easy bruising or bleeding  Endo: No heat/cold intolerance  Psych: No significant nervousness, anxiety, stress, depression    All other systems were reviewed and are negative, except as noted.    VITALS/PHYSICAL EXAM  --------------------------------------------------------------------------------  T(C): 36.1 (05-24-19 @ 23:00), Max: 37.2 (05-24-19 @ 08:00)  HR: 51 (05-25-19 @ 06:30) (50 - 98)  BP: --  RR: 11 (05-25-19 @ 06:30) (5 - 18)  SpO2: 100% (05-25-19 @ 06:30) (88% - 100%)  Wt(kg): --        05-23-19 @ 07:01  -  05-24-19 @ 07:00  --------------------------------------------------------  IN: 5187 mL / OUT: 4010 mL / NET: 1177 mL    05-24-19 @ 07:01  -  05-25-19 @ 06:42  --------------------------------------------------------  IN: 3620.5 mL / OUT: 3139 mL / NET: 481.5 mL      Physical Exam:  	Gen: NAD, well-appearing  	HEENT: PERRL, supple neck, clear oropharynx  	Pulm: CTA B/L  	CV: RRR, S1S2; no rub  	Back: No spinal or CVA tenderness; no sacral edema  	Abd: +BS, soft, nontender/nondistended  	: No suprapubic tenderness  	UE: Warm, FROM, no clubbing, intact strength; no edema; no asterixis  	LE: Warm, FROM, no clubbing, intact strength; no edema  	Neuro: No focal deficits, intact gait  	Psych: Normal affect and mood  	Skin: Warm, without rashes  	Vascular access:    LABS/STUDIES  --------------------------------------------------------------------------------              10.0   6.1   >-----------<  55       [05-25-19 @ 00:46]              31.6     136  |  103  |  40  ----------------------------<  185      [05-25-19 @ 03:39]  3.7   |  19  |  0.48        Ca     8.2     [05-25-19 @ 03:39]      iCa    1.18     [05-25 @ 03:41]      Mg     2.5     [05-25-19 @ 03:39]      Phos  3.9     [05-25-19 @ 03:39]    TPro  5.3  /  Alb  2.9  /  TBili  18.0  /  DBili  x   /  AST  122  /  ALT  38  /  AlkPhos  138  [05-25-19 @ 03:39]    PT/INR: PT 16.2 , INR 1.39       [05-25-19 @ 00:46]  PTT: 100.6      [05-25-19 @ 00:46]          [05-25-19 @ 00:46]    Creatinine Trend:  SCr 0.48 [05-25 @ 03:39]  SCr 0.54 [05-25 @ 00:46]  SCr 0.74 [05-24 @ 01:54]  SCr 0.73 [05-23 @ 01:57]  SCr 0.75 [05-22 @ 00:45]    Urinalysis - [05-14-19 @ 19:13]      Color Yellow / Appearance Slightly Turbid / SG 1.011 / pH 5.0      Gluc Negative / Ketone Negative  / Bili Negative / Urobili Negative       Blood Moderate / Protein Trace / Leuk Est Negative / Nitrite Negative      RBC 10 / WBC 4 / Hyaline 32 / Gran  / Sq Epi  / Non Sq Epi 3 / Bacteria Negative      HbA1c 5.4      [04-25-19 @ 17:39]  TSH 0.11      [05-24-19 @ 08:33]  Lipid: chol --, TG 76, HDL --, LDL --      [05-25-19 @ 03:39]    HBsAb <3.0      [05-12-19 @ 00:08]  HBsAg Nonreact      [05-18-19 @ 02:54]  HCV 0.23, Nonreact      [05-18-19 @ 02:54]    YOHANA: titer Negative, pattern --      [05-17-19 @ 09:33]  Free Light Chains: kappa 12.40, lambda 11.58, ratio = 1.07      [05-18 @ 06:43]  Immunofixation Serum:   No Monoclonal Band Identified    Reference Range: None Detected      [05-10-19 @ 12:48]  SPEP Interpretation: Normal Electrophoresis Pattern      [05-10-19 @ 12:48]  Immunofixation Urine: No Monoclonal Band Identified      [05-11-19 @ 16:11]  UPEP Interpretation: Normal Electrophoresis Pattern      [05-11-19 @ 16:11] [facility] DIVISION OF KIDNEY DISEASES AND HYPERTENSION -- FOLLOW UP NOTE  --------------------------------------------------------------------------------  Chief Complaint: TARAH    24 hour events/subjective:  Pt restarted on CRRT , tolerating 75 cc/hr UF.  remains intubated, on pressors.      PAST HISTORY  --------------------------------------------------------------------------------  No significant changes to PMH, PSH, FHx, SHx, unless otherwise noted    ALLERGIES & MEDICATIONS  --------------------------------------------------------------------------------  Allergies    No Known Allergies    Intolerances      Standing Inpatient Medications  artificial  tears Solution 1 Drop(s) Both EYES every 6 hours  calcium chloride Injectable 1000 milliGRAM(s) IV Push <User Schedule>  chlorhexidine 0.12% Liquid 15 milliLiter(s) Oral Mucosa two times a day  chlorhexidine 4% Liquid 1 Application(s) Topical <User Schedule>  dextrose 50% Injectable 50 milliLiter(s) IV Push once  diphenoxylate/atropine 2 Tablet(s) Oral four times a day  DOBUTamine Infusion 1.25 MICROgram(s)/kG/Min IV Continuous <Continuous>  heparin  Infusion 500 Unit(s)/Hr IV Continuous <Continuous>  heparin  Infusion Syringe 650 Unit(s)/Hr Dialysis <Continuous>  insulin lispro (HumaLOG) corrective regimen sliding scale   SubCutaneous every 6 hours  midodrine 10 milliGRAM(s) Oral every 8 hours  mineral oil for Topical Use 1 Application(s) Topical daily  norepinephrine Infusion 0.034 MICROgram(s)/kG/Min IV Continuous <Continuous>  octreotide  Injectable 100 MICROGram(s) SubCutaneous every 8 hours  opium Tincture 6 milliGRAM(s) Oral four times a day  pantoprazole  Injectable 40 milliGRAM(s) IV Push two times a day  Parenteral Nutrition - Adult 1 Each TPN Continuous <Continuous>  thiamine Injectable 200 milliGRAM(s) IV Push <User Schedule>  vasopressin Infusion 0.083 Unit(s)/Min IV Continuous <Continuous>    PRN Inpatient Medications  sodium chloride 0.9% lock flush 10 milliLiter(s) IV Push every 1 hour PRN      REVIEW OF SYSTEMS  --------------------------------------------------------------------------------  unable to obtain    VITALS/PHYSICAL EXAM  --------------------------------------------------------------------------------  T(C): 36.1 (05-24-19 @ 23:00), Max: 37.2 (05-24-19 @ 08:00)  HR: 51 (05-25-19 @ 06:30) (50 - 98)  BP: --  RR: 11 (05-25-19 @ 06:30) (5 - 18)  SpO2: 100% (05-25-19 @ 06:30) (88% - 100%)  Wt(kg): --        05-23-19 @ 07:01  -  05-24-19 @ 07:00  --------------------------------------------------------  IN: 5187 mL / OUT: 4010 mL / NET: 1177 mL    05-24-19 @ 07:01  -  05-25-19 @ 06:42  --------------------------------------------------------  IN: 3620.5 mL / OUT: 3139 mL / NET: 481.5 mL      Physical Exam:             Gen: critically ill.   	HEENT: +NGT, +trach to vent  	Pulm: mechanical breath sounds+  	CV: RRR, S1S2; no rub  	Abd: +BS, soft, nondistended              : anuric   	LE: Warm, edema+  	Vascular access: +LIJ non-tunneled HD catheter    LABS/STUDIES  --------------------------------------------------------------------------------              10.0   6.1   >-----------<  55       [05-25-19 @ 00:46]              31.6     136  |  103  |  40  ----------------------------<  185      [05-25-19 @ 03:39]  3.7   |  19  |  0.48        Ca     8.2     [05-25-19 @ 03:39]      iCa    1.18     [05-25 @ 03:41]      Mg     2.5     [05-25-19 @ 03:39]      Phos  3.9     [05-25-19 @ 03:39]    TPro  5.3  /  Alb  2.9  /  TBili  18.0  /  DBili  x   /  AST  122  /  ALT  38  /  AlkPhos  138  [05-25-19 @ 03:39]    PT/INR: PT 16.2 , INR 1.39       [05-25-19 @ 00:46]  PTT: 100.6      [05-25-19 @ 00:46]          [05-25-19 @ 00:46]    Creatinine Trend:  SCr 0.48 [05-25 @ 03:39]  SCr 0.54 [05-25 @ 00:46]  SCr 0.74 [05-24 @ 01:54]  SCr 0.73 [05-23 @ 01:57]  SCr 0.75 [05-22 @ 00:45]    Urinalysis - [05-14-19 @ 19:13]      Color Yellow / Appearance Slightly Turbid / SG 1.011 / pH 5.0      Gluc Negative / Ketone Negative  / Bili Negative / Urobili Negative       Blood Moderate / Protein Trace / Leuk Est Negative / Nitrite Negative      RBC 10 / WBC 4 / Hyaline 32 / Gran  / Sq Epi  / Non Sq Epi 3 / Bacteria Negative      HbA1c 5.4      [04-25-19 @ 17:39]  TSH 0.11      [05-24-19 @ 08:33]  Lipid: chol --, TG 76, HDL --, LDL --      [05-25-19 @ 03:39]    HBsAb <3.0      [05-12-19 @ 00:08]  HBsAg Nonreact      [05-18-19 @ 02:54]  HCV 0.23, Nonreact      [05-18-19 @ 02:54]    YOHANA: titer Negative, pattern --      [05-17-19 @ 09:33]  Free Light Chains: kappa 12.40, lambda 11.58, ratio = 1.07      [05-18 @ 06:43]  Immunofixation Serum:   No Monoclonal Band Identified    Reference Range: None Detected      [05-10-19 @ 12:48]  SPEP Interpretation: Normal Electrophoresis Pattern      [05-10-19 @ 12:48]  Immunofixation Urine: No Monoclonal Band Identified      [05-11-19 @ 16:11]  UPEP Interpretation: Normal Electrophoresis Pattern      [05-11-19 @ 16:11]

## 2019-05-25 NOTE — PROGRESS NOTE ADULT - ATTENDING COMMENTS
seen and examined 05-25-19 @ 2020    on norepinephrine and vasopressin infusions  on mechanical vent FiO2 = 40%, PEEP = +5  on CVVHD  on vital AF @ 30 mL/hour  soft / NT / ND  some stool in rectal tube    severe protein-calorie malnutrition  -continue dextrose and amino acids at goal  -start lipids 20g    elevated bilirubin  -trace elements MWF    hyperglycemia  -increase insulin 0 -> 5 units    TARAH on CVVHD  -continue KCl 20 mEq

## 2019-05-25 NOTE — PROGRESS NOTE ADULT - ASSESSMENT
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 TPN initiated for protein calorie malnutrition     Plan:  1. Carb and AA increased to full dose, no lipids ordered. Volume increased to 2000 ml.   2. Strict Intake and Output.  3. Weights three times a week  4. Monitor BMP, Mg+, Ionized Ca++, Phosphorus daily  5.  Check Triglycerides daily for first 3 days of TPN, then monthly   6. Pre-albumin weekly.  7. Fingersticks to monitor glucose every 6 hours until stable then may be decreased to twice a day, coverage with ISS - to be ordered by primary team  8. Discussed with CTU provider to order for triglyceride level and finger stick every 6 hours.       TPN team, pager [phone] 78Mh/o  4/26/2019 CABG  C3L, acute hypoxemic respiratory failure, 5/4 Tracheostomy and Lung biopsy: pulmonary amyloidosis, Pneumonia MRSA, Hypotension, acute renal failue/TARAH, elevated transaminitis ASHD/CAD, Pulmonary amyloidosi s, atrial fibrillation/flutter, anxiety, depression. TPN initiated for protein calorie malnutrition     Plan:  1. Carb and AA increased to full dose, lipids at 1/2. Volume increased to 2000 ml.   2. Strict Intake and Output.  3. Weights three times a week  4. Monitor BMP, Mg+, Ionized Ca++, Phosphorus daily  5.  Check Triglycerides daily for first 3 days of TPN, then monthly   6. Pre-albumin weekly.  7. Fingersticks to monitor glucose every 6 hours until stable then may be decreased to twice a day, coverage with ISS - to be ordered by primary team  8. Discussed with CTU provider to order for triglyceride level and finger stick every 6 hours.   9. Hyperglycemia noted on ABG, will add insulin 5U      TPN team, pager [phone]  d/w Dr. Reno and Dr. Nj

## 2019-05-25 NOTE — PROGRESS NOTE ADULT - ASSESSMENT
elevated lfts  diarrhea    c diff negative  etiology of diarrhea can be antibiotic associated, related to tube feeds, amyloid infiltration of gi tract  stool is brown no evidence of bleeding  no role for endoscopic evaluation  he has significant liver dysfunction and presumed encephalopathy therefore would not give anti diarrhea as it can worsening encephalopathy  consider xifaxin  monitor clinical course  on TPN  overall prognosis very poor

## 2019-05-25 NOTE — PROGRESS NOTE ADULT - ASSESSMENT
77 yo   male with PMH of HLD, Sleep apnea ( non compliant with CPAP), GERD, diverticulitis, depression, anxiety, and panic attack, presented to [facility] on 04/25/19 for LHC after having  abnormal NST done as outpatient, LHC done same day revealed TVD. therefore underwent CABG on 04/26/19, hospital course complicated with septic shock and anuric TARAH.

## 2019-05-25 NOTE — PROGRESS NOTE ADULT - SUBJECTIVE AND OBJECTIVE BOX
CRITICAL CARE ATTENDING - CTICU    MEDICATIONS  (STANDING):  artificial  tears Solution 1 Drop(s) Both EYES every 6 hours  calcium chloride Injectable 1000 milliGRAM(s) IV Push <User Schedule>  chlorhexidine 0.12% Liquid 15 milliLiter(s) Oral Mucosa two times a day  chlorhexidine 4% Liquid 1 Application(s) Topical <User Schedule>  dextrose 50% Injectable 50 milliLiter(s) IV Push once  diphenoxylate/atropine 2 Tablet(s) Oral four times a day  heparin  Infusion 500 Unit(s)/Hr (4 mL/Hr) IV Continuous <Continuous>  heparin  Infusion Syringe 650 Unit(s)/Hr (1.3 mL/Hr) Dialysis <Continuous>  insulin lispro (HumaLOG) corrective regimen sliding scale   SubCutaneous every 6 hours  midodrine 10 milliGRAM(s) Oral every 8 hours  mineral oil for Topical Use 1 Application(s) Topical daily  norepinephrine Infusion 0.034 MICROgram(s)/kG/Min (5 mL/Hr) IV Continuous <Continuous>  octreotide  Injectable 100 MICROGram(s) SubCutaneous every 8 hours  opium Tincture 6 milliGRAM(s) Oral four times a day  pantoprazole  Injectable 40 milliGRAM(s) IV Push two times a day  Parenteral Nutrition - Adult 1 Each (83 mL/Hr) TPN Continuous <Continuous>  Parenteral Nutrition - Adult 1 Each (83 mL/Hr) TPN Continuous <Continuous>  thiamine Injectable 200 milliGRAM(s) IV Push <User Schedule>  vasopressin Infusion 0.083 Unit(s)/Min (5 mL/Hr) IV Continuous <Continuous>                                    10.0   6.1   )-----------( 55       ( 25 May 2019 00:46 )             31.6       05-    136  |  103  |  40<H>  ----------------------------<  185<H>  3.7   |  19<L>  |  0.48<L>    Ca    8.2<L>      25 May 2019 03:39  Phos  3.9     05-  Mg     2.5         TPro  5.3<L>  /  Alb  2.9<L>  /  TBili  18.0<H>  /  DBili  x   /  AST  122<H>  /  ALT  38  /  AlkPhos  138<H>  05-25      PT/INR - ( 25 May 2019 00:46 )   PT: 16.2 sec;   INR: 1.39 ratio         PTT - ( 25 May 2019 06:43 )  PTT:110.2 sec    Mode: AC/ CMV (Assist Control/ Continuous Mandatory Ventilation)  RR (machine): 10  TV (machine): 500  FiO2: 50  PEEP: 6  ITime: 1  MAP: 8  PIP: 24      Daily     Daily Weight in k.6 (25 May 2019 00:00)       @ 07:  -   @ 07:00  --------------------------------------------------------  IN: 3627.5 mL / OUT: 3139 mL / NET: 488.5 mL     @ 07:01  -   @ 11:16  --------------------------------------------------------  IN: 507 mL / OUT: 404 mL / NET: 103 mL        Critically Ill patient  : [ ] preoperative ,   [ x] post operative    Requires :  [x ] Arterial Line   [ x] Central Line  [ ] PA catheter  [ ] IABP  [ ] ECMO  [ ] LVAD  [x ] Ventilator  [ ] pacemaker [ ] Impella.    Bedside evaluation , monitoring , treatment of hemodynamics , fluids , IVP/ IVCD meds.        Diagnosis:     POD  - CABG X 3 L    Post Op ARDS - pulmonary amyloidosis    respiratory failure - Tracheostomy     Requires chest PT, pulmonary toilet, ambu bagging, suctioning to maintain SaO2,  patent airway and treat atelectasis.     Ventilator Management:  [x ]AC-rest    [ x]CPAP-PS Wean    [ ]Trach Collar     [ ]Extubate    [ ] T-Piece  [ ]peep>5     Difficult weaning process - multiple organ system involvement in critically ill patient     ARDS    fluid overload      Renal Failure - Acute Kidney Injury     CVVHD    -50cc/hr    MRSA pneumonia    Thrombocytopenia     Hemodynamic lability,instability. Requires IVCD [x ] vasopressors [x ] inotropes  [ ] vasodilator  [ ]IVSS fluid  to maintain MAP, perfusion, C.I.     CHF- acute [ x]   chronic [x    systolic [ ]   diatolic [x ]          - Echo- EF -  LVH           [ ] RV dysfunction          - Cxr-cardiomegally, edema          - Clinical-  [x ]inotropes   [ x]pressors   [ ]diuresis   [ ]IABP   [ ]ECMO   [ ]LVAD   [x]Respiratory Failure    TPN     Tolerates NG / NJ feeds at [ ] goal rate    [x ] trophic rate  to advance    [ ]       rate                     -                     Discussed with CT surgeon, Physician's Assistant - Nurse Practitioner- Critical care medicine team.   Dicussed at  AM / PM rounds.   Chart, labs , films reviewed.    Total Time: 30 min

## 2019-05-25 NOTE — PROGRESS NOTE ADULT - SUBJECTIVE AND OBJECTIVE BOX
INTERVAL HPI/OVERNIGHT EVENTS:    intubated/lethargic  rectal tube with increased loose stools brown   chaparro feeds     MEDICATIONS  (STANDING):  artificial  tears Solution 1 Drop(s) Both EYES every 6 hours  calcium chloride Injectable 1000 milliGRAM(s) IV Push <User Schedule>  chlorhexidine 0.12% Liquid 15 milliLiter(s) Oral Mucosa two times a day  chlorhexidine 4% Liquid 1 Application(s) Topical <User Schedule>  dextrose 50% Injectable 50 milliLiter(s) IV Push once  diphenoxylate/atropine 2 Tablet(s) Oral four times a day  fat emulsion (Fish Oil and Plant Based) 20% Infusion 8.3 mL/Hr (8.3 mL/Hr) IV Continuous <Continuous>  heparin  Infusion 500 Unit(s)/Hr (4 mL/Hr) IV Continuous <Continuous>  heparin  Infusion Syringe 650 Unit(s)/Hr (1.3 mL/Hr) Dialysis <Continuous>  insulin lispro (HumaLOG) corrective regimen sliding scale   SubCutaneous every 6 hours  midodrine 10 milliGRAM(s) Oral every 8 hours  mineral oil for Topical Use 1 Application(s) Topical daily  norepinephrine Infusion 0.034 MICROgram(s)/kG/Min (5 mL/Hr) IV Continuous <Continuous>  octreotide  Injectable 100 MICROGram(s) SubCutaneous every 8 hours  opium Tincture 6 milliGRAM(s) Oral four times a day  pantoprazole  Injectable 40 milliGRAM(s) IV Push two times a day  Parenteral Nutrition - Adult 1 Each (83 mL/Hr) TPN Continuous <Continuous>  Parenteral Nutrition - Adult 1 Each (83 mL/Hr) TPN Continuous <Continuous>  thiamine Injectable 200 milliGRAM(s) IV Push <User Schedule>  vasopressin Infusion 0.083 Unit(s)/Min (5 mL/Hr) IV Continuous <Continuous>    MEDICATIONS  (PRN):  sodium chloride 0.9% lock flush 10 milliLiter(s) IV Push every 1 hour PRN Pre/post blood products, medications, blood draw, and to maintain line patency      Allergies    No Known Allergies    Intolerances    Review of Systems: unable to obtain    Vital Signs Last 24 Hrs  T(C): 35.7 (25 May 2019 11:00), Max: 37.2 (24 May 2019 16:00)  T(F): 96.3 (25 May 2019 11:00), Max: 99 (24 May 2019 16:00)  HR: 51 (25 May 2019 13:00) (50 - 89)  BP: --  BP(mean): --  RR: 16 (25 May 2019 13:00) (5 - 18)  SpO2: 100% (25 May 2019 13:00) (88% - 100%)    PHYSICAL EXAM:    Constitutional: NAD  HEENT: EOMI, throat clear  Neck: No LAD, supple+intubated  Respiratory: CTA and P  Cardiovascular: S1 and S2, RRR, no M  Gastrointestinal: BS+, soft, NT/ND, neg HSM, +ngt +rectal tube  Extremities: No peripheral edema, neg clubbing, cyanosis  Vascular: 2+ peripheral pulses  Neurological: A/O x 0  Psychiatric: lethargic intubated  Skin: No rashes        LABS:                        10.0   6.1   )-----------( 55       ( 25 May 2019 00:46 )             31.6     05-25    136  |  103  |  40<H>  ----------------------------<  185<H>  3.7   |  19<L>  |  0.48<L>    Ca    8.2<L>      25 May 2019 03:39  Phos  3.9     05-25  Mg     2.5     05-25    TPro  5.3<L>  /  Alb  2.9<L>  /  TBili  18.0<H>  /  DBili  x   /  AST  122<H>  /  ALT  38  /  AlkPhos  138<H>  05-25    PT/INR - ( 25 May 2019 00:46 )   PT: 16.2 sec;   INR: 1.39 ratio         PTT - ( 25 May 2019 06:43 )  PTT:110.2 sec      RADIOLOGY & ADDITIONAL TESTS:

## 2019-05-25 NOTE — PROVIDER CONTACT NOTE (CRITICAL VALUE NOTIFICATION) - RECOMMENDATIONS
Will continue to follow
Repeat test q1h.
as per Provider
notified NP ratna maddox
please advise with medications for infection

## 2019-05-26 VITALS — RESPIRATION RATE: 16 BRPM | OXYGEN SATURATION: 96 % | HEART RATE: 92 BPM

## 2019-05-26 LAB
ALBUMIN SERPL ELPH-MCNC: 2.6 G/DL — LOW (ref 3.3–5)
ALP SERPL-CCNC: 161 U/L — HIGH (ref 40–120)
ALT FLD-CCNC: 45 U/L — SIGNIFICANT CHANGE UP (ref 10–45)
ANION GAP SERPL CALC-SCNC: 14 MMOL/L — SIGNIFICANT CHANGE UP (ref 5–17)
APTT BLD: 80.8 SEC — HIGH (ref 27.5–36.3)
APTT BLD: 80.8 SEC — HIGH (ref 27.5–36.3)
APTT BLD: 84.4 SEC — HIGH (ref 27.5–36.3)
AST SERPL-CCNC: 128 U/L — HIGH (ref 10–40)
BILIRUB SERPL-MCNC: 18.6 MG/DL — HIGH (ref 0.2–1.2)
BUN SERPL-MCNC: 26 MG/DL — HIGH (ref 7–23)
CALCIUM SERPL-MCNC: 8.4 MG/DL — SIGNIFICANT CHANGE UP (ref 8.4–10.5)
CHLORIDE SERPL-SCNC: 105 MMOL/L — SIGNIFICANT CHANGE UP (ref 96–108)
CO2 SERPL-SCNC: 15 MMOL/L — LOW (ref 22–31)
CREAT SERPL-MCNC: <0.3 MG/DL — LOW (ref 0.5–1.3)
CULTURE RESULTS: SIGNIFICANT CHANGE UP
GAS PNL BLDA: SIGNIFICANT CHANGE UP
GLUCOSE SERPL-MCNC: 142 MG/DL — HIGH (ref 70–99)
HCT VFR BLD CALC: 30.8 % — LOW (ref 39–50)
HCT VFR BLD CALC: 35.1 % — LOW (ref 39–50)
HGB BLD-MCNC: 10.2 G/DL — LOW (ref 13–17)
HGB BLD-MCNC: 11.4 G/DL — LOW (ref 13–17)
MAGNESIUM SERPL-MCNC: 2.6 MG/DL — SIGNIFICANT CHANGE UP (ref 1.6–2.6)
MCHC RBC-ENTMCNC: 30 PG — SIGNIFICANT CHANGE UP (ref 27–34)
MCHC RBC-ENTMCNC: 30.6 PG — SIGNIFICANT CHANGE UP (ref 27–34)
MCHC RBC-ENTMCNC: 32.6 GM/DL — SIGNIFICANT CHANGE UP (ref 32–36)
MCHC RBC-ENTMCNC: 33 GM/DL — SIGNIFICANT CHANGE UP (ref 32–36)
MCV RBC AUTO: 92.1 FL — SIGNIFICANT CHANGE UP (ref 80–100)
MCV RBC AUTO: 92.5 FL — SIGNIFICANT CHANGE UP (ref 80–100)
PHOSPHATE SERPL-MCNC: 4.1 MG/DL — SIGNIFICANT CHANGE UP (ref 2.5–4.5)
PLATELET # BLD AUTO: 14 K/UL — CRITICAL LOW (ref 150–400)
PLATELET # BLD AUTO: 52 K/UL — LOW (ref 150–400)
POTASSIUM SERPL-MCNC: 4.1 MMOL/L — SIGNIFICANT CHANGE UP (ref 3.5–5.3)
POTASSIUM SERPL-SCNC: 4.1 MMOL/L — SIGNIFICANT CHANGE UP (ref 3.5–5.3)
PROT SERPL-MCNC: 5.1 G/DL — LOW (ref 6–8.3)
RBC # BLD: 3.33 M/UL — LOW (ref 4.2–5.8)
RBC # BLD: 3.81 M/UL — LOW (ref 4.2–5.8)
RBC # FLD: 15.9 % — HIGH (ref 10.3–14.5)
RBC # FLD: 16.3 % — HIGH (ref 10.3–14.5)
SODIUM SERPL-SCNC: 134 MMOL/L — LOW (ref 135–145)
SPECIMEN SOURCE: SIGNIFICANT CHANGE UP
TRIGL SERPL-MCNC: 111 MG/DL — SIGNIFICANT CHANGE UP (ref 10–149)
VANCOMYCIN TROUGH SERPL-MCNC: 11.9 UG/ML — SIGNIFICANT CHANGE UP (ref 10–20)
WBC # BLD: 9.4 K/UL — SIGNIFICANT CHANGE UP (ref 3.8–10.5)
WBC # BLD: 9.8 K/UL — SIGNIFICANT CHANGE UP (ref 3.8–10.5)
WBC # FLD AUTO: 9.4 K/UL — SIGNIFICANT CHANGE UP (ref 3.8–10.5)
WBC # FLD AUTO: 9.8 K/UL — SIGNIFICANT CHANGE UP (ref 3.8–10.5)

## 2019-05-26 PROCEDURE — 74018 RADEX ABDOMEN 1 VIEW: CPT

## 2019-05-26 PROCEDURE — 97112 NEUROMUSCULAR REEDUCATION: CPT

## 2019-05-26 PROCEDURE — 87040 BLOOD CULTURE FOR BACTERIA: CPT

## 2019-05-26 PROCEDURE — 85635 REPTILASE TEST: CPT

## 2019-05-26 PROCEDURE — 82150 ASSAY OF AMYLASE: CPT

## 2019-05-26 PROCEDURE — 80048 BASIC METABOLIC PNL TOTAL CA: CPT

## 2019-05-26 PROCEDURE — 84436 ASSAY OF TOTAL THYROXINE: CPT

## 2019-05-26 PROCEDURE — 82803 BLOOD GASES ANY COMBINATION: CPT

## 2019-05-26 PROCEDURE — 93306 TTE W/DOPPLER COMPLETE: CPT

## 2019-05-26 PROCEDURE — 93926 LOWER EXTREMITY STUDY: CPT

## 2019-05-26 PROCEDURE — 97167 OT EVAL HIGH COMPLEX 60 MIN: CPT

## 2019-05-26 PROCEDURE — 85670 THROMBIN TIME PLASMA: CPT

## 2019-05-26 PROCEDURE — 94003 VENT MGMT INPAT SUBQ DAY: CPT

## 2019-05-26 PROCEDURE — 85027 COMPLETE CBC AUTOMATED: CPT

## 2019-05-26 PROCEDURE — 99239 HOSP IP/OBS DSCHRG MGMT >30: CPT

## 2019-05-26 PROCEDURE — 85379 FIBRIN DEGRADATION QUANT: CPT

## 2019-05-26 PROCEDURE — 93970 EXTREMITY STUDY: CPT

## 2019-05-26 PROCEDURE — 87070 CULTURE OTHR SPECIMN AEROBIC: CPT

## 2019-05-26 PROCEDURE — 82962 GLUCOSE BLOOD TEST: CPT

## 2019-05-26 PROCEDURE — P9045: CPT

## 2019-05-26 PROCEDURE — 84484 ASSAY OF TROPONIN QUANT: CPT

## 2019-05-26 PROCEDURE — 84478 ASSAY OF TRIGLYCERIDES: CPT

## 2019-05-26 PROCEDURE — 84480 ASSAY TRIIODOTHYRONINE (T3): CPT

## 2019-05-26 PROCEDURE — 85652 RBC SED RATE AUTOMATED: CPT

## 2019-05-26 PROCEDURE — 87640 STAPH A DNA AMP PROBE: CPT

## 2019-05-26 PROCEDURE — 83883 ASSAY NEPHELOMETRY NOT SPEC: CPT

## 2019-05-26 PROCEDURE — 99223 1ST HOSP IP/OBS HIGH 75: CPT

## 2019-05-26 PROCEDURE — 71260 CT THORAX DX C+: CPT

## 2019-05-26 PROCEDURE — 85730 THROMBOPLASTIN TIME PARTIAL: CPT

## 2019-05-26 PROCEDURE — 85384 FIBRINOGEN ACTIVITY: CPT

## 2019-05-26 PROCEDURE — 86901 BLOOD TYPING SEROLOGIC RH(D): CPT

## 2019-05-26 PROCEDURE — 74177 CT ABD & PELVIS W/CONTRAST: CPT

## 2019-05-26 PROCEDURE — 86334 IMMUNOFIX E-PHORESIS SERUM: CPT

## 2019-05-26 PROCEDURE — 85611 PROTHROMBIN TEST: CPT

## 2019-05-26 PROCEDURE — 97161 PT EVAL LOW COMPLEX 20 MIN: CPT

## 2019-05-26 PROCEDURE — 94640 AIRWAY INHALATION TREATMENT: CPT

## 2019-05-26 PROCEDURE — 80202 ASSAY OF VANCOMYCIN: CPT

## 2019-05-26 PROCEDURE — 85291 CLOT FACTOR XIII FIBRIN SCRN: CPT

## 2019-05-26 PROCEDURE — 86900 BLOOD TYPING SEROLOGIC ABO: CPT

## 2019-05-26 PROCEDURE — 84540 ASSAY OF URINE/UREA-N: CPT

## 2019-05-26 PROCEDURE — C1887: CPT

## 2019-05-26 PROCEDURE — C1729: CPT

## 2019-05-26 PROCEDURE — 85732 THROMBOPLASTIN TIME PARTIAL: CPT

## 2019-05-26 PROCEDURE — 31720 CLEARANCE OF AIRWAYS: CPT

## 2019-05-26 PROCEDURE — 80061 LIPID PANEL: CPT

## 2019-05-26 PROCEDURE — 82570 ASSAY OF URINE CREATININE: CPT

## 2019-05-26 PROCEDURE — 81003 URINALYSIS AUTO W/O SCOPE: CPT

## 2019-05-26 PROCEDURE — 87206 SMEAR FLUORESCENT/ACID STAI: CPT

## 2019-05-26 PROCEDURE — 88309 TISSUE EXAM BY PATHOLOGIST: CPT

## 2019-05-26 PROCEDURE — 85270 CLOT FACTOR XI PTA: CPT

## 2019-05-26 PROCEDURE — 84155 ASSAY OF PROTEIN SERUM: CPT

## 2019-05-26 PROCEDURE — 80074 ACUTE HEPATITIS PANEL: CPT

## 2019-05-26 PROCEDURE — 84443 ASSAY THYROID STIM HORMONE: CPT

## 2019-05-26 PROCEDURE — 71045 X-RAY EXAM CHEST 1 VIEW: CPT | Mod: 26

## 2019-05-26 PROCEDURE — 93321 DOPPLER ECHO F-UP/LMTD STD: CPT

## 2019-05-26 PROCEDURE — P9040: CPT

## 2019-05-26 PROCEDURE — 85240 CLOT FACTOR VIII AHG 1 STAGE: CPT

## 2019-05-26 PROCEDURE — 85250 CLOT FACTOR IX PTC/CHRSTMAS: CPT

## 2019-05-26 PROCEDURE — 82553 CREATINE MB FRACTION: CPT

## 2019-05-26 PROCEDURE — P9047: CPT

## 2019-05-26 PROCEDURE — 87324 CLOSTRIDIUM AG IA: CPT

## 2019-05-26 PROCEDURE — C1751: CPT

## 2019-05-26 PROCEDURE — 86923 COMPATIBILITY TEST ELECTRIC: CPT

## 2019-05-26 PROCEDURE — 82140 ASSAY OF AMMONIA: CPT

## 2019-05-26 PROCEDURE — 86335 IMMUNFIX E-PHORSIS/URINE/CSF: CPT

## 2019-05-26 PROCEDURE — 84133 ASSAY OF URINE POTASSIUM: CPT

## 2019-05-26 PROCEDURE — 84132 ASSAY OF SERUM POTASSIUM: CPT

## 2019-05-26 PROCEDURE — 87641 MR-STAPH DNA AMP PROBE: CPT

## 2019-05-26 PROCEDURE — 86706 HEP B SURFACE ANTIBODY: CPT

## 2019-05-26 PROCEDURE — 85220 BLOOC CLOT FACTOR V TEST: CPT

## 2019-05-26 PROCEDURE — 82272 OCCULT BLD FECES 1-3 TESTS: CPT

## 2019-05-26 PROCEDURE — 93005 ELECTROCARDIOGRAM TRACING: CPT

## 2019-05-26 PROCEDURE — P9059: CPT

## 2019-05-26 PROCEDURE — 97530 THERAPEUTIC ACTIVITIES: CPT

## 2019-05-26 PROCEDURE — 81001 URINALYSIS AUTO W/SCOPE: CPT

## 2019-05-26 PROCEDURE — 87116 MYCOBACTERIA CULTURE: CPT

## 2019-05-26 PROCEDURE — C1769: CPT

## 2019-05-26 PROCEDURE — 84100 ASSAY OF PHOSPHORUS: CPT

## 2019-05-26 PROCEDURE — 86891 AUTOLOGOUS BLOOD OP SALVAGE: CPT

## 2019-05-26 PROCEDURE — 88313 SPECIAL STAINS GROUP 2: CPT

## 2019-05-26 PROCEDURE — P9017: CPT

## 2019-05-26 PROCEDURE — 86038 ANTINUCLEAR ANTIBODIES: CPT

## 2019-05-26 PROCEDURE — 71045 X-RAY EXAM CHEST 1 VIEW: CPT

## 2019-05-26 PROCEDURE — 85260 CLOT FACTOR X STUART-POWER: CPT

## 2019-05-26 PROCEDURE — 84156 ASSAY OF PROTEIN URINE: CPT

## 2019-05-26 PROCEDURE — 94660 CPAP INITIATION&MGMT: CPT

## 2019-05-26 PROCEDURE — 76937 US GUIDE VASCULAR ACCESS: CPT

## 2019-05-26 PROCEDURE — 82435 ASSAY OF BLOOD CHLORIDE: CPT

## 2019-05-26 PROCEDURE — 82947 ASSAY GLUCOSE BLOOD QUANT: CPT

## 2019-05-26 PROCEDURE — 84134 ASSAY OF PREALBUMIN: CPT

## 2019-05-26 PROCEDURE — 83690 ASSAY OF LIPASE: CPT

## 2019-05-26 PROCEDURE — 87150 DNA/RNA AMPLIFIED PROBE: CPT

## 2019-05-26 PROCEDURE — 99261: CPT

## 2019-05-26 PROCEDURE — 84166 PROTEIN E-PHORESIS/URINE/CSF: CPT

## 2019-05-26 PROCEDURE — 82330 ASSAY OF CALCIUM: CPT

## 2019-05-26 PROCEDURE — 83735 ASSAY OF MAGNESIUM: CPT

## 2019-05-26 PROCEDURE — 94002 VENT MGMT INPAT INIT DAY: CPT

## 2019-05-26 PROCEDURE — 85576 BLOOD PLATELET AGGREGATION: CPT

## 2019-05-26 PROCEDURE — C1889: CPT

## 2019-05-26 PROCEDURE — 83605 ASSAY OF LACTIC ACID: CPT

## 2019-05-26 PROCEDURE — 87186 SC STD MICRODIL/AGAR DIL: CPT

## 2019-05-26 PROCEDURE — 85610 PROTHROMBIN TIME: CPT

## 2019-05-26 PROCEDURE — 82784 ASSAY IGA/IGD/IGG/IGM EACH: CPT

## 2019-05-26 PROCEDURE — 87449 NOS EACH ORGANISM AG IA: CPT

## 2019-05-26 PROCEDURE — 97162 PT EVAL MOD COMPLEX 30 MIN: CPT

## 2019-05-26 PROCEDURE — P9012: CPT

## 2019-05-26 PROCEDURE — 84439 ASSAY OF FREE THYROXINE: CPT

## 2019-05-26 PROCEDURE — 80053 COMPREHEN METABOLIC PANEL: CPT

## 2019-05-26 PROCEDURE — 99152 MOD SED SAME PHYS/QHP 5/>YRS: CPT

## 2019-05-26 PROCEDURE — 97110 THERAPEUTIC EXERCISES: CPT

## 2019-05-26 PROCEDURE — 82565 ASSAY OF CREATININE: CPT

## 2019-05-26 PROCEDURE — 83880 ASSAY OF NATRIURETIC PEPTIDE: CPT

## 2019-05-26 PROCEDURE — 82550 ASSAY OF CK (CPK): CPT

## 2019-05-26 PROCEDURE — 80076 HEPATIC FUNCTION PANEL: CPT

## 2019-05-26 PROCEDURE — 99233 SBSQ HOSP IP/OBS HIGH 50: CPT | Mod: GC

## 2019-05-26 PROCEDURE — 87102 FUNGUS ISOLATION CULTURE: CPT

## 2019-05-26 PROCEDURE — 88341 IMHCHEM/IMCYTCHM EA ADD ANTB: CPT

## 2019-05-26 PROCEDURE — 86705 HEP B CORE ANTIBODY IGM: CPT

## 2019-05-26 PROCEDURE — 83010 ASSAY OF HAPTOGLOBIN QUANT: CPT

## 2019-05-26 PROCEDURE — 86022 PLATELET ANTIBODIES: CPT

## 2019-05-26 PROCEDURE — C1894: CPT

## 2019-05-26 PROCEDURE — 85045 AUTOMATED RETICULOCYTE COUNT: CPT

## 2019-05-26 PROCEDURE — 93456 R HRT CORONARY ARTERY ANGIO: CPT

## 2019-05-26 PROCEDURE — 74177 CT ABD & PELVIS W/CONTRAST: CPT | Mod: 26

## 2019-05-26 PROCEDURE — 83521 IG LIGHT CHAINS FREE EACH: CPT

## 2019-05-26 PROCEDURE — 87015 SPECIMEN INFECT AGNT CONCNTJ: CPT

## 2019-05-26 PROCEDURE — 70470 CT HEAD/BRAIN W/O & W/DYE: CPT

## 2019-05-26 PROCEDURE — 86803 HEPATITIS C AB TEST: CPT

## 2019-05-26 PROCEDURE — 83615 LACTATE (LD) (LDH) ENZYME: CPT

## 2019-05-26 PROCEDURE — P9037: CPT

## 2019-05-26 PROCEDURE — 84165 PROTEIN E-PHORESIS SERUM: CPT

## 2019-05-26 PROCEDURE — 93308 TTE F-UP OR LMTD: CPT

## 2019-05-26 PROCEDURE — 93880 EXTRACRANIAL BILAT STUDY: CPT

## 2019-05-26 PROCEDURE — 82533 TOTAL CORTISOL: CPT

## 2019-05-26 PROCEDURE — 36430 TRANSFUSION BLD/BLD COMPNT: CPT

## 2019-05-26 PROCEDURE — 84295 ASSAY OF SERUM SODIUM: CPT

## 2019-05-26 PROCEDURE — 86140 C-REACTIVE PROTEIN: CPT

## 2019-05-26 PROCEDURE — 84145 PROCALCITONIN (PCT): CPT

## 2019-05-26 PROCEDURE — 85014 HEMATOCRIT: CPT

## 2019-05-26 PROCEDURE — 87340 HEPATITIS B SURFACE AG IA: CPT

## 2019-05-26 PROCEDURE — 86850 RBC ANTIBODY SCREEN: CPT

## 2019-05-26 PROCEDURE — 83036 HEMOGLOBIN GLYCOSYLATED A1C: CPT

## 2019-05-26 PROCEDURE — L8699: CPT

## 2019-05-26 PROCEDURE — 84300 ASSAY OF URINE SODIUM: CPT

## 2019-05-26 PROCEDURE — 99231 SBSQ HOSP IP/OBS SF/LOW 25: CPT

## 2019-05-26 PROCEDURE — 87205 SMEAR GRAM STAIN: CPT

## 2019-05-26 RX ORDER — FENTANYL CITRATE 50 UG/ML
50 INJECTION INTRAVENOUS ONCE
Refills: 0 | Status: DISCONTINUED | OUTPATIENT
Start: 2019-05-26 | End: 2019-05-26

## 2019-05-26 RX ORDER — HYDROMORPHONE HYDROCHLORIDE 2 MG/ML
0.5 INJECTION INTRAMUSCULAR; INTRAVENOUS; SUBCUTANEOUS ONCE
Refills: 0 | Status: DISCONTINUED | OUTPATIENT
Start: 2019-05-26 | End: 2019-05-26

## 2019-05-26 RX ORDER — FENTANYL CITRATE 50 UG/ML
1 INJECTION INTRAVENOUS
Qty: 2500 | Refills: 0 | Status: DISCONTINUED | OUTPATIENT
Start: 2019-05-26 | End: 2019-05-26

## 2019-05-26 RX ORDER — PIPERACILLIN AND TAZOBACTAM 4; .5 G/20ML; G/20ML
3.38 INJECTION, POWDER, LYOPHILIZED, FOR SOLUTION INTRAVENOUS EVERY 8 HOURS
Refills: 0 | Status: DISCONTINUED | OUTPATIENT
Start: 2019-05-26 | End: 2019-05-26

## 2019-05-26 RX ORDER — ALBUMIN HUMAN 25 %
250 VIAL (ML) INTRAVENOUS ONCE
Refills: 0 | Status: COMPLETED | OUTPATIENT
Start: 2019-05-26 | End: 2019-05-26

## 2019-05-26 RX ORDER — HYDROCORTISONE 20 MG
50 TABLET ORAL EVERY 8 HOURS
Refills: 0 | Status: DISCONTINUED | OUTPATIENT
Start: 2019-05-26 | End: 2019-05-26

## 2019-05-26 RX ORDER — MEROPENEM 1 G/30ML
1000 INJECTION INTRAVENOUS ONCE
Refills: 0 | Status: COMPLETED | OUTPATIENT
Start: 2019-05-26 | End: 2019-05-26

## 2019-05-26 RX ORDER — FENTANYL CITRATE 50 UG/ML
0.5 INJECTION INTRAVENOUS
Qty: 5000 | Refills: 0 | Status: DISCONTINUED | OUTPATIENT
Start: 2019-05-26 | End: 2019-05-26

## 2019-05-26 RX ORDER — ALBUMIN HUMAN 25 %
250 VIAL (ML) INTRAVENOUS ONCE
Refills: 0 | Status: DISCONTINUED | OUTPATIENT
Start: 2019-05-26 | End: 2019-05-26

## 2019-05-26 RX ORDER — MEROPENEM 1 G/30ML
1000 INJECTION INTRAVENOUS EVERY 8 HOURS
Refills: 0 | Status: DISCONTINUED | OUTPATIENT
Start: 2019-05-26 | End: 2019-05-26

## 2019-05-26 RX ORDER — I.V. FAT EMULSION 20 G/100ML
12.5 EMULSION INTRAVENOUS
Qty: 30 | Refills: 0 | Status: DISCONTINUED | OUTPATIENT
Start: 2019-05-26 | End: 2019-05-26

## 2019-05-26 RX ORDER — MEROPENEM 1 G/30ML
INJECTION INTRAVENOUS
Refills: 0 | Status: DISCONTINUED | OUTPATIENT
Start: 2019-05-26 | End: 2019-05-26

## 2019-05-26 RX ORDER — VANCOMYCIN HCL 1 G
1000 VIAL (EA) INTRAVENOUS ONCE
Refills: 0 | Status: COMPLETED | OUTPATIENT
Start: 2019-05-26 | End: 2019-05-26

## 2019-05-26 RX ORDER — NOREPINEPHRINE BITARTRATE/D5W 8 MG/250ML
0.13 PLASTIC BAG, INJECTION (ML) INTRAVENOUS
Qty: 16 | Refills: 0 | Status: DISCONTINUED | OUTPATIENT
Start: 2019-05-26 | End: 2019-05-26

## 2019-05-26 RX ORDER — HYDROCORTISONE 20 MG
100 TABLET ORAL ONCE
Refills: 0 | Status: COMPLETED | OUTPATIENT
Start: 2019-05-26 | End: 2019-05-26

## 2019-05-26 RX ORDER — ELECTROLYTE SOLUTION,INJ
1 VIAL (ML) INTRAVENOUS
Refills: 0 | Status: DISCONTINUED | OUTPATIENT
Start: 2019-05-26 | End: 2019-05-26

## 2019-05-26 RX ADMIN — MEROPENEM 100 MILLIGRAM(S): 1 INJECTION INTRAVENOUS at 14:02

## 2019-05-26 RX ADMIN — OCTREOTIDE ACETATE 100 MICROGRAM(S): 200 INJECTION, SOLUTION INTRAVENOUS; SUBCUTANEOUS at 05:02

## 2019-05-26 RX ADMIN — MIDODRINE HYDROCHLORIDE 10 MILLIGRAM(S): 2.5 TABLET ORAL at 05:02

## 2019-05-26 RX ADMIN — MORPHINE 5 MILLIGRAM(S): 10 SOLUTION ORAL at 00:17

## 2019-05-26 RX ADMIN — HYDROMORPHONE HYDROCHLORIDE 0.5 MILLIGRAM(S): 2 INJECTION INTRAMUSCULAR; INTRAVENOUS; SUBCUTANEOUS at 01:20

## 2019-05-26 RX ADMIN — MIDODRINE HYDROCHLORIDE 10 MILLIGRAM(S): 2.5 TABLET ORAL at 14:02

## 2019-05-26 RX ADMIN — Medication 125 MILLILITER(S): at 09:40

## 2019-05-26 RX ADMIN — MORPHINE 5 MILLIGRAM(S): 10 SOLUTION ORAL at 05:02

## 2019-05-26 RX ADMIN — Medication 1 DROP(S): at 14:04

## 2019-05-26 RX ADMIN — Medication 1 DROP(S): at 06:10

## 2019-05-26 RX ADMIN — Medication 125 MILLILITER(S): at 14:33

## 2019-05-26 RX ADMIN — Medication 1 DROP(S): at 00:17

## 2019-05-26 RX ADMIN — Medication 125 MILLILITER(S): at 09:03

## 2019-05-26 RX ADMIN — PANTOPRAZOLE SODIUM 40 MILLIGRAM(S): 20 TABLET, DELAYED RELEASE ORAL at 05:02

## 2019-05-26 RX ADMIN — Medication 250 MILLIGRAM(S): at 14:21

## 2019-05-26 RX ADMIN — Medication 500 MILLILITER(S): at 02:48

## 2019-05-26 RX ADMIN — CHLORHEXIDINE GLUCONATE 15 MILLILITER(S): 213 SOLUTION TOPICAL at 06:11

## 2019-05-26 RX ADMIN — Medication 1000 MILLIGRAM(S): at 09:40

## 2019-05-26 RX ADMIN — Medication 100 MILLIGRAM(S): at 09:51

## 2019-05-26 RX ADMIN — Medication 200 MILLIGRAM(S): at 09:40

## 2019-05-26 RX ADMIN — HYDROMORPHONE HYDROCHLORIDE 0.5 MILLIGRAM(S): 2 INJECTION INTRAMUSCULAR; INTRAVENOUS; SUBCUTANEOUS at 01:05

## 2019-05-26 RX ADMIN — CHLORHEXIDINE GLUCONATE 1 APPLICATION(S): 213 SOLUTION TOPICAL at 06:10

## 2019-05-26 RX ADMIN — MORPHINE 5 MILLIGRAM(S): 10 SOLUTION ORAL at 14:02

## 2019-05-26 RX ADMIN — Medication 1 APPLICATION(S): at 14:04

## 2019-05-26 RX ADMIN — FENTANYL CITRATE 50 MICROGRAM(S): 50 INJECTION INTRAVENOUS at 16:00

## 2019-05-26 NOTE — DISCHARGE NOTE FOR THE EXPIRED PATIENT - HOSPITAL COURSE
77 y/o male PMH HLD, anxiety, asthma, depression, diverticulitis, nocturia, sleep apnea, h/o colon resection, right eye surgery, lung surgery, GERD now POD 30 s/p CABG x 3 on 19. Postop course complicated by respiratory failure s/p trach 19, pulmonary biopsy revealing pulmonary amyloidosis. Multisystem organ dysfunction with TARAH leading to HD, hepatic dysfunction, altered mental status. Prolonged pressor requirement postop developing lactic acidosis with CT Abdomen & Pelvis with IV contrast on 19 revealing findings concerning for ischemic colitis/bowel perforation, pneumoperitoneum, bilateral renal infarcts, progression of splenic infarcts. Findings discussed with family. Dr. Acuna, CTU intensivist, and Dr. Milner made aware, General Surgery called. Dr. Buck at the bedside who offered an exploratory laparotomy. Family refused any surgical intervention and made patient DNR as well as requesting withdrawal of care. Fentanyl gtt initiated. 19 @ 1715 pressors discontinued, patient  @ 1726. Patient's wife, Lacy, as well as children at the bedside.

## 2019-05-26 NOTE — CONSULT NOTE ADULT - REASON FOR ADMISSION
Cardiac Cath
77 yo   male with PMH of HLD, Sleep apnea ( non compliant with CPAP), GERD, diverticulitis, depression, anxiety, and panic attack. Early CAD in family; mother  of MI at age 54 and uncle ( mothers brother) at age 37.  Presented to Dr Feng with c/c of chest discomfort associated with dyspnea ( on exertion after walking 100 feet)  and palpitations for the past 1 year.   Abnormal NST ( last week) : mild to moderate abnormal study with medium sized inferior and post- lateral ischemia without infarct, EF 50%.  Holter monitor revealed: frequent PVCs, ECHO revealed AR, concentric LVH, diastolic dysfunction, and MR. Referred  for R and LHC.       s/p 3v CABG with persistent respiratory failure requiring mechanical ventilation & h/o BOOP
CABG
fever
CABG
CAD s/p CABG

## 2019-05-26 NOTE — PROGRESS NOTE ADULT - PROBLEM SELECTOR PROBLEM 1
TARAH (acute kidney injury)
Acute hypoxemic respiratory failure
Acute hypoxemic respiratory failure
TARAH (acute kidney injury)
Acute diastolic heart failure secondary to restrictive cardiomyopathy
Acute hypoxemic respiratory failure
TAARH (acute kidney injury)
TARAH (acute kidney injury)
Acute diastolic heart failure secondary to restrictive cardiomyopathy
TARAH (acute kidney injury)
TARAH (acute kidney injury)
Acute hypoxemic respiratory failure
Acute hypoxemic respiratory failure

## 2019-05-26 NOTE — PROGRESS NOTE ADULT - PROBLEM SELECTOR PLAN 1
Pt with TARAH most likely hemodynamically mediated vs ischemic ATN in the setting of anemia from blood loss, hemodynamic instability, and renal venous congestion. SCr of 1.2 in 2016, 1.1 08/18 and 1.06 on 04/25/19. Started on HD on 05/11/19 due to BUN 190s worsening mental status, concerning for uremic encephalopathy. Restarted on CRRT on 05/24. Labs reviewed today. Continue CRRT with phoxillum solutions. Monitor I&O, daily weights, avoid nephrotoxics.

## 2019-05-26 NOTE — PROGRESS NOTE ADULT - NSHPATTENDINGPLANDISCUSS_GEN_ALL_CORE
CTU Attending
CTU, renal and TPN/nutritional teams
ct icu attending Dr Maradiaga
primary team NP and heme team
team
ct icu
ct icu
dr mckeon.
CTICU team

## 2019-05-26 NOTE — PROGRESS NOTE ADULT - ASSESSMENT
elevated lfts  diarrhea    c diff negative  etiology of diarrhea can be antibiotic associated, related to tube feeds, amyloid infiltration of gi tract  stool is brown no evidence of bleeding  no role for endoscopic evaluation  he has significant liver dysfunction and presumed encephalopathy therefore would not give anti diarrhea as it can worsening encephalopathy  consider xifaxin  monitor clinical course  on TPN  overall prognosis very poor     Advanced care planning was discussed with patient and family.  Advanced care planning forms were reviewed and discussed.  Risks, benefits and alternatives of gastroenterologic procedures were discussed in detail and all questions were answered.    30 minutes spent.

## 2019-05-26 NOTE — PROGRESS NOTE ADULT - PROBLEM SELECTOR PROBLEM 2
Azotemia
Pulmonary amyloidosis
Azotemia
Hypervolemia
Pulmonary amyloidosis
Pulmonary amyloidosis
Amyloidosis, unspecified type
Amyloidosis, unspecified type
Azotemia
Hypervolemia
Pulmonary amyloidosis
Sepsis
Sepsis
Azotemia
Sepsis
Pulmonary amyloidosis

## 2019-05-26 NOTE — PROGRESS NOTE ADULT - PROVIDER SPECIALTY LIST ADULT
CTU
Critical Care
Gastroenterology
Heart Failure
Heme/Onc
Infectious Disease
Nephrology
Nutrition Support
Nutrition Support
Pulmonology
CTU
Critical Care
Infectious Disease
Critical Care
Critical Care
Gastroenterology
Heme/Onc
Infectious Disease
Nutrition Support
Heart Failure
Internal Medicine
Nephrology
Pulmonology

## 2019-05-26 NOTE — CONSULT NOTE ADULT - CONSULT REQUESTED DATE/TIME
25-Apr-2019
03-May-2019 11:24
08-May-2019 11:59
10-May-2019 09:34
10-May-2019 18:00
16-May-2019 12:37
20-May-2019 11:20
23-May-2019
26-May-2019 14:29
30-Apr-2019

## 2019-05-26 NOTE — PROGRESS NOTE ADULT - SUBJECTIVE AND OBJECTIVE BOX
INTERVAL HPI/OVERNIGHT EVENTS:    intubated/lethargic  rectal tube with loose stools brown   chaparro feeds via NGT  On TPN    MEDICATIONS  (STANDING):  artificial  tears Solution 1 Drop(s) Both EYES every 6 hours  calcium chloride Injectable 1000 milliGRAM(s) IV Push <User Schedule>  chlorhexidine 0.12% Liquid 15 milliLiter(s) Oral Mucosa two times a day  chlorhexidine 4% Liquid 1 Application(s) Topical <User Schedule>  dextrose 50% Injectable 50 milliLiter(s) IV Push once  fat emulsion (Fish Oil and Plant Based) 20% Infusion 12.5 mL/Hr (12.5 mL/Hr) IV Continuous <Continuous>  heparin  Infusion 200 Unit(s)/Hr (0.7 mL/Hr) IV Continuous <Continuous>  heparin  Infusion Syringe 650 Unit(s)/Hr (1.3 mL/Hr) Dialysis <Continuous>  hydrocortisone sodium succinate Injectable 50 milliGRAM(s) IV Push every 8 hours  insulin lispro (HumaLOG) corrective regimen sliding scale   SubCutaneous every 6 hours  meropenem  IVPB 1000 milliGRAM(s) IV Intermittent once  meropenem  IVPB 1000 milliGRAM(s) IV Intermittent every 8 hours  meropenem  IVPB      midodrine 10 milliGRAM(s) Oral every 8 hours  mineral oil for Topical Use 1 Application(s) Topical daily  norepinephrine Infusion 0.13 MICROgram(s)/kG/Min (9.689 mL/Hr) IV Continuous <Continuous>  opium Tincture 5 milliGRAM(s) Oral four times a day  pantoprazole  Injectable 40 milliGRAM(s) IV Push two times a day  Parenteral Nutrition - Adult 1 Each (83 mL/Hr) TPN Continuous <Continuous>  Parenteral Nutrition - Adult 1 Each (83 mL/Hr) TPN Continuous <Continuous>  thiamine Injectable 200 milliGRAM(s) IV Push <User Schedule>  vancomycin  IVPB 1000 milliGRAM(s) IV Intermittent once  vasopressin Infusion 0.083 Unit(s)/Min (5 mL/Hr) IV Continuous <Continuous>    MEDICATIONS  (PRN):  sodium chloride 0.9% lock flush 10 milliLiter(s) IV Push every 1 hour PRN Pre/post blood products, medications, blood draw, and to maintain line patency      Allergies    No Known Allergies    Intolerances          Review of Systems: unable to obtainruisability  Skin:  No rash, tattoos, scars, edema      Vital Signs Last 24 Hrs  T(C): 34.8 (26 May 2019 08:00), Max: 36 (26 May 2019 03:00)  T(F): 94.7 (26 May 2019 08:00), Max: 96.8 (26 May 2019 03:00)  HR: 92 (26 May 2019 12:00) (51 - 95)  BP: --  BP(mean): --  RR: 19 (26 May 2019 12:00) (10 - 22)  SpO2: 98% (26 May 2019 12:00) (80% - 100%)    PHYSICAL EXAM:    Constitutional: NAD  HEENT: EOMI, throat clear  Neck: No LAD, supple+intubated  Respiratory: CTA and P  Cardiovascular: S1 and S2, RRR, no M  Gastrointestinal: BS+, soft, NT/ND, neg HSM, +ngt +rectal tube  Extremities: No peripheral edema, neg clubbing, cyanosis  Vascular: 2+ peripheral pulses  Neurological: A/O x 0  Psychiatric: lethargic intubated  Skin: No rashes    LABS:                        10.2   9.4   )-----------( 52       ( 26 May 2019 05:46 )             30.8     05-26    134<L>  |  105  |  26<H>  ----------------------------<  142<H>  4.1   |  15<L>  |  <0.30<L>    Ca    8.4      26 May 2019 02:05  Phos  4.1     05-26  Mg     2.6     05-26    TPro  5.1<L>  /  Alb  2.6<L>  /  TBili  18.6<H>  /  DBili  x   /  AST  128<H>  /  ALT  45  /  AlkPhos  161<H>  05-26    PT/INR - ( 25 May 2019 21:44 )   PT: 18.6 sec;   INR: 1.61 ratio         PTT - ( 26 May 2019 11:56 )  PTT:84.4 sec      RADIOLOGY & ADDITIONAL TESTS:

## 2019-05-26 NOTE — PROGRESS NOTE ADULT - ASSESSMENT
78Mh/o  4/26/2019 CABG  C3L, acute hypoxemic respiratory failure, 5/4 Tracheostomy and Lung biopsy: pulmonary amyloidosis, Pneumonia MRSA, Hypotension, acute renal failue/TARAH, elevated transaminitis ASHD/CAD, Pulmonary amyloidosis, atrial fibrillation/flutter, anxiety, depression. TPN initiated for protein calorie malnutrition     Plan:  1. Carb and AA increased to full dose, lipids at 1/2. Volume increased to 2000 ml.   2. Strict Intake and Output.  3. Weights three times a week  4. Monitor BMP, Mg+, Ionized Ca++, Phosphorus daily  5. Check Triglycerides daily for first 3 days of TPN, then monthly   6. Pre-albumin weekly.  7. Fingersticks to monitor glucose every 6 hours until stable then may be decreased to twice a day, coverage with ISS - to be ordered by primary team  8. Hyperglycemia noted on ABG, will add insulin 5U      TPN team, pager [phone] spectra [id number]  d/w Dr. Reno and Dr. Nj 78Mh/o  4/26/2019 CABG  C3L, acute hypoxemic respiratory failure, 5/4 Tracheostomy and Lung biopsy: pulmonary amyloidosis, Pneumonia MRSA, Hypotension, acute renal failue/TARAH, elevated transaminitis ASHD/CAD, Pulmonary amyloidosis, atrial fibrillation/flutter, anxiety, depression. TPN initiated 5/23 for severe protein calorie malnutrition     Nutritional goals: 30g lipids, 120g AA, 350g CHO    Plan:  1. Continue Full dose TPN, Increase lipids to full dose. Volume increased to 2000 ml.   2. Strict Intake and Output.  3. Weights three times a week  4. Monitor BMP, Mg+, Ionized Ca++, Phosphorus daily  5. Check Triglycerides daily for first 3 days of TPN, then monthly   6. Pre-albumin weekly.  7. Fingersticks to monitor glucose every 6 hours until stable then may be decreased to twice a day, coverage with ISS - to be ordered by primary team  8. Hyperglycemia noted on ABG, will add insulin 5U        TPN team, pager [phone] spectra [id number]  d/w Dr. Reno and Dr. Nj 78Mh/o  4/26/2019 CABG  C3L, acute hypoxemic respiratory failure, 5/4 Tracheostomy and Lung biopsy: pulmonary amyloidosis, Pneumonia MRSA, Hypotension, acute renal failue/TARAH, elevated transaminitis ASHD/CAD, Pulmonary amyloidosis, atrial fibrillation/flutter, anxiety, depression. TPN initiated 5/23 for severe protein calorie malnutrition     Nutritional goals: 30g lipids, 120g AA, 350g CHO    Plan:  1. Continue Full dose TPN, lipids at full dose. Volume increased to 2000 ml.   2. Strict Intake and Output.  3. Weights three times a week  4. Monitor BMP, Mg+, Ionized Ca++, Phosphorus daily  5. Check Triglycerides daily for first 3 days of TPN, then monthly   6. Pre-albumin weekly.  7. Fingersticks to monitor glucose every 6 hours until stable then may be decreased to twice a day, coverage with ISS - to be ordered by primary team  8. Hyperglycemia noted on ABG, continue nsulin 5U  9. Spoke wtih ALMA ROSA carranza to decrease TFs if suspicioub for ischemic bowel is high.  10. Would supplement with Phoxilium.    TPN team, pager [phone] spectra [phone]  d/w Dr. Reno and Dr. Nj 78Mh/o  4/26/2019 CABG  C3L, acute hypoxemic respiratory failure, 5/4 Tracheostomy and Lung biopsy: pulmonary amyloidosis, Pneumonia MRSA, Hypotension, acute renal failue/TARAH, elevated transaminitis ASHD/CAD, Pulmonary amyloidosis, atrial fibrillation/flutter, anxiety, depression. TPN initiated 5/23 for severe protein calorie malnutrition     Nutritional goals: 30g lipids, 120g AA, 350g CHO    Plan:  1. Continue Full dose TPN, lipids at full dose. Volume increased to 2000 ml.   2. Strict Intake and Output.  3. Weights three times a week  4. Monitor BMP, Mg+, Ionized Ca++, Phosphorus daily  5. Check Triglycerides daily for first 3 days of TPN, then monthly   6. Pre-albumin weekly.  7. Fingersticks to monitor glucose every 6 hours until stable then may be decreased to twice a day, coverage with ISS - to be ordered by primary team  8. Hyperglycemia noted on ABG, continue nsulin 5U  9. Spoke wtih ALMA ROSA carranza to decrease TFs if suspicioub for ischemic bowel is high.  10. Electrolyte imbalances. Would supplement with Phoxillum.    TPN team, pager [phone] spectra [phone]  d/w Dr. Reno and Dr. Nj

## 2019-05-26 NOTE — PROGRESS NOTE ADULT - REASON FOR ADMISSION
CABG
cabg
s/p CABG
CABG
CABG
CABG surgery
amyloid
cabf
cabg
cad
cardiac surgery
cardiac surgery
fever
S/P CABG
cabg
rod
resp failyure, pulmonary amyloid
-
cabg
S/P CABG
sob

## 2019-05-26 NOTE — PROGRESS NOTE ADULT - SUBJECTIVE AND OBJECTIVE BOX
[facility] DIVISION OF KIDNEY DISEASES AND HYPERTENSION -- FOLLOW UP NOTE  --------------------------------------------------------------------------------  Chief Complaint: TARAH    24 hour events/subjective:  Pt remains on high doses of pressors. Continued on CRRT, kept net even.      PAST HISTORY  --------------------------------------------------------------------------------  No significant changes to PMH, PSH, FHx, SHx, unless otherwise noted    ALLERGIES & MEDICATIONS  --------------------------------------------------------------------------------  Allergies    No Known Allergies    Intolerances      Standing Inpatient Medications  artificial  tears Solution 1 Drop(s) Both EYES every 6 hours  calcium chloride Injectable 1000 milliGRAM(s) IV Push <User Schedule>  chlorhexidine 0.12% Liquid 15 milliLiter(s) Oral Mucosa two times a day  chlorhexidine 4% Liquid 1 Application(s) Topical <User Schedule>  dextrose 50% Injectable 50 milliLiter(s) IV Push once  fat emulsion (Fish Oil and Plant Based) 20% Infusion 8.3 mL/Hr IV Continuous <Continuous>  heparin  Infusion 200 Unit(s)/Hr IV Continuous <Continuous>  heparin  Infusion Syringe 650 Unit(s)/Hr Dialysis <Continuous>  insulin lispro (HumaLOG) corrective regimen sliding scale   SubCutaneous every 6 hours  midodrine 10 milliGRAM(s) Oral every 8 hours  mineral oil for Topical Use 1 Application(s) Topical daily  norepinephrine Infusion 0.034 MICROgram(s)/kG/Min IV Continuous <Continuous>  octreotide  Injectable 100 MICROGram(s) SubCutaneous every 8 hours  opium Tincture 5 milliGRAM(s) Oral four times a day  pantoprazole  Injectable 40 milliGRAM(s) IV Push two times a day  Parenteral Nutrition - Adult 1 Each TPN Continuous <Continuous>  thiamine Injectable 200 milliGRAM(s) IV Push <User Schedule>  vasopressin Infusion 0.083 Unit(s)/Min IV Continuous <Continuous>    PRN Inpatient Medications  sodium chloride 0.9% lock flush 10 milliLiter(s) IV Push every 1 hour PRN      REVIEW OF SYSTEMS  --------------------------------------------------------------------------------  unable to obtain    VITALS/PHYSICAL EXAM  --------------------------------------------------------------------------------  T(C): 34.8 (05-26-19 @ 08:00), Max: 36 (05-26-19 @ 03:00)  HR: 78 (05-26-19 @ 08:00) (50 - 86)  BP: --  RR: 15 (05-26-19 @ 08:00) (9 - 22)  SpO2: 98% (05-26-19 @ 08:00) (80% - 100%)  Wt(kg): --        05-25-19 @ 07:01  -  05-26-19 @ 07:00  --------------------------------------------------------  IN: 3917 mL / OUT: 3794 mL / NET: 123 mL    05-26-19 @ 07:01  -  05-26-19 @ 08:28  --------------------------------------------------------  IN: 141.7 mL / OUT: 88 mL / NET: 53.7 mL      Physical Exam:  	   Gen: critically ill.   	HEENT: +NGT, +trach to vent  	Pulm: mechanical breath sounds+  	CV: RRR, S1S2; no rub  	Abd: +BS, soft, nondistended              : anuric   	LE: Warm, edema+  	Vascular access: +LIJ non-tunneled HD catheter      LABS/STUDIES  --------------------------------------------------------------------------------              10.2   9.4   >-----------<  52       [05-26-19 @ 05:46]              30.8     134  |  105  |  26  ----------------------------<  142      [05-26-19 @ 02:05]  4.1   |  15  |  <0.30        Ca     8.4     [05-26-19 @ 02:05]      iCa    1.18     [05-25 @ 03:41]      Mg     2.6     [05-26-19 @ 02:05]      Phos  4.1     [05-26-19 @ 02:05]    TPro  5.1  /  Alb  2.6  /  TBili  18.6  /  DBili  x   /  AST  128  /  ALT  45  /  AlkPhos  161  [05-26-19 @ 02:05]    PT/INR: PT 18.6 , INR 1.61       [05-25-19 @ 21:44]  PTT: 80.8       [05-26-19 @ 05:46]          [05-25-19 @ 00:46]    Creatinine Trend:  SCr <0.30 [05-26 @ 02:05]  SCr 0.48 [05-25 @ 03:39]  SCr 0.54 [05-25 @ 00:46]  SCr 0.74 [05-24 @ 01:54]  SCr 0.73 [05-23 @ 01:57]    Urinalysis - [05-14-19 @ 19:13]      Color Yellow / Appearance Slightly Turbid / SG 1.011 / pH 5.0      Gluc Negative / Ketone Negative  / Bili Negative / Urobili Negative       Blood Moderate / Protein Trace / Leuk Est Negative / Nitrite Negative      RBC 10 / WBC 4 / Hyaline 32 / Gran  / Sq Epi  / Non Sq Epi 3 / Bacteria Negative      HbA1c 5.4      [04-25-19 @ 17:39]  TSH 0.11      [05-24-19 @ 08:33]  Lipid: chol --, TG 76, HDL --, LDL --      [05-25-19 @ 03:39]    HBsAb <3.0      [05-12-19 @ 00:08]  HBsAg Nonreact      [05-18-19 @ 02:54]  HCV 0.23, Nonreact      [05-18-19 @ 02:54]    YOHANA: titer Negative, pattern --      [05-17-19 @ 09:33]  Free Light Chains: kappa 12.40, lambda 11.58, ratio = 1.07      [05-18 @ 06:43]  Immunofixation Serum:   No Monoclonal Band Identified    Reference Range: None Detected      [05-10-19 @ 12:48]  SPEP Interpretation: Normal Electrophoresis Pattern      [05-10-19 @ 12:48]  Immunofixation Urine: No Monoclonal Band Identified      [05-11-19 @ 16:11]  UPEP Interpretation: Normal Electrophoresis Pattern      [05-11-19 @ 16:11]

## 2019-05-26 NOTE — CONSULT NOTE ADULT - CONSULT REASON
CAD
Heart failure s/p CABG
Nutrition Support
Pulmonary Amyloidosis     (Evaluation on behalf of Dr. Jann Guadarrama)
TARAH  Azotemia
elevated lfts  diarrhea
fever
leukocytosis, amyloidosis
lung biopsy & tracheostomy
pneumoperitoneum

## 2019-05-26 NOTE — CONSULT NOTE ADULT - ASSESSMENT
79M admitted for CABG complicated by respiratory failure due to pulmonary amyloidosis now with concern for perforated viscus with pneumoperitoneum on CTAP      - Spoke with CTICU team and family regarding patient's guarded prognosis. As patient has perforated viscus and worsening lactic acidosis would require exploratory laparotomy but due to patient's cardiopulmonary comorbidities and prexisting multisystem organ failure have discussed the risks and benefits of a major surgery in this setting.   - Family is weighing their options, will follow.    Seen and examined with Dr. Cielo Dukes [phone]

## 2019-05-26 NOTE — PROGRESS NOTE ADULT - ASSESSMENT
77 yo   male with PMH of HLD, Sleep apnea ( non compliant with CPAP), GERD, diverticulitis, depression, anxiety, and panic attack, presented to [facility] on 04/25/19 for LHC after having  abnormal NST done as outpatient, LHC done same day revealed TVD. therefore underwent CABG on 04/26/19, hospital course complicated with septic shock and anuric TARAH.

## 2019-05-26 NOTE — PROGRESS NOTE ADULT - SUBJECTIVE AND OBJECTIVE BOX
[facility] NUTRITION SUPPORT-- FOLLOW UP NOTE  --------------------------------------------------------------------------------  Chief Complaint:    24 hour events/subjective:        PAST HISTORY  --------------------------------------------------------------------------------  No significant changes to PMH, PSH, FHx, SHx, unless otherwise noted    ALLERGIES & MEDICATIONS  --------------------------------------------------------------------------------  Allergies    No Known Allergies    Intolerances      Standing Inpatient Medications  artificial  tears Solution 1 Drop(s) Both EYES every 6 hours  calcium chloride Injectable 1000 milliGRAM(s) IV Push <User Schedule>  chlorhexidine 0.12% Liquid 15 milliLiter(s) Oral Mucosa two times a day  chlorhexidine 4% Liquid 1 Application(s) Topical <User Schedule>  dextrose 50% Injectable 50 milliLiter(s) IV Push once  fat emulsion (Fish Oil and Plant Based) 20% Infusion 8.3 mL/Hr IV Continuous <Continuous>  heparin  Infusion 200 Unit(s)/Hr IV Continuous <Continuous>  heparin  Infusion Syringe 650 Unit(s)/Hr Dialysis <Continuous>  insulin lispro (HumaLOG) corrective regimen sliding scale   SubCutaneous every 6 hours  midodrine 10 milliGRAM(s) Oral every 8 hours  mineral oil for Topical Use 1 Application(s) Topical daily  norepinephrine Infusion 0.034 MICROgram(s)/kG/Min IV Continuous <Continuous>  octreotide  Injectable 100 MICROGram(s) SubCutaneous every 8 hours  opium Tincture 5 milliGRAM(s) Oral four times a day  pantoprazole  Injectable 40 milliGRAM(s) IV Push two times a day  Parenteral Nutrition - Adult 1 Each TPN Continuous <Continuous>  thiamine Injectable 200 milliGRAM(s) IV Push <User Schedule>  vasopressin Infusion 0.083 Unit(s)/Min IV Continuous <Continuous>    PRN Inpatient Medications  sodium chloride 0.9% lock flush 10 milliLiter(s) IV Push every 1 hour PRN      REVIEW OF SYSTEMS  --------------------------------------------------------------------------------  Gen: as per HPI  Skin: No rashes  Head/Eyes/Ears/Mouth: No headache;No sore throat  Respiratory: No dyspnea, cough,   CV: No chest pain, PND, orthopnea  GI: as per HPI  : No increased frequency, dysuria, hematuria, nocturia  MSK: No joint pain/swelling; no back pain; no edema  Neuro: No dizziness/lightheadedness, weakness, seizures, numbness, tingling  Psych: No significant nervousness, anxiety, stress, depression    All other systems were reviewed and are negative, except as noted.        05-25-19 @ 07:01  -  05-26-19 @ 07:00  --------------------------------------------------------  IN: 3917 mL / OUT: 3794 mL / NET: 123 mL    05-26-19 @ 07:01 - 05-26-19 @ 08:50  --------------------------------------------------------  IN: 141.7 mL / OUT: 88 mL / NET: 53.7 mL      LABS/STUDIES  --------------------------------------------------------------------------------              10.2   9.4   >-----------<  52       [05-26-19 @ 05:46]              30.8     134  |  105  |  26  ----------------------------<  142      [05-26-19 @ 02:05]  4.1   |  15  |  <0.30        Ca     8.4     [05-26-19 @ 02:05]      iCa    1.18     [05-25 @ 03:41]      Mg     2.6     [05-26-19 @ 02:05]      Phos  4.1     [05-26-19 @ 02:05]    TPro  5.1  /  Alb  2.6  /  TBili  18.6  /  DBili  x   /  AST  128  /  ALT  45  /  AlkPhos  161  [05-26-19 @ 02:05]    PT/INR: PT 18.6 , INR 1.61       [05-25-19 @ 21:44]  PTT: 80.8       [05-26-19 @ 05:46]          [05-25-19 @ 00:46]    Blood Gas Arterial - Calcium, Ionized: 1.23 mmoL/L (05-26-19 @ 05:42)  Blood Gas Arterial - Calcium, Ionized: 1.16 mmoL/L (05-26-19 @ 02:38)  Glucose, Serum: 142 mg/dL (05-26-19 @ 02:05)  Prealbumin, Serum: 6 mg/dL (05-24-19 @ 08:33)  Prealbumin, Serum: 6 mg/dL (05-20-19 @ 08:34)      VITALS/PHYSICAL EXAM  --------------------------------------------------------------------------------  T(C): 34.8 (05-26-19 @ 08:00), Max: 36 (05-26-19 @ 03:00)  HR: 78 (05-26-19 @ 08:42) (50 - 86)  BP: --  RR: 14 (05-26-19 @ 08:30) (9 - 22)  SpO2: 97% (05-26-19 @ 08:42) (80% - 100%)  Wt(kg): -- [facility] NUTRITION SUPPORT-- ATTENDING/ PA FOLLOW UP NOTE  --------------------------------------------------------------------------------  24 hour events/subjective: Pt planned for CT A/P to r/o ischemic bowel, later today. Tolerating TFs at 30cc/hr.  Rectal tube output 100 O/N        PAST HISTORY  --------------------------------------------------------------------------------  No significant changes to PMH, PSH, FHx, SHx, unless otherwise noted    ALLERGIES & MEDICATIONS  --------------------------------------------------------------------------------  Allergies    No Known Allergies    Intolerances      Standing Inpatient Medications  artificial  tears Solution 1 Drop(s) Both EYES every 6 hours  calcium chloride Injectable 1000 milliGRAM(s) IV Push <User Schedule>  chlorhexidine 0.12% Liquid 15 milliLiter(s) Oral Mucosa two times a day  chlorhexidine 4% Liquid 1 Application(s) Topical <User Schedule>  dextrose 50% Injectable 50 milliLiter(s) IV Push once  fat emulsion (Fish Oil and Plant Based) 20% Infusion 8.3 mL/Hr IV Continuous <Continuous>  heparin  Infusion 200 Unit(s)/Hr IV Continuous <Continuous>  heparin  Infusion Syringe 650 Unit(s)/Hr Dialysis <Continuous>  insulin lispro (HumaLOG) corrective regimen sliding scale   SubCutaneous every 6 hours  midodrine 10 milliGRAM(s) Oral every 8 hours  mineral oil for Topical Use 1 Application(s) Topical daily  norepinephrine Infusion 0.034 MICROgram(s)/kG/Min IV Continuous <Continuous>  octreotide  Injectable 100 MICROGram(s) SubCutaneous every 8 hours  opium Tincture 5 milliGRAM(s) Oral four times a day  pantoprazole  Injectable 40 milliGRAM(s) IV Push two times a day  Parenteral Nutrition - Adult 1 Each TPN Continuous <Continuous>  thiamine Injectable 200 milliGRAM(s) IV Push <User Schedule>  vasopressin Infusion 0.083 Unit(s)/Min IV Continuous <Continuous>    PRN Inpatient Medications  sodium chloride 0.9% lock flush 10 milliLiter(s) IV Push every 1 hour PRN      REVIEW OF SYSTEMS  --------------------------------------------------------------------------------  Gen: as per HPI  Skin: No rashes  Head/Eyes/Ears/Mouth: No headache;No sore throat  Respiratory: No dyspnea, cough,   CV: No chest pain, PND, orthopnea  GI: as per HPI  : No increased frequency, dysuria, hematuria, nocturia  MSK: No joint pain/swelling; no back pain; no edema  Neuro: No dizziness/lightheadedness, weakness, seizures, numbness, tingling  Psych: No significant nervousness, anxiety, stress, depression    All other systems were reviewed and are negative, except as noted.        05-25-19 @ 07:01  -  05-26-19 @ 07:00  --------------------------------------------------------  IN: 3917 mL / OUT: 3794 mL / NET: 123 mL    05-26-19 @ 07:01  -  05-26-19 @ 08:50  --------------------------------------------------------  IN: 141.7 mL / OUT: 88 mL / NET: 53.7 mL      LABS/STUDIES  --------------------------------------------------------------------------------              10.2   9.4   >-----------<  52       [05-26-19 @ 05:46]              30.8     134  |  105  |  26  ----------------------------<  142      [05-26-19 @ 02:05]  4.1   |  15  |  <0.30        Ca     8.4     [05-26-19 @ 02:05]      iCa    1.18     [05-25 @ 03:41]      Mg     2.6     [05-26-19 @ 02:05]      Phos  4.1     [05-26-19 @ 02:05]    TPro  5.1  /  Alb  2.6  /  TBili  18.6  /  DBili  x   /  AST  128  /  ALT  45  /  AlkPhos  161  [05-26-19 @ 02:05]    PT/INR: PT 18.6 , INR 1.61       [05-25-19 @ 21:44]  PTT: 80.8       [05-26-19 @ 05:46]          [05-25-19 @ 00:46]    Blood Gas Arterial - Calcium, Ionized: 1.23 mmoL/L (05-26-19 @ 05:42)  Blood Gas Arterial - Calcium, Ionized: 1.16 mmoL/L (05-26-19 @ 02:38)  Glucose, Serum: 142 mg/dL (05-26-19 @ 02:05)  Prealbumin, Serum: 6 mg/dL (05-24-19 @ 08:33)  Prealbumin, Serum: 6 mg/dL (05-20-19 @ 08:34)      VITALS/PHYSICAL EXAM  --------------------------------------------------------------------------------  T(C): 34.8 (05-26-19 @ 08:00), Max: 36 (05-26-19 @ 03:00)  HR: 78 (05-26-19 @ 08:42) (50 - 86)  BP: --  RR: 14 (05-26-19 @ 08:30) (9 - 22)  SpO2: 97% (05-26-19 @ 08:42) (80% - 100%)  Wt(kg): --    Physical Exam:    Gen: NAD, jaundice, family at bedside.  HEENT: PERRL, NJ tube in place  Chest: non labored breathing  Abd:  firmly distended, rectal tube in place   Exttrem:  UE: Warm, FROM, no clubbing, intact strength; no edema; no asterixis       LE: Warm, FROM, no clubbing, intact strength; no edema

## 2019-05-26 NOTE — CONSULT NOTE ADULT - SUBJECTIVE AND OBJECTIVE BOX
GENERAL SURGERY CONSULT NOTE  Attending: Bank  Service: ACS   Contact: p [phone]    HPI  80 yo M with a PMH HLD, ARCENIO, GERD, diverticulitis,  admitted on 4/25/19 for non-urgent right and left heart cath, found to have multi-vessel disease, and was scheduled for a 3 vessel CABG, which he had on 4/26. 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 His course was also complicated by PNA (suspected ventilator-acquired), for which he was on Vancomycin (4/28-5/2), Cefepime (4/28-29), Meropenem (4/29-5/9), and Micafungin (5/5-5/10). In addition, he developed acute renal failure, for which he started HD on 5/11 with UF, changed to CRRT. Over the last several days the patient developed increasing LFTs and diarrhea. GI was consulted, Cdiff was negative. It was thought there was a possibility that he could have GI infiltration of amyloidosis.  Over the last 24 hours his pressor requirements were increasing and his lactate was increasing (up to 8 today) and a CTAP was obtained that revealed pneumoperitoneum as well as non enhancing loops of bowel and free fluid concerning for perforated viscus/visceral ischemia.     On exam he was on the ventilator via tracheostomy, on levophed (0.36ug/kg/hr) and vasopressin (0.1 ug/kg/hr). He had scleral icterus.  His abdomen was distended, patient did not react to abdominal exam. On CVVHD.    PMH/PSH  Diverticulitis  Nocturia  Panic attacks  Anxiety  Depression  Asthma  Sleep Apnea    History of partial colectomy  History of colon resection  History of eye surgery  Cataract extraction status  Status post lung surgery  S/P eye surgery  Bunion  Sinus Disorder  Inguinal Hernia Bilateral  Shoulder Problem  Carpal Tunnel Syndrome      MEDICATIONS  albumin human  5% IVPB 250 milliLiter(s) IV Intermittent once  albumin human  5% IVPB 250 milliLiter(s) IV Intermittent once  artificial  tears Solution 1 Drop(s) Both EYES every 6 hours  calcium chloride Injectable 1000 milliGRAM(s) IV Push <User Schedule>  chlorhexidine 0.12% Liquid 15 milliLiter(s) Oral Mucosa two times a day  chlorhexidine 4% Liquid 1 Application(s) Topical <User Schedule>  dextrose 50% Injectable 50 milliLiter(s) IV Push once  fat emulsion (Fish Oil and Plant Based) 20% Infusion 12.5 mL/Hr IV Continuous <Continuous>  heparin  Infusion 200 Unit(s)/Hr IV Continuous <Continuous>  heparin  Infusion Syringe 650 Unit(s)/Hr Dialysis <Continuous>  hydrocortisone sodium succinate Injectable 50 milliGRAM(s) IV Push every 8 hours  insulin lispro (HumaLOG) corrective regimen sliding scale   SubCutaneous every 6 hours  meropenem  IVPB 1000 milliGRAM(s) IV Intermittent every 8 hours  meropenem  IVPB      midodrine 10 milliGRAM(s) Oral every 8 hours  mineral oil for Topical Use 1 Application(s) Topical daily  norepinephrine Infusion 0.13 MICROgram(s)/kG/Min IV Continuous <Continuous>  opium Tincture 5 milliGRAM(s) Oral four times a day  pantoprazole  Injectable 40 milliGRAM(s) IV Push two times a day  Parenteral Nutrition - Adult 1 Each TPN Continuous <Continuous>  Parenteral Nutrition - Adult 1 Each TPN Continuous <Continuous>  sodium chloride 0.9% lock flush 10 milliLiter(s) IV Push every 1 hour PRN  thiamine Injectable 200 milliGRAM(s) IV Push <User Schedule>  vasopressin Infusion 0.083 Unit(s)/Min IV Continuous <Continuous>      Allergies    No Known Allergies    Intolerances      Physical Exam  T(C): 34.8 (05-26-19 @ 08:00), Max: 36 (05-26-19 @ 03:00)  HR: 85 (05-26-19 @ 14:15) (53 - 95)  BP: --  RR: 17 (05-26-19 @ 14:15) (10 - 22)  SpO2: 94% (05-26-19 @ 14:15) (80% - 100%)  Wt(kg): --  Tmax: T(C): , Max: 36 (05-26-19 @ 03:00)  Wt(kg): --    05-25-19 @ 07:01  -  05-26-19 @ 07:00  --------------------------------------------------------  IN: 3917 mL / OUT: 3794 mL / NET: 123 mL    05-26-19 @ 07:01  -  05-26-19 @ 14:29  --------------------------------------------------------  IN: 1975.8 mL / OUT: 868 mL / NET: 1107.8 mL        Gen: NAD  Neuro: AAOx3  HEENT: normocephalic, atraumatic, scleral icterus present   CV: S1, S2, RRR  Pulm: CTA B/L  Abd: distended, soft, no rebund or guarding  Ext: warm, no edema    LABS                        10.2   9.4   )-----------( 52       ( 26 May 2019 05:46 )             30.8     05-26    134<L>  |  105  |  26<H>  ----------------------------<  142<H>  4.1   |  15<L>  |  <0.30<L>    Ca    8.4      26 May 2019 02:05  Phos  4.1     05-26  Mg     2.6     05-26    TPro  5.1<L>  /  Alb  2.6<L>  /  TBili  18.6<H>  /  DBili  x   /  AST  128<H>  /  ALT  45  /  AlkPhos  161<H>  05-26    PT/INR - ( 25 May 2019 21:44 )   PT: 18.6 sec;   INR: 1.61 ratio         PTT - ( 26 May 2019 11:56 )  PTT:84.4 sec    ABG - ( 26 May 2019 11:52 )  pH, Arterial: 7.28  pH, Blood: x     /  pCO2: 33    /  pO2: 100   / HCO3: 15    / Base Excess: -10.4 /  SaO2: 98                    IMAGING  CTAP read pending GENERAL SURGERY CONSULT NOTE  Attending: Bank  Service: ACS   Contact: p [phone]    HPI  80 yo M with a PMH HLD, ARCENIO, GERD, diverticulitis,  admitted on 4/25/19 for non-urgent right and left heart cath, found to have multi-vessel disease, and was scheduled for a 3 vessel CABG, which he had on 4/26. 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 His course was also complicated by PNA (suspected ventilator-acquired), for which he was on Vancomycin (4/28-5/2), Cefepime (4/28-29), Meropenem (4/29-5/9), and Micafungin (5/5-5/10). In addition, he developed acute renal failure, for which he started HD on 5/11 with UF, changed to CRRT. Over the last several days the patient developed increasing LFTs and diarrhea. GI was consulted, Cdiff was negative. It was thought there was a possibility that he could have GI infiltration of amyloidosis.  Over the last 24 hours his pressor requirements were increasing and his lactate was increasing (up to 8 today) and a CTAP was obtained that revealed pneumoperitoneum as well as non enhancing loops of bowel and free fluid concerning for perforated viscus/visceral ischemia.     On exam he was on the ventilator via tracheostomy, on levophed (0.36ug/kg/hr) and vasopressin (0.1 ug/kg/hr). He had scleral icterus.  His abdomen was distended, patient did not react to abdominal exam. On CVVHD.    PMH/PSH  Diverticulitis  Nocturia  Panic attacks  Anxiety  Depression  Asthma  Sleep Apnea    History of partial colectomy  History of colon resection  History of eye surgery  Cataract extraction status  Status post lung surgery  S/P eye surgery  Bunion  Sinus Disorder  Inguinal Hernia Bilateral  Shoulder Problem  Carpal Tunnel Syndrome      MEDICATIONS  albumin human  5% IVPB 250 milliLiter(s) IV Intermittent once  albumin human  5% IVPB 250 milliLiter(s) IV Intermittent once  artificial  tears Solution 1 Drop(s) Both EYES every 6 hours  calcium chloride Injectable 1000 milliGRAM(s) IV Push <User Schedule>  chlorhexidine 0.12% Liquid 15 milliLiter(s) Oral Mucosa two times a day  chlorhexidine 4% Liquid 1 Application(s) Topical <User Schedule>  dextrose 50% Injectable 50 milliLiter(s) IV Push once  fat emulsion (Fish Oil and Plant Based) 20% Infusion 12.5 mL/Hr IV Continuous <Continuous>  heparin  Infusion 200 Unit(s)/Hr IV Continuous <Continuous>  heparin  Infusion Syringe 650 Unit(s)/Hr Dialysis <Continuous>  hydrocortisone sodium succinate Injectable 50 milliGRAM(s) IV Push every 8 hours  insulin lispro (HumaLOG) corrective regimen sliding scale   SubCutaneous every 6 hours  meropenem  IVPB 1000 milliGRAM(s) IV Intermittent every 8 hours  meropenem  IVPB      midodrine 10 milliGRAM(s) Oral every 8 hours  mineral oil for Topical Use 1 Application(s) Topical daily  norepinephrine Infusion 0.13 MICROgram(s)/kG/Min IV Continuous <Continuous>  opium Tincture 5 milliGRAM(s) Oral four times a day  pantoprazole  Injectable 40 milliGRAM(s) IV Push two times a day  Parenteral Nutrition - Adult 1 Each TPN Continuous <Continuous>  Parenteral Nutrition - Adult 1 Each TPN Continuous <Continuous>  sodium chloride 0.9% lock flush 10 milliLiter(s) IV Push every 1 hour PRN  thiamine Injectable 200 milliGRAM(s) IV Push <User Schedule>  vasopressin Infusion 0.083 Unit(s)/Min IV Continuous <Continuous>      Allergies    No Known Allergies    Intolerances      Physical Exam  T(C): 34.8 (05-26-19 @ 08:00), Max: 36 (05-26-19 @ 03:00)  HR: 85 (05-26-19 @ 14:15) (53 - 95)  BP: --  RR: 17 (05-26-19 @ 14:15) (10 - 22)  SpO2: 94% (05-26-19 @ 14:15) (80% - 100%)  Wt(kg): --  Tmax: T(C): , Max: 36 (05-26-19 @ 03:00)  Wt(kg): --    05-25-19 @ 07:01  -  05-26-19 @ 07:00  --------------------------------------------------------  IN: 3917 mL / OUT: 3794 mL / NET: 123 mL    05-26-19 @ 07:01  -  05-26-19 @ 14:29  --------------------------------------------------------  IN: 1975.8 mL / OUT: 868 mL / NET: 1107.8 mL        Gen: NAD  Neuro: AAOx3  HEENT: normocephalic, atraumatic, scleral icterus present   CV: S1, S2, RRR  Pulm: CTA B/L  Abd: distended, soft, no rebund or guarding  Ext: warm, no edema    LABS                        10.2   9.4   )-----------( 52       ( 26 May 2019 05:46 )             30.8     05-26    134<L>  |  105  |  26<H>  ----------------------------<  142<H>  4.1   |  15<L>  |  <0.30<L>    Ca    8.4      26 May 2019 02:05  Phos  4.1     05-26  Mg     2.6     05-26    TPro  5.1<L>  /  Alb  2.6<L>  /  TBili  18.6<H>  /  DBili  x   /  AST  128<H>  /  ALT  45  /  AlkPhos  161<H>  05-26    PT/INR - ( 25 May 2019 21:44 )   PT: 18.6 sec;   INR: 1.61 ratio         PTT - ( 26 May 2019 11:56 )  PTT:84.4 sec    ABG - ( 26 May 2019 11:52 )  pH, Arterial: 7.28  pH, Blood: x     /  pCO2: 33    /  pO2: 100   / HCO3: 15    / Base Excess: -10.4 /  SaO2: 98                    IMAGING  < from: CT Abdomen and Pelvis w/ IV Cont (05.26.19 @ 12:41) >    EXAM:  CT ABDOMEN AND PELVIS IC                            PROCEDURE DATE:  05/26/2019            INTERPRETATION:  CLINICAL INFORMATION: Lactic acidosis, evaluate for   bowel ischemia    COMPARISON: CT chest/abdomen/pelvis 5/16/2019.    PROCEDURE:   CT Angiography of the Abdomen and Pelvis with and without intravenous   contrast.  Noncontrast imaging was performed followed by arterial phase and venous   phases.  Intravenous contrast: 90 ml Omnipaque 350. 10 ml discarded.  Oral contrast: None.  Sagittal and coronal reformats were performed as well as 3D (MIP)   reconstructions.    FINDINGS:    LOWER CHEST: Small bilateral pleural effusions, left greater than right,   with associated bibasilar passive and linear atelectasis. Mild opacities   in both lung bases. Left lower lobe wedge resection. Status post median   sternotomy with subjacent small anterior mediastinal hematoma, partially   imaged and unchanged. Cardiomegaly. Status post CABG. Epicardial leads   are again noted. Coronary artery and aortic valvular calcifications.    LIVER: Within normal limits.  BILE DUCTS: Normal caliber.  GALLBLADDER: Cholelithiasis. Contracted gallbladder.  SPLEEN: Interval increase in number and size of the wedge-shaped   hypodense defects.  PANCREAS: Within normal limits.  ADRENALS: Within normal limits.  KIDNEYS/URETERS: Bilateral wedge-shaped hypodensities, new. No   hydronephrosis. Bilateral renal cysts.    BLADDER: Underdistended.  REPRODUCTIVE ORGANS: Normal sized prostate. Likely large bilateral   hydroceles.    BOWEL: Enteric tube with tip in the fundus. Rectal tube is visualized.   Rectosigmoid colonic sutures are noted. There is hypoenhancement   involving multiple loops of small bowel. Question hyperenhancement of the   few loops of large bowel. No pneumatosis or portal venous gas. No bowel   obstruction. Appendix is normal.  PERITONEUM: Moderate volume ascites, significantly increased. New small   pneumoperitoneum.  VESSELS:  Moderate to marked atherosclerotic changes and stenosis of the   celiac axis and SMA, as well as the bilateral renal arteries. The hepatic   arteries and the left gastric artery are not well visualized on this   exam, change from the prior exam. Mild ectasia of the infrarenal   abdominal aorta. The VICKEY is patent. The portal, hepatic and the splenic   veins are patent. The SMV is also patent as far as it can be visualized.  RETROPERITONEUM: No lymphadenopathy.    ABDOMINAL WALL: Tiny fat-containing umbilical hernia. Anasarca.  BONES: Degenerative changes.    IMPRESSION:     Hypoenhancement involving multiple loops of small and perhaps a few loops   of lower bowel, new small amount of pneumoperitoneum and moderate ascites   are highly suspicious for bowel ischemia/perforation. No pneumatosis or   portal venous gas. Moderate to marked atherosclerotic changes and   stenosis of the celiac axis and SMA, as well as the bilateral renal   arteries. The VICKEY is patent. SMV and portal veins are patent. The hepatic   arteries and the left gastric artery, arises from the celiac axis but are   not well opacified on today's exam, change from the prior exam.  New bilateral wedge-shaped renal hypodensities, concerning for bilateral   renal infarcts.  Progression of splenic infarcts.  Mild airspace opacities in both lung bases can represent pneumonia.   Bilateral pleural effusions, left greater than right.    Findings discussed by Dr. Zavala with Dr. Jose on 5/26/2019 at 1:43 PM   with read back.    < end of copied text >

## 2019-05-26 NOTE — CONSULT NOTE ADULT - ATTENDING COMMENTS
Patient seen and examined with house staff  Hospitalization course reviewed with CTU staff and Dr. Milner  Patient now in multi-system organ failure (hematologic, respiratory, hepatic, renal, etc.), which has been getting worse of the last few weeks  Patient now with increasing lactate over the last 24 hours.  Imaging today showing pneumoperitoneum and possible multi-focal bowel ischemia.    I have discussed the current clinical situation with the family at length.  I have discussed that the patient most likely has small bowel ischemia with a perforation.    I have discussed that this pathology will likely lead to death within the next couple of days if not resected in the OR.  I have also discussed that a laparotomy may discover unrecoverable, inoperative bowel ischemia, and that even if a the segment of bowel ischemia was limited, ultimately I do not feel that the patient will survive his present multiorgan failure.    Consult and clinical decision making discussed with CTU staff and Dr. Milner.    However, I have offered a laparotomy to the family.  They did not immediately consent to laparotomy, but are continuing to discuss their options.  The CTU staff will recontact the general surgery is a laparotomy is agreed upon by the family.

## 2019-05-27 LAB
CULTURE RESULTS: SIGNIFICANT CHANGE UP
SPECIMEN SOURCE: SIGNIFICANT CHANGE UP

## 2019-06-04 NOTE — H&P CARDIOLOGY - TOBACCO USE
[Negative] : Allergic/Immunologic [FreeTextEntry9] : Left thigh swelling in the area of fluid accumulation in the skin resection Never smoker

## 2019-06-05 LAB
CULTURE RESULTS: SIGNIFICANT CHANGE UP
SPECIMEN SOURCE: SIGNIFICANT CHANGE UP

## 2019-06-06 LAB
CULTURE RESULTS: SIGNIFICANT CHANGE UP
SPECIMEN SOURCE: SIGNIFICANT CHANGE UP

## 2019-06-26 LAB
CULTURE RESULTS: SIGNIFICANT CHANGE UP
SPECIMEN SOURCE: SIGNIFICANT CHANGE UP

## 2020-12-15 NOTE — ASU PATIENT PROFILE, ADULT - TEACHING/LEARNING FACTORS INFLUENCE READINESS TO LEARN
Sushil on [facility] request a refill on Accu-Check SmartviewTest Strip.  Any questions, please call Saad at [phone]   none

## 2021-10-04 NOTE — BEHAVIORAL HEALTH ASSESSMENT NOTE - NS ED BHA ALCOHOL
None known Pt scheduled for surgery for Cysto Right Retrograde Pyelogram Right Ureteral stent placement with dr Cedeno  and preop instructions including instructions for taking Famotidine  on the day of surgery, given verbally and with use of  written materials, and patient confirming understanding of such instructions using  teach back method.  Comp EKG requested from PCP   Pt to see Oncology Wed and will ask for clearance

## 2022-07-01 NOTE — DISCHARGE NOTE FOR THE EXPIRED PATIENT - NS PATIENT DEATH CRITERIA
Impression: Dermatochalasis of left upper eyelid: H02.834. Plan: Patient is not symptomatic and will observe Explained how it may become visually significant. The patient is 
advised to contact us for any change or obstruction of vision. Patient is dead based on Cardiopulmonary criteria including absent breath sounds, pulselessness and/or asystole

## 2022-10-16 NOTE — ANESTHESIA FOLLOW-UP NOTE - NSINTERVIEW_GEN_ALL_CORE
Interviewed and evaluated Pt resting in room, easy, equal chest rise and fall. Pt remains calm and cooperative.

## 2023-05-31 NOTE — PROGRESS NOTE ADULT - PROBLEM SELECTOR PLAN 2
- Agree with repeating blood cultures and empiric antibiotics. Low Dose Naltrexone Counseling- I discussed with the patient the potential risks and side effects of low dose naltrexone including but not limited to: more vivid dreams, headaches, nausea, vomiting, abdominal pain, fatigue, dizziness, and anxiety.

## 2023-09-08 NOTE — PROGRESS NOTE ADULT - SUBJECTIVE AND OBJECTIVE BOX
CHRIS THAKKAR   MRN#: [id number]     The patient is a 79y Male who was seen, evaluated, & examined with the CTICU staff post-operatively with a multidisciplinary care plan formulated & implemented.  All available clinical, laboratory, radiographic, pharmacologic, and electrocardiographic data were reviewed & analyzed.      The patient was in the CTICU in critical condition secondary to:     persistent cardiopulmonary dysfunction  undifferentiated shock  uncontrolled Type II Diabetes melitus-stress hyperglycemia    For support and evaluation & prevention of further decompensation secondary to persistent cardiopulmonary dysfunction and cardiogenic shock-cardiovascular dysfunction, respiratory status required:     supplemental oxygen with full ventilatory support / mechanical ventilation   continuous pulse oximetry monitoring  following ABGs with A-line monitoring    Invasive hemodynamic monitoring with an A-line was required for continuous MAP/BP monitoring to ensure adequate cardiovascular support and to evaluate for & help prevent decompensation while receiving IV Vasopressin infusion secondary to undifferentiated shock.    Metabolic stability, uncontrolled type II Diabetes mellitus-stress hyperglycemia, & infection prophylaxis required an IV regular Insulin drip & the following of serial glucose levels to help achieve & maintain euglycemia.      In addition:  Respiratory failure requiring trach, on trach collar for most of the day yesterday; will continue  Unclear etiology of respiratory failure - pulm biopsy shows pulmonary amyloid - will check labs for evidence of systemic disease and consult pulm   Heart on echo does not have characteristic amyloid appearance (yet) but LV is hypertrophied and he has low voltage on EKG  Off antibiotics and tapering steroids  H/H responded appropriately to one unit PRBC transfusion  Volume up, increasing Cr (normal on 4/30); will diurese, aim for 1L overall negative  Sodium still elevated - increasing free water    The patient required critical care management and I personally provided 30 minutes of non-continuous care to the patient, excluding separate procedures, in addition to  discussing the patient and plan at length with the CTICU staff and helping coordinate care. Mohs Method Verbiage: An incision at a 45 degree angle following the standard Mohs approach was done and the specimen was harvested as a microscopic controlled layer.

## 2024-03-14 NOTE — PATIENT PROFILE ADULT - NSPROMEDSADMININFO_GEN_A_NUR
Care Transitions Outreach Attempt      Patient: Vita Clarke Patient : 1938 MRN: [id number]  Reason for Admission: Chest Pain w/ ACS R/O    Discharge Date: 24       RARS: Readmission Risk Score: 12.1  Facility: [facility]     Last Discharge Facility       Date Complaint Diagnosis Description Type Department Provider    24 Chest Pain; Shortness of Breath Unstable angina pectoris (HCC) ... ED [facility] (Discharged[facility] Jay[facility]ason MD; Radha Beckett...          Noted following upcoming appointments from discharge chart review:   [facility] follow up appointment(s):   Future Appointments   Date Time Provider Department Center   3/18/2024  3:00 PM Joaquim Gonzalez MD [facility] E S IM [facility]   3/21/2024  9:00 AM Slava Hoang MD [facility] ID [facility]   4/3/2024  9:15 AM James Hoang MD [facility] RHEUM [facility]   2024 10:00 AM Maged Whitlock MD [facility] CT SURG [facility]   2024 11:40 AM Gabrielle Grullon, APRN - CNP [facility] Heart [facility]       Attempt to reach for Care Transition follow up call unsuccessful. HIPAA compliant message left requesting call back.      no concerns

## 2025-02-04 NOTE — INPATIENT CERTIFICATION FOR MEDICARE PATIENTS - THE SEVERITY OF SIGNS/SYMPTOMS. (SEE ED/ADMIT DOCUMENTS)
Patient back from IR. Reconnected to monitors   1. The severity of signs/symptoms.(See ED/admit documents)